# Patient Record
Sex: MALE | Race: WHITE | NOT HISPANIC OR LATINO | Employment: OTHER | URBAN - METROPOLITAN AREA
[De-identification: names, ages, dates, MRNs, and addresses within clinical notes are randomized per-mention and may not be internally consistent; named-entity substitution may affect disease eponyms.]

---

## 2017-01-17 ENCOUNTER — HOSPITAL ENCOUNTER (OUTPATIENT)
Dept: RADIOLOGY | Facility: HOSPITAL | Age: 67
Discharge: HOME | End: 2017-01-17
Attending: INTERNAL MEDICINE
Payer: COMMERCIAL

## 2017-01-17 ENCOUNTER — OFFICE VISIT (OUTPATIENT)
Dept: CARDIOLOGY | Facility: CLINIC | Age: 67
End: 2017-01-17
Payer: COMMERCIAL

## 2017-01-17 ENCOUNTER — LAB VISIT (OUTPATIENT)
Dept: LAB | Facility: HOSPITAL | Age: 67
End: 2017-01-17
Payer: COMMERCIAL

## 2017-01-17 VITALS
HEART RATE: 63 BPM | BODY MASS INDEX: 24.08 KG/M2 | DIASTOLIC BLOOD PRESSURE: 74 MMHG | WEIGHT: 187.63 LBS | HEIGHT: 74 IN | SYSTOLIC BLOOD PRESSURE: 159 MMHG

## 2017-01-17 DIAGNOSIS — I27.20 PULMONARY HYPERTENSION: Primary | ICD-10-CM

## 2017-01-17 DIAGNOSIS — Z76.82 ORGAN TRANSPLANT CANDIDATE: ICD-10-CM

## 2017-01-17 DIAGNOSIS — Z76.82 AWAITING ORGAN TRANSPLANT STATUS: ICD-10-CM

## 2017-01-17 PROCEDURE — 99214 OFFICE O/P EST MOD 30 MIN: CPT | Mod: S$GLB,,, | Performed by: INTERNAL MEDICINE

## 2017-01-17 PROCEDURE — 86829 HLA CLASS I/II ANTIBODY QUAL: CPT | Mod: PO

## 2017-01-17 PROCEDURE — 86829 HLA CLASS I/II ANTIBODY QUAL: CPT | Mod: 91,PO

## 2017-01-17 PROCEDURE — 99999 PR PBB SHADOW E&M-EST. PATIENT-LVL IV: CPT | Mod: PBBFAC,,, | Performed by: INTERNAL MEDICINE

## 2017-01-17 PROCEDURE — 71020 XR CHEST PA AND LATERAL: CPT | Mod: 26,,, | Performed by: RADIOLOGY

## 2017-01-17 PROCEDURE — 1159F MED LIST DOCD IN RCRD: CPT | Mod: S$GLB,,, | Performed by: INTERNAL MEDICINE

## 2017-01-17 RX ORDER — CHOLECALCIFEROL (VITAMIN D3) 25 MCG
5000 TABLET ORAL
COMMUNITY
End: 2017-05-09

## 2017-01-17 RX ORDER — LABETALOL 100 MG/1
100 TABLET, FILM COATED ORAL EVERY 12 HOURS
Qty: 60 TABLET | Refills: 11 | Status: SHIPPED | OUTPATIENT
Start: 2017-01-17 | End: 2018-02-05 | Stop reason: SDUPTHER

## 2017-01-17 NOTE — PROGRESS NOTES
Subjective:    Patient ID:  Tom Gage is a 66 y.o. male who presents for evaluation of PA Pressure of 65; Pre-op Exam (Kidney transplant); and Medication Management (amlodipine)      HPI Comments: Mr. Gage is a 66 y.o. year old white male with ESRD currently evaluated for a renal transplant. He has advanced kidney disease secondary to Hypertensive nephrosclerosis and diabetic nephropathy.Patient is currently on peritoneal dialysis from Jul 2016( was on hemodialysis for 2 months prior).    He has PMH of CAD when he had NSTEMI in Feb 2012. Left Heart Cath showed 2 lesions of 50% and 20%. Medical management was advised. In addition he had diastolic heart failure in 2012.    He was hospitalized for a pneumonia in April and had recurrence of similar symptoms around Thanksgiving 2016. He has persistent right sided pleural effusion that has never been tapped( thoracentesis).  He saw renal transplant team on 12/16 and also had a 2 D Echo done that showed elevated PA pressure - estimate 65. Hence referred for further cardiac evaluation. He denies any chest pain, SOB, palpitations. Denies orthopnea. Physically very active and no limitations.      Past Medical History   Diagnosis Date    Anemia of chronic renal failure, stage 5     CAD (coronary artery disease)     CHF (congestive heart failure)     CKD (chronic kidney disease) stage 5, GFR less than 15 ml/min     Diastolic dysfunction 12/16/2016    GERD (gastroesophageal reflux disease)     Hyperparathyroidism, secondary renal     Hypertension     Metabolic acidosis     MI (myocardial infarction) Feb 2012    Pulmonary hypertension 12/16/2016    TIA (transient ischemic attack)     Type 2 diabetes mellitus with diabetic nephropathy      Past Surgical History   Procedure Laterality Date    Cholecystectomy  July 2010    Eye surgery Bilateral      bilateral cataracts    Gastric bypass  May 2014    Gastric sleeve  June 2013     Malfunctioned requiring  bypass    Shoulder arthroscopy Left 11/18/14     RCR; glenoid labral repair; arch decompression    Elbow surgery Left 3/18/14     anterior submuscular transposition, ulnar nerve    Basal cell carcinoma excision Left Jan 2011     left forehead    Rotator cuff repair Left 2014    Hernia repair      Abdominal surgery       PD catheter     Current Outpatient Prescriptions on File Prior to Visit   Medication Sig Dispense Refill    amlodipine (NORVASC) 10 MG tablet Take 10 mg by mouth once daily. As needed      calcitRIOL (ROCALTROL) 0.25 MCG Cap once daily.       FOLIC ACID/VIT BCOMP,C (RENAL-CARLOS ORAL) Take 1 tablet by mouth once daily.      hyoscyamine (LEVSIN/SL) 0.125 mg Subl Place 0.125 mg under the tongue every 4 (four) hours as needed (for dry heaving).       nitroGLYCERIN (NITROSTAT) 0.4 MG SL tablet Place 0.4 mg under the tongue every 5 (five) minutes as needed for Chest pain.      ondansetron (ZOFRAN-ODT) 4 MG TbDL Take 2 tablets (8 mg total) by mouth every 8 (eight) hours as needed (Nausea). 21 tablet 0    pantoprazole (PROTONIX) 40 MG tablet Take 40 mg by mouth 2 (two) times daily.       sevelamer carbonate (RENVELA) 800 mg Tab Take 800 mg by mouth 3 (three) times daily.      sodium bicarbonate 325 MG tablet 3 (three) times daily.   10    tamsulosin (FLOMAX) 0.4 mg Cp24 TAKE ONE CAPSULE BY MOUTH EVERY EVENING 90 capsule 1    [DISCONTINUED] LABETALOL HCL (LABETALOL ORAL) Take 100 mg by mouth once daily.      [DISCONTINUED] VITAMIN D2 50,000 unit capsule TAKE 1 CAPSULE BY MOUTH EVERY 7 DAYS 12 capsule 1     No current facility-administered medications on file prior to visit.      Review of patient's allergies indicates:   Allergen Reactions    Latex, natural rubber Rash     Latex tape causes blisters    Adhesive Dermatitis    Morphine Itching and Hives     Body Rash/ Reddness  Phlebitis and itching     Ace inhibitors      Other reaction(s): COUGH       Review of Systems   Constitution:  Negative for diaphoresis and malaise/fatigue.   HENT: Negative.    Eyes: Negative for blurred vision, double vision and photophobia.   Cardiovascular: Negative for chest pain, cyanosis, dyspnea on exertion, leg swelling, near-syncope, orthopnea and palpitations.   Respiratory: Negative for cough and shortness of breath.    Endocrine: Negative.    Hematologic/Lymphatic: Negative.    Skin: Negative.    Musculoskeletal: Negative for joint pain and joint swelling.   Gastrointestinal: Negative for abdominal pain and anorexia.   Genitourinary: Negative for decreased libido.   Neurological: Negative for dizziness and focal weakness.   Psychiatric/Behavioral: Negative.         Objective:    Physical Exam   Constitutional: He is oriented to person, place, and time. He appears well-developed and well-nourished. No distress.   HENT:   Head: Normocephalic and atraumatic.   Eyes: Conjunctivae and EOM are normal. Pupils are equal, round, and reactive to light. Right eye exhibits no discharge. Left eye exhibits no discharge. No scleral icterus.   Neck: Normal range of motion. Neck supple. No JVD present. No tracheal deviation present. No thyromegaly present.   Cardiovascular: Normal rate, regular rhythm and intact distal pulses.  Exam reveals no gallop and no friction rub.    Murmur heard.  AV fistula in left arm   Pulmonary/Chest: Effort normal and breath sounds normal. No respiratory distress. He has no wheezes. He has no rales.   Abdominal: Soft. Bowel sounds are normal. He exhibits no distension. There is no tenderness. There is no rebound.   Musculoskeletal: Normal range of motion. He exhibits edema.   Neurological: He is alert and oriented to person, place, and time. No cranial nerve deficit. Coordination normal.   Skin: Skin is warm and dry. He is not diaphoretic. No erythema.   Psychiatric: He has a normal mood and affect. His behavior is normal.   Vitals reviewed.    Visit Vitals    BP (!) 159/74 (BP Location: Right arm,  "Patient Position: Sitting, BP Method: Automatic)    Pulse 63    Ht 6' 2" (1.88 m)    Wt 85.1 kg (187 lb 9.8 oz)    BMI 24.09 kg/m2   TTE 12/16/16  CONCLUSIONS     1 - Normal left ventricular systolic function (EF 55-60%).     2 - Eccentric hypertrophy.     3 - Left ventricular diastolic dysfunction.     4 - Severe left atrial enlargement.     5 - Normal right ventricular systolic function .     6 - Mild aortic stenosis, ARMIN = 1.79 cm2, peak velocity = 2.5 m/s, mean gradient = 13.0 mmHg.     7 - Trivial aortic regurgitation.     8 - Mild to moderate mitral regurgitation.     9 - Trivial to mild tricuspid regurgitation.     10 - Trivial pulmonic regurgitation.     11 - Trivial pericardial effusion with thickened pericardium .     12 - Pulmonary hypertension. The estimated PA systolic pressure is 65 mmHg.   TTE 12/21/15  CONCLUSIONS     1 - Concentric hypertrophy.     2 - Normal left ventricular systolic function (EF 60-65%).     3 - Left ventricular diastolic dysfunction.     4 - Mild left atrial enlargement.     5 - Normal right ventricular systolic function .     6 - The estimated PA systolic pressure is 37 mmHg.     7 - Mild tricuspid regurgitation           Assessment:       1. Pulmonary hypertension    2       ESRD  3       CAD- Previous NSTEMI  4.      HTN with nephropathy and retinopathy  5       DM2 with nephropathy and retinopathy  6       S/p gastric bypass surgery     Plan:       66 year old male patient with DM/HTN , ESRD on PD being evaluated for renal transplant candidacy. Found to have elevated PA pressures on trans thoracic echo- 65.  He was euvolemic at the time of echo with IVC was not enlarged suggesting RA pressure of around 3. Patient is clinically euvolemic at this time.   No signs of DULCE- he lost 100 pounds from gastric sleeve surgery and was told that he did not need to be on CPAP.   No prior history of PE/DVT. He worked as a  in [employer] for 40 years and recently retired. He did a " lot of scuba diving while in [place]. These are probably not related to his current PA pressures as it was normal ( 37 on TTE )a year ago on his initial cardiac evaluation for assessment of renal transplant candidacy.     Will get a Right heart cath to confirm elevated PA pressures- Will get a VQ scan, PFTs if elevated PA pressures confirmed and refer to PHTN clinic.  Advised patient to take his blood pressure medications and dialyse himself- Peritoneal dialysis a day before RHC so that he is not fluid overloaded on the RHC day.  He has a right sided pleural effusion- never tapped. With renal transplant candidacy on hold- will refer him to Pulmonary for diagnostic thoracentesis.    D/w Dr Mcmanus.    Henok Burns MD  [phone]

## 2017-01-17 NOTE — MR AVS SNAPSHOT
[facility] Cardiology  [address]  [place] [address]  Phone: [phone]                  Tom Gage   2017 3:00 PM   Office Visit    Description:  Male : 1950   Provider:  Henok Burns MD   Department:  [facility] - Cardiology           Reason for Visit     PA Pressure of 65     Pre-op Exam     Medication Management           Diagnoses this Visit        Comments    Pulmonary hypertension    -  Primary            To Do List           Future Appointments        Provider Department Dept Phone    2017 1:40 PM Henok Burns MD [facility] Cardiology [phone]      Your Future Surgeries/Procedures     2017   Surgery with Julita Wilder MD   [facility]    [address] [facility] [address] [phone]              Goals (5 Years of Data)     None       These Medications        Disp Refills Start End    labetalol (NORMODYNE) 100 MG tablet 60 tablet 11 2017    Take 1 tablet (100 mg total) by mouth every 12 (twelve) hours. - Oral    Pharmacy: [facility] [address] [place], [place] [address] Ph #: [phone]         [facility]     [facility] Nurse Care Line -  Assistance  Registered nurses in the [facility] Center provide clinical advisement, health education, appointment booking, and other advisory services.  Call for this free service at [phone].             Medications           Message regarding Medications     Verify the changes and/or additions to your medication regime listed below are the same as discussed with your clinician today.  If any of these changes or additions are incorrect, please notify your healthcare provider.        CHANGE how you are taking these medications     Start Taking Instead of    labetalol (NORMODYNE) 100 MG tablet LABETALOL HCL (LABETALOL ORAL)    Dosage:   "Take 1 tablet (100 mg total) by mouth every 12 (twelve) hours. Dosage:  Take 100 mg by mouth once daily.    Reason for Change:  Reorder       STOP taking these medications     VITAMIN D2 50,000 unit capsule TAKE 1 CAPSULE BY MOUTH EVERY 7 DAYS           Verify that the below list of medications is an accurate representation of the medications you are currently taking.  If none reported, the list may be blank. If incorrect, please contact your healthcare provider. Carry this list with you in case of emergency.           Current Medications     amlodipine (NORVASC) 10 MG tablet Take 10 mg by mouth once daily. As needed    calcitRIOL (ROCALTROL) 0.25 MCG Cap once daily.     FOLIC ACID/VIT BCOMP,C (RENAL-CARLOS ORAL) Take 1 tablet by mouth once daily.    hyoscyamine (LEVSIN/SL) 0.125 mg Subl Place 0.125 mg under the tongue every 4 (four) hours as needed (for dry heaving).     labetalol (NORMODYNE) 100 MG tablet Take 1 tablet (100 mg total) by mouth every 12 (twelve) hours.    nitroGLYCERIN (NITROSTAT) 0.4 MG SL tablet Place 0.4 mg under the tongue every 5 (five) minutes as needed for Chest pain.    ondansetron (ZOFRAN-ODT) 4 MG TbDL Take 2 tablets (8 mg total) by mouth every 8 (eight) hours as needed (Nausea).    pantoprazole (PROTONIX) 40 MG tablet Take 40 mg by mouth 2 (two) times daily.     sevelamer carbonate (RENVELA) 800 mg Tab Take 800 mg by mouth 3 (three) times daily.    sodium bicarbonate 325 MG tablet 3 (three) times daily.     tamsulosin (FLOMAX) 0.4 mg Cp24 TAKE ONE CAPSULE BY MOUTH EVERY EVENING    vitamin D 1000 units Tab Take 5,000 mg by mouth every 7 days.           Clinical Reference Information           Vital Signs - Last Recorded  Most recent update: 1/17/2017  2:40 PM by Leanna Dubose MA    BP Pulse Ht Wt BMI    (!) 159/74 (BP Location: Right arm, Patient Position: Sitting, BP Method: Automatic) 63 6' 2" (1.88 m) 85.1 kg (187 lb 9.8 oz) 24.09 kg/m2      Blood Pressure          Most Recent Value "    Right Arm BP - Sitting  159/74    Reason for not completing BP on both arms  central line    BP  (!)  159/74      Allergies as of 1/17/2017     Latex, Natural Rubber    Adhesive    Morphine    Ace Inhibitors      Immunizations Administered on Date of Encounter - 1/17/2017     None      Orders Placed During Today's Visit      Normal Orders This Visit    Ambulatory consult to Pulmonology     Case Request-RAD/Other Procedure Area: HEART CATH-RIGHT       Maintenance Dialysis History     Start End Type Comments Center    4/21/2016  [facility] # [id number] [facility]            Current Dialysis Center Information     [facility] [address] [place] Phone #:  [phone]    Contact:  [place]  [id number] Fax #:  [phone]            Transplant Information        Txp Date Organ Coordinator Care Team     Kidney Addy Gant Jr., RN Referring Physician:  Mauricio Frazier MD   Current Nephrologist:  Ekta Cope MD

## 2017-01-17 NOTE — PROGRESS NOTES
Would suggest to Dr Mercer or PCP to refer to Pulmonary Medicine due to pleural effusion and chronic changes in the Lung parenchyma

## 2017-01-18 DIAGNOSIS — I25.10 CORONARY ARTERY DISEASE INVOLVING NATIVE CORONARY ARTERY OF NATIVE HEART WITHOUT ANGINA PECTORIS: Primary | ICD-10-CM

## 2017-01-18 DIAGNOSIS — I27.20 PULMONARY HYPERTENSION: Primary | ICD-10-CM

## 2017-01-18 DIAGNOSIS — I13.0 HYPERTENSIVE HEART AND CHRONIC KIDNEY DISEASE WITH HEART FAILURE AND STAGE 1 THROUGH STAGE 4 CHRONIC KIDNEY DISEASE, OR UNSPECIFIED CHRONIC KIDNEY DISEASE (CODE): ICD-10-CM

## 2017-01-20 ENCOUNTER — SURGERY (OUTPATIENT)
Age: 67
End: 2017-01-20

## 2017-01-20 ENCOUNTER — HOSPITAL ENCOUNTER (OUTPATIENT)
Facility: HOSPITAL | Age: 67
Discharge: HOME | End: 2017-01-20
Attending: INTERNAL MEDICINE | Admitting: INTERNAL MEDICINE
Payer: COMMERCIAL

## 2017-01-20 PROBLEM — I13.0 HYPERTENSIVE HEART AND KIDNEY DISEASE WITH HEART FAILURE: Status: ACTIVE | Noted: 2017-01-20

## 2017-01-20 PROCEDURE — 25000003 PHARM REV CODE 250

## 2017-01-20 PROCEDURE — C1894 INTRO/SHEATH, NON-LASER: HCPCS

## 2017-01-20 PROCEDURE — 93451 RIGHT HEART CATH: CPT | Mod: 26,,, | Performed by: INTERNAL MEDICINE

## 2017-01-20 NOTE — DISCHARGE SUMMARY
[facility]  Discharge Note  Short Stay    Admit Date: 1/20/2017    Discharge Date and Time: 1/20/2017    Attending Physician: Ron Bernstein Jr    Discharge Provider: Ron Bernstein Jr    Diagnoses:  Active Hospital Problems    Diagnosis  POA    *Pulmonary hypertension group 2 [I27.2]  Yes    Hypertensive heart and kidney disease with heart failure [I13.0]  Yes    CKD (chronic kidney disease) stage V requiring chronic dialysis [N18.6, Z99.2]  Not Applicable    Pre-transplant evaluation for chronic kidney disease [Z01.818]  Not Applicable      Resolved Hospital Problems    Diagnosis Date Resolved POA   No resolved problems to display.       Discharged Condition: stable    Hospital Course: Patient was admitted for an outpatient procedure and tolerated the procedure well with no complications.    Final Diagnoses: Same as principal problem.    Disposition: Home or Self Care    Follow up/Patient Instructions:    Medications:  Reconciled Home Medications:   Current Discharge Medication List      CONTINUE these medications which have NOT CHANGED    Details   calcitRIOL (ROCALTROL) 0.25 MCG Cap once daily.       FOLIC ACID/VIT BCOMP,C (RENAL-CARLOS ORAL) Take 1 tablet by mouth once daily.      hyoscyamine (LEVSIN/SL) 0.125 mg Subl Place 0.125 mg under the tongue every 4 (four) hours as needed (for dry heaving).       labetalol (NORMODYNE) 100 MG tablet Take 1 tablet (100 mg total) by mouth every 12 (twelve) hours.  Qty: 60 tablet, Refills: 11      nitroGLYCERIN (NITROSTAT) 0.4 MG SL tablet Place 0.4 mg under the tongue every 5 (five) minutes as needed for Chest pain.      ondansetron (ZOFRAN-ODT) 4 MG TbDL Take 2 tablets (8 mg total) by mouth every 8 (eight) hours as needed (Nausea).  Qty: 21 tablet, Refills: 0      pantoprazole (PROTONIX) 40 MG tablet Take 40 mg by mouth 2 (two) times daily.       sevelamer carbonate (RENVELA) 800 mg Tab Take 800 mg by mouth 3 (three) times daily.      sodium  bicarbonate 325 MG tablet 3 (three) times daily.   Refills: 10      tamsulosin (FLOMAX) 0.4 mg Cp24 TAKE ONE CAPSULE BY MOUTH EVERY EVENING  Qty: 90 capsule, Refills: 1    Comments: **Patient requests 90 days supply**      vitamin D 1000 units Tab Take 5,000 mg by mouth every 7 days.           No discharge procedures on file.  Follow-up Information     Follow up with Prudence Mcmanus MD. Call in 3 days.    Specialty:  Cardiology    Contact information:    [facility] [address] [phone]          Resume previous diet and activity; continue f/u with your physicians as directed prior to this procedure

## 2017-01-20 NOTE — OP NOTE
RHC Lab Post Procedure Note    Patient tolerated the procedure well.   Group 2 PH with normal PVR and high CO due to AV fistula  There were no complications.   Please see full report in [facility] for details.

## 2017-01-20 NOTE — DISCHARGE INSTRUCTIONS
AFTER THE PROCEDURE:   -DO NOT DRINK OR EAT ANYTHING UNTIL NO LONGER HOARSE   -You may remove the bandage in 24 hours and wash with soap and water.   -You may shower, but do not soak in a tub for three days.   PRECAUTIONS FOR THE NEXT 24 HOURS:   -If you need to cough, sneeze, have a bowel movement, or bear down, hold pressure over your bandage.   -Do not  anything heavier than a gallon of milk(about 5 pounds)   -Avoid excessive bending over.   SYMPTOMS TO WATCH FOR AND REPORT TO YOUR DOCTOR:   -BLEEDING: hold pressure over the site until bleeding stops. Proceed to Emergency Room by ambulance (do not drive yourself) if unable to stop bleeding. Notify your doctor.   -HEMATOMA(hard bruise under the skin): Antoine around the bruise if one develops. Call your doctor if it increases in size or if you have difficulty talking, swallowing, breathing or anything unusual.   SIGNS OF INFECTION:Fever (temperature over 100.5 F), pus or redness   -RASH   -CHEST PAIN OR SHORTNESS OF BREATH   You may call you coordinator in the Heart Failure/Heart Transplant/Pulmonary Hypertension Clinic at [phone] during normal business hours(Monday through Friday from 8 A.M. to 5 P.M.) After hours, call the Heart Transplant Service doctor on call at [phone].

## 2017-01-20 NOTE — H&P
66 y.o. year old white male referred for RHC by Dr. VIN Mcmanus has hx of ESRD on PD and undergoing evaluation for renal transplant. Hx includes hypertensive nephrosclerosis, DM, diabetic nephropathy, CAD with NSTEMI in Feb 2012. Left Heart Cath with mild CAD.  He feels well today and ready for procedure.    Past History and Operation reviewed  Meds reviewed  Allergies reviewed  Vital signs per nurse's notes  JVP not elevated sitting  S1 and S2 normal.  No gallop. No murmur. No rub  Lungs: clear  Extr: Moderate edema    Lab Results   Component Value Date     01/20/2017     01/20/2017    K 4.9 01/20/2017    K 4.9 01/20/2017    MG 2.1 04/07/2016     01/20/2017     01/20/2017    CO2 18 (L) 01/20/2017    CO2 18 (L) 01/20/2017    BUN 77 (H) 01/20/2017    BUN 77 (H) 01/20/2017    CREATININE 9.3 (H) 01/20/2017    CREATININE 9.3 (H) 01/20/2017     01/20/2017     01/20/2017    AST 23 01/20/2017    AST 23 01/20/2017    ALT 40 01/20/2017    ALT 40 01/20/2017    ALBUMIN 3.0 (L) 01/20/2017    ALBUMIN 3.0 (L) 01/20/2017    PROT 5.7 (L) 01/20/2017    PROT 5.7 (L) 01/20/2017    BILITOT 0.6 01/20/2017    BILITOT 0.6 01/20/2017    WBC 5.25 01/20/2017    WBC 5.25 01/20/2017    HGB 11.5 (L) 01/20/2017    HGB 11.5 (L) 01/20/2017    HCT 34.3 (L) 01/20/2017    HCT 34.3 (L) 01/20/2017     (L) 01/20/2017     (L) 01/20/2017    INR 1.1 01/20/2017    INR 1.1 01/20/2017    TSH 4.653 (H) 09/20/2015    CHOL 131 12/15/2015    HDL 36 (L) 12/15/2015    LDLCALC 73.6 12/15/2015    TRIG 107 12/15/2015       12/16/16 ECHO CONCLUSIONS     1 - Normal left ventricular systolic function (EF 55-60%).     2 - Eccentric hypertrophy.     3 - Left ventricular diastolic dysfunction.     4 - Severe left atrial enlargement.     5 - Normal right ventricular systolic function .     6 - Mild aortic stenosis, ARMIN = 1.79 cm2, peak velocity = 2.5 m/s, mean gradient = 13.0 mmHg.     7 - Trivial aortic regurgitation.     8 -  Mild to moderate mitral regurgitation.     9 - Trivial to mild tricuspid regurgitation.     10 - Trivial pulmonic regurgitation.     11 - Trivial pericardial effusion with thickened pericardium .     12 - Pulmonary hypertension. The estimated PA systolic pressure is 65 mmHg.     13 - Anterior pericardial density as per text.     ASSES:  PH on ECHO  CKD on PD  CAD  Hx HFpEF  HYPERTENSIVE CARDIOVASCULAR-RENAL DISEASE    PLAN:  Proceed with RHC as planned  This procedure has been fully reviewed with the patient and written informed consent has been obtained.

## 2017-01-20 NOTE — IP AVS SNAPSHOT
[facility]  [address]  [facility] [address]  Phone: [phone]           Patient Discharge Instructions     Our goal is to set you up for success. This packet includes information on your condition, medications, and your home care. It will help you to care for yourself so you don't get sicker and need to go back to the hospital.     Please ask your nurse if you have any questions.        There are many details to remember when preparing to leave the hospital. Here is what you will need to do:    1. Take your medicine. If you are prescribed medications, review your Medication List in the following pages. You may have new medications to  at the pharmacy and others that you'll need to stop taking. Review the instructions for how and when to take your medications. Talk with your doctor or nurses if you are unsure of what to do.     2. Go to your follow-up appointments. Specific follow-up information is listed in the following pages. Your may be contacted by a transition nurse or clinical provider about future appointments. Be sure we have all of the phone numbers to reach you, if needed. Please contact your provider's office if you are unable to make an appointment.     3. Watch for warning signs. Your doctor or nurse will give you detailed warning signs to watch for and when to call for assistance. These instructions may also include educational information about your condition. If you experience any of warning signs to your health, call your doctor.               [facility]  Unless otherwise directed by your provider, please contact [facility], our nurse care line that is available for 24/7 assistance.     [phone] (toll-free)    Registered nurses in the [facility] provide clinical advisement, health education, appointment booking, and other advisory services.                    ** Verify the list of medication(s) below is accurate and up  to date. Carry this with you in case of emergency. If your medications have changed, please notify your healthcare provider.             Medication List      CONTINUE taking these medications        Additional Info                      calcitRIOL 0.25 MCG Cap   Commonly known as:  ROCALTROL   Refills:  0    Instructions:  once daily.     Begin Date    AM    Noon    PM    Bedtime       hyoscyamine 0.125 mg Subl   Commonly known as:  LEVSIN/SL   Refills:  0   Dose:  0.125 mg    Instructions:  Place 0.125 mg under the tongue every 4 (four) hours as needed (for dry heaving).     Begin Date    AM    Noon    PM    Bedtime       labetalol 100 MG tablet   Commonly known as:  NORMODYNE   Quantity:  60 tablet   Refills:  11   Dose:  100 mg    Instructions:  Take 1 tablet (100 mg total) by mouth every 12 (twelve) hours.     Begin Date    AM    Noon    PM    Bedtime       nitroGLYCERIN 0.4 MG SL tablet   Commonly known as:  NITROSTAT   Refills:  0   Dose:  0.4 mg    Instructions:  Place 0.4 mg under the tongue every 5 (five) minutes as needed for Chest pain.     Begin Date    AM    Noon    PM    Bedtime       ondansetron 4 MG Tbdl   Commonly known as:  ZOFRAN-ODT   Quantity:  21 tablet   Refills:  0   Dose:  8 mg    Instructions:  Take 2 tablets (8 mg total) by mouth every 8 (eight) hours as needed (Nausea).     Begin Date    AM    Noon    PM    Bedtime       pantoprazole 40 MG tablet   Commonly known as:  PROTONIX   Refills:  0   Dose:  40 mg    Instructions:  Take 40 mg by mouth 2 (two) times daily.     Begin Date    AM    Noon    PM    Bedtime       RENAL-CARLOS ORAL   Refills:  0   Dose:  1 tablet    Instructions:  Take 1 tablet by mouth once daily.     Begin Date    AM    Noon    PM    Bedtime       RENVELA 800 mg Tab   Refills:  0   Dose:  800 mg   Generic drug:  sevelamer carbonate    Instructions:  Take 800 mg by mouth 3 (three) times daily.     Begin Date    AM    Noon    PM    Bedtime       sodium bicarbonate 325 MG  tablet   Refills:  10    Instructions:  3 (three) times daily.     Begin Date    AM    Noon    PM    Bedtime       tamsulosin 0.4 mg Cp24   Commonly known as:  FLOMAX   Quantity:  90 capsule   Refills:  1   Comments:  **Patient requests 90 days supply**    Instructions:  TAKE ONE CAPSULE BY MOUTH EVERY EVENING     Begin Date    AM    Noon    PM    Bedtime       vitamin D 1000 units Tab   Refills:  0   Dose:  5000 mg    Instructions:  Take 5,000 mg by mouth every 7 days.     Begin Date    AM    Noon    PM    Bedtime                  Please bring to all follow up appointments:    1. A copy of your discharge instructions.  2. All medicines you are currently taking in their original bottles.  3. Identification and insurance card.    Please arrive 15 minutes ahead of scheduled appointment time.    Please call 24 hours in advance if you must reschedule your appointment and/or time.        Your Scheduled Appointments     Feb 08, 2017 10:00 AM CST   Consult with MD Jordan Galvin jori - Pulmonary Services ([facility] )    [address]  [place] [id number] [phone]            Feb 21, 2017  1:40 PM CST   Established Patient Visit with MD Jordan Urbina jori - Cardiology ([facility] )    [address]  [place] [address] [phone]              Follow-up Information     Follow up with Prudence Mcmanus MD. Call in 3 days.    Specialty:  Cardiology    Contact information:    [address] [facility] [id number] [phone]            Discharge Instructions       AFTER THE PROCEDURE:   -DO NOT DRINK OR EAT ANYTHING UNTIL NO LONGER HOARSE   -You may remove the bandage in 24 hours and wash with soap and water.   -You may shower, but do not soak in a tub for three days.   PRECAUTIONS FOR THE NEXT 24 HOURS:   -If you need to cough, sneeze, have a bowel movement, or bear down, hold pressure over your bandage.   -Do not  anything heavier than a gallon of  milk(about 5 pounds)   -Avoid excessive bending over.   SYMPTOMS TO WATCH FOR AND REPORT TO YOUR DOCTOR:   -BLEEDING: hold pressure over the site until bleeding stops. Proceed to Emergency Room by ambulance (do not drive yourself) if unable to stop bleeding. Notify your doctor.   -HEMATOMA(hard bruise under the skin): Antoine around the bruise if one develops. Call your doctor if it increases in size or if you have difficulty talking, swallowing, breathing or anything unusual.   SIGNS OF INFECTION:Fever (temperature over 100.5 F), pus or redness   -RASH   -CHEST PAIN OR SHORTNESS OF BREATH   You may call you coordinator in the Heart Failure/Heart Transplant/Pulmonary Hypertension Clinic at [phone] during normal business hours(Monday through Friday from 8 A.M. to 5 P.M.) After hours, call the Heart Transplant Service doctor on call at [phone].          Primary Diagnosis     Your primary diagnosis was:  Pulmonary Hypertension      Admission Information     Date & Time Provider Department [facility]    1/20/2017 10:10 AM Julita Wilder MD [facility] [id number]      Care Providers     Provider Role Specialty Primary office phone    Julita Wilder MD Attending Provider Cardiology [phone]      Recent Lab Values     No lab values to display.      Allergies as of 1/20/2017        Reactions    Latex, Natural Rubber Rash    Latex tape causes blisters    Adhesive Dermatitis    Morphine Itching, Hives    Body Rash/ Reddness  Phlebitis and itching     Ace Inhibitors     Other reaction(s): COUGH      Advance Directives     An advance directive is a document which, in the event you are no longer able to make decisions for yourself, tells your healthcare team what kind of treatment you do or do not want to receive, or who you would like to make those decisions for you.  If you do not currently have an advance directive, [facility] encourages you to create one.  For more information call:  [phone]  [phone]), [phone] ([phone]),  or log on to [email or web address].        Language Assistance Services     ATTENTION: Language assistance services are available, free of charge. Please call [phone].      ATENCIÓN: Si antonyla elodia, tiene a rosas disposición servicios gratuitos de asistencia lingüística. Llame al [phone].     CHÚ Ý: N?u b?n nói Ti?ng Vi?t, có các d?ch v? h? tr? ngôn ng? mi?n phí dành cho b?n. G?i s? [phone].        Heart Failure Education       Heart Failure: Being Active  You have a condition called heart failure. Being active doesnt mean that you have to wear yourself out. Even a little movement each day helps to strengthen your heart. If you cant get out to exercise, you can do simple stretching and strengthening exercises at home. These are good ways to keep you well-conditioned and prevent you and your heart from becoming excessively weak.    Ideas to get you started  · Add a little movement to things you do now. Walk to mail letters. Park your car at the far end of the parking lot and walk to the store. Walk up a flight of stairs instead of taking the elevator.  · Choose activities you enjoy. You might walk, swim, or ride an exercise bike. Things like gardening and washing the car count, too. Other possibilities include: washing dishes, walking the dog, walking around the mall, and doing aerobic activities with friends.  · Join a group exercise program at a [facility] or [facility], a senior center, or a community center. Or look into a hospital cardiac rehabilitation program. Ask your doctor if you qualify.  Tips to keep you going  · Get up and get dressed each day. Go to a coffee shop and read a newspaper or go somewhere that you'll be in the presence of other active people. Youll feel more like being active.  · Make a plan. Choose one or more activities that you enjoy and that you can easily do. Then plan to do at least one each day. You might write your plan on a  calendar.  · Go with a friend or a group if you like company. This can help you feel supported and stay motivated, too.  · Plan social events that you enjoy. This will keep you mentally engaged as well as physically motivated to do things you find pleasure in.  For your safety  · Talk with your healthcare provider before starting an exercise program.  · Exercise indoors when its too hot or too cold outside, or when the air quality is poor. Try walking at a shopping mall.  · Wear socks and sturdy shoes to maintain your balance and prevent falls.  · Start slowly. Do a few minutes several times a day at first. Increase your time and speed little by little.  · Stop and rest whenever you feel tired or get short of breath.  · Dont push yourself on days when you dont feel well.  © 5363-2880 [employer]. [address] [place] [address]. All rights reserved. This information is not intended as a substitute for professional medical care. Always follow your healthcare professional's instructions.              Heart Failure: Evaluating Your Heart  You have a condition called heart failure. To evaluate your condition, your doctor will examine you, ask questions, and do some tests. Along with looking for signs of heart failure, the doctor looks for any other health problems that may have led to heart failure. The results of your evaluation will help your doctor form a treatment plan.  Health history and physical exam  Your visit will start with a health history. Tell the doctor about any symptoms youve noticed and about all medicines you take. Then youll have a physical exam. This includes listening to your heartbeat and breathing. Youll also be checked for swelling (edema) in your legs and neck. When you have fluid buildup or fluid in the lungs, it may be called congestive heart failure.  Diagnosing heart failure     During an echocardiogram, sound waves bounce off the heart. These are converted  into a picture on the screen.   The following may be done to help your doctor form a diagnosis:  · X-rays show the size and shape of your heart. These pictures can also show fluid in your lungs.  · An electrocardiogram (ECG or EKG) shows the pattern of your heartbeat. Small pads (electrodes) are placed on your chest, arms, and legs. Wires connect the pads to the ECG machine, which records your hearts electrical signals. This can give the doctor information about heart function.  · An echocardiogram uses ultrasound waves to show the structure and movement of your heart muscle. This shows how well the heart pumps. It also shows the thickness of the heart walls, and if the heart is enlarged. It is one of the most useful, non-invasive tests as it provides information about the heart's general function. This helps your doctor make treatment decisions.  · Lab tests evaluate small amounts of blood or urine for signs of problems. A BNP lab test can help diagnose and evaluate heart failure. BNP stands for B-type natriuretic peptide. The ventricles secrete more BNP when heart failure worsens. Lab tests can also provide information about metabolic dysfunction or heart dysfunction.  Your treatment plan  Based on the results of your evaluation and tests, your doctor will develop a treatment plan. This plan is designed to relieve some of your heart failure symptoms and help make you more comfortable. Your treatment plan may include:  · Medicine to help your heart work better and improve your quality of life  · Changes in what you eat and drink to help prevent fluid from backing up in your body  · Daily monitoring of your weight and heart failure symptoms to see how well your treatment plan is working  · Exercise to help you stay healthy  · Help with quitting smoking  · Emotional and psychological support to help adjust to the changes  · Referrals to other specialists to make sure you are being treated comprehensively  ©  6875-2887 [employer]. [address] [place] [address], [place] [address]. All rights reserved. This information is not intended as a substitute for professional medical care. Always follow your healthcare professional's instructions.              Heart Failure: Making Changes to Your Diet  You have a condition called heart failure. When you have heart failure, excess fluid is more likely to build up in your body because your heart isn't working well. This makes the heart work harder to pump blood. Fluid buildup causes symptoms such as shortness of breath and swelling (edema). This is often referred to as congestive heart failure or CHF. Controlling the amount of salt (sodium) you eat may help stop fluid from building up. Your doctor may also tell you to reduce the amount of fluid you drink.  Reading food labels    Your healthcare provider will tell you how much sodium you can eat each day. Read food labels to keep track. Keep in mind that certain foods are high in salt. These include canned, frozen, and processed foods. Check the amount of sodium in each serving. Watch out for high-sodium ingredients. These include MSG (monosodium glutamate), baking soda, and sodium phosphate.   Eating less salt  Give yourself time to get used to eating less salt. It may take a little while. Here are some tips to help:  · Take the saltshaker off the table. Replace it with salt-free herb mixes and spices.  · Eat fresh or plain frozen vegetables. These have much less salt than canned vegetables.  · Choose low-sodium snacks like sodium-free pretzels, crackers, or air-popped popcorn.  · Dont add salt to your food when youre cooking. Instead, season your foods with pepper, lemon, garlic, or onion.  · When you eat out, ask that your food be cooked without added salt.  · Avoid eating fried foods as these often have a great deal of salt.  If youre told to limit fluids  You may need to limit how much fluid you have to help prevent  swelling. This includes anything that is liquid at room temperature, such as ice cream and soup. If your doctor tells you to limit fluid, try these tips:  · Measure drinks in a measuring cup before you drink them. This will help you meet daily goals.  · Chill drinks to make them more refreshing.  · Suck on frozen lemon wedges to quench thirst.  · Only drink when youre thirsty.  · Chew sugarless gum or suck on hard candy to keep your mouth moist.  · Weigh yourself daily to know if your body's fluid content is rising.  My sodium goal  Your healthcare provider may give you a sodium goal to meet each day. This includes sodium found in food as well as salt that you add. My goal is to eat no more than ___________ mg of sodium per day.     When to call your doctor  Call your doctor right away if you have any symptoms of worsening heart failure. These can include:  · Sudden weight gain  · Increased swelling of your legs or ankles  · Trouble breathing when youre resting or at night  · Increase in the number of pillows you have to sleep on  · Chest pain, pressure, discomfort, or pain in the jaw, neck, or back   © 2840-6233 [employer]. [address] [place] [address], [place] [address]. All rights reserved. This information is not intended as a substitute for professional medical care. Always follow your healthcare professional's instructions.              Heart Failure: Medicines to Help Your Heart    You have a condition called heart failure (also known as congestive heart failure, or CHF). Your doctor will likely prescribe medicines for heart failure and any underlying health problems you have. Most heart failure patients take one or more types of medicinen. Your healthcare provider will work to find the combination of medicines that works best for you.  Heart failure medicines  Here are the most common heart failure medicines:  · ACE inhibitors lower blood pressure and decrease strain on the heart. This makes it  easier for the heart to pump. Angiotensin receptor blockers have similar effects. These are prescribed for some patients instead of ACE inhibitors.  · Beta-blockers relieve stress on the heart. They also improve symptoms. They may also improve the heart's pumping action over time.  · Diuretics (also called water pills) help rid your body of excess water. This can help rid your body of swelling (edema). Having less fluid to pump means your heart doesnt have to work as hard. Some diuretics make your body lose a mineral called potassium. Your doctor will tell you if you need to take supplements or eat more foods high in potassium.  · Digoxin helps your heart pump with more strength. This helps your heart pump more blood with each beat. So, more oxygen-rich blood travels to the rest of the body.  · Aldosterone antagonists help alter hormones and decrease strain on the heart.  · Hydralazine and nitrates are two separate medicines used together to treat heart failure. They may come in one combination pill. They lower blood pressure and decrease how hard the heart has to pump.  Medicines for related conditions  Controlling other heart problems helps keep heart failure under control, too. Depending on other heart problems you have, medicines may be prescribed to:  · Lower blood pressure (antihypertensives).  · Lower cholesterol levels (statins).  · Prevent blood clots (anticoagulants or aspirin).  · Keep the heartbeat steady (antiarrhythmics).  © 0559-7774 [employer]. [address] [place] [address], [place] [address]. All rights reserved. This information is not intended as a substitute for professional medical care. Always follow your healthcare professional's instructions.              Heart Failure: Procedures That May Help    The heart is a muscle that pumps oxygen-rich blood to all parts of the body. When you have heart failure, the heart is not able to pump as well as it should. Blood and fluid may back up  into the lungs (congestive heart failure), and some parts of the body dont get enough oxygen-rich blood to work normally. These problems lead to the symptoms of heart failure.     Certain procedures may help the heart pump better in some cases of heart failure. Some procedures are done to treat health problems that may have caused the heart failure such as coronary artery disease or heart rhythm problems. For more serious heart failure, other options are available.  Treating artery and valve problems  If you have coronary artery disease or valve disease, procedures may be done to improve blood flow. This helps the heart pump better, which can improve heart failure symptoms. First, your doctor may do a cardiac catheterization to help detect clogged blood vessels or valve damage. During this procedure, a  thin tube (catheter) in inserted into a blood vessel and guided to the heart. There a dye is injected and a special type of X-ray (angiogram) is taken of the blood vessels. Procedures to open a blocked artery or fix damaged valves can also be done using catheterization.  · Angioplasty uses a balloon-tipped instrument at the end of the catheter. The balloon is inflated to widen the narrowed artery. In many cases, a stent is expanded to further support the narrowed artery. A stent is a metal mesh tube.  · Valve surgery repairs or replacement of faulty valves can also be done during catheterization so blood can flow properly through the chambers of the heart.  Bypass surgery is another option to help treat blocked arteries. It uses a healthy blood vessel from elsewhere in the body. The healthy blood vessel is attached above and below the blocked area so that blood can flow around the blocked artery.  Treating heart rhythm problems  A device may be placed in the chest to help a weak heart maintain a healthy, heartbeat so the heart can pump more effectively:  · Pacemaker. A pacemaker is an implanted device that regulates  your heartbeat electronically. It monitors your heart's rhythm and generates a painless electric impulse that helps the heart beat in a regular rhythm. A pacemaker is programmed to meet your specific heart rhythm needs.  · Biventricular pacing/cardiac resynchronization therapy. A type of pacemaker that paces both pumping chambers of the heart at the same time to coordinate contractions and to improve the heart's function. Some people with heart failure are candidates for this therapy.  · Implantable cardioverter defibrillator. A device similar to a pacemaker that senses when the heart is beating too fast and delivers an electrical shock to convert the fast rhythm to a normal rhythm. This can be a life saving device.  In severe cases  In more serious cases of heart failure when other treatments no longer work, other options may include:  · Ventricular assist devices (VADs). These are mechanical devices used to take over the pumping function for one or both of the heart's ventricles, or pumping chambers. A VAD may be necessary when heart failure progresses to the point that medicines and other treatments no longer help. In some cases, a VAD may be used as a bridge to transplant.  · Heart transplant. This is replacing the diseased heart with a healthy one from a donor. This is an option for a few people who are very sick. A heart transplant is very serious and not an option for all patients. Your doctor can tell you more.  © 8659-8592 [employer]. [address] [place] [address], [place] [address]. All rights reserved. This information is not intended as a substitute for professional medical care. Always follow your healthcare professional's instructions.              Heart Failure: Tracking Your Weight  You have a condition called heart failure. When you have heart failure, a sudden weight gain or a steady rise in weight is a warning sign that your body is retaining too much water and salt. This could mean your  heart failure is getting worse. If left untreated, it can cause problems for your lungs and result in shortness of breath. Weighing yourself each day is the best way to know if youre retaining water. If your weight goes up quickly, call your doctor. You will be given instructions on how to get rid of the excess water. You will likely need medicines and to avoid salt. This will help your heart work better.  Call your doctor if you gain more than 2 pounds in 1 day, more than 5 pounds in 1 week, or whatever weight gain you were told to report by your doctor. This is often a sign of worsening heart failure and needs to be evaluated and treated. Your doctor will tell you what to do next.   Tips for weighing yourself    · Weigh yourself at the same time each morning, wearing the same clothes. Weigh yourself after urinating and before eating.  · Use the same scale each day. Make sure the numbers are easy to read. Put the scale on a flat, hard surface -- not on a rug or carpet.  · Do not stop weighing yourself. If you forget one day, weigh again the next morning.  How to use your weight chart  · Keep your weight chart near the scale. Write your weight on the chart as soon as you get off the scale.  · Fill in the month and the start date on the chart. Then write down your weight each day. Your chart will look like this:    · If you miss a day, leave the space blank. Weigh yourself the next day and write your weight in the next space.  · Take your weight chart with you when you go to see your doctor.  © 9980-7215 [email or web address]. [address] [place] [address], [place] [address]. All rights reserved. This information is not intended as a substitute for professional medical care. Always follow your healthcare professional's instructions.              Heart Failure: Warning Signs of a Flare-Up  You have a condition called heart failure. Once you have heart failure, flare-ups can happen. Below are signs that can mean your  heart failure is getting worse. If you notice any of these warning signs, call your healthcare provider.  Swelling    · Your feet, ankles, or lower legs get puffier.  · You notice skin changes on your lower legs.  · Your shoes feel too tight.  · Your clothes are tighter in the waist.  · You have trouble getting rings on or off your fingers.  Shortness of breath  · You have to breathe harder even when youre doing your normal activities or when youre resting.  · You are short of breath walking up stairs or even short distances.  · You wake up at night short of breath or coughing.  · You need to use more pillows or sit up to sleep.  · You wake up tired or restless.  Other warning signs  · You feel weaker, dizzy, or more tired.  · You have chest pain or changes in your heartbeat.  · You have a cough that wont go away.  · You cant remember things or dont feel like eating.  Tracking your weight  Gaining weight is often the first warning sign that heart failure is getting worse. Gaining even a few pounds can be a sign that your body is retaining excess water and salt. Weighing yourself each day in the morning after you urinate and before you eat, is the best way to know if you're retaining water. Get a scale that is easy to read and make sure you wear the same clothes and use the same scale every time you weigh. Your healthcare provider will show you how to track your weight. Call your doctor if you gain more than 2 pounds in 1 day, 5 pounds in 1 week, or whatever weight gain you were told to report by your doctor. This is often a sign of worsening heart failure and needs to be evaluated and treated before it compromises your breathing. Your doctor will tell you what to do next.    © 0144-0060 [employer]. [address] [place] [address], [place] [address]. All rights reserved. This information is not intended as a substitute for professional medical care. Always follow your healthcare professional's  instructions.              Pneumonmia Discharge Instructions                Chronic Kindey Disease Education             Diabetes Discharge Instructions                                    [facility] complies with applicable Federal civil rights laws and does not discriminate on the basis of race, color, national origin, age, disability, or sex.

## 2017-01-25 ENCOUNTER — TELEPHONE (OUTPATIENT)
Dept: CARDIOLOGY | Facility: CLINIC | Age: 67
End: 2017-01-25

## 2017-01-25 NOTE — TELEPHONE ENCOUNTER
Called patient and updated RHC results and acceptable range for a renal transplant. Also notified renal transplant team about the results. Advised to increase volume of fluid removal in consultation with his primary nephrologist.

## 2017-01-31 LAB — HPRA INTERPRETATION: NORMAL

## 2017-02-08 ENCOUNTER — HOSPITAL ENCOUNTER (OUTPATIENT)
Dept: PULMONOLOGY | Facility: CLINIC | Age: 67
Discharge: HOME | End: 2017-02-08
Payer: COMMERCIAL

## 2017-02-08 ENCOUNTER — OFFICE VISIT (OUTPATIENT)
Dept: PULMONOLOGY | Facility: CLINIC | Age: 67
End: 2017-02-08
Payer: MEDICARE

## 2017-02-08 ENCOUNTER — HOSPITAL ENCOUNTER (OUTPATIENT)
Dept: PULMONOLOGY | Facility: CLINIC | Age: 67
Discharge: HOME | End: 2017-02-08
Payer: MEDICARE

## 2017-02-08 VITALS — WEIGHT: 198 LBS | HEIGHT: 72 IN | BODY MASS INDEX: 26.82 KG/M2

## 2017-02-08 VITALS
BODY MASS INDEX: 25.72 KG/M2 | SYSTOLIC BLOOD PRESSURE: 140 MMHG | WEIGHT: 200.38 LBS | HEART RATE: 60 BPM | HEIGHT: 74 IN | OXYGEN SATURATION: 98 % | DIASTOLIC BLOOD PRESSURE: 64 MMHG

## 2017-02-08 DIAGNOSIS — N18.6 ESRD ON PERITONEAL DIALYSIS: ICD-10-CM

## 2017-02-08 DIAGNOSIS — E87.70 HYPERVOLEMIA, UNSPECIFIED HYPERVOLEMIA TYPE: ICD-10-CM

## 2017-02-08 DIAGNOSIS — J90 PLEURAL EFFUSION: ICD-10-CM

## 2017-02-08 DIAGNOSIS — I50.32 CHRONIC DIASTOLIC HEART FAILURE: ICD-10-CM

## 2017-02-08 DIAGNOSIS — Z99.2 ESRD ON PERITONEAL DIALYSIS: ICD-10-CM

## 2017-02-08 DIAGNOSIS — J90 PLEURAL EFFUSION: Primary | ICD-10-CM

## 2017-02-08 LAB
POST FEV1 FVC: 0.86
POST FEV1: 2.06
POST FVC: 2.4
PRE FEV1 FVC: 85
PRE FEV1: 1.97
PRE FVC: 2.31
PREDICTED FEV1 FVC: 79
PREDICTED FEV1: 3.79
PREDICTED FVC: 4.73

## 2017-02-08 PROCEDURE — 99204 OFFICE O/P NEW MOD 45 MIN: CPT | Mod: 25,NTX,S$GLB, | Performed by: INTERNAL MEDICINE

## 2017-02-08 PROCEDURE — 94620 PR PULMONARY STRESS TESTING,SIMPLE: CPT | Mod: PBBFAC,NTX | Performed by: INTERNAL MEDICINE

## 2017-02-08 PROCEDURE — 99999 PR PBB SHADOW E&M-EST. PATIENT-LVL III: CPT | Mod: PBBFAC,TXP,, | Performed by: INTERNAL MEDICINE

## 2017-02-08 PROCEDURE — 94060 EVALUATION OF WHEEZING: CPT | Mod: NTX,S$GLB,, | Performed by: INTERNAL MEDICINE

## 2017-02-08 PROCEDURE — 94620 PR PULMONARY STRESS TESTING,SIMPLE: CPT | Mod: 26,S$PBB,NTX, | Performed by: INTERNAL MEDICINE

## 2017-02-08 PROCEDURE — 94729 DIFFUSING CAPACITY: CPT | Mod: NTX,S$GLB,, | Performed by: INTERNAL MEDICINE

## 2017-02-08 PROCEDURE — 99213 OFFICE O/P EST LOW 20 MIN: CPT | Mod: PBBFAC,TXP,25 | Performed by: INTERNAL MEDICINE

## 2017-02-08 NOTE — PROGRESS NOTES
"Subjective:       Patient ID: Tom Gage is a 66 y.o. male.  Consult from Dr. Campbell for pleural effusion  Chief Complaint: Pleural Effusion    HPI Comments: 66 year old lifetime nonsmoker with ESRD on initially peritoneal dialysis for which he became dehydrated and could not tolerate it.  Then temporarily went to HD for 6 weeks.  Patient still on PD three times a week.  During the holidays, had an acute viral infection.  Does not always tolerate HD due to cramping.  Feels like he is at a comfortable dry weight, however, he has tissue edema.  Unclear if adherent to fluid restriction.  Drinks gatorade.  Denies any current respiratory symptoms.  Recently started on Lasix.    Review of Systems   Constitutional: Negative for weight loss and weight gain.   HENT: Negative for trouble swallowing.    Eyes: Negative for itching.   Respiratory: Negative for cough, shortness of breath and wheezing.    Cardiovascular: Positive for leg swelling. Negative for palpitations.   Genitourinary: Negative for difficulty urinating and hematuria.   Endocrine: Negative for cold intolerance and heat intolerance.    Gastrointestinal: Negative for acid reflux.   Neurological: Negative for headaches.   Hematological: Negative for adenopathy.   Psychiatric/Behavioral: Negative for confusion.       Objective:       Vitals:    02/08/17 1023   BP: (!) 140/64   Pulse: 60   SpO2: 98%   Weight: 90.9 kg (200 lb 6.4 oz)   Height: 6' 2" (1.88 m)     Physical Exam   Constitutional: He is oriented to person, place, and time. He appears well-developed.   HENT:   Head: Normocephalic.   Right Ear: External ear normal.   Left Ear: External ear normal.   Mouth/Throat: Oropharynx is clear and moist. No oropharyngeal exudate.   Neck: Normal range of motion. Neck supple. No tracheal deviation present. No thyromegaly present.   Cardiovascular: Normal rate and regular rhythm.  Exam reveals no friction rub.    No murmur heard.  Pulmonary/Chest: Normal " expansion. He has no wheezes. He has rales (at right base).   Musculoskeletal: He exhibits edema (2 plus pitting edema).   Lymphadenopathy: No supraclavicular adenopathy is present.     He has no cervical adenopathy.   Neurological: He is alert and oriented to person, place, and time.   Skin: Skin is warm and dry.   Psychiatric: He has a normal mood and affect.   Vitals reviewed.    Personal Diagnostic Review:    1.  Right heart cath:  Summary/Post-Operative Diagnosis      1.   Borderline-mildly elevated right atrial pressure.   2.   Markedly elevated mean PCWP pressure.   3.   Mildly elevated mean PA pressure with normal PVR (< 1 Wood unit).   4.   Elevated CO/CI in renal failure patient with AV fistula.  This elevated CO contributes to elevated PA pressure with normal PVR.   5.   Systemic vascular resistance 1168.    Chest x-ray: Right effusion that was new since a year ago.  CT of abdomen with bilateral effusions, no obvious parenchymal lung disease.    No flowsheet data found.      Assessment:       1. Pleural effusion    2. ESRD on peritoneal dialysis    3. Chronic diastolic heart failure    4. Hypervolemia, unspecified hypervolemia type        Outpatient Encounter Prescriptions as of 2/8/2017   Medication Sig Dispense Refill    calcitRIOL (ROCALTROL) 0.25 MCG Cap once daily.       FOLIC ACID/VIT BCOMP,C (RENAL-CARLOS ORAL) Take 1 tablet by mouth once daily.      hyoscyamine (LEVSIN/SL) 0.125 mg Subl Place 0.125 mg under the tongue every 4 (four) hours as needed (for dry heaving).       labetalol (NORMODYNE) 100 MG tablet Take 1 tablet (100 mg total) by mouth every 12 (twelve) hours. 60 tablet 11    nitroGLYCERIN (NITROSTAT) 0.4 MG SL tablet Place 0.4 mg under the tongue every 5 (five) minutes as needed for Chest pain.      ondansetron (ZOFRAN-ODT) 4 MG TbDL Take 2 tablets (8 mg total) by mouth every 8 (eight) hours as needed (Nausea). 21 tablet 0    pantoprazole (PROTONIX) 40 MG tablet Take 40 mg by mouth  2 (two) times daily.       sevelamer carbonate (RENVELA) 800 mg Tab Take 800 mg by mouth 3 (three) times daily.      sodium bicarbonate 325 MG tablet 3 (three) times daily.   10    tamsulosin (FLOMAX) 0.4 mg Cp24 TAKE ONE CAPSULE BY MOUTH EVERY EVENING 90 capsule 1    vitamin D 1000 units Tab Take 5,000 mg by mouth every 7 days.      [DISCONTINUED] amlodipine (NORVASC) 10 MG tablet Take 10 mg by mouth once daily. As needed       No facility-administered encounter medications on file as of 2/8/2017.      Orders Placed This Encounter   Procedures    X-Ray Chest PA And Lateral     Standing Status:   Future     Standing Expiration Date:   2/8/2018     Order Specific Question:   May the Radiologist modify the order per protocol to meet the clinical needs of the patient?     Answer:   Yes    Spirometry with/without bronchodilator     Standing Status:   Future     Number of Occurrences:   1     Standing Expiration Date:   2/8/2018    DLCO-Carbon Monoxide Diffusing Capacity     Standing Status:   Future     Number of Occurrences:   1     Standing Expiration Date:   2/8/2018    Stress test, pulmonary     Standing Status:   Future     Number of Occurrences:   1     Standing Expiration Date:   2/8/2018     Plan:       Bilateral pleural effusions with pitting edema in a patient that has difficulty adhering to fluid restriction due to cramping.  Right heart cath c/w volume overload (no PH)  Counseled extensively of obtaining a slight neg negative/euvolemic fluid balance.  Will adhere to fluid restriction and diuretic use for now.  CXR in two weeks to see if improved after diuresis and fluid restriction.  Baseline PFTs today.  Call with CXR results to determine if follow up is necessary.

## 2017-02-08 NOTE — LETTER
February 8, 2017      Henok Burns MD  [address] [facility] [address]           [facility] - Pulmonary Services  [address]  [place] [address]  Phone: [phone]          Patient: Tom Gage   MR Number: [id number]   YOB: 1950   Date of Visit: 2/8/2017       Dear Dr. Henok Burns:    Thank you for referring Tom Gage to me for evaluation. Attached you will find relevant portions of my assessment and plan of care.    If you have questions, please do not hesitate to call me. I look forward to following Tom Gage along with you.    Sincerely,    Marcy Sarmiento MD    Enclosure  CC:  No Recipients    If you would like to receive this communication electronically, please contact [email or web address] or [phone] to request more information on [facility] access.    For providers and/or their staff who would like to refer a patient to [facility], please contact us through our one-stop-shop provider referral line, [facility], at [phone].    If you feel you have received this communication in error or would no longer like to receive these types of communications, please e-mail [email or web address]

## 2017-02-09 NOTE — PROCEDURES
Tom Gage is a 66 y.o.  male patient, who presents for a 6 minute walk test ordered by Marcy Sarmiento MD.  The diagnosis is Diastolic Heart Failure.  The patient's BMI is 26.9 kg/m2.  Predicted distance (lower limit of normal) is 391.93 meters.      Test Results:    The test was completed without stopping.  The total time walked was 360 seconds.  During walking, the patient reported:  No complaints. The patient used no assistive devices during testing.     02/08/2017---------Distance: 335.28 meters (1100 feet)     O2 Sat % Supplemental Oxygen Heart Rate Blood Pressure Evelina Scale   Pre-exercise  (Resting) 98 % Room Air 60 bpm 161/78 mmHg 0   During Exercise 93 % Room Air 70 bpm 180/77 mmHg 0   Post-exercise  (Recovery) 95 % Room Air  67 bpm       Recovery Time: 170 seconds    Performing nurse/tech: FAY Max      PREVIOUS STUDY:   The patient has not had a previous study.      CLINICAL INTERPRETATION:  Six minute walk distance is 335.28 meters (1100 feet) with no dyspnea.  During exercise, there was significant desaturation while breathing room air.  Both blood pressure and heart rate remained stable with walking.  Hypertension was present prior to exercise.  The patient did not report non-pulmonary symptoms during exercise.  No previous study performed.  Based upon age and body mass index, exercise capacity is less than predicted.

## 2017-02-13 ENCOUNTER — LAB VISIT (OUTPATIENT)
Dept: LAB | Facility: HOSPITAL | Age: 67
End: 2017-02-13
Payer: COMMERCIAL

## 2017-02-13 DIAGNOSIS — Z76.82 ORGAN TRANSPLANT CANDIDATE: ICD-10-CM

## 2017-02-13 PROCEDURE — 86829 HLA CLASS I/II ANTIBODY QUAL: CPT | Mod: 91,PO

## 2017-02-13 PROCEDURE — 86829 HLA CLASS I/II ANTIBODY QUAL: CPT | Mod: PO

## 2017-02-14 ENCOUNTER — TELEPHONE (OUTPATIENT)
Dept: PULMONOLOGY | Facility: CLINIC | Age: 67
End: 2017-02-14

## 2017-02-14 NOTE — TELEPHONE ENCOUNTER
----- Message from Elda Murphy sent at 2/14/2017 12:07 PM CST -----  Contact: self  [phone]  Torsten  -  Patient calling to get an follow up appt  To see the Dr after his xray appt  . - call back number [phone]  Thanks,

## 2017-02-15 ENCOUNTER — TELEPHONE (OUTPATIENT)
Dept: PULMONOLOGY | Facility: CLINIC | Age: 67
End: 2017-02-15

## 2017-02-15 NOTE — TELEPHONE ENCOUNTER
----- Message from Brea Nicolas sent at 2/15/2017  9:01 AM CST -----  Contact: patient wife Padmaja: [phone]  Patient wife is calling to see about getting an appt after 2/22/17.  Patient wife is not wanting a May appt.  Patient was told that he should increase the lasix.  Patient needs to be seen for transplant to stay in compliance with them.  She can be reached at [phone].  Please call.    Thanks

## 2017-02-23 LAB — HPRA INTERPRETATION: NORMAL

## 2017-03-01 ENCOUNTER — HOSPITAL ENCOUNTER (OUTPATIENT)
Dept: RADIOLOGY | Facility: HOSPITAL | Age: 67
Discharge: HOME | End: 2017-03-01
Attending: INTERNAL MEDICINE
Payer: MEDICARE

## 2017-03-01 DIAGNOSIS — J90 PLEURAL EFFUSION: ICD-10-CM

## 2017-03-01 DIAGNOSIS — Z99.2 ESRD ON PERITONEAL DIALYSIS: ICD-10-CM

## 2017-03-01 DIAGNOSIS — N18.6 ESRD ON PERITONEAL DIALYSIS: ICD-10-CM

## 2017-03-01 PROCEDURE — 71020 XR CHEST PA AND LATERAL: CPT | Mod: 26,,, | Performed by: RADIOLOGY

## 2017-03-01 PROCEDURE — 71020 XR CHEST PA AND LATERAL: CPT | Mod: TC,PO

## 2017-03-06 ENCOUNTER — TELEPHONE (OUTPATIENT)
Dept: PULMONOLOGY | Facility: CLINIC | Age: 67
End: 2017-03-06

## 2017-03-09 ENCOUNTER — OFFICE VISIT (OUTPATIENT)
Dept: PULMONOLOGY | Facility: CLINIC | Age: 67
End: 2017-03-09
Payer: MEDICARE

## 2017-03-09 VITALS
HEIGHT: 74 IN | HEART RATE: 64 BPM | BODY MASS INDEX: 24.93 KG/M2 | OXYGEN SATURATION: 98 % | WEIGHT: 194.25 LBS | DIASTOLIC BLOOD PRESSURE: 60 MMHG | SYSTOLIC BLOOD PRESSURE: 142 MMHG

## 2017-03-09 DIAGNOSIS — Z99.2 CKD (CHRONIC KIDNEY DISEASE) REQUIRING CHRONIC DIALYSIS: ICD-10-CM

## 2017-03-09 DIAGNOSIS — N18.6 CKD (CHRONIC KIDNEY DISEASE) REQUIRING CHRONIC DIALYSIS: ICD-10-CM

## 2017-03-09 DIAGNOSIS — J90 PLEURAL EFFUSION: ICD-10-CM

## 2017-03-09 DIAGNOSIS — J90 PLEURAL EFFUSION, RIGHT: Primary | ICD-10-CM

## 2017-03-09 LAB
ALBUMIN FLD-MCNC: 0.8 G/DL
APPEARANCE FLD: CLEAR
BODY FLD TYPE: NORMAL
BODY FLUID SOURCE, LDH: NORMAL
COLOR FLD: YELLOW
GLUCOSE FLD-MCNC: 94 MG/DL
GRAM STN SPEC: NORMAL
GRAM STN SPEC: NORMAL
LDH FLD L TO P-CCNC: 49 U/L
LYMPHOCYTES NFR FLD MANUAL: 25 %
MESOTHL CELL NFR FLD MANUAL: 1 %
MONOS+MACROS NFR FLD MANUAL: 22 %
NEUTROPHILS NFR FLD MANUAL: 52 %
PROT FLD-MCNC: 1.1 G/DL
SPECIMEN SOURCE: NORMAL
WBC # FLD: 46 /CU MM

## 2017-03-09 PROCEDURE — 99211 OFF/OP EST MAY X REQ PHY/QHP: CPT | Mod: 25,S$PBB,, | Performed by: INTERNAL MEDICINE

## 2017-03-09 PROCEDURE — 88305 TISSUE EXAM BY PATHOLOGIST: CPT | Mod: 26,,, | Performed by: PATHOLOGY

## 2017-03-09 PROCEDURE — 84157 ASSAY OF PROTEIN OTHER: CPT

## 2017-03-09 PROCEDURE — 32554 ASPIRATE PLEURA W/O IMAGING: CPT | Mod: PBBFAC | Performed by: INTERNAL MEDICINE

## 2017-03-09 PROCEDURE — 83615 LACTATE (LD) (LDH) ENZYME: CPT | Mod: 91

## 2017-03-09 PROCEDURE — 87116 MYCOBACTERIA CULTURE: CPT

## 2017-03-09 PROCEDURE — 99999 PR PBB SHADOW E&M-EST. PATIENT-LVL III: CPT | Mod: PBBFAC,,, | Performed by: INTERNAL MEDICINE

## 2017-03-09 PROCEDURE — 82945 GLUCOSE OTHER FLUID: CPT

## 2017-03-09 PROCEDURE — 88305 TISSUE EXAM BY PATHOLOGIST: CPT | Performed by: PATHOLOGY

## 2017-03-09 PROCEDURE — 87070 CULTURE OTHR SPECIMN AEROBIC: CPT

## 2017-03-09 PROCEDURE — 88112 CYTOPATH CELL ENHANCE TECH: CPT | Mod: 26,,, | Performed by: PATHOLOGY

## 2017-03-09 PROCEDURE — 87102 FUNGUS ISOLATION CULTURE: CPT

## 2017-03-09 PROCEDURE — 32554 ASPIRATE PLEURA W/O IMAGING: CPT | Mod: S$PBB,,, | Performed by: INTERNAL MEDICINE

## 2017-03-09 PROCEDURE — 89051 BODY FLUID CELL COUNT: CPT

## 2017-03-09 PROCEDURE — 87205 SMEAR GRAM STAIN: CPT

## 2017-03-09 PROCEDURE — 99213 OFFICE O/P EST LOW 20 MIN: CPT | Mod: PBBFAC | Performed by: INTERNAL MEDICINE

## 2017-03-09 PROCEDURE — 82042 OTHER SOURCE ALBUMIN QUAN EA: CPT

## 2017-03-09 RX ORDER — GENTAMICIN SULFATE 3 MG/ML
SOLUTION/ DROPS OPHTHALMIC
Refills: 3 | COMMUNITY
Start: 2017-01-03 | End: 2017-11-02

## 2017-03-09 RX ORDER — FUROSEMIDE 80 MG/1
TABLET ORAL
Refills: 11 | COMMUNITY
Start: 2017-02-08 | End: 2017-03-09 | Stop reason: SDUPTHER

## 2017-03-09 RX ORDER — VIT B COMP NO.3/FOLIC/C/BIOTIN 1 MG-60 MG
TABLET ORAL
Refills: 5 | COMMUNITY
Start: 2017-02-16 | End: 2017-05-09

## 2017-03-09 RX ORDER — CALCITRIOL 0.5 UG/1
CAPSULE ORAL
Refills: 3 | COMMUNITY
Start: 2017-02-10 | End: 2017-09-19 | Stop reason: ALTCHOICE

## 2017-03-09 NOTE — PROGRESS NOTES
"Subjective:       Patient ID: Tom Gage is a 66 y.o. male.    Chief Complaint: Pleural Effusion    HPI Comments: 66 year old on peritoneal HD for CKD.  Being evaluated for kidney transplant.  Since last visit has worsening right effusion and edema.  More fatigued and dyspneic    Pleural Effusion       Review of Systems    Objective:       Vitals:    03/09/17 0818   BP: (!) 142/60   Pulse: 64   SpO2: 98%   Weight: 88.1 kg (194 lb 3.6 oz)   Height: 6' 2" (1.88 m)     Physical Exam   Constitutional: He is oriented to person, place, and time. He appears well-developed and well-nourished.   Cardiovascular: Normal rate and regular rhythm.    Musculoskeletal: He exhibits edema (Sacral edema that is pitting half way up back.  Tense lower extremity edema to knees).   Neurological: He is alert and oriented to person, place, and time.   Vitals reviewed.    Personal Diagnostic Review  Chest x-ray: 3/1/2017 with increasing pleural fluid on the right  No flowsheet data found.      Assessment:       1. Pleural effusion, right    2. CKD (chronic kidney disease) requiring chronic dialysis        Outpatient Encounter Prescriptions as of 3/9/2017   Medication Sig Dispense Refill    calcitRIOL (ROCALTROL) 0.5 MCG Cap TK ONE C PO D  3    FOLIC ACID/VIT BCOMP,C (RENAL-CARLOS ORAL) Take 1 tablet by mouth once daily.      gentamicin (GARAMYCIN) 0.3 % ophthalmic solution APPLY 2 GTS TO EXIT SITE QD AS DIRECTED  3    labetalol (NORMODYNE) 100 MG tablet Take 1 tablet (100 mg total) by mouth every 12 (twelve) hours. 60 tablet 11    pantoprazole (PROTONIX) 40 MG tablet Take 40 mg by mouth 2 (two) times daily.       GEMMA-CARLOS RX 1- mg-mg-mcg Tab TK 1 T PO D  5    sevelamer carbonate (RENVELA) 800 mg Tab Take 800 mg by mouth 3 (three) times daily.      sodium bicarbonate 325 MG tablet 3 (three) times daily.   10    tamsulosin (FLOMAX) 0.4 mg Cp24 TAKE ONE CAPSULE BY MOUTH EVERY EVENING 90 capsule 1    vitamin D 1000 units Tab " Take 5,000 mg by mouth every 7 days.      [DISCONTINUED] furosemide (LASIX) 80 MG tablet   11    hyoscyamine (LEVSIN/SL) 0.125 mg Subl Place 0.125 mg under the tongue every 4 (four) hours as needed (for dry heaving).       nitroGLYCERIN (NITROSTAT) 0.4 MG SL tablet Place 0.4 mg under the tongue every 5 (five) minutes as needed for Chest pain.      ondansetron (ZOFRAN-ODT) 4 MG TbDL Take 2 tablets (8 mg total) by mouth every 8 (eight) hours as needed (Nausea). 21 tablet 0    [DISCONTINUED] calcitRIOL (ROCALTROL) 0.25 MCG Cap once daily.        No facility-administered encounter medications on file as of 3/9/2017.      Orders Placed This Encounter   Procedures    LACTATE DEHYDROGENASE     Standing Status:   Future     Standing Expiration Date:   5/8/2018    COMPREHENSIVE METABOLIC PANEL     Standing Status:   Future     Standing Expiration Date:   5/8/2018    CBC W/ AUTO DIFFERENTIAL     Standing Status:   Future     Standing Expiration Date:   5/8/2018     Plan:       Diagnostic thoracentesis today.  I suspect that this is volume overload related to ineffective UF removal with peritoneal HD      [facility]  Thoracentesis  Procedure Note    SUMMARY     Date of Procedure: 03/09/2017     Procedure: Thoracentesis    Indications: Diagnostic    Pre-Operative Diagnosis: Volume overload with right pleural effusion    Post-Operative Diagnosis: same    Anesthesia: local    Technical Procedures Used: Seldinger technique    Description of the Findings of the Procedure: 1500 cc of clear light yellow fluid aspirated    Consent: Informed consent was obtained. Risks of the procedure were discussed including: infection, bleeding, pain, pneumothorax.    Under sterile conditions the patient was positioned. Chlorhexadine solution and sterile drapes were utilized. 40 cc 1% plain lidocaine was used to anesthetize between the rib space after localized under ultrasound. Fluid was obtained after catheter inserted  without  difficulty and suction applied with minimal blood loss.  A dressing was applied to the wound and wound care instructions were provided.     1500 ml of clear pleural fluid was obtained. A sample was sent to Pathology for cytogenetics, and cell counts, as well as for infection analysis.    Plan:    A follow up chest x-ray was ordered. No  Tylenol 650 mg. for pain.    Significant Surgical Tasks Conducted by the Assistant(s), if Applicable:    Complications: None; patient tolerated the procedure well.    Estimated Blood Loss (EBL): None    Attestation: I performed the procedure.

## 2017-03-13 LAB — BACTERIA SPEC AEROBE CULT: NO GROWTH

## 2017-03-14 ENCOUNTER — OFFICE VISIT (OUTPATIENT)
Dept: CARDIOLOGY | Facility: CLINIC | Age: 67
End: 2017-03-14
Payer: MEDICARE

## 2017-03-14 VITALS
HEART RATE: 61 BPM | BODY MASS INDEX: 26.12 KG/M2 | WEIGHT: 192.88 LBS | DIASTOLIC BLOOD PRESSURE: 83 MMHG | SYSTOLIC BLOOD PRESSURE: 157 MMHG | HEIGHT: 72 IN

## 2017-03-14 DIAGNOSIS — I27.20 PULMONARY HTN: Primary | ICD-10-CM

## 2017-03-14 PROCEDURE — 99213 OFFICE O/P EST LOW 20 MIN: CPT | Mod: PBBFAC | Performed by: INTERNAL MEDICINE

## 2017-03-14 PROCEDURE — 99999 PR PBB SHADOW E&M-EST. PATIENT-LVL III: CPT | Mod: PBBFAC,GC,, | Performed by: INTERNAL MEDICINE

## 2017-03-14 PROCEDURE — 99213 OFFICE O/P EST LOW 20 MIN: CPT | Mod: S$PBB,GC,, | Performed by: INTERNAL MEDICINE

## 2017-03-14 RX ORDER — FUROSEMIDE 80 MG/1
80 TABLET ORAL 2 TIMES DAILY PRN
COMMUNITY
End: 2017-05-09

## 2017-03-14 NOTE — MR AVS SNAPSHOT
[facility] - Cardiology  [address]  [facility] [address]  Phone: [phone]                  Tom Gage   3/14/2017 1:00 PM   Office Visit    Description:  Male : 1950   Provider:  Henok Burns MD   Department:  [facility] - Cardiology           Reason for Visit     Pulmonary Hypertension                To Do List           Goals (5 Years of Data)     None      [facility]     [facility] Care [facility] -  Assistance  Registered nurses in the [facility] Call Center provide clinical advisement, health education, appointment booking, and other advisory services.  Call for this free service at [phone].             Medications           Message regarding Medications     Verify the changes and/or additions to your medication regime listed below are the same as discussed with your clinician today.  If any of these changes or additions are incorrect, please notify your healthcare provider.             Verify that the below list of medications is an accurate representation of the medications you are currently taking.  If none reported, the list may be blank. If incorrect, please contact your healthcare provider. Carry this list with you in case of emergency.           Current Medications     calcitRIOL (ROCALTROL) 0.5 MCG Cap TK ONE C PO D    FOLIC ACID/VIT BCOMP,C (RENAL-CARLOS ORAL) Take 1 tablet by mouth once daily.    furosemide (LASIX) 80 MG tablet Take 80 mg by mouth 2 (two) times daily.    gentamicin (GARAMYCIN) 0.3 % ophthalmic solution APPLY 2 GTS TO EXIT SITE QD AS DIRECTED    hyoscyamine (LEVSIN/SL) 0.125 mg Subl Place 0.125 mg under the tongue every 4 (four) hours as needed (for dry heaving).     labetalol (NORMODYNE) 100 MG tablet Take 1 tablet (100 mg total) by mouth every 12 (twelve) hours.    nitroGLYCERIN (NITROSTAT) 0.4 MG SL tablet Place 0.4 mg under the tongue every 5 (five) minutes as needed for Chest pain.    ondansetron (ZOFRAN-ODT) 4 MG  TbDL Take 2 tablets (8 mg total) by mouth every 8 (eight) hours as needed (Nausea).    pantoprazole (PROTONIX) 40 MG tablet Take 40 mg by mouth 2 (two) times daily.     GEMMA-CARLOS RX 1- mg-mg-mcg Tab TK 1 T PO D    sevelamer carbonate (RENVELA) 800 mg Tab Take 800 mg by mouth 3 (three) times daily.    sodium bicarbonate 325 MG tablet 3 (three) times daily.     tamsulosin (FLOMAX) 0.4 mg Cp24 TAKE ONE CAPSULE BY MOUTH EVERY EVENING    vitamin D 1000 units Tab Take 5,000 mg by mouth every 7 days.           Clinical Reference Information           Your Vitals Were     BP Pulse Height Weight BMI    157/83 (BP Location: Right arm, Patient Position: Sitting, BP Method: Automatic) 61 6' (1.829 m) 87.5 kg (192 lb 14.4 oz) 26.16 kg/m2      Blood Pressure          Most Recent Value    Right Arm BP - Sitting  157/83    Reason for not completing BP on both arms  AV shunt    BP  (!)  157/83 [pt refused to raise shirt sleeve]      Allergies as of 3/14/2017     Latex, Natural Rubber    Adhesive    Morphine    Ace Inhibitors      Immunizations Administered on Date of Encounter - 3/14/2017     None      Maintenance Dialysis History     Start End Type Comments Center    4/21/2016  [facility] # [id number] [facility]            Current D[facility] Center Information     [facility] [address] [place] Phone #:  [phone]    Contact:  [place]  [address] Fax #:  [phone]            Transplant Information        Txp Date Organ Coordinator Care Team     Kidney Addy Gant Jr. RN Referring Physician:  Mauricio Frazier MD   Current Nephrologist:  Ekta Cope MD         Language Assistance Services     ATTENTION: Language assistance services are available, free of charge. Please call [phone].      ATENCIÓN: Si antonyla elodia, tiene a rosas disposición servicios gratuitos de asistencia lingüística. Llame al [phone].     CHÚ Ý: N?u b?n nói Ti?ng Vi?t, có các d?ch v? h? tr? ngôn ng? mi?n phí dàn  jacqueline fung?n. G?i s? [phone].         [facility] complies with applicable Federal civil rights laws and does not discriminate on the basis of race, color, national origin, age, disability, or sex.

## 2017-03-14 NOTE — PROGRESS NOTES
Subjective:    Patient ID:  Tom Gage is a 66 y.o. male who presents for follow-up of Pulmonary Hypertension (4 weeks fu)      HPI Comments: Mr. Gage is a 66 y.o. year old white male with ESRD currently evaluated for a renal transplant. He has advanced kidney disease secondary to Hypertensive nephrosclerosis and diabetic nephropathy.Patient is currently on peritoneal dialysis from Jul 2016( was on hemodialysis for 2 months prior).    He has PMH of non obstructive CAD when he had NSTEMI in Feb 2012. In addition he had diastolic heart failure in 2012.   He had persistent right sided pleural effusion that has never been tapped( thoracentesis).  He saw renal transplant team on 12/16 and also had a 2 D Echo done that showed elevated PA pressure - estimate 65. Hence referred for further cardiac evaluation.     Interval history: Patient had a RHC and found to have mild elevated PA pressures at an acceptable rage for a transplant surgery candidate. In addition thoracentesis was done over a liter of pleural fluid removed.- transudative in analysis. Patient feels much better.      Past Medical History:   Diagnosis Date    Anemia of chronic renal failure, stage 5     CAD (coronary artery disease)     CHF (congestive heart failure)     CKD (chronic kidney disease) stage 5, GFR less than 15 ml/min     Diastolic dysfunction 12/16/2016    GERD (gastroesophageal reflux disease)     Hyperparathyroidism, secondary renal     Hypertension     Metabolic acidosis     MI (myocardial infarction) Feb 2012    Pulmonary hypertension 12/16/2016    TIA (transient ischemic attack)     Type 2 diabetes mellitus with diabetic nephropathy      Past Surgical History:   Procedure Laterality Date    ABDOMINAL SURGERY      PD catheter    BASAL CELL CARCINOMA EXCISION Left Jan 2011    left forehead    CHOLECYSTECTOMY  July 2010    ELBOW SURGERY Left 3/18/14    anterior submuscular transposition, ulnar nerve    EYE SURGERY  Bilateral     bilateral cataracts    GASTRIC BYPASS  May 2014    gastric sleeve  June 2013    Malfunctioned requiring bypass    HERNIA REPAIR      ROTATOR CUFF REPAIR Left 2014    SHOULDER ARTHROSCOPY Left 11/18/14    RCR; glenoid labral repair; arch decompression     Review of Systems   Constitution: Negative for diaphoresis and malaise/fatigue.   HENT: Negative.    Eyes: Negative for blurred vision, double vision and photophobia.   Cardiovascular: Negative for chest pain, cyanosis, dyspnea on exertion, leg swelling, near-syncope, orthopnea and palpitations.   Respiratory: Negative for cough and shortness of breath.    Endocrine: Negative.    Hematologic/Lymphatic: Negative.    Skin: Negative.    Musculoskeletal: Positive for stiffness. Negative for joint pain and joint swelling.   Gastrointestinal: Negative for abdominal pain and anorexia.   Genitourinary: Negative for decreased libido.   Neurological: Negative for dizziness and focal weakness.   Psychiatric/Behavioral: Negative.        Current Outpatient Prescriptions on File Prior to Visit   Medication Sig Dispense Refill    calcitRIOL (ROCALTROL) 0.5 MCG Cap TK ONE C PO D  3    FOLIC ACID/VIT BCOMP,C (RENAL-CARLOS ORAL) Take 1 tablet by mouth once daily.      gentamicin (GARAMYCIN) 0.3 % ophthalmic solution APPLY 2 GTS TO EXIT SITE QD AS DIRECTED  3    hyoscyamine (LEVSIN/SL) 0.125 mg Subl Place 0.125 mg under the tongue every 4 (four) hours as needed (for dry heaving).       labetalol (NORMODYNE) 100 MG tablet Take 1 tablet (100 mg total) by mouth every 12 (twelve) hours. 60 tablet 11    nitroGLYCERIN (NITROSTAT) 0.4 MG SL tablet Place 0.4 mg under the tongue every 5 (five) minutes as needed for Chest pain.      ondansetron (ZOFRAN-ODT) 4 MG TbDL Take 2 tablets (8 mg total) by mouth every 8 (eight) hours as needed (Nausea). 21 tablet 0    pantoprazole (PROTONIX) 40 MG tablet Take 40 mg by mouth 2 (two) times daily.       GEMMA-CARLOS RX 0-  mg-mg-mcg Tab TK 1 T PO D  5    sevelamer carbonate (RENVELA) 800 mg Tab Take 800 mg by mouth 3 (three) times daily.      sodium bicarbonate 325 MG tablet 3 (three) times daily.   10    tamsulosin (FLOMAX) 0.4 mg Cp24 TAKE ONE CAPSULE BY MOUTH EVERY EVENING 90 capsule 1    vitamin D 1000 units Tab Take 5,000 mg by mouth every 7 days.       No current facility-administered medications on file prior to visit.      Review of patient's allergies indicates:   Allergen Reactions    Latex, natural rubber Rash     Latex tape causes blisters    Adhesive Dermatitis    Morphine Itching and Hives     Body Rash/ Reddness  Phlebitis and itching     Ace inhibitors      Other reaction(s): COUGH          Objective:    Physical Exam   Constitutional: He is oriented to person, place, and time. He appears well-developed and well-nourished. No distress.   HENT:   Head: Normocephalic and atraumatic.   Eyes: Conjunctivae and EOM are normal. Pupils are equal, round, and reactive to light. Right eye exhibits no discharge. Left eye exhibits no discharge. No scleral icterus.   Neck: Normal range of motion. Neck supple. No JVD present. No tracheal deviation present. No thyromegaly present.   Cardiovascular: Normal rate, regular rhythm and intact distal pulses.  Exam reveals no gallop and no friction rub.    Murmur heard.  AV fistula in left arm   Pulmonary/Chest: Effort normal and breath sounds normal. No respiratory distress. He has no wheezes. He has no rales.   Abdominal: Soft. Bowel sounds are normal. He exhibits no distension. There is no tenderness. There is no rebound.   Musculoskeletal: Normal range of motion. He exhibits edema.   Neurological: He is alert and oriented to person, place, and time. No cranial nerve deficit. Coordination normal.   Skin: Skin is warm and dry. He is not diaphoretic. No erythema.   Psychiatric: He has a normal mood and affect. His behavior is normal.   Vitals reviewed.    BP (!) 157/83 (BP Location:  Right arm, Patient Position: Sitting, BP Method: Automatic) Comment: pt refused to raise shirt sleeve  Pulse 61  Ht 6' (1.829 m)  Wt 87.5 kg (192 lb 14.4 oz)  BMI 26.16 kg/m2     RHC 1/20/17   1.   Borderline-mildly elevated right atrial pressure.   2.   Markedly elevated mean PCWP pressure.   3.   Mildly elevated mean PA pressure with normal PVR (< 1 Wood unit).   4.   Elevated CO/CI in renal failure patient with AV fistula.  This elevated CO contributes to elevated PA pressure with normal PVR.   5.   Systemic vascular resistance 1168.  AOPRES: 158/82 (101)  AOSAT: 99  FICKCI: 3.04  FICKCO: 6.44  PAPRES: 59/20 (33)  PASAT: 71  PVR: 0.47  PWPRES: 26/51 (30)  Unable to confirm with saturation but confirmed under fluoroscopy and with reproducibile waveforms.  RAPRES: 12/9 (7)  RVPRES: 55/10, 10  SVR: 14.6        Assessment:     1. Pulmonary hypertension    2 ESRD  3 CAD- Previous NSTEMI  4. HTN with nephropathy and retinopathy  5 DM2 with nephropathy and retinopathy  6 S/p gastric bypass surgery.  Moderate to severe MR       Plan:       66 year old male patient with DM/HTN , ESRD on PD being evaluated for renal transplant candidacy. Found to have elevated PA pressures on trans thoracic echo- 65.  Has mildly elevated PA pressure with PVR < 1 EDWARDS elevated PCWP. This is acceptable for a transplant procedure. Discussed with Dr Bernstein who did the RHC. He has more volume that has to removed by dialysis.   He does have mod-sev MR. Will benefit from afterload reduction. Will start him on ARBs. He was on ACEi in the past but had cough and switched to ARBs. With decline in renal function it was taken off. In the current circumstances he has ESRD and on RRT with peritoneal dialysis. Hence the benefits of ARBs is higher than the risk of further renal decline.     Discussed with Dr Peters.    Henok Burns MD  [phone].

## 2017-04-11 LAB — FUNGUS SPEC CULT: NORMAL

## 2017-04-26 ENCOUNTER — TELEPHONE (OUTPATIENT)
Dept: SURGERY | Facility: CLINIC | Age: 67
End: 2017-04-26

## 2017-04-26 NOTE — TELEPHONE ENCOUNTER
----- Message from Jesse Singh sent at 4/26/2017 12:11 PM CDT -----  Patient states that he needs to speak with nurse in ref to some questions he has and to possibly get some bariatric vitamins//please call back at [phone]//thank you

## 2017-04-28 ENCOUNTER — TELEPHONE (OUTPATIENT)
Dept: SURGERY | Facility: CLINIC | Age: 67
End: 2017-04-28

## 2017-04-28 NOTE — TELEPHONE ENCOUNTER
----- Message from Nancy Galarza sent at 4/28/2017  9:17 AM CDT -----  Contact: wife/padmaja Giang States that she needs a call returned by a nurse in reference to her husbands bariatrics weight mgt appointment that was not scheduled.  Please call Padmaja @ [phone] . Thanks :)

## 2017-05-02 ENCOUNTER — LAB VISIT (OUTPATIENT)
Dept: LAB | Facility: HOSPITAL | Age: 67
End: 2017-05-02
Attending: SURGERY
Payer: MEDICARE

## 2017-05-02 ENCOUNTER — PATIENT MESSAGE (OUTPATIENT)
Dept: BARIATRICS | Facility: CLINIC | Age: 67
End: 2017-05-02

## 2017-05-02 ENCOUNTER — INITIAL CONSULT (OUTPATIENT)
Dept: BARIATRICS | Facility: CLINIC | Age: 67
End: 2017-05-02
Payer: MEDICARE

## 2017-05-02 VITALS
DIASTOLIC BLOOD PRESSURE: 78 MMHG | SYSTOLIC BLOOD PRESSURE: 170 MMHG | HEART RATE: 64 BPM | HEIGHT: 72 IN | WEIGHT: 173.75 LBS | BODY MASS INDEX: 23.53 KG/M2

## 2017-05-02 DIAGNOSIS — K21.9 GASTROESOPHAGEAL REFLUX DISEASE WITHOUT ESOPHAGITIS: ICD-10-CM

## 2017-05-02 DIAGNOSIS — Z98.84 S/P GASTRIC BYPASS: ICD-10-CM

## 2017-05-02 DIAGNOSIS — T40.2X5A CONSTIPATION DUE TO OPIOID THERAPY: ICD-10-CM

## 2017-05-02 DIAGNOSIS — D51.9 ANEMIA DUE TO VITAMIN B12 DEFICIENCY, UNSPECIFIED B12 DEFICIENCY TYPE: ICD-10-CM

## 2017-05-02 DIAGNOSIS — Z99.2 CKD (CHRONIC KIDNEY DISEASE) STAGE V REQUIRING CHRONIC DIALYSIS: ICD-10-CM

## 2017-05-02 DIAGNOSIS — I13.0 HYPERTENSIVE HEART AND KIDNEY DISEASE WITH HEART FAILURE: ICD-10-CM

## 2017-05-02 DIAGNOSIS — D52.0 DIETARY FOLATE DEFICIENCY ANEMIA: ICD-10-CM

## 2017-05-02 DIAGNOSIS — Z76.82 ORGAN TRANSPLANT CANDIDATE: ICD-10-CM

## 2017-05-02 DIAGNOSIS — I27.20 PULMONARY HYPERTENSION: ICD-10-CM

## 2017-05-02 DIAGNOSIS — E56.9 VITAMIN DEFICIENCY: ICD-10-CM

## 2017-05-02 DIAGNOSIS — K59.03 CONSTIPATION DUE TO OPIOID THERAPY: ICD-10-CM

## 2017-05-02 DIAGNOSIS — N18.6 CKD (CHRONIC KIDNEY DISEASE) STAGE V REQUIRING CHRONIC DIALYSIS: ICD-10-CM

## 2017-05-02 DIAGNOSIS — E56.9 VITAMIN DEFICIENCY: Primary | ICD-10-CM

## 2017-05-02 DIAGNOSIS — E55.9 VITAMIN D DEFICIENCY: ICD-10-CM

## 2017-05-02 LAB
25(OH)D3+25(OH)D2 SERPL-MCNC: 20 NG/ML
FOLATE SERPL-MCNC: 34.3 NG/ML
VIT B12 SERPL-MCNC: 560 PG/ML

## 2017-05-02 PROCEDURE — 82746 ASSAY OF FOLIC ACID SERUM: CPT | Mod: TXP

## 2017-05-02 PROCEDURE — 82607 VITAMIN B-12: CPT | Mod: TXP

## 2017-05-02 PROCEDURE — 99999 PR PBB SHADOW E&M-EST. PATIENT-LVL IV: CPT | Mod: PBBFAC,TXP,, | Performed by: PHYSICIAN ASSISTANT

## 2017-05-02 PROCEDURE — 84630 ASSAY OF ZINC: CPT | Mod: TXP

## 2017-05-02 PROCEDURE — 82306 VITAMIN D 25 HYDROXY: CPT | Mod: TXP

## 2017-05-02 PROCEDURE — 84597 ASSAY OF VITAMIN K: CPT | Mod: TXP

## 2017-05-02 PROCEDURE — 82525 ASSAY OF COPPER: CPT | Mod: TXP

## 2017-05-02 PROCEDURE — 99215 OFFICE O/P EST HI 40 MIN: CPT | Mod: S$PBB,NTX,, | Performed by: PHYSICIAN ASSISTANT

## 2017-05-02 PROCEDURE — 99214 OFFICE O/P EST MOD 30 MIN: CPT | Mod: PBBFAC,TXP | Performed by: PHYSICIAN ASSISTANT

## 2017-05-02 PROCEDURE — 84425 ASSAY OF VITAMIN B-1: CPT | Mod: TXP

## 2017-05-02 RX ORDER — LOSARTAN POTASSIUM 50 MG/1
1 TABLET ORAL DAILY
Refills: 6 | COMMUNITY
Start: 2017-03-16 | End: 2017-05-09 | Stop reason: SINTOL

## 2017-05-02 RX ORDER — ERGOCALCIFEROL 1.25 MG/1
50000 CAPSULE ORAL
Qty: 24 CAPSULE | Refills: 0 | Status: SHIPPED | OUTPATIENT
Start: 2017-05-04 | End: 2017-09-08 | Stop reason: SDUPTHER

## 2017-05-02 NOTE — LETTER
May 9, 2017      Owen Ríos MD  [address]  [facility] [address]           [facility]  [address]  [facility] [address]  Phone: [phone]  Fax: [phone]          Patient: Tom Gage   MR Number: [id number]   YOB: 1950   Date of Visit: 5/2/2017       Dear Dr. Owen Ríos:    Thank you for referring Tom Gage to me for evaluation. Attached you will find relevant portions of my assessment and plan of care.    If you have questions, please do not hesitate to call me. I look forward to following Tom Gage along with you.    Sincerely,    Benita Win PA-C    Enclosure  CC:  No Recipients    If you would like to receive this communication electronically, please contact [email or web address] or [phone] to request more information on [facility] Link access.    For providers and/or their staff who would like to refer a patient to [facility], please contact us through our one-stop-shop provider referral line, [facility], at [phone].    If you feel you have received this communication in error or would no longer like to receive these types of communications, please e-mail [email or web address]

## 2017-05-02 NOTE — MR AVS SNAPSHOT
[facility] - Bariatric Surgery  [address]  [facility] [address]  Phone: [phone]  Fax: [phone]                  Tom Gage   2017 9:40 AM   Initial consult    Description:  Male : 1950   Provider:  Benita Win PA-C   Department:  [facility] - Bariatric Surgery           Reason for Visit     Consult           Diagnoses this Visit        Comments    Vitamin deficiency    -  Primary     Anemia due to vitamin B12 deficiency, unspecified B12 deficiency type         Vitamin D deficiency         Dietary folate deficiency anemia                To Do List           Future Appointments        Provider Department Dept Phone    2017 11:45 AM LAB, SAME DAY [facility] [phone]    2017 9:00 AM Benita Win PA-C [facility] Bariatric Surgery [phone]      Goals (5 Years of Data)     None      [facility]     [facility] Nurse Care Line -  Assistance  Unless otherwise directed by your provider, please contact [facility], our nurse care line that is available for  assistance.     Registered nurses in the [facility] Center provide: appointment scheduling, clinical advisement, health education, and other advisory services.  Call: [phone] (toll free)               Medications           Message regarding Medications     Verify the changes and/or additions to your medication regime listed below are the same as discussed with your clinician today.  If any of these changes or additions are incorrect, please notify your healthcare provider.             Verify that the below list of medications is an accurate representation of the medications you are currently taking.  If none reported, the list may be blank. If incorrect, please contact your healthcare provider. Carry this list with you in case of emergency.           Current Medications     calcitRIOL (ROCALTROL) 0.5 MCG Cap TK ONE C PO D    furosemide (LASIX) 80 MG  tablet Take 80 mg by mouth 2 (two) times daily as needed.     gentamicin (GARAMYCIN) 0.3 % ophthalmic solution APPLY 2 GTS TO EXIT SITE QD AS DIRECTED    hyoscyamine (LEVSIN/SL) 0.125 mg Subl Place 0.125 mg under the tongue every 4 (four) hours as needed (for dry heaving).     labetalol (NORMODYNE) 100 MG tablet Take 1 tablet (100 mg total) by mouth every 12 (twelve) hours.    losartan (COZAAR) 50 MG tablet Take 1 tablet by mouth once daily at 6am.    nitroGLYCERIN (NITROSTAT) 0.4 MG SL tablet Place 0.4 mg under the tongue every 5 (five) minutes as needed for Chest pain.    ondansetron (ZOFRAN-ODT) 4 MG TbDL Take 2 tablets (8 mg total) by mouth every 8 (eight) hours as needed (Nausea).    pantoprazole (PROTONIX) 40 MG tablet Take 40 mg by mouth 2 (two) times daily.     GEMMA-CARLOS RX 1- mg-mg-mcg Tab TK 1 T PO D    sevelamer carbonate (RENVELA) 800 mg Tab Take 800 mg by mouth 3 (three) times daily.    sodium bicarbonate 325 MG tablet 3 (three) times daily.     tamsulosin (FLOMAX) 0.4 mg Cp24 TAKE ONE CAPSULE BY MOUTH EVERY EVENING    vitamin D 1000 units Tab Take 5,000 mg by mouth every 7 days.    FOLIC ACID/VIT BCOMP,C (RENAL-CARLOS ORAL) Take 1 tablet by mouth once daily.           Clinical Reference Information           Your Vitals Were     BP Pulse Height Weight BMI    170/78 64 6' (1.829 m) 78.8 kg (173 lb 11.6 oz) 23.56 kg/m2      Blood Pressure          Most Recent Value    BP  (!)  170/78      Allergies as of 5/2/2017     Latex, Natural Rubber    Adhesive    Morphine    Ace Inhibitors      Immunizations Administered on Date of Encounter - 5/2/2017     None      Orders Placed During Today's Visit     Future Labs/Procedures Expected by Expires    B12  5/2/2017 7/1/2018    B1  5/2/2017 7/1/2018    Copper, serum  5/2/2017 7/1/2018    Folate  5/2/2017 7/1/2018    Vitamin D 25 Hydroxy  5/2/2017 7/1/2018    Vitamin K  5/2/2017 7/1/2018    Zinc  5/2/2017 7/1/2018      Maintenance Dialysis History     Start End  Type Comments Center    4/21/2016  [facility] # [id number] [facility]            Current Dialysis Center Information     [facility] [address] Phone #:  [phone]    Contact:  [place]  [address] Fax #:  [phone]            Transplant Information        Txp Date Organ Coordinator Care Team     Kidney Addy Gant Jr., RN Referring Physician:  Mauricio Frazier MD   Current Nephrologist:  Ekta Cope MD         Instructions    - Test Vitamin levels  - Meet with nutrition for diet & vitamins  - return in 1 month       Language Assistance Services     ATTENTION: Language assistance services are available, free of charge. Please call [phone].      ATENCIÓN: Si antonyla elodia, tiene a rosas disposición servicios gratuitos de asistencia lingüística. Llame al [phone].     CHÚ Ý: N?u b?n nói Ti?ng Vi?t, có các d?ch v? h? tr? ngôn ng? mi?n phí dành cho b?n. G?i s? [phone].         [facility] complies with applicable Federal civil rights laws and does not discriminate on the basis of race, color, national origin, age, disability, or sex.

## 2017-05-02 NOTE — PROGRESS NOTES
BARIATRIC SURGERY VISIT TO ESTABLISH CARE:    Chief Complaint   Patient presents with    Consult       HISTORY OF PRESENT ILLNESS: Tom Gage is a 66 y.o. male with a Body mass index is 23.56 kg/(m^2). who presents in clinic today to establish care s/p LRNY with Dr. Forman on 5/2/2014.  His initial surgery was a Sleeve Gastrectomy in June 2013 with a pre-surgery weight of 290 lbs.  He has severe complications and needed to be revised to a LRNY.  He has done very well since surgery and maintains a weight loss of 117 lbs, approximately 98% of his excess weight.  He has seen Dr. Ríos for placement of PD Cath.  He will be on the Renal Transplant list.  He has no other complaints.     Denies: nausea, vomiting, abdominal pain, changes in bowel movement pattern, fever, chills, dysphagia, chest pain, and shortness of breath.    Review of Systems   Constitutional: Negative for chills, fever and malaise/fatigue.   Eyes: Negative for blurred vision and double vision.   Respiratory: Negative for cough, hemoptysis and shortness of breath.    Cardiovascular: Negative for chest pain, palpitations and leg swelling.   Gastrointestinal: Negative for abdominal pain, blood in stool, constipation, diarrhea, heartburn, melena, nausea and vomiting.   Genitourinary: Negative for dysuria and hematuria.   Musculoskeletal: Negative for back pain, falls, joint pain, myalgias and neck pain.   Skin: Negative for rash.   Neurological: Negative for dizziness, tingling, weakness and headaches.   Endo/Heme/Allergies: Negative for environmental allergies. Does not bruise/bleed easily.   Psychiatric/Behavioral: Negative for depression, hallucinations, memory loss, substance abuse and suicidal ideas. The patient is not nervous/anxious and does not have insomnia.        EXERCISE & VITAMINS:  See Bariatric Assessment    MEDICATIONS/ALLERGIES:  Have been reviewed.    DIET:  Regular Bariatric Diet.  Diet Recall.  Br:  Cereal-cheerios w/  strawberries (3 g), [place]:  Crystal burger x 2 (15 g), Di:  Meat & veggie (15 g), Sn: Pure Protein Bar & alexis food cake (20 g), ~50-60 grams daily protein.    Vitals:    05/02/17 1007   BP: (!) 170/78   Pulse: 64       Physical Exam   Constitutional: He is oriented to person, place, and time. He appears well-developed and well-nourished. No distress.   HENT:   Head: Normocephalic and atraumatic.   Cardiovascular: Normal rate, regular rhythm, normal heart sounds and intact distal pulses.    Pulmonary/Chest: Effort normal and breath sounds normal. No respiratory distress. He has no wheezes. He has no rales. He exhibits no tenderness.   Abdominal: Soft. Bowel sounds are normal. He exhibits no distension and no mass. There is no tenderness. There is no rebound and no guarding.   WHSS   Musculoskeletal: He exhibits no edema.   Neurological: He is alert and oriented to person, place, and time.   Skin: Skin is warm and dry. No rash noted. He is not diaphoretic. No erythema. No pallor.   Psychiatric: He has a normal mood and affect. His behavior is normal. Judgment and thought content normal.   Nursing note and vitals reviewed.      ASSESSMENT:  - Body mass index is 23.56 kg/(m^2).,  s/p laparoscopic Claire-en-Y on 5/2/2014.  - Estimated goal weight, 230 lbs, which is 50% EWL  - Co-morbidities: GERD (stable), DM2 (stable), CAD (stable)  - Great Weight loss, 117 lbs, 98% EWL  - No Exercise regimen  - Good Vitamin Regimen  - Fair Diet, low in daily protein.  - Not at risk for fall or abuse    PLAN:  - Emphasized the importance of regular exercise and adherence to bariatric diet to achieve maximum weight loss.  - Encouraged patient to start regular exercise.  - Follow-up with dietician to reinforce diet.  - Continue daily vitamins and medications.  - Anti-Acid medication, Omeprazole daily as needed.  - Miralax daily for constipation, no fiber.  - RTC in 1 year or sooner if needed.  - Call the office for any issues.  - Check labs  today.    40 minute visit, over 50% of time spent counseling patient face to face on diet, exercise, and weight loss.

## 2017-05-04 LAB
COPPER SERPL-MCNC: 763 UG/L (ref 665–1480)
VIT B1 SERPL-MCNC: >160 UG/L (ref 38–122)
ZINC SERPL-MCNC: 65 UG/DL (ref 60–130)

## 2017-05-08 DIAGNOSIS — I50.9 CONGESTIVE HEART FAILURE, UNSPECIFIED CONGESTIVE HEART FAILURE CHRONICITY, UNSPECIFIED CONGESTIVE HEART FAILURE TYPE: Primary | ICD-10-CM

## 2017-05-09 ENCOUNTER — OFFICE VISIT (OUTPATIENT)
Dept: CARDIOLOGY | Facility: CLINIC | Age: 67
End: 2017-05-09
Payer: MEDICARE

## 2017-05-09 ENCOUNTER — HOSPITAL ENCOUNTER (OUTPATIENT)
Dept: CARDIOLOGY | Facility: CLINIC | Age: 67
Discharge: HOME | End: 2017-05-09
Payer: MEDICARE

## 2017-05-09 VITALS
HEART RATE: 56 BPM | BODY MASS INDEX: 22.43 KG/M2 | WEIGHT: 174.81 LBS | DIASTOLIC BLOOD PRESSURE: 65 MMHG | HEIGHT: 74 IN | SYSTOLIC BLOOD PRESSURE: 146 MMHG

## 2017-05-09 DIAGNOSIS — I15.0 RENOVASCULAR HYPERTENSION: ICD-10-CM

## 2017-05-09 DIAGNOSIS — I25.10 CORONARY ARTERY DISEASE INVOLVING NATIVE CORONARY ARTERY OF NATIVE HEART WITHOUT ANGINA PECTORIS: Primary | Chronic | ICD-10-CM

## 2017-05-09 DIAGNOSIS — I50.9 CONGESTIVE HEART FAILURE, UNSPECIFIED CONGESTIVE HEART FAILURE CHRONICITY, UNSPECIFIED CONGESTIVE HEART FAILURE TYPE: ICD-10-CM

## 2017-05-09 DIAGNOSIS — Z99.2 CKD (CHRONIC KIDNEY DISEASE) STAGE V REQUIRING CHRONIC DIALYSIS: ICD-10-CM

## 2017-05-09 DIAGNOSIS — I27.20 PULMONARY HYPERTENSION: ICD-10-CM

## 2017-05-09 DIAGNOSIS — N18.6 CKD (CHRONIC KIDNEY DISEASE) STAGE V REQUIRING CHRONIC DIALYSIS: ICD-10-CM

## 2017-05-09 DIAGNOSIS — I51.89 DIASTOLIC DYSFUNCTION: ICD-10-CM

## 2017-05-09 PROBLEM — Z98.84 S/P GASTRIC BYPASS: Status: ACTIVE | Noted: 2017-05-09

## 2017-05-09 LAB — PHYTONADIONE SERPL-MCNC: 176 PG/ML (ref 80–1160)

## 2017-05-09 PROCEDURE — 93010 ELECTROCARDIOGRAM REPORT: CPT | Mod: S$PBB,NTX,, | Performed by: INTERNAL MEDICINE

## 2017-05-09 PROCEDURE — 99999 PR PBB SHADOW E&M-EST. PATIENT-LVL III: CPT | Mod: PBBFAC,TXP,, | Performed by: NURSE PRACTITIONER

## 2017-05-09 PROCEDURE — 99214 OFFICE O/P EST MOD 30 MIN: CPT | Mod: S$PBB,NTX,, | Performed by: NURSE PRACTITIONER

## 2017-05-09 PROCEDURE — 99213 OFFICE O/P EST LOW 20 MIN: CPT | Mod: PBBFAC,TXP | Performed by: NURSE PRACTITIONER

## 2017-05-09 PROCEDURE — 93005 ELECTROCARDIOGRAM TRACING: CPT | Mod: PBBFAC,NTX | Performed by: INTERNAL MEDICINE

## 2017-05-09 NOTE — PROGRESS NOTES
Mr. Gage is a patient of Dr. Burns and was last seen in [facility] 3/14/2017.      Subjective:   Patient ID:  Tom Gage is a 66 y.o. male who presents for follow-up of Pulmonary HTN; Edema; Medication Management (losartan,labetalol); Dizziness; Nausea; and Fatigue  .   HPI:    Mr. Gage is a 67yo male with a PMHx of CAD (NSTEMI in 2012), HFpEF (2012), MR, HTN, pleural effusion (thoracentesis 3/9/2017), TIA, gastric bypass (2014), DM, and ESRD (on peritoneal dialysis) here for follow up of CHF. He is here with complaints of fatigue, light-headedness, loss of balance, and nausea since initiating his losartan in March 2017. He also takes labetalol 100mg BID. Mr. Gage denies chest pain with exertion or at rest, palpitations, syncope,  claudication, PND, or orthopnea. He has mild leg edema which resolves after dialysis. He is normally active but reports that his activity is severely limited by lack of balance and fatigue since starting his losartan in March. He says that his SBP at home range in the 130s-140s and this has been steady both before and after the losartan was added to his regimen.    Recent Cardiac Tests:    2D Echo (12/16/2016):  CONCLUSIONS     1 - Normal left ventricular systolic function (EF 55-60%).     2 - Eccentric hypertrophy.     3 - Left ventricular diastolic dysfunction.     4 - Severe left atrial enlargement.     5 - Normal right ventricular systolic function .     6 - Mild aortic stenosis, ARMIN = 1.79 cm2, peak velocity = 2.5 m/s, mean gradient = 13.0 mmHg.     7 - Trivial aortic regurgitation.     8 - Mild to moderate mitral regurgitation.     9 - Trivial to mild tricuspid regurgitation.     10 - Trivial pulmonic regurgitation.     11 - Trivial pericardial effusion with thickened pericardium .     12 - Pulmonary hypertension. The estimated PA systolic pressure is 65 mmHg.     13 - Anterior pericardial density as per text.     Nuclear Stress Test  (1/20/2016):  Impression: NORMAL MYOCARDIAL PERFUSION  1. The perfusion scan is free of evidence for myocardial ischemia or injury.   2. Resting wall motion is physiologic.   3. Resting LV function is normal.  (normal is >= 51%)  4. The ventricular volumes are normal at rest and stress.   5. The extracardiac distribution of radioactivity is normal.   6. There was no previous study available to compare.      Current Outpatient Prescriptions   Medication Sig    calcitRIOL (ROCALTROL) 0.5 MCG Cap TK ONE C PO D    ergocalciferol (ERGOCALCIFEROL) 50,000 unit Cap Take 1 capsule (50,000 Units total) by mouth twice a week.    FOLIC ACID/VIT BCOMP,C (RENAL-CARLOS ORAL) Take 1 tablet by mouth once daily.    gentamicin (GARAMYCIN) 0.3 % ophthalmic solution APPLY 2 GTS TO EXIT SITE QD AS DIRECTED    hyoscyamine (LEVSIN/SL) 0.125 mg Subl Place 0.125 mg under the tongue every 4 (four) hours as needed (for dry heaving).     labetalol (NORMODYNE) 100 MG tablet Take 1 tablet (100 mg total) by mouth every 12 (twelve) hours.    nitroGLYCERIN (NITROSTAT) 0.4 MG SL tablet Place 0.4 mg under the tongue every 5 (five) minutes as needed for Chest pain.    ondansetron (ZOFRAN-ODT) 4 MG TbDL Take 2 tablets (8 mg total) by mouth every 8 (eight) hours as needed (Nausea).    pantoprazole (PROTONIX) 40 MG tablet Take 40 mg by mouth 2 (two) times daily.     sevelamer carbonate (RENVELA) 800 mg Tab Take 800 mg by mouth 3 (three) times daily.    sodium bicarbonate 325 MG tablet 3 (three) times daily.      No current facility-administered medications for this visit.        Review of Systems   Constitution: Positive for weakness. Negative for malaise/fatigue.   HENT: Negative for headaches.    Eyes: Negative for blurred vision.   Cardiovascular: Negative for chest pain, claudication, dyspnea on exertion, irregular heartbeat, leg swelling, orthopnea, palpitations, paroxysmal nocturnal dyspnea and syncope.   Respiratory: Negative for snoring.   "  Hematologic/Lymphatic: Negative for bleeding problem.   Skin: Negative for rash.   Musculoskeletal: Negative for back pain, muscle weakness and myalgias.   Gastrointestinal: Positive for nausea. Negative for abdominal pain, constipation and diarrhea.   Genitourinary: Negative for dysuria and hematuria.   Neurological: Positive for dizziness, light-headedness and loss of balance. Negative for numbness.   Psychiatric/Behavioral: Negative for altered mental status.   Allergic/Immunologic: Negative for persistent infections.         Objective:     Right Arm BP - Sittin/65 (17 1427)    BP (!) 146/65 (BP Location: Right arm, Patient Position: Sitting, BP Method: Automatic)  Pulse (!) 56  Ht 6' 2" (1.88 m)  Wt 79.3 kg (174 lb 13.2 oz)  BMI 22.45 kg/m2    Physical Exam   Constitutional: He is oriented to person, place, and time. He appears well-developed and well-nourished.   HENT:   Head: Normocephalic.   Nose: Nose normal.   Eyes: Pupils are equal, round, and reactive to light.   Neck: No JVD present. Carotid bruit is not present.   Cardiovascular: Normal rate, regular rhythm, S1 normal, S2 normal and intact distal pulses.   No extrasystoles are present. PMI is not displaced.  Exam reveals no gallop and no friction rub.    Murmur heard.  High-pitched blowing holosystolic murmur is present with a grade of 3/6  at the apex radiating to the axilla  Pulses:       Carotid pulses are 2+ on the right side, and 2+ on the left side.       Radial pulses are 2+ on the right side, and 2+ on the left side.        Dorsalis pedis pulses are 2+ on the right side, and 2+ on the left side.   Pulmonary/Chest: Breath sounds normal. No respiratory distress.   Abdominal: Soft. Bowel sounds are normal. He exhibits no distension. There is no tenderness.   PD cath noted to left abd   Musculoskeletal: Normal range of motion. He exhibits edema (1 pitting lower legs).   Neurological: He is alert and oriented to person, place, and " time. He is not disoriented.   Skin: Skin is warm and dry. No rash noted.   Psychiatric: He has a normal mood and affect. His speech is normal and behavior is normal.   Nursing note and vitals reviewed.        Lab Results   Component Value Date     03/09/2017    K 5.0 03/09/2017     03/09/2017    CO2 17 (L) 03/09/2017     (H) 03/09/2017    CREATININE 9.5 (H) 03/09/2017    GLU 82 03/09/2017    MG 2.1 04/07/2016    AST 30 03/09/2017    ALT 35 03/09/2017    ALBUMIN 2.8 (L) 03/09/2017    PROT 5.4 (L) 03/09/2017    BILITOT 0.7 03/09/2017    WBC 5.01 03/09/2017    HGB 11.1 (L) 03/09/2017    HCT 33.5 (L) 03/09/2017    MCV 99 (H) 03/09/2017     (L) 03/09/2017    TSH 4.653 (H) 09/20/2015    CHOL 131 12/15/2015    HDL 36 (L) 12/15/2015    LDLCALC 73.6 12/15/2015    TRIG 107 12/15/2015         Recent Labs  Lab 12/15/15  0715 04/07/16  0336 01/20/17  1009   INR 0.9 0.9 1.1  1.1        Test(s) Reviewed  I have reviewed the following in detail:  [] Stress test   [] Angiography   [] Echocardiogram   [x] Labs   [x] Other:  EKG       Assessment:         1. Coronary artery disease involving native coronary artery of native heart without angina pectoris. Intolerant of statins. LDL 73 in 2015. Nuclear stress 1/20/2016 negative for ischemia. EKG today demonstrates no significant changes.     2. Diastolic dysfunction. On BB. No significant overload at this time.     3. CKD (chronic kidney disease) stage V requiring chronic dialysis. Peritoneal dialysis.     4. Renovascular hypertension. Controlled per home reports. BP unchanged since initiation of losartan. Will stop losartan to see if symptoms improve.     5. Pulmonary hypertension group 2. Stable.     Plan:     Tom was seen today for pulmonary htn, edema, medication management, dizziness, nausea and fatigue.    Diagnoses and all orders for this visit:    Coronary artery disease involving native coronary artery of native heart without angina  pectoris    Diastolic dysfunction    CKD (chronic kidney disease) stage V requiring chronic dialysis    Renovascular hypertension    Pulmonary hypertension group 2      Patient may discontinue losartan. Continue other medications as prescribed.     Return in about 1 year (around 5/9/2018).

## 2017-05-09 NOTE — MR AVS SNAPSHOT
[facility] - Cardiology  [address]  [facility] [address]  Phone: [phone]                  Tom Gage   2017 3:20 PM   Office Visit    Description:  Male : 1950   Provider:  Renetta Luo NP   Department:  [facility]           Reason for Visit     Pulmonary HTN     Edema     Medication Management     Dizziness     Nausea     Fatigue           Diagnoses this Visit        Comments    Coronary artery disease involving native coronary artery of native heart without angina pectoris    -  Primary     Diastolic dysfunction         CKD (chronic kidney disease) stage V requiring chronic dialysis         Type 2 diabetes mellitus with diabetic nephropathy, without long-term current use of insulin         Renovascular hypertension         Pulmonary hypertension                To Do List           Future Appointments        Provider Department Dept Phone    2017 9:00 AM Benita Win PA-C [facility] - Bariatric Surgery [phone]    2017 9:30 AM Christiano Suazo RD [facility] Bariatric Surgery [phone]      Goals (5 Years of Data)     None      Follow-Up and Disposition     Return in about 1 year (around 2018).      [facility]     [facility] -  Assistance  Unless otherwise directed by your provider, please contact [facility], our nurse care line that is available for  assistance.     Registered nurses in the [facility] provide: appointment scheduling, clinical advisement, health education, and other advisory services.  Call: [phone] (toll free)               Medications           Message regarding Medications     Verify the changes and/or additions to your medication regime listed below are the same as discussed with your clinician today.  If any of these changes or additions are incorrect, please notify your healthcare provider.        STOP taking these medications     furosemide (LASIX) 80 MG  "tablet Take 80 mg by mouth 2 (two) times daily as needed.     GEMMA-CARLOS RX 1- mg-mg-mcg Tab TK 1 T PO D    vitamin D 1000 units Tab Take 5,000 mg by mouth every 7 days.    tamsulosin (FLOMAX) 0.4 mg Cp24 TAKE ONE CAPSULE BY MOUTH EVERY EVENING    losartan (COZAAR) 50 MG tablet Take 1 tablet by mouth once daily at 6am.           Verify that the below list of medications is an accurate representation of the medications you are currently taking.  If none reported, the list may be blank. If incorrect, please contact your healthcare provider. Carry this list with you in case of emergency.           Current Medications     calcitRIOL (ROCALTROL) 0.5 MCG Cap TK ONE C PO D    ergocalciferol (ERGOCALCIFEROL) 50,000 unit Cap Take 1 capsule (50,000 Units total) by mouth twice a week.    FOLIC ACID/VIT BCOMP,C (RENAL-CARLOS ORAL) Take 1 tablet by mouth once daily.    gentamicin (GARAMYCIN) 0.3 % ophthalmic solution APPLY 2 GTS TO EXIT SITE QD AS DIRECTED    hyoscyamine (LEVSIN/SL) 0.125 mg Subl Place 0.125 mg under the tongue every 4 (four) hours as needed (for dry heaving).     labetalol (NORMODYNE) 100 MG tablet Take 1 tablet (100 mg total) by mouth every 12 (twelve) hours.    nitroGLYCERIN (NITROSTAT) 0.4 MG SL tablet Place 0.4 mg under the tongue every 5 (five) minutes as needed for Chest pain.    ondansetron (ZOFRAN-ODT) 4 MG TbDL Take 2 tablets (8 mg total) by mouth every 8 (eight) hours as needed (Nausea).    pantoprazole (PROTONIX) 40 MG tablet Take 40 mg by mouth 2 (two) times daily.     sevelamer carbonate (RENVELA) 800 mg Tab Take 800 mg by mouth 3 (three) times daily.    sodium bicarbonate 325 MG tablet 3 (three) times daily.            Clinical Reference Information           Your Vitals Were     BP Pulse Height Weight BMI    146/65 (BP Location: Right arm, Patient Position: Sitting, BP Method: Automatic) 56 6' 2" (1.88 m) 79.3 kg (174 lb 13.2 oz) 22.45 kg/m2      Blood Pressure          Most Recent Value    " Right Arm BP - Sitting  146/65    Reason for not completing BP on both arms  AV shunt    BP  (!)  146/65      Allergies as of 5/9/2017     Latex, Natural Rubber    Adhesive    Morphine    Ace Inhibitors      Immunizations Administered on Date of Encounter - 5/9/2017     None      Maintenance Dialysis History     Start End Type Comments Center    4/21/2016  [facility] # [id number] [facility] [place]            Current Dialysis Center Information     [facility] [address] [place] Phone #:  [phone]    Contact:  [place]  [address] Fax #:  [phone]            Transplant Information        Txp Date Organ Coordinator Care Team     Kidney Addy Gant Jr., RN Referring Physician:  Mauricio Frazier MD   Current Nephrologist:  Ekta Cope MD         Language Assistance Services     ATTENTION: Language assistance services are available, free of charge. Please call [phone].      ATENCIÓN: Si habla español, tiene a rosas disposición servicios gratuitos de asistencia lingüística. Llame al [phone].     CHÚ Ý: N?u b?n nói Ti?ng Vi?t, có các d?ch v? h? tr? ngôn ng? mi?n phí dành cho b?n. G?i s? [phone].         [facility] complies with applicable Federal civil rights laws and does not discriminate on the basis of race, color, national origin, age, disability, or sex.

## 2017-05-11 LAB
ACID FAST MOD KINY STN SPEC: NORMAL
MYCOBACTERIUM SPEC QL CULT: NORMAL

## 2017-05-16 ENCOUNTER — LAB VISIT (OUTPATIENT)
Dept: LAB | Facility: HOSPITAL | Age: 67
End: 2017-05-16
Payer: MEDICARE

## 2017-05-16 DIAGNOSIS — Z76.82 AWAITING ORGAN TRANSPLANT STATUS: ICD-10-CM

## 2017-05-22 ENCOUNTER — TELEPHONE (OUTPATIENT)
Dept: TRANSPLANT | Facility: CLINIC | Age: 67
End: 2017-05-22

## 2017-05-22 NOTE — TELEPHONE ENCOUNTER
----- Message from Addy Gant Jr., RN sent at 5/10/2017  4:13 PM CDT -----  Dr. Sims,  The above pt had a CT of abd/pelvis performed on 5/16/16 for an abdominal mass.  That issue was address by Urology.  Can you, though, review this CT to determine if this gentleman has any vascular issues which may preclude transplant?  Thanks  Addy

## 2017-05-30 ENCOUNTER — CLINICAL SUPPORT (OUTPATIENT)
Dept: BARIATRICS | Facility: CLINIC | Age: 67
End: 2017-05-30
Payer: MEDICARE

## 2017-05-30 ENCOUNTER — OFFICE VISIT (OUTPATIENT)
Dept: BARIATRICS | Facility: CLINIC | Age: 67
End: 2017-05-30
Payer: MEDICARE

## 2017-05-30 VITALS
SYSTOLIC BLOOD PRESSURE: 154 MMHG | WEIGHT: 183.63 LBS | HEIGHT: 74 IN | BODY MASS INDEX: 23.57 KG/M2 | DIASTOLIC BLOOD PRESSURE: 70 MMHG | HEART RATE: 70 BPM

## 2017-05-30 DIAGNOSIS — I51.89 DIASTOLIC DYSFUNCTION: ICD-10-CM

## 2017-05-30 DIAGNOSIS — Z98.84 S/P GASTRIC BYPASS: ICD-10-CM

## 2017-05-30 DIAGNOSIS — I13.0 HYPERTENSIVE HEART AND KIDNEY DISEASE WITH HEART FAILURE: ICD-10-CM

## 2017-05-30 DIAGNOSIS — K21.9 GASTROESOPHAGEAL REFLUX DISEASE WITHOUT ESOPHAGITIS: Chronic | ICD-10-CM

## 2017-05-30 DIAGNOSIS — Z79.899 OTHER LONG TERM (CURRENT) DRUG THERAPY: ICD-10-CM

## 2017-05-30 DIAGNOSIS — E55.9 VITAMIN D DEFICIENCY: Primary | ICD-10-CM

## 2017-05-30 PROCEDURE — 99999 PR PBB SHADOW E&M-EST. PATIENT-LVL IV: CPT | Mod: PBBFAC,TXP,, | Performed by: PHYSICIAN ASSISTANT

## 2017-05-30 PROCEDURE — 99211 OFF/OP EST MAY X REQ PHY/QHP: CPT | Mod: PBBFAC,TXP | Performed by: DIETITIAN, REGISTERED

## 2017-05-30 PROCEDURE — 99214 OFFICE O/P EST MOD 30 MIN: CPT | Mod: S$PBB,NTX,, | Performed by: PHYSICIAN ASSISTANT

## 2017-05-30 PROCEDURE — 99499 UNLISTED E&M SERVICE: CPT | Mod: S$PBB,NTX,, | Performed by: SURGERY

## 2017-05-30 PROCEDURE — 99999 PR PBB SHADOW E&M-EST. PATIENT-LVL I: CPT | Mod: PBBFAC,TXP,, | Performed by: DIETITIAN, REGISTERED

## 2017-05-30 PROCEDURE — 99214 OFFICE O/P EST MOD 30 MIN: CPT | Mod: PBBFAC,27,TXP | Performed by: PHYSICIAN ASSISTANT

## 2017-05-30 RX ORDER — VIT B COMP NO.3/FOLIC/C/BIOTIN 1 MG-60 MG
1 TABLET ORAL DAILY
COMMUNITY
Start: 2017-05-10 | End: 2017-09-19 | Stop reason: SDUPTHER

## 2017-05-30 NOTE — PROGRESS NOTES
BARIATRIC SURGERY FOLLOW UP VISIT:    Chief Complaint   Patient presents with    Follow-up       HISTORY OF PRESENT ILLNESS: Tom Gage is a 66 y.o. male with a Body mass index is 23.58 kg/m². who presents in clinic today for follow up s/p LRNY with Dr. Forman on 5/2/2014.  His initial surgery was a Sleeve Gastrectomy in June 2013 with a pre-surgery weight of 290 lbs.  He has severe complications and needed to be revised to a LRNY.  He has done very well since surgery but is having a great deal of difficulty with his dialysis center.  He states that they are not managing him well and he is concerned.  He would like our help to change centers.  He tried increasing his protein, but his phosphorous became elevated.  We will help him with a good Renal/Bariatric Diet. He has lost 107 lbs, approximately 89% of his excess weight. He has no other complaints.     Denies: nausea, vomiting, abdominal pain, changes in bowel movement pattern, fever, chills, dysphagia, chest pain, and shortness of breath.    Review of Systems   Constitutional: Negative for chills, fever and malaise/fatigue.   Eyes: Negative for blurred vision and double vision.   Respiratory: Negative for cough, hemoptysis and shortness of breath.    Cardiovascular: Negative for chest pain, palpitations and leg swelling.   Gastrointestinal: Negative for abdominal pain, blood in stool, constipation, diarrhea, heartburn, melena, nausea and vomiting.   Genitourinary: Negative for dysuria and hematuria.   Musculoskeletal: Negative for back pain, falls, joint pain, myalgias and neck pain.   Skin: Negative for rash.   Neurological: Negative for dizziness, tingling, weakness and headaches.   Endo/Heme/Allergies: Negative for environmental allergies. Does not bruise/bleed easily.   Psychiatric/Behavioral: Negative for depression, hallucinations, memory loss, substance abuse and suicidal ideas. The patient is not nervous/anxious and does not have insomnia.         EXERCISE & VITAMINS:  See Bariatric Assessment    MEDICATIONS/ALLERGIES:  Have been reviewed.    DIET:  Regular Bariatric Diet.  Diet Recall.  Br:  Cereal-cheerios w/ strawberries (3 g), Madison:  Crystal burger x 2 (15 g), Di:  Meat & veggie (15 g), Sn: Pure Protein Bar & alexis food cake (20 g), ~50-60 grams daily protein.      Vitals:    05/30/17 0927   BP: (!) 154/70   Pulse: 70       Physical Exam   Constitutional: He is oriented to person, place, and time. He appears well-developed and well-nourished. No distress.   HENT:   Head: Normocephalic and atraumatic.   Cardiovascular: Normal rate, regular rhythm, normal heart sounds and intact distal pulses.    Pulmonary/Chest: Effort normal and breath sounds normal. No respiratory distress. He has no wheezes. He has no rales. He exhibits no tenderness.   Abdominal: Soft. Bowel sounds are normal. He exhibits no distension and no mass. There is no tenderness. There is no rebound and no guarding.   WHSS   Musculoskeletal: He exhibits no edema.   Neurological: He is alert and oriented to person, place, and time.   Skin: Skin is warm and dry. No rash noted. He is not diaphoretic. No erythema. No pallor.   Psychiatric: He has a normal mood and affect. His behavior is normal. Judgment and thought content normal.   Nursing note and vitals reviewed.      ASSESSMENT:  - Body mass index is 23.58 kg/m².,  s/p laparoscopic Claire-en-Y on 5/2/2014.  - Estimated goal weight, 230 lbs, which is 50% EWL  - Co-morbidities: GERD (stable), DM2 (stable), CAD (stable)  - Great Weight loss, 107 lbs, 89% EWL  - No Exercise regimen  - Good Vitamin Regimen  - Fair Diet, low in daily protein.  - Not at risk for fall or abuse    PLAN:  - Emphasized the importance of regular exercise and adherence to bariatric diet to achieve maximum weight loss.  - Encouraged patient to start regular exercise.  - Follow-up with dietician to reinforce diet and give recommendations on a Bariatric/Renal Diet.   Recommend Unjury Protein shakes.  - Continue daily vitamins and medications.  - Anti-Acid medication, Omeprazole daily as needed.  - Miralax daily for constipation, no fiber.  - RTC in 1 year or sooner if needed.  - Call the office for any issues.  - Check labs 4/2018.    25 minute visit, over 50% of time spent counseling patient face to face on diet, exercise, and weight loss.

## 2017-05-30 NOTE — PATIENT INSTRUCTIONS
- Repeat Vitamin D in 4 weeks.  - Work with Christiano on Diet.    - To lose weight you want to cut 100% starchy carbohydrates out of your diet (bread, rice, pasta, potatoes, granola, flour, corn, peas, oatmeal, grits, tortillas, crackers, chips) and get  grams of protein.  Aim for 100 grams of protein daily.    - Premier Protein (Chocolate, Bananas & Cream, Strawberries & Cream, Vanilla) Eugen[employer] or [employer]    - Syntrax Ivey from [employer], www.bariatricadvantage.com, www.bariatricchoice.com. (LACTOSE FREE)    - Atkins Lift - [employer] & [employer] (LACTOSE FREE)    - Veggetti Pro from [employer]ORSt, [employer], [employer] Beyond    - www.pinterest.com (cauliflower, cloud bread, quest bar cookies, eggplant, zucchini, zucchini noodles, crustless quiche, no carb meals, taco lettuce boats)    - [email or web address]

## 2017-06-01 NOTE — PROGRESS NOTES
NUTRITION NOTE    Referring Physician: Owen Ríos M.D.  Reason for MNT Referral: Follow-up s/p Gastric Bypass    PAST MEDICAL HISTORY:    Denies nausea, vomiting, constipation and diarrhea.  Reports doing well.    Past Medical History:   Diagnosis Date    Anemia of chronic renal failure, stage 5     CAD (coronary artery disease)     CHF (congestive heart failure)     CKD (chronic kidney disease) stage 5, GFR less than 15 ml/min     Diastolic dysfunction 12/16/2016    GERD (gastroesophageal reflux disease)     Hyperparathyroidism, secondary renal     Hypertension     Metabolic acidosis     MI (myocardial infarction) Feb 2012    Pulmonary hypertension 12/16/2016    TIA (transient ischemic attack)     Type 2 diabetes mellitus with diabetic nephropathy     history/ before wt loss       CLINICAL DATA:  66 y.o. male.    There were no vitals filed for this visit.    Current Weight: 183 lbs  BMI: 23.58  Total Weight Loss: 107 lbs  Excess Weight Loss: 89%    LABS:  Reviewed.    CURRENT DIET:  Regular Bariatric Diet.  Diet Recall.  Br:  Cereal-cheerios w/ strawberries (3 g), Madison:  Crystal burger x 2 (15 g), Di:  Meat & veggie (15 g), Sn: Pure Protein Bar & alexis food cake (20 g), ~50-60 grams daily protein.  Inadequate protein supplement intake.  Inadequate dairy intake.  Adequate vegetable intake. Tolerates raw vegetables and lettuce.  Adequate fruit intake.  Starchy CHO: excess  Other: high phosphorus foods    EXERCISE:  None.  Restrictions to Exercise: None.    VITAMINS / MINERALS:  Multivitamins: Renal-Vit  B-Complex: B-Complex Renal Ricky  Calcitriol  Vitamin B12: Sublingual.    ASSESSMENT:  Doing well overall.  Weight loss.  Adequate calorie intake.  Inadequate protein intake.  Adequate fluid intake.  Following diet appropriately.  Not exercising.  Adequate vitamins & minerals.    BARIATRIC DIET DISCUSSION:  Instructed and provided written materials on bariatric diet plan.  Reinforced post-op  nutrition guidelines.    PLAN / RECOMMENDATIONS:  May begin to incorporate raw vegetables, lettuce, unsalted nuts, and light popcorn as tolerated.  May begin to swallow whole pills as tolerated.  Continue excellent diet plan.  Adjust diet by eliminating carbohydrate sources of phosphorus and use Unjury protein shakes.  Increase protein intake.  Maintain fluid intake.  Continue exercise.  Continue appropriate vitamins & minerals.    Return to clinic in 1 months.    SESSION TIME: 15 minutes

## 2017-06-14 ENCOUNTER — LAB VISIT (OUTPATIENT)
Dept: LAB | Facility: HOSPITAL | Age: 67
End: 2017-06-14
Payer: MEDICARE

## 2017-06-14 DIAGNOSIS — Z76.82 AWAITING ORGAN TRANSPLANT STATUS: ICD-10-CM

## 2017-06-14 PROCEDURE — 86829 HLA CLASS I/II ANTIBODY QUAL: CPT | Mod: PO,TXP

## 2017-06-14 PROCEDURE — 86829 HLA CLASS I/II ANTIBODY QUAL: CPT | Mod: 91,PO,TXP

## 2017-06-28 NOTE — Clinical Note
June 28, 2017        [facility]  [address] [place] [address]          The patient below is is currently being dialized at your dialysis center/unit and who is on the kidney waiting list at [facility].     Tom Gage   [facility] Number: [id number]    To monitor the antibody levels that are essential in transplantation and which fluctuate   from month to month, it is imperative that we keep a record of monthly antibody titers. Therefore, we would like to have you draw one 10 ml RED top tube BEFORE dialysis   begins on the above specified patient.                                                    IMPORTANT NOTICE  SAMPLES THAT DO NOT HAVE CORRECT LABELLING INFORMATION WILL BE          REFUSED DUE TO LABORATORY REGULATIONS AND GUIDELINES    All tubes MUST BE labeled with the following information: PATIENT NAME, either DATE OF BIRTH or [facility] NUMBER and DATE DRAWN. Sample must be mailed within two to three days of the draw so that it is still a usable sample once received. Tubes do not have to be iced nor serum must be  from clot. It is imperative that the blood samples for the month be received in the [facility] by the end of the month, (preferably by the 15th).        Please send samples to :       [facility]     [address]      [place] [address]        Please contact the [facility] staff at [phone] if additional collection or shipping supplies are needed. Thank you for your cooperation.      Sincerely,  [facility] Team

## 2017-06-28 NOTE — LETTER
IMPORTANT    July 3, 2017    Tom Gage  [address] [facility] [place] [address]     Re: [id number]    Dear Mr/Mrs Gage,    Thank you for choosing [facility] as your health care provider.  We are committed to assisting you with timely insurance filing and payment of your account.  To protect your liability, updated insurance information must be given to us at the time of service and we should be notified immediately if      · Your insurance benefits/plan changes.   You become eligible for any other benefits   Your current plan/coverage terms.    Also, please bring in a copy of your insurance premium payment if you have one of the following types of insurance:    · Coverage from a care home plan.  · Coverage from the Affordable Healthcare Act Plan.  · Coverage from a COBRA plan  · Premium paid by the [employer].    To ensure we have the correct insurance (Medical/Pharmacy) on file and to answer any questions regarding your benefits, please call us at [phone] or [phone] and ask to speak to the kidney  indicated below:      Transplant Dept  [facility]   Heart   Maria Dolores Barrera   Kidney (A-K) and Lung  Jayajohn Raoul    Kidney (L-Z)  Veronica Li   Liver    We look forward to hearing from you soon.    Sincerely,      Transplant Financial Services  [facility]

## 2017-07-06 PROCEDURE — 86829 HLA CLASS I/II ANTIBODY QUAL: CPT | Mod: PO,TXP

## 2017-07-06 PROCEDURE — 86829 HLA CLASS I/II ANTIBODY QUAL: CPT | Mod: 91,PO,TXP

## 2017-07-07 LAB — HPRA INTERPRETATION: NORMAL

## 2017-07-14 LAB — HPRA INTERPRETATION: NORMAL

## 2017-07-21 ENCOUNTER — LAB VISIT (OUTPATIENT)
Dept: LAB | Facility: HOSPITAL | Age: 67
End: 2017-07-21
Payer: MEDICARE

## 2017-07-21 DIAGNOSIS — Z76.82 AWAITING ORGAN TRANSPLANT STATUS: ICD-10-CM

## 2017-07-21 PROCEDURE — 86829 HLA CLASS I/II ANTIBODY QUAL: CPT | Mod: 91,PO,TXP

## 2017-07-21 PROCEDURE — 86829 HLA CLASS I/II ANTIBODY QUAL: CPT | Mod: PO,TXP

## 2017-07-24 ENCOUNTER — TELEPHONE (OUTPATIENT)
Dept: BARIATRICS | Facility: CLINIC | Age: 67
End: 2017-07-24

## 2017-07-31 DIAGNOSIS — Z76.82 ORGAN TRANSPLANT CANDIDATE: ICD-10-CM

## 2017-08-16 ENCOUNTER — TELEPHONE (OUTPATIENT)
Dept: BARIATRICS | Facility: CLINIC | Age: 67
End: 2017-08-16

## 2017-08-16 NOTE — TELEPHONE ENCOUNTER
----- Message from Jesse Singh sent at 8/16/2017  9:49 AM CDT -----  Contact: Padmaja//Wife  Caller states that (s)he needs to speak with nurse in ref to some questions she has for the pt about the next step//please call back at [phone]//thank you

## 2017-08-16 NOTE — TELEPHONE ENCOUNTER
Returned patient's wife called. Patient ended up in [place] for 9 days due to fluid overload with 5 liters positive. Per wife, patient will be converting to HD from PD and has appointment with Dr. Ríos for PD catheter removal. Patient curious if he needs further work up for Vitamin D levels. Patient has lost another 20 lbs in the last 3 months.     Scheduled patient with me following Dr. Ríos's appointment and will discuss with PAs regarding workup.

## 2017-08-18 LAB — HPRA INTERPRETATION: NORMAL

## 2017-08-22 ENCOUNTER — TELEPHONE (OUTPATIENT)
Dept: BARIATRICS | Facility: CLINIC | Age: 67
End: 2017-08-22

## 2017-08-22 NOTE — TELEPHONE ENCOUNTER
Scheduled patient for labs and discussed upcoming appointments. Discussed strategies for increasing calorie and protein intake.

## 2017-08-28 ENCOUNTER — TELEPHONE (OUTPATIENT)
Dept: BARIATRICS | Facility: CLINIC | Age: 67
End: 2017-08-28

## 2017-08-28 NOTE — TELEPHONE ENCOUNTER
Returned patients call and rescheduled appointments. Patient was admitted to the hospital over the weekend with 2L + volume overload. Patient's wife did not feel comfortable traveling with the weather.

## 2017-08-28 NOTE — TELEPHONE ENCOUNTER
----- Message from Nancy Galarza sent at 8/28/2017 12:58 PM CDT -----  Contact: wife/padmaja Giang Called and wanted to reschedule his labs and appointment with you  And also had general questions. Please call Padmaja [phone]  . Thanks :)

## 2017-09-01 ENCOUNTER — TELEPHONE (OUTPATIENT)
Dept: TRANSPLANT | Facility: CLINIC | Age: 67
End: 2017-09-01

## 2017-09-07 ENCOUNTER — CLINICAL SUPPORT (OUTPATIENT)
Dept: BARIATRICS | Facility: CLINIC | Age: 67
End: 2017-09-07
Payer: MEDICARE

## 2017-09-07 ENCOUNTER — OFFICE VISIT (OUTPATIENT)
Dept: SURGERY | Facility: CLINIC | Age: 67
End: 2017-09-07
Payer: MEDICARE

## 2017-09-07 ENCOUNTER — LAB VISIT (OUTPATIENT)
Dept: LAB | Facility: HOSPITAL | Age: 67
End: 2017-09-07
Attending: SURGERY
Payer: MEDICARE

## 2017-09-07 VITALS
HEIGHT: 75 IN | TEMPERATURE: 99 F | BODY MASS INDEX: 21.01 KG/M2 | WEIGHT: 169 LBS | HEART RATE: 64 BPM | DIASTOLIC BLOOD PRESSURE: 77 MMHG | SYSTOLIC BLOOD PRESSURE: 171 MMHG

## 2017-09-07 VITALS — WEIGHT: 169.06 LBS | BODY MASS INDEX: 21.14 KG/M2

## 2017-09-07 DIAGNOSIS — E11.21 TYPE 2 DIABETES MELLITUS WITH DIABETIC NEPHROPATHY, WITHOUT LONG-TERM CURRENT USE OF INSULIN: Chronic | ICD-10-CM

## 2017-09-07 DIAGNOSIS — Z98.84 S/P GASTRIC BYPASS: ICD-10-CM

## 2017-09-07 DIAGNOSIS — E55.9 VITAMIN D DEFICIENCY: ICD-10-CM

## 2017-09-07 DIAGNOSIS — N18.5 CKD (CHRONIC KIDNEY DISEASE) STAGE 5, GFR LESS THAN 15 ML/MIN: Primary | Chronic | ICD-10-CM

## 2017-09-07 LAB — 25(OH)D3+25(OH)D2 SERPL-MCNC: 25 NG/ML

## 2017-09-07 PROCEDURE — 99999 PR PBB SHADOW E&M-EST. PATIENT-LVL II: CPT | Mod: PBBFAC,TXP,, | Performed by: DIETITIAN, REGISTERED

## 2017-09-07 PROCEDURE — 99212 OFFICE O/P EST SF 10 MIN: CPT | Mod: PBBFAC,TXP | Performed by: DIETITIAN, REGISTERED

## 2017-09-07 PROCEDURE — 99999 PR PBB SHADOW E&M-EST. PATIENT-LVL III: CPT | Mod: PBBFAC,TXP,, | Performed by: SURGERY

## 2017-09-07 PROCEDURE — 36415 COLL VENOUS BLD VENIPUNCTURE: CPT | Mod: TXP

## 2017-09-07 PROCEDURE — 97803 MED NUTRITION INDIV SUBSEQ: CPT | Mod: PBBFAC,NTX | Performed by: DIETITIAN, REGISTERED

## 2017-09-07 PROCEDURE — 99214 OFFICE O/P EST MOD 30 MIN: CPT | Mod: S$PBB,NTX,, | Performed by: SURGERY

## 2017-09-07 PROCEDURE — 82306 VITAMIN D 25 HYDROXY: CPT | Mod: TXP

## 2017-09-07 PROCEDURE — 99499 UNLISTED E&M SERVICE: CPT | Mod: S$PBB,NTX,, | Performed by: DIETITIAN, REGISTERED

## 2017-09-07 PROCEDURE — 99213 OFFICE O/P EST LOW 20 MIN: CPT | Mod: PBBFAC,27,TXP | Performed by: SURGERY

## 2017-09-07 NOTE — PROGRESS NOTES
Nutrition Note    Patient has been in and out of the hospital for the last week and half. During that time, he lost 12 lbs. He was unable to have meals during scheduled meal hours due to HD needs inpatient. Patient has been struggling to maintain weight and has lost 14 lbs since his last visit in May. He has lost about 7.6% of his body weight in the last 3 months. Patient is at risk for malnutrition to ESRD and poor ability to maintain nutrition. Discussed strategies to increase calorie and protein intake and appropriate protein supplements 2/2 hx of gastric bypass. Patient would like to gain the 12 lbs he lost inpatient. Patient to f/u with RD as needed.

## 2017-09-07 NOTE — PROGRESS NOTES
History & Physical    SUBJECTIVE:     History of Present Illness:  Patient is a 67 y.o. male presents with esrd with pd cath.  He is on hemo now due to recurring lung fluid from the pd.    Chief Complaint   Patient presents with    Follow-up     pd cath removal       Review of patient's allergies indicates:   Allergen Reactions    Latex, natural rubber Rash     Latex tape causes blisters    Adhesive Dermatitis    Morphine Itching and Hives     Body Rash/ Reddness  Phlebitis and itching     Ace inhibitors      Other reaction(s): COUGH       Current Outpatient Prescriptions   Medication Sig Dispense Refill    calcitRIOL (ROCALTROL) 0.5 MCG Cap TK ONE C PO D  3    ergocalciferol (ERGOCALCIFEROL) 50,000 unit Cap Take 1 capsule (50,000 Units total) by mouth twice a week. 24 capsule 0    FOLIC ACID/VIT BCOMP,C (RENAL-CARLOS ORAL) Take 1 tablet by mouth once daily.      gentamicin (GARAMYCIN) 0.3 % ophthalmic solution APPLY 2 GTS TO EXIT SITE QD AS DIRECTED  3    hyoscyamine (LEVSIN/SL) 0.125 mg Subl Place 0.125 mg under the tongue every 4 (four) hours as needed (for dry heaving).       labetalol (NORMODYNE) 100 MG tablet Take 1 tablet (100 mg total) by mouth every 12 (twelve) hours. 60 tablet 11    nitroGLYCERIN (NITROSTAT) 0.4 MG SL tablet Place 0.4 mg under the tongue every 5 (five) minutes as needed for Chest pain.      ondansetron (ZOFRAN-ODT) 4 MG TbDL Take 2 tablets (8 mg total) by mouth every 8 (eight) hours as needed (Nausea). 21 tablet 0    pantoprazole (PROTONIX) 40 MG tablet Take 40 mg by mouth 2 (two) times daily.       GEMMA-CARLOS RX 1- mg-mg-mcg Tab Take 1 tablet by mouth once daily at 6am.      sevelamer carbonate (RENVELA) 800 mg Tab Take 800 mg by mouth 3 (three) times daily.      sodium bicarbonate 325 MG tablet 3 (three) times daily.   10     No current facility-administered medications for this visit.        Past Medical History:   Diagnosis Date    Anemia of chronic renal failure,  stage 5     CAD (coronary artery disease)     CHF (congestive heart failure)     CKD (chronic kidney disease) stage 5, GFR less than 15 ml/min     Diastolic dysfunction 12/16/2016    GERD (gastroesophageal reflux disease)     Hyperparathyroidism, secondary renal     Hypertension     Metabolic acidosis     MI (myocardial infarction) Feb 2012    Pulmonary hypertension 12/16/2016    TIA (transient ischemic attack)     Type 2 diabetes mellitus with diabetic nephropathy     history/ before wt loss     Past Surgical History:   Procedure Laterality Date    ABDOMINAL SURGERY      PD catheter    BASAL CELL CARCINOMA EXCISION Left Jan 2011    left forehead    CHOLECYSTECTOMY  July 2010    ELBOW SURGERY Left 3/18/14    anterior submuscular transposition, ulnar nerve    EYE SURGERY Bilateral     bilateral cataracts    GASTRECTOMY      GASTRIC BYPASS  May 2014    gastric sleeve  June 2013    Malfunctioned requiring bypass    HERNIA REPAIR      ROTATOR CUFF REPAIR Left 2014    SHOULDER ARTHROSCOPY Left 11/18/14    RCR; glenoid labral repair; arch decompression     Family History   Problem Relation Age of Onset    Lung cancer Mother      smoker    Diabetes Mother     Diabetes Father     Kidney disease Neg Hx     Hypertension Neg Hx     Coronary artery disease Neg Hx      Social History   Substance Use Topics    Smoking status: Never Smoker    Smokeless tobacco: Never Used    Alcohol use No        Review of Systems:  Review of Systems   Constitutional: Negative for fever.   Respiratory: Negative for chest tightness and shortness of breath.    Cardiovascular: Negative for chest pain.   Gastrointestinal: Negative for abdominal pain.   Genitourinary: Negative for difficulty urinating and dysuria.   Hematological:        Has easy bruising but no easy bleeding       OBJECTIVE:     Vital Signs (Most Recent)  Temp: 98.6 °F (37 °C) (09/07/17 0828)  Pulse: 64 (09/07/17 0828)  BP: (!) 171/77 (09/07/17  "0828)  6' 3" (1.905 m)  76.7 kg (169 lb)     Physical Exam:  Physical Exam   Constitutional: He appears well-developed and well-nourished.   Cardiovascular:   Murmur heard.   Systolic murmur is present with a grade of 5/6   Pulmonary/Chest: Effort normal and breath sounds normal.   Abdominal: Soft. Normal appearance and bowel sounds are normal. There is no tenderness.       Vitals reviewed.      Laboratory  CBC: Reviewed  CMP: Reviewed    Diagnostic Results:  X-Ray: Reviewed  Echo: Reviewed    ASSESSMENT/PLAN:     ESRD on hemo    PLAN:Plan     Removal pd.       "

## 2017-09-07 NOTE — PATIENT INSTRUCTIONS
Switch to whole milk and full-fat cheese.   FitSceneShot bars. [email or web address] or at [facility].   Power Crunch bars at [employer].     Unjury Unflavored protein powder. [email or web address]

## 2017-09-08 RX ORDER — ERGOCALCIFEROL 1.25 MG/1
50000 CAPSULE ORAL
Qty: 24 CAPSULE | Refills: 0 | Status: SHIPPED | OUTPATIENT
Start: 2017-09-11 | End: 2017-11-30

## 2017-09-19 ENCOUNTER — TELEPHONE (OUTPATIENT)
Dept: SURGERY | Facility: CLINIC | Age: 67
End: 2017-09-19

## 2017-09-19 NOTE — PRE-PROCEDURE INSTRUCTIONS
Spoke with Patient and Wife.  NPO, medication, and pre-op instructions reviewed.  Denies previous problems with Anesthesia.  Goes to Dialysis on Mondays, Wednesdays, and Fridays.  Stated that he spoke to his Nephrologist and he was told that he could skip Dialysis tomorrow, have Dialysis at [facility] post-op, or go to his Dialysis at 1600 post-op.  Both verbalized understanding of instructions.

## 2017-09-20 ENCOUNTER — SURGERY (OUTPATIENT)
Age: 67
End: 2017-09-20

## 2017-09-20 ENCOUNTER — ANESTHESIA EVENT (OUTPATIENT)
Dept: SURGERY | Facility: HOSPITAL | Age: 67
End: 2017-09-20
Payer: MEDICARE

## 2017-09-20 ENCOUNTER — HOSPITAL ENCOUNTER (OUTPATIENT)
Facility: HOSPITAL | Age: 67
Discharge: HOME | End: 2017-09-20
Attending: SURGERY | Admitting: SURGERY
Payer: MEDICARE

## 2017-09-20 ENCOUNTER — ANESTHESIA (OUTPATIENT)
Dept: SURGERY | Facility: HOSPITAL | Age: 67
End: 2017-09-20
Payer: MEDICARE

## 2017-09-20 VITALS
OXYGEN SATURATION: 100 % | RESPIRATION RATE: 16 BRPM | DIASTOLIC BLOOD PRESSURE: 68 MMHG | TEMPERATURE: 98 F | HEART RATE: 60 BPM | HEIGHT: 74 IN | SYSTOLIC BLOOD PRESSURE: 173 MMHG | WEIGHT: 172.31 LBS | BODY MASS INDEX: 22.11 KG/M2

## 2017-09-20 DIAGNOSIS — N18.6 ESRD (END STAGE RENAL DISEASE): ICD-10-CM

## 2017-09-20 DIAGNOSIS — I15.0 RENOVASCULAR HYPERTENSION: Primary | ICD-10-CM

## 2017-09-20 LAB — POCT GLUCOSE: 100 MG/DL (ref 70–110)

## 2017-09-20 PROCEDURE — 37000009 HC ANESTHESIA EA ADD 15 MINS: Mod: TXP | Performed by: SURGERY

## 2017-09-20 PROCEDURE — D9220A PRA ANESTHESIA: Mod: ANES,NTX,, | Performed by: ANESTHESIOLOGY

## 2017-09-20 PROCEDURE — 25000003 PHARM REV CODE 250: Mod: NTX | Performed by: NURSE ANESTHETIST, CERTIFIED REGISTERED

## 2017-09-20 PROCEDURE — S0028 INJECTION, FAMOTIDINE, 20 MG: HCPCS | Mod: NTX | Performed by: NURSE ANESTHETIST, CERTIFIED REGISTERED

## 2017-09-20 PROCEDURE — 63600175 PHARM REV CODE 636 W HCPCS: Mod: TXP | Performed by: NURSE ANESTHETIST, CERTIFIED REGISTERED

## 2017-09-20 PROCEDURE — D9220A PRA ANESTHESIA: Mod: CRNA,NTX,, | Performed by: NURSE ANESTHETIST, CERTIFIED REGISTERED

## 2017-09-20 PROCEDURE — 36000704 HC OR TIME LEV I 1ST 15 MIN: Mod: TXP | Performed by: SURGERY

## 2017-09-20 PROCEDURE — 82962 GLUCOSE BLOOD TEST: CPT | Mod: NTX | Performed by: SURGERY

## 2017-09-20 PROCEDURE — 25000003 PHARM REV CODE 250: Mod: TXP | Performed by: SURGERY

## 2017-09-20 PROCEDURE — 94760 N-INVAS EAR/PLS OXIMETRY 1: CPT | Mod: TXP

## 2017-09-20 PROCEDURE — 25000003 PHARM REV CODE 250: Mod: NTX | Performed by: SURGERY

## 2017-09-20 PROCEDURE — 88300 SURGICAL PATH GROSS: CPT | Mod: TXP | Performed by: PATHOLOGY

## 2017-09-20 PROCEDURE — 49422 REMOVE TUNNELED IP CATH: CPT | Mod: NTX,,, | Performed by: SURGERY

## 2017-09-20 PROCEDURE — 71000015 HC POSTOP RECOV 1ST HR: Mod: TXP | Performed by: SURGERY

## 2017-09-20 PROCEDURE — 71000039 HC RECOVERY, EACH ADD'L HOUR: Mod: TXP | Performed by: SURGERY

## 2017-09-20 PROCEDURE — 37000008 HC ANESTHESIA 1ST 15 MINUTES: Mod: TXP | Performed by: SURGERY

## 2017-09-20 PROCEDURE — 27000221 HC OXYGEN, UP TO 24 HOURS: Mod: TXP

## 2017-09-20 PROCEDURE — 36000705 HC OR TIME LEV I EA ADD 15 MIN: Mod: TXP | Performed by: SURGERY

## 2017-09-20 PROCEDURE — 71000033 HC RECOVERY, INTIAL HOUR: Mod: NTX | Performed by: SURGERY

## 2017-09-20 RX ORDER — BUPIVACAINE HYDROCHLORIDE 2.5 MG/ML
INJECTION, SOLUTION EPIDURAL; INFILTRATION; INTRACAUDAL
Status: DISCONTINUED | OUTPATIENT
Start: 2017-09-20 | End: 2017-09-20 | Stop reason: HOSPADM

## 2017-09-20 RX ORDER — FENTANYL CITRATE 50 UG/ML
INJECTION, SOLUTION INTRAMUSCULAR; INTRAVENOUS
Status: DISCONTINUED | OUTPATIENT
Start: 2017-09-20 | End: 2017-09-20

## 2017-09-20 RX ORDER — MIDAZOLAM HYDROCHLORIDE 1 MG/ML
INJECTION, SOLUTION INTRAMUSCULAR; INTRAVENOUS
Status: DISCONTINUED | OUTPATIENT
Start: 2017-09-20 | End: 2017-09-20

## 2017-09-20 RX ORDER — SODIUM CHLORIDE 9 MG/ML
INJECTION, SOLUTION INTRAVENOUS CONTINUOUS
Status: DISCONTINUED | OUTPATIENT
Start: 2017-09-20 | End: 2017-09-20 | Stop reason: HOSPADM

## 2017-09-20 RX ORDER — DIPHENHYDRAMINE HYDROCHLORIDE 50 MG/ML
INJECTION INTRAMUSCULAR; INTRAVENOUS
Status: DISCONTINUED | OUTPATIENT
Start: 2017-09-20 | End: 2017-09-20

## 2017-09-20 RX ORDER — ONDANSETRON 2 MG/ML
INJECTION INTRAMUSCULAR; INTRAVENOUS
Status: DISCONTINUED | OUTPATIENT
Start: 2017-09-20 | End: 2017-09-20

## 2017-09-20 RX ORDER — FAMOTIDINE 10 MG/ML
INJECTION INTRAVENOUS
Status: DISCONTINUED | OUTPATIENT
Start: 2017-09-20 | End: 2017-09-20

## 2017-09-20 RX ORDER — PROPOFOL 10 MG/ML
VIAL (ML) INTRAVENOUS
Status: DISCONTINUED | OUTPATIENT
Start: 2017-09-20 | End: 2017-09-20

## 2017-09-20 RX ORDER — CISATRACURIUM BESYLATE 10 MG/ML
INJECTION, SOLUTION INTRAVENOUS
Status: DISCONTINUED | OUTPATIENT
Start: 2017-09-20 | End: 2017-09-20

## 2017-09-20 RX ORDER — AMOXICILLIN 250 MG
1 CAPSULE ORAL 2 TIMES DAILY
COMMUNITY
Start: 2017-09-20 | End: 2017-11-02

## 2017-09-20 RX ORDER — GLYCOPYRROLATE 0.2 MG/ML
INJECTION INTRAMUSCULAR; INTRAVENOUS
Status: DISCONTINUED | OUTPATIENT
Start: 2017-09-20 | End: 2017-09-20

## 2017-09-20 RX ORDER — LIDOCAINE HCL/PF 100 MG/5ML
SYRINGE (ML) INTRAVENOUS
Status: DISCONTINUED | OUTPATIENT
Start: 2017-09-20 | End: 2017-09-20

## 2017-09-20 RX ORDER — NEOSTIGMINE METHYLSULFATE 1 MG/ML
INJECTION, SOLUTION INTRAVENOUS
Status: DISCONTINUED | OUTPATIENT
Start: 2017-09-20 | End: 2017-09-20

## 2017-09-20 RX ORDER — OXYCODONE AND ACETAMINOPHEN 5; 325 MG/1; MG/1
1 TABLET ORAL EVERY 4 HOURS PRN
Status: DISCONTINUED | OUTPATIENT
Start: 2017-09-20 | End: 2017-09-20 | Stop reason: HOSPADM

## 2017-09-20 RX ORDER — OXYCODONE AND ACETAMINOPHEN 5; 325 MG/1; MG/1
1 TABLET ORAL EVERY 4 HOURS PRN
Qty: 21 TABLET | Refills: 0 | Status: SHIPPED | OUTPATIENT
Start: 2017-09-20 | End: 2017-11-02

## 2017-09-20 RX ADMIN — CISATRACURIUM BESYLATE 2 MG: 10 INJECTION INTRAVENOUS at 08:09

## 2017-09-20 RX ADMIN — DIPHENHYDRAMINE HYDROCHLORIDE 12.5 MG: 50 INJECTION, SOLUTION INTRAMUSCULAR; INTRAVENOUS at 08:09

## 2017-09-20 RX ADMIN — GLYCOPYRROLATE 0.8 MG: 0.2 INJECTION, SOLUTION INTRAMUSCULAR; INTRAVENOUS at 08:09

## 2017-09-20 RX ADMIN — CISATRACURIUM BESYLATE 10 MG: 10 INJECTION INTRAVENOUS at 08:09

## 2017-09-20 RX ADMIN — MIDAZOLAM HYDROCHLORIDE 2 MG: 1 INJECTION, SOLUTION INTRAMUSCULAR; INTRAVENOUS at 07:09

## 2017-09-20 RX ADMIN — BUPIVACAINE HYDROCHLORIDE 6 ML: 2.5 INJECTION, SOLUTION EPIDURAL; INFILTRATION; INTRACAUDAL; PERINEURAL at 08:09

## 2017-09-20 RX ADMIN — ONDANSETRON 4 MG: 2 INJECTION INTRAMUSCULAR; INTRAVENOUS at 08:09

## 2017-09-20 RX ADMIN — FAMOTIDINE 20 MG: 10 INJECTION, SOLUTION INTRAVENOUS at 08:09

## 2017-09-20 RX ADMIN — SODIUM CHLORIDE: 0.9 INJECTION, SOLUTION INTRAVENOUS at 07:09

## 2017-09-20 RX ADMIN — LIDOCAINE HYDROCHLORIDE 75 MG: 20 INJECTION, SOLUTION INTRAVENOUS at 08:09

## 2017-09-20 RX ADMIN — VANCOMYCIN HYDROCHLORIDE: 1 INJECTION, POWDER, LYOPHILIZED, FOR SOLUTION INTRAVENOUS at 08:09

## 2017-09-20 RX ADMIN — FENTANYL CITRATE 100 MCG: 50 INJECTION, SOLUTION INTRAMUSCULAR; INTRAVENOUS at 08:09

## 2017-09-20 RX ADMIN — PROPOFOL 100 MG: 10 INJECTION, EMULSION INTRAVENOUS at 08:09

## 2017-09-20 RX ADMIN — NEOSTIGMINE METHYLSULFATE 5 MG: 1 INJECTION INTRAVENOUS at 08:09

## 2017-09-20 NOTE — ANESTHESIA RELEASE NOTE
"Anesthesia Release from PACU Note    Patient: Tom Gage    Procedure(s) Performed: Procedure(s) (LRB):  REMOVAL-CATHETER-DIALYSIS-PERITONEAL (N/A)    Anesthesia type: general    Post pain: Adequate analgesia    Post assessment: no apparent anesthetic complications, tolerated procedure well and no evidence of recall    Last Vitals:   Visit Vitals  BP (!) 149/79   Pulse (!) 56   Temp 36.6 °C (97.9 °F) (Temporal)   Resp 11   Ht 6' 2" (1.88 m)   Wt 78.2 kg (172 lb 5 oz)   SpO2 100%   BMI 22.12 kg/m²       Post vital signs: stable    Level of consciousness: awake, alert  and oriented    Nausea/Vomiting: no nausea/no vomiting    Complications: none    Airway Patency: patent    Respiratory: unassisted, spontaneous ventilation, room air    Cardiovascular: stable and blood pressure at baseline    Hydration: euvolemic  "

## 2017-09-20 NOTE — BRIEF OP NOTE
[facility]  Brief Operative Note     SUMMARY     Surgery Date: 9/20/2017     Surgeon(s) and Role:     * Owen Ríos MD - Primary     * Jonathan Schoen, MD - Resident - Assisting        Pre-op Diagnosis:  CKD (chronic kidney disease) stage 5, GFR less than 15 ml/min [N18.5]    Post-op Diagnosis:  Post-Op Diagnosis Codes:     * CKD (chronic kidney disease) stage 5, GFR less than 15 ml/min [N18.5]    Procedure(s) (LRB):  REMOVAL-CATHETER-DIALYSIS-PERITONEAL (N/A)    Anesthesia: General    Description of the findings of the procedure: as expected -- PD catheter removed without difficulty and provided for gross specimen    Findings/Key Components: as above    Estimated Blood Loss: 2 mL         Specimens:   Specimen (12h ago through future)    Start     Ordered    09/20/17 0827  Specimen to Pathology - Surgery  Once     Comments:  1. Removed PD catheter, gross path only.      09/20/17 0829          Discharge Note    SUMMARY     Admit Date: 9/20/2017    Discharge Date and Time:  09/20/2017 8:43 AM    Hospital Course (synopsis of major diagnoses, care, treatment, and services provided during the course of the hospital stay): The patient came into the hospital for an outpatient procedure, tolerated it well, and was discharged in good condition from the recovery area with the below follow-up, instructions, and medications.       Final Diagnosis: Post-Op Diagnosis Codes:     * CKD (chronic kidney disease) stage 5, GFR less than 15 ml/min [N18.5]    Disposition: Home or Self Care    Follow Up/Patient Instructions:     Medications:  Reconciled Home Medications:   Current Discharge Medication List      START taking these medications    Details   oxycodone-acetaminophen (PERCOCET) 5-325 mg per tablet Take 1 tablet by mouth every 4 (four) hours as needed for Pain.  Qty: 21 tablet, Refills: 0      senna-docusate 8.6-50 mg (PERICOLACE) 8.6-50 mg per tablet Take 1 tablet by mouth 2 (two) times daily.  Take twice daily on any days that you take your prescription pain medications.         CONTINUE these medications which have NOT CHANGED    Details   gentamicin (GARAMYCIN) 0.3 % ophthalmic solution APPLY 2 GTS TO EXIT SITE QD AS DIRECTED, prn PD Catheter site  Refills: 3      labetalol (NORMODYNE) 100 MG tablet Take 1 tablet (100 mg total) by mouth every 12 (twelve) hours.  Qty: 60 tablet, Refills: 11      pantoprazole (PROTONIX) 40 MG tablet Take 40 mg by mouth 2 (two) times daily as needed.       pediatric multivit-iron-min (FLINTSTONES COMPLETE, IRON,) Chew Take 1 tablet by mouth 2 (two) times daily.      sevelamer carbonate (RENVELA) 800 mg Tab Take 1,600 mg by mouth 3 (three) times daily. Takes two 800 mg tablets (1600 mg) with meals      ergocalciferol (ERGOCALCIFEROL) 50,000 unit Cap Take 1 capsule (50,000 Units total) by mouth twice a week.  Qty: 24 capsule, Refills: 0      FOLIC ACID/VIT BCOMP,C (RENAL-CARLOS ORAL) Take 1 tablet by mouth every morning.       hyoscyamine (LEVSIN/SL) 0.125 mg Subl Place 0.125 mg under the tongue every 4 (four) hours as needed (for dry heaving).       nitroGLYCERIN (NITROSTAT) 0.4 MG SL tablet Place 0.4 mg under the tongue every 5 (five) minutes as needed for Chest pain.      ondansetron (ZOFRAN-ODT) 4 MG TbDL Take 2 tablets (8 mg total) by mouth every 8 (eight) hours as needed (Nausea).  Qty: 21 tablet, Refills: 0             Discharge Procedure Orders  Diet general     Activity as tolerated     Lifting restrictions   Order Comments: No lifting greater than 10 lbs prior to follow-up.  No swimming, bathing, or otherwise submerging wound until follow-up.     Other restrictions (specify):   Order Comments: Remove surgical dressings on POD 2. Leave steri-strips (small white bandages directly on the skin) in place--if they fall off in the shower, that is OK.  If not, we will remove then in clinic at follow-up.     Call MD for:  temperature >100.4     Call MD for:  persistent nausea  and vomiting     Call MD for:  severe uncontrolled pain     Call MD for:  difficulty breathing, headache or visual disturbances     Call MD for:  redness, tenderness, or signs of infection (pain, swelling, redness, odor or green/yellow discharge around incision site)     Call MD for:  hives     Call MD for:  persistent dizziness or light-headedness     Call MD for:  extreme fatigue     Remove dressing in 48 hours       Follow-up Information     Owen Ríos MD.    Specialties:  General Surgery, Bariatrics  Why:  As needed  Contact information:  John ZENDEJAS  [facility] [id number] [phone]

## 2017-09-20 NOTE — DISCHARGE INSTRUCTIONS
Discharge Instructions: Caring for Your Abdominal Incision  You are going home with stitches (sutures), surgical staples, special strips of tape, or surgical skin glue. One of these items was used to close your incision, help stop bleeding, and speed healing. Follow the tips on this sheet to help your incision heal.   Home care  · Clean your work area:  ¨ Put pets in another room.  ¨ Use soap and water to clean the surface youll be working on.  ¨ Spread a clean cloth or paper towel over the surface.  ¨ Move away from the clean surface if you need to cough or sneeze.  · Gather your supplies:  ¨ Packaged dressing for your wound  ¨ Irrigation solutions (if using these)  ¨ Pair of scissors (cleaned with soap and water)  ¨ Medical tape  ¨ Disposable gloves (2 pairs)  ¨ Clean plastic trash bag (open it before you wash your hands)  · Wash your hands:  ¨ Use liquid soap.  ¨ Work up a good lather and scrub for 1 to 2 minutes.  ¨ Be sure to scrub between your fingers and under your nails.  ¨ Rinse with warm water, keeping your fingers pointed down.  ¨ Use a clean paper towel to dry your hands and turn off the faucet.  · Prepare your dressing supplies:  ¨ Peel back the edges of the dressing packages. Pour any irrigation solutions into solution cups.  ¨ Cut each piece of tape 4 inches longer than the dressing.  · Remove the old dressing:  ¨ Put on disposable gloves.  ¨ Loosen the tape on the dressing by pulling gently toward the incision. Remove the dressing one layer at a time. Put it in the plastic bag immediately.  ¨ Remove your gloves and put them in the plastic bag. Wash your hands.  ¨ Put on a new pair of gloves.  · Clean and dress the incision:  ¨ Clean the incision and apply a new dressing as directed.  ¨ Do not remove the special strips of tape even if they are starting to loosen.   ¨ Put all used supplies in the plastic bag. Remove your gloves last and put them in the plastic bag. Seal the bag and put it in the  trash.  ¨ Be sure to wash your hands again.  Care for specific closures  Follow these guidelines unless your healthcare provider tells you otherwise:  · Sutures or staples. Once you no longer need to keep these dry, clean the wound daily, using the instructions listed above. First remove the bandage using clean hands. Then wash the area gently with soap and warm water. Use a wet cotton swab to loosen and remove any blood or crust that forms. After cleaning, put a thin layer of antibiotic ointment on. Then put on a new bandage.  · Skin glue. Dont put liquid, ointment, or cream on your wound while the glue is in place. Avoid activities that cause heavy sweating. Protect the wound from sunlight. Do not scratch, rub, or pick at the glue. Do not put tape directly over the glue. The glue should peel off within 5 to 10 days.  · Surgical tape. Keep the area dry. If it gets wet, blot the area dry with a clean towel. Surgical tape usually falls off within 7 to 10 days. If it has not fallen off after 10 days, contact your healthcare provider before taking it off yourself. If you are told to remove the tape, put mineral oil or petroleum jelly on a cotton ball. Gently rub the tape until it is removed.  Follow-up care  Follow up with your healthcare provider to ask how long sutures or staples should be left in place. Be sure to return for suture or staple removal as directed.  If tape closures were used, remove them yourself when your provider recommends if they have not fallen off on their own. If skin glue was used, the glue will wear off by itself.     When to call your healthcare provider  Call your healthcare provider right away if you have any of the following:  · More pain, bleeding, redness, swelling, or foul-smelling discharge around the incision area  · Fever of 100.4°F (38°C) or higher, or as directed by your healthcare provider  · Shaking chills  · Vomiting or nausea that doesnt go away  · Numbness, coldness, or  tingling around the incision area, or changes in skin color  · Opening of sutures or wound  · Stitches or staples come apart or fall out or surgical tape falls off before 7 days, or as directed by your provider   Date Last Reviewed: 8/1/2016  © 5350-3101 [employer]. [address] [place] [address]. All rights reserved. This information is not intended as a substitute for professional medical care. Always follow your healthcare professional's instructions.

## 2017-09-20 NOTE — OP NOTE
[facility]  Surgery Department  Operative Note    SUMMARY     Date of Procedure: 9/20/2017     Procedure: Procedure(s) (LRB):  REMOVAL-CATHETER-DIALYSIS-PERITONEAL (N/A)     Surgeon(s) and Role:     * Owen Ríos MD - Primary     * Jonathan Schoen, MD - Resident - Assisting        Pre-Operative Diagnosis: CKD (chronic kidney disease) stage 5, GFR less than 15 ml/min [N18.5]    Post-Operative Diagnosis: Post-Op Diagnosis Codes:     * CKD (chronic kidney disease) stage 5, GFR less than 15 ml/min [N18.5]    Anesthesia: General    Technical Procedures Used: none    Procedure In-Detail:  After informed consent was assured-preoperatively and H&P updated, the patient was brought to the operating room and general endotracheal anesthesia was induced.  His abdomen was prepped with chlorhexidine with focus on the prior incision site where the catheter enters the fascia. The prior incision was opened approx 2 cm in vertical orientation using the #15 blade scalpel and the Bovie on deeper tissues and blunt dissection with a hemostat was used to get beneath the catheter and elevate it.  It was clamped x 2 with hemostats and cut using Copeland scissors between the clamps.  The curved mayos were then used to the cut the catheter at its non-sterile skin exit site and then both the catheter and curved copeland scissors were removed from the field.  The subcutaneous cuff was dissected using the bovie and the intra-tunnel portion of the catheter was removed.  The facial cuff was then dissected using the Bovie and once it was free the catheter was carefully removed from the abdomen and noted to be complete.  The fascial defect was closed using a figure of 8 0 vicryl suture and then the skin was closed using 4-0 monocryl in subcuticular fashion.  The skin was dressed with dermaflex and the catheter exit site was covered with telfa gauze and paper tape.    Significant Surgical Tasks Conducted by the Assistant(s), if  Applicable: the case    Complications: No    Estimated Blood Loss (EBL): 2 mL           Implants: * No implants in log *    Specimens:   Specimen (12h ago through future)    Start     Ordered    09/20/17 0827  Specimen to Pathology - Surgery  Once     Comments:  1. Removed PD catheter, gross path only.      09/20/17 0829                  Condition: Good    Disposition: PACU - hemodynamically stable.    Attestation: I was present and scrubbed for the entire procedure.

## 2017-09-20 NOTE — PLAN OF CARE
Pt to pod __28 AAOx4. Pt oriented to immediate surroundings and situation and verbalized understanding, acceptance and satisfaction with clinical encounter.Patient arrived to unit via stretcher from__pacu_____. Denies pain. Denies shortness of breath. Monitoring equipment placed on pt with noted VSS HRR, .IVF patent per gravity and without any s/s of infiltration noted and saline locked upon arrival.. Bed in lowest position. Brakes locked Call light within reach. Side rails up x2. Abdominal wound noted cdi to llq with dermabond on umbilical puncture site. No s/s of bleeding or hematoma formation noted

## 2017-09-20 NOTE — ANESTHESIA POSTPROCEDURE EVALUATION
"Anesthesia Post Evaluation    Patient: Tom Gage    Procedure(s) Performed: Procedure(s) (LRB):  REMOVAL-CATHETER-DIALYSIS-PERITONEAL (N/A)    Final Anesthesia Type: general  Patient location during evaluation: PACU  Patient participation: Yes- Able to Participate  Level of consciousness: awake and alert and oriented  Post-procedure vital signs: reviewed and stable  Pain management: adequate  Airway patency: patent  PONV status at discharge: No PONV  Anesthetic complications: no      Cardiovascular status: stable  Respiratory status: unassisted  Hydration status: euvolemic  Follow-up not needed.        Visit Vitals  BP (!) 173/68   Pulse 60   Temp 36.5 °C (97.7 °F) (Temporal)   Resp 16   Ht 6' 2" (1.88 m)   Wt 78.2 kg (172 lb 5 oz)   SpO2 100%   BMI 22.12 kg/m²       Pain/Lary Score: Pain Assessment Performed: Yes (9/20/2017 10:12 AM)  Presence of Pain: denies (9/20/2017 10:55 AM)  Lary Score: 10 (9/20/2017 10:40 AM)      "

## 2017-09-28 ENCOUNTER — OFFICE VISIT (OUTPATIENT)
Dept: SURGERY | Facility: CLINIC | Age: 67
End: 2017-09-28
Payer: MEDICARE

## 2017-09-28 VITALS
BODY MASS INDEX: 22.07 KG/M2 | HEART RATE: 65 BPM | TEMPERATURE: 98 F | SYSTOLIC BLOOD PRESSURE: 163 MMHG | WEIGHT: 172 LBS | DIASTOLIC BLOOD PRESSURE: 72 MMHG | HEIGHT: 74 IN

## 2017-09-28 DIAGNOSIS — Z09 POSTOP CHECK: ICD-10-CM

## 2017-09-28 DIAGNOSIS — E55.9 VITAMIN D DEFICIENCY: Primary | ICD-10-CM

## 2017-09-28 PROCEDURE — 99024 POSTOP FOLLOW-UP VISIT: CPT | Mod: NTX,,, | Performed by: SURGERY

## 2017-09-28 PROCEDURE — 99999 PR PBB SHADOW E&M-EST. PATIENT-LVL III: CPT | Mod: PBBFAC,TXP,, | Performed by: SURGERY

## 2017-09-28 PROCEDURE — 99213 OFFICE O/P EST LOW 20 MIN: CPT | Mod: PBBFAC,TXP | Performed by: SURGERY

## 2017-09-28 RX ORDER — POLYETHYLENE GLYCOL 3350 17 G/17G
POWDER, FOR SOLUTION ORAL
Refills: 0 | COMMUNITY
Start: 2017-08-01 | End: 2017-11-02

## 2017-09-28 NOTE — PROGRESS NOTES
"Tom Gage is a 67 y.o. male patient.   No diagnosis found.  Past Medical History:   Diagnosis Date    Anemia of chronic renal failure, stage 5     BPH (benign prostatic hyperplasia)     CAD (coronary artery disease)     CHF (congestive heart failure)     CKD (chronic kidney disease) stage 5, GFR less than 15 ml/min     Diastolic dysfunction 12/16/2016    GERD (gastroesophageal reflux disease)     Hyperparathyroidism, secondary renal     Hypertension     Metabolic acidosis     MI (myocardial infarction) Feb 2012    Pulmonary hypertension 12/16/2016    TIA (transient ischemic attack)     Type 2 diabetes mellitus with diabetic nephropathy     history/ before wt loss     No past surgical history pertinent negatives on file.  Scheduled Meds:  Continuous Infusions:  PRN Meds:    Review of patient's allergies indicates:   Allergen Reactions    Latex, natural rubber Rash and Blisters     Latex tape causes blisters    Ace inhibitors Other (See Comments)     Other reaction(s): Cough    Adhesive Dermatitis and Blisters     Please use Paper Tape    Morphine Hives, Itching, Dermatitis and Rash     Body Rash, Phlebitis, "My veins showed through the skin."    Iodine and iodide containing products      IVP dye     There are no hospital problems to display for this patient.    Blood pressure (!) 163/72, pulse 65, temperature 97.9 °F (36.6 °C), height 6' 2" (1.88 m), weight 78 kg (172 lb).    Subjective S/p removal pd 9/20/17.  He has been having pleural effusions and drainage of these continues despite stopping pd.  He has no complaints.  Objective Abdomen benign, wounds clear.   Assessment & Plan Doing well.  Regular duty and rtc prn.       Owen Ríos MD  9/28/2017  "

## 2017-09-28 NOTE — LETTER
[facility] - General Surgery  [address]  [facility] [address]  Phone: [phone] October 13, 2017      Ruel Calabrese MD  [address] [place] [address] [address]  [place] [address]    Patient: Tom Gage   MR Number: [id number]   YOB: 1950   Date of Visit: 9/28/2017     Dear Dr. Calabrese:    Thank you for referring Tom Gage to me for evaluation. Below are the relevant portions of my assessment and plan of care.    Patient is status post removal PD 9/20/17.  He has been having pleural effusions and drainage of these continues despite stopping PD.  He has no complaints.    PLAN: Doing well.  Regular duty and RTC PRN.    If you have questions, please do not hesitate to call me. I look forward to following Tom along with you.    Sincerely,      Owen Ríos MD   Section Head - General, Laparoscopic, Bariatric  Acute Care and Oncologic Surgery   - Surgical Weight Loss Program  [facility]    WSR/hayden    CC  MD Ekta King MD Catherine Staffeld Coit, MD

## 2017-10-12 ENCOUNTER — TELEPHONE (OUTPATIENT)
Dept: PULMONOLOGY | Facility: CLINIC | Age: 67
End: 2017-10-12

## 2017-10-12 NOTE — TELEPHONE ENCOUNTER
"Patient was last seen here inFeb of this year. He states that in the last 3 months IR at [facility] has performed 8 thoracentesis. Patient was told he needs to see pulmonary. [facility] cannot get him in before 11/1. Patient does not feel he can wait that long as he is "smothering in his own fluids". Patient is asking if you can see him sooner, please.  "

## 2017-10-12 NOTE — TELEPHONE ENCOUNTER
----- Message from Elda Murphy sent at 10/12/2017 11:39 AM CDT -----  Contact: Pt   [phone]    Torsten   -   Pt has had 8  pleural effusions done with   other health issues with his lungs , please call the pt back to discuss  Call back number [phone]  Thanks,

## 2017-10-13 ENCOUNTER — TELEPHONE (OUTPATIENT)
Dept: PULMONOLOGY | Facility: CLINIC | Age: 67
End: 2017-10-13

## 2017-10-13 DIAGNOSIS — J90 PLEURAL EFFUSION: Primary | ICD-10-CM

## 2017-10-13 NOTE — TELEPHONE ENCOUNTER
----- Message from Gemini Richardson sent at 10/13/2017 10:20 AM CDT -----  Regarding: STAT Appointment Request from Dr. Paredes  Good Morning,     I received a call from Dr. Tari Paredes requesting an update on the STAT appointment she requested for this patient yesterday. I'd be happy to give her an update regarding the status of this patient's appointment, please advise.     Thank you,  Gemini Richardson   [facility]   [phone]

## 2017-10-13 NOTE — TELEPHONE ENCOUNTER
Received a call and message from [facility] requesting a STAT appointment for patient with Dr. Sarmiento. I advised the staff that I have spoken with the patient for details of his condition and I am in communication with the provider. I will call patient to arrange as soon as I receive a reply. Further advised caller at no time did we receive a request that a provider was asking for stat appointment. Patient only asked to be seen sooner than 11/1.

## 2017-10-16 ENCOUNTER — LAB VISIT (OUTPATIENT)
Dept: LAB | Facility: HOSPITAL | Age: 67
End: 2017-10-16
Payer: MEDICARE

## 2017-10-16 DIAGNOSIS — Z76.82 ORGAN TRANSPLANT CANDIDATE: ICD-10-CM

## 2017-10-16 PROCEDURE — 86833 HLA CLASS II HIGH DEFIN QUAL: CPT | Mod: PO,TXP

## 2017-10-16 PROCEDURE — 86832 HLA CLASS I HIGH DEFIN QUAL: CPT | Mod: PO,TXP

## 2017-10-19 ENCOUNTER — HOSPITAL ENCOUNTER (OUTPATIENT)
Dept: RADIOLOGY | Facility: HOSPITAL | Age: 67
Discharge: HOME | End: 2017-10-19
Attending: INTERNAL MEDICINE
Payer: MEDICARE

## 2017-10-19 ENCOUNTER — OFFICE VISIT (OUTPATIENT)
Dept: PULMONOLOGY | Facility: CLINIC | Age: 67
End: 2017-10-19
Payer: MEDICARE

## 2017-10-19 VITALS
HEART RATE: 58 BPM | OXYGEN SATURATION: 100 % | WEIGHT: 180.75 LBS | BODY MASS INDEX: 23.2 KG/M2 | SYSTOLIC BLOOD PRESSURE: 160 MMHG | DIASTOLIC BLOOD PRESSURE: 77 MMHG | HEIGHT: 74 IN

## 2017-10-19 DIAGNOSIS — J90 PLEURAL EFFUSION: Primary | ICD-10-CM

## 2017-10-19 DIAGNOSIS — E46 PROTEIN MALNUTRITION: ICD-10-CM

## 2017-10-19 DIAGNOSIS — N18.6 ESRD (END STAGE RENAL DISEASE): ICD-10-CM

## 2017-10-19 DIAGNOSIS — N18.5 CKD (CHRONIC KIDNEY DISEASE) STAGE 5, GFR LESS THAN 15 ML/MIN: Chronic | ICD-10-CM

## 2017-10-19 DIAGNOSIS — J90 PLEURAL EFFUSION: ICD-10-CM

## 2017-10-19 PROCEDURE — 99214 OFFICE O/P EST MOD 30 MIN: CPT | Mod: S$PBB,NTX,, | Performed by: INTERNAL MEDICINE

## 2017-10-19 PROCEDURE — 99213 OFFICE O/P EST LOW 20 MIN: CPT | Mod: PBBFAC,25,TXP | Performed by: INTERNAL MEDICINE

## 2017-10-19 PROCEDURE — 99999 PR PBB SHADOW E&M-EST. PATIENT-LVL III: CPT | Mod: PBBFAC,TXP,, | Performed by: INTERNAL MEDICINE

## 2017-10-19 PROCEDURE — 71020 XR CHEST PA AND LATERAL: CPT | Mod: TC,TXP

## 2017-10-19 PROCEDURE — 71020 XR CHEST PA AND LATERAL: CPT | Mod: 26,NTX,, | Performed by: RADIOLOGY

## 2017-10-19 RX ORDER — FUROSEMIDE 80 MG/1
80 TABLET ORAL EVERY 12 HOURS
COMMUNITY
Start: 2017-10-16

## 2017-10-19 RX ORDER — VIT B COMP NO.3/FOLIC/C/BIOTIN 1 MG-60 MG
TABLET ORAL
Refills: 3 | COMMUNITY
Start: 2017-10-10 | End: 2018-05-31

## 2017-10-19 NOTE — LETTER
October 19, 2017        Asael Poole MD  [address] [place] [address]             [facility] - Pulmonary Services  [address]  [place] [address]  Phone: [phone]   Patient: Tom Gage   MR Number: [id number]   YOB: 1950   Date of Visit: 10/19/2017       Dear Dr. Poole:    Thank you for referring Tom Gage to me for evaluation. Below are the relevant portions of my assessment and plan of care.        1. Pleural effusion    2. Protein malnutrition    3. ESRD (end stage renal disease)    4. CKD (chronic kidney disease) stage 5, GFR less than 15 ml/min          Explained to patient that with ESRD and protein malnutrition, patient is third spacing into his right pleural space.  The weekly thoracentesis is likely leading to further protein loss.  Agree with nephrologist, Dr. Cope, that these must be discontinued and a strategy of better nutrition with maintaining euvolemia through HD is a better option.  If nutrition improves and effusion continues to reaccumulate, consider thoracic surgery for VATS pleuredesis but at this point would be concerned that healing is an issue.    I attempted to call PCP, Dr. Asael Poole, and after a lengthy hold I was able to leave a message requesting to stop the weekly thoracentesis.    This was a 30 minute visit that counseling was the majority of the visit.        If you have questions, please do not hesitate to call me. I look forward to following Tom along with you.    Sincerely,      Marcy Sarmiento MD           CC  Ekta Cope MD

## 2017-10-19 NOTE — PROGRESS NOTES
"Subjective:       Patient ID: Tom Gage is a 67 y.o. male.    Chief Complaint: Pleural Effusion     67 year old with protein malnutrition secondary to gastric bypass and ESRD.  I last saw him in March of 2017 for a transudative effusion, pulmonary edema and ineffective ultrafiltrate removal secondary to peritoneal dialysis.   Patient had worsening volume overload from ineffective HD which resulted in prolonged hospitalization in August and he was transitioned to hemodialysis.  Patient has a chronic right pleural effusion for which he has a standing order for weekly thoracentesis.  His pleural space was last drained yesterday.  He is here today because he was told the his lung was collapsed and need to see a pulmonologist.  Patient denies worsening LE edema and orthopnea at this time.  He described rhinorrhea prior to his thoracentesis.      Review of Systems   Cardiovascular: Positive for leg swelling (but improved).       Objective:       Vitals:    10/19/17 0814   BP: (!) 160/77   BP Location: Right arm   Patient Position: Sitting   Pulse: (!) 58   SpO2: 100%   Weight: 82 kg (180 lb 12.4 oz)   Height: 6' 2" (1.88 m)     Physical Exam   Constitutional: He is oriented to person, place, and time.   pallor   Neurological: He is alert and oriented to person, place, and time.   Psychiatric: He has a normal mood and affect.     Personal Diagnostic Review  Chest x-ray: reviewed from today with atelectasis and possible loculated effusion.  Chest CT from 10/4/2017 with effusion with associated atelectasis on right.  Left lung without parenchymal abnormalities.  No flowsheet data found.      Assessment:       1. Pleural effusion    2. Protein malnutrition    3. ESRD (end stage renal disease)    4. CKD (chronic kidney disease) stage 5, GFR less than 15 ml/min        Outpatient Encounter Prescriptions as of 10/19/2017   Medication Sig Dispense Refill    ergocalciferol (ERGOCALCIFEROL) 50,000 unit Cap Take 1 capsule " (50,000 Units total) by mouth twice a week. (Patient taking differently: Take 50,000 Units by mouth twice a week. Takes on Tuesdays and Thursdays) 24 capsule 0    FOLIC ACID/VIT BCOMP,C (RENAL-CARLOS ORAL) Take 1 tablet by mouth every morning.       furosemide (LASIX) 80 MG tablet       hyoscyamine (LEVSIN/SL) 0.125 mg Subl Place 0.125 mg under the tongue every 4 (four) hours as needed (for dry heaving).       labetalol (NORMODYNE) 100 MG tablet Take 1 tablet (100 mg total) by mouth every 12 (twelve) hours. 60 tablet 11    nitroGLYCERIN (NITROSTAT) 0.4 MG SL tablet Place 0.4 mg under the tongue every 5 (five) minutes as needed for Chest pain.      ondansetron (ZOFRAN-ODT) 4 MG TbDL Take 2 tablets (8 mg total) by mouth every 8 (eight) hours as needed (Nausea). 21 tablet 0    pantoprazole (PROTONIX) 40 MG tablet Take 40 mg by mouth 2 (two) times daily as needed.       pediatric multivit-iron-min (FLINTSTONES COMPLETE, IRON,) Chew Take 1 tablet by mouth 2 (two) times daily.      polyethylene glycol (GLYCOLAX) 17 gram/dose powder TK UTD ON PREP SHEET.  0    GEMMA-CARLOS RX 1- mg-mg-mcg Tab TK 1 T PO QD  3    sevelamer carbonate (RENVELA) 800 mg Tab Take 1,600 mg by mouth 3 (three) times daily. Takes two 800 mg tablets (1600 mg) with meals      gentamicin (GARAMYCIN) 0.3 % ophthalmic solution APPLY 2 GTS TO EXIT SITE QD AS DIRECTED, prn PD Catheter site  3    oxycodone-acetaminophen (PERCOCET) 5-325 mg per tablet Take 1 tablet by mouth every 4 (four) hours as needed for Pain. 21 tablet 0    senna-docusate 8.6-50 mg (PERICOLACE) 8.6-50 mg per tablet Take 1 tablet by mouth 2 (two) times daily. Take twice daily on any days that you take your prescription pain medications.       No facility-administered encounter medications on file as of 10/19/2017.      No orders of the defined types were placed in this encounter.    Plan:       Explained to patient that with ESRD and protein malnutrition, patient is third  spacing into his right pleural space.  The weekly thoracentesis is likely leading to further protein loss.  Agree with nephrologist, Dr. Cope, that these must be discontinued and a strategy of better nutrition with maintaining euvolemia through HD is a better option.  If nutrition improves and effusion continues to reaccumulate, consider thoracic surgery for VATS pleuredesis but at this point would be concerned that healing is an issue.    I attempted to call PCP, Dr. Asael Poole, and after a lengthy hold I was able to leave a message requesting to stop the weekly thoracentesis.    This was a 30 minute visit that counseling was the majority of the visit.

## 2017-10-30 ENCOUNTER — TELEPHONE (OUTPATIENT)
Dept: DERMATOLOGY | Facility: CLINIC | Age: 67
End: 2017-10-30

## 2017-10-30 NOTE — TELEPHONE ENCOUNTER
10-30-17 Called patient with path report and he is scheduled for a consult with Dr. Flores on 11-2-17

## 2017-10-30 NOTE — TELEPHONE ENCOUNTER
----- Message from Josiah Blue sent at 10/30/2017 10:30 AM CDT -----  Contact: PT  Please follow up with pt in regard to scheduling a consult for squamous cell carcinoma, please contact pt at [phone]

## 2017-11-02 ENCOUNTER — INITIAL CONSULT (OUTPATIENT)
Dept: DERMATOLOGY | Facility: CLINIC | Age: 67
End: 2017-11-02
Payer: MEDICARE

## 2017-11-02 VITALS
WEIGHT: 180 LBS | HEART RATE: 70 BPM | SYSTOLIC BLOOD PRESSURE: 190 MMHG | BODY MASS INDEX: 23.1 KG/M2 | HEIGHT: 74 IN | DIASTOLIC BLOOD PRESSURE: 87 MMHG

## 2017-11-02 DIAGNOSIS — C44.42 SQUAMOUS CELL CARCINOMA OF SCALP: Primary | ICD-10-CM

## 2017-11-02 PROCEDURE — 99999 PR PBB SHADOW E&M-EST. PATIENT-LVL III: CPT | Mod: PBBFAC,,, | Performed by: DERMATOLOGY

## 2017-11-02 PROCEDURE — 99214 OFFICE O/P EST MOD 30 MIN: CPT | Mod: S$PBB,,, | Performed by: DERMATOLOGY

## 2017-11-02 PROCEDURE — 99213 OFFICE O/P EST LOW 20 MIN: CPT | Mod: PBBFAC | Performed by: DERMATOLOGY

## 2017-11-02 NOTE — LETTER
November 4, 2017      Jessica Coller [facility], MD  [address] [place] [address]  [place] [address]           [facility] - Dermatology Surgery  [address]  [place] [address]  Phone: [phone]  Fax: [phone]          Patient: Tom Gage   MR Number: [id number]   YOB: 1950   Date of Visit: 11/2/2017       Dear Dr. Jessica [facility]ler [facility]:    Thank you for referring Tom Gage to me for evaluation. Attached you will find relevant portions of my assessment and plan of care.    If you have questions, please do not hesitate to call me. I look forward to following Tom Gage along with you.    Sincerely,    Dustin Flores MD    Enclosure  CC:  No Recipients    If you would like to receive this communication electronically, please contact [email or web address] or [phone] to request more information on [employer] Link access.    For providers and/or their staff who would like to refer a patient to [facility], please contact us through our one-stop-shop provider referral line, [facility], at [phone].    If you feel you have received this communication in error or would no longer like to receive these types of communications, please e-mail [email or web address]

## 2017-11-02 NOTE — PROGRESS NOTES
ALLERGIES:  Latex, natural rubber; Ace inhibitors; Adhesive; Morphine; and Iodine and iodide containing products    CHIEF COMPLAINT:  This 67 y.o. 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 Consultation requested by J. Col[facility],M.D.    The patient is accompanied to this visit by his wife.    HISTORY OF PRESENT ILLNESS:   Location: scalp  Duration: several weeks  Quality: not really symptomatic at present  Context: status post biopsy by [facility].Coll[facility],M.D.; path = keratoacanthoma-type squamous cell carcinoma; pathology accession #[id number],  [facility]    Prior Treatment: none    See also the handwritten notes/diagrams scanned to chart for additional details.    Defibrillator: No  Pacemaker: No  Artificial heart valves: No  Artificial joints: No    REVIEW OF SYSTEMS:   General: general health fair  Skin: has previous history of skin cancer(s); prior Mohs surgery  CV: has hypertension, no artificial valves, has no chest pain; has CHF, CAD  Resp: has no shortness of breath  Endo: has diabetes  Hem/Lymph: not taking prescribed anticoagulants, has easy bruising/bleeding  Allergy/Immuno: has allergies as noted above  GI: has no history of hepatitis  MS: as noted above   : on dialysis MWF due to chronic renal disease    PAST MEDICAL HISTORY:  Past Medical History:   Diagnosis Date    Anemia of chronic renal failure, stage 5     BPH (benign prostatic hyperplasia)     CAD (coronary artery disease)     CHF (congestive heart failure)     CKD (chronic kidney disease) stage 5, GFR less than 15 ml/min     Diastolic dysfunction 12/16/2016    GERD (gastroesophageal reflux disease)     Hyperparathyroidism, secondary renal     Hypertension     Metabolic acidosis     MI (myocardial infarction) Feb 2012    Pulmonary hypertension 12/16/2016    TIA (transient ischemic  attack)     Type 2 diabetes mellitus with diabetic nephropathy     history/ before wt loss       PAST SURGICAL HISTORY:  Past Surgical History:   Procedure Laterality Date    ABDOMINAL SURGERY      PD catheter    BASAL CELL CARCINOMA EXCISION Left Jan 2011    left forehead    CHOLECYSTECTOMY  July 2010    ELBOW SURGERY Left 3/18/14    anterior submuscular transposition, ulnar nerve    EYE SURGERY Bilateral     bilateral cataracts    GASTRECTOMY      GASTRIC BYPASS  May 2014    gastric sleeve  June 2013    Malfunctioned requiring bypass    HERNIA REPAIR      ROTATOR CUFF REPAIR Left 2014    SHOULDER ARTHROSCOPY Left 11/18/14    RCR; glenoid labral repair; arch decompression        SOCIAL HISTORY:  Dependencies: smoking status as noted below  Social History   Substance Use Topics    Smoking status: Never Smoker    Smokeless tobacco: Never Used    Alcohol use No       PERTINENT MEDICATIONS:  See medications list.    Current Outpatient Prescriptions:     ergocalciferol (ERGOCALCIFEROL) 50,000 unit Cap, Take 1 capsule (50,000 Units total) by mouth twice a week. (Patient taking differently: Take 50,000 Units by mouth twice a week. Takes on Tuesdays and Thursdays), Disp: 24 capsule, Rfl: 0    FOLIC ACID/VIT BCOMP,C (RENAL-CARLOS ORAL), Take 1 tablet by mouth every morning. , Disp: , Rfl:     furosemide (LASIX) 80 MG tablet, , Disp: , Rfl:     hyoscyamine (LEVSIN/SL) 0.125 mg Subl, Place 0.125 mg under the tongue every 4 (four) hours as needed (for dry heaving). , Disp: , Rfl:     labetalol (NORMODYNE) 100 MG tablet, Take 1 tablet (100 mg total) by mouth every 12 (twelve) hours., Disp: 60 tablet, Rfl: 11    nitroGLYCERIN (NITROSTAT) 0.4 MG SL tablet, Place 0.4 mg under the tongue every 5 (five) minutes as needed for Chest pain., Disp: , Rfl:     ondansetron (ZOFRAN-ODT) 4 MG TbDL, Take 2 tablets (8 mg total) by mouth every 8 (eight) hours as needed (Nausea)., Disp: 21 tablet, Rfl: 0    pantoprazole  (PROTONIX) 40 MG tablet, Take 40 mg by mouth 2 (two) times daily as needed. , Disp: , Rfl:     pediatric multivit-iron-min (FLINTSTONES COMPLETE, IRON,) Chew, Take 1 tablet by mouth 2 (two) times daily., Disp: , Rfl:     GEMMA-CARLOS RX 1- mg-mg-mcg Tab, TK 1 T PO QD, Disp: , Rfl: 3    sevelamer carbonate (RENVELA) 800 mg Tab, Take 1,600 mg by mouth 3 (three) times daily. Takes two 800 mg tablets (1600 mg) with meals, Disp: , Rfl:     ALLERGIES:  Latex, natural rubber; Ace inhibitors; Adhesive; Morphine; and Iodine and iodide containing products    EXAM:  See also the handwritten notes/diagrams scanned to chart for additional details.  Constitutional  General appearance: well-developed, well-nourished, well-kempt older white male    Eyes  Inspection of conjunctivae and lids reveals no abnormalities; sclerae anicteric  Neurologic/Psychiatric  Alert,  normal orientation to time, place, person  Normal mood and affect with no evidence of depression, anxiety, agitation  Skin: see photo(s)  Head: background marked solar damage to exposed areas of skin; in addition, inspection/palpation reveals an approximately 1.2 cm eschar on the posterior scalp vertex which feels freely movable over the underlying tissues on palpation;  he confirmed this as the site of the prior biopsy  Neck: examination reveals marked chronic solar damage  Right upper extremity: examination reveals marked chronic solar damage  Left upper extremity: examination reveals marked chronic solar damage    ASSESSMENT: 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 In particular, the discussion included, but was not limited to, the following:    One alternative at this point would be to defer further treatment and observe the lesion. With small skin cancers of this kind, it is possible that a biopsy can be sufficient to definitively treat a small skin cancer of this kind. Alternatively, some skin cancers are slow growing and do not require immediate treatment. The potential advantage of this choice would be to avoid the need for possibly unnecessary additional surgery. Among the potential disadvantages of this would be the possibility of enlargement of the lesion, more extensive spread of the lesion or recurrence at a later date, which might necessitate a larger and more complex surgery.    Radiation treatment can be an effective treatment for this type of skin cancer. The usual course of treatment is every weekday for several weeks. Local irritation will result from treatment, although no systemic side effects are expected. The potential advantage of radiation treatment is that it avoids the need for surgery. Among the disadvantages of radiation treatment are the length of treatment, the local inflammatory response, the absence of pathologic confirmation of the removal of the skin cancer, a possible increased risk of additional skin cancer in the treated area in later years, and a somewhat increased risk of recurrence at a later date.     Excisional surgery can be an effective treatment for this type of skin cancer. This would involve excision of the lesion with margin evaluation by submitting the specimen to a pathologist for either immediate marginal assessment via frozen section processing, or delayed marginal assessment by fixed-tissue processing. The potential advantage of this technique is that it offers a way of treating the lesion with some degree of histologic confirmation of tumor removal. Among the  disadvantages of this treatment are the possible need for re-excision if marginal involvement is identified, a somewhat greater likelihood of recurrence as compared to Mohs' surgery because of the less comprehensive margin evaluation inherent in the technique, and the general potential risks of surgery, including allergic reactions to the anesthetic and other materials used, infection, injury to nerves in the area with consequent loss of sensation or muscle function, and scarring or distortion of surrounding structures.    Mohs' surgery is a very effective treatment for this type of skin cancer. The potential advantage of Mohs' surgery is that this technique offers the greatest possible certainty of knowing that the skin cancer has been completely removed, with the removal of the least amount of normal tissue. The potential disadvantages of Mohs' surgery include the duration of the surgery, the possible need for a separate surgery for reconstruction following tumor removal, and scarring as a result. In addition, general potential risks of surgery as noted above also apply to treatment via Mohs' surgery.    In light of the nature of this tumor and the location on the scalp in an area of increased risk of recurrence,  Mohs' micrographic surgery was thought to be the most appropriate management choice, and this diagnosis is appropriate for treatment by Mohs' micrographic surgery.     Sufficient time was available for questions, and all questions were answered to his satisfaction. He fully understands the aims, risks, alternatives, and possible complications, and has elected to proceed with the surgery, and verbally consented to do so. The procedure will be scheduled in the near future.    Routine pre-op instructions were given to him.    A consultation report will be sent to Dr. [facility].  --------------------------------------  Note: Some or all of this note may have been generated using voice recognition software.  There may be voice recognition errors including grammatical and/or spelling errors found in the text. Attempts were made to correct these errors prior to signature.

## 2017-11-08 LAB — HPRA INTERPRETATION: NORMAL

## 2017-11-09 ENCOUNTER — PROCEDURE VISIT (OUTPATIENT)
Dept: DERMATOLOGY | Facility: CLINIC | Age: 67
End: 2017-11-09
Payer: MEDICARE

## 2017-11-09 VITALS
WEIGHT: 180 LBS | HEART RATE: 68 BPM | BODY MASS INDEX: 23.1 KG/M2 | DIASTOLIC BLOOD PRESSURE: 87 MMHG | SYSTOLIC BLOOD PRESSURE: 187 MMHG | HEIGHT: 74 IN

## 2017-11-09 DIAGNOSIS — C44.42 SQUAMOUS CELL CARCINOMA OF SCALP: Primary | ICD-10-CM

## 2017-11-09 PROCEDURE — 13121 CMPLX RPR S/A/L 2.6-7.5 CM: CPT | Mod: 51,S$PBB,, | Performed by: DERMATOLOGY

## 2017-11-09 PROCEDURE — 12032 INTMD RPR S/A/T/EXT 2.6-7.5: CPT | Mod: PBBFAC | Performed by: DERMATOLOGY

## 2017-11-09 PROCEDURE — 17311 MOHS 1 STAGE H/N/HF/G: CPT | Mod: PBBFAC | Performed by: DERMATOLOGY

## 2017-11-09 PROCEDURE — 99499 UNLISTED E&M SERVICE: CPT | Mod: S$PBB,,, | Performed by: DERMATOLOGY

## 2017-11-09 PROCEDURE — 17311 MOHS 1 STAGE H/N/HF/G: CPT | Mod: S$PBB,,, | Performed by: DERMATOLOGY

## 2017-11-09 RX ORDER — DOXYCYCLINE 100 MG/1
CAPSULE ORAL
Qty: 20 CAPSULE | Refills: 0 | Status: SHIPPED | OUTPATIENT
Start: 2017-11-09 | End: 2017-11-30

## 2017-11-09 NOTE — PROGRESS NOTES
ALLERGIES:   Latex, natural rubber; Ace inhibitors; Adhesive; Morphine; and Iodine and iodide containing products      Current Outpatient Prescriptions:     ergocalciferol (ERGOCALCIFEROL) 50,000 unit Cap, Take 1 capsule (50,000 Units total) by mouth twice a week. (Patient taking differently: Take 50,000 Units by mouth twice a week. Takes on Tuesdays and Thursdays), Disp: 24 capsule, Rfl: 0    FOLIC ACID/VIT BCOMP,C (RENAL-CARLOS ORAL), Take 1 tablet by mouth every morning. , Disp: , Rfl:     furosemide (LASIX) 80 MG tablet, , Disp: , Rfl:     hyoscyamine (LEVSIN/SL) 0.125 mg Subl, Place 0.125 mg under the tongue every 4 (four) hours as needed (for dry heaving). , Disp: , Rfl:     labetalol (NORMODYNE) 100 MG tablet, Take 1 tablet (100 mg total) by mouth every 12 (twelve) hours., Disp: 60 tablet, Rfl: 11    nitroGLYCERIN (NITROSTAT) 0.4 MG SL tablet, Place 0.4 mg under the tongue every 5 (five) minutes as needed for Chest pain., Disp: , Rfl:     ondansetron (ZOFRAN-ODT) 4 MG TbDL, Take 2 tablets (8 mg total) by mouth every 8 (eight) hours as needed (Nausea)., Disp: 21 tablet, Rfl: 0    pantoprazole (PROTONIX) 40 MG tablet, Take 40 mg by mouth 2 (two) times daily as needed. , Disp: , Rfl:     pediatric multivit-iron-min (FLINTSTONES COMPLETE, IRON,) Chew, Take 1 tablet by mouth 2 (two) times daily., Disp: , Rfl:     GEMMA-CARLOS RX 1- mg-mg-mcg Tab, TK 1 T PO QD, Disp: , Rfl: 3    sevelamer carbonate (RENVELA) 800 mg Tab, Take 1,600 mg by mouth 3 (three) times daily. 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    CASE NUMBER: [id number]      ANESTHETIC: 3 mL 1% Lidocaine with Epinephrine 1:100,000    SURGICAL PREP: Ethanol and Hibiclens    SURGEON: Dustin Floers,  MD    ASSISTANTS: Pippa Tarango CST     PREOPERATIVE DIAGNOSIS: squamous cell carcinoma     POSTOPERATIVE DIAGNOSIS: squamous cell carcinoma     PATHOLOGIC DIAGNOSIS: keratoacanthoma-type squamous cell carcinoma     STAGES OF MOHS' SURGERY PERFORMED: one    TUMOR-FREE PLANE ACHIEVED: yes    HEMOSTASIS: Hyfrecation     SPECIMENS: one (one in stage A)    INITIAL LESION SIZE: 1.7 x 1.8 cm    FINAL DEFECT SIZE: 1.7 x 1.8 cm    WOUND REPAIR/DISPOSITION: see below    NARRATIVE:    The patient is a 67 y.o.male referred by [facility], MD with a history of cancer on the posterior scalp which was biopsied - pathology accession #[id number],  [facility]. 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 Examination revealed an approximately 1 cm eschar with an ill-defined surrounding halo of erythema and slight scale on the posterior scalp at the site of prior biopsy, which was confirmed by reference to the photograph taken at the previous patient visit. In light of the nature of this tumor and the location on the scalp, Mohs' micrographic surgery was thought to be the most appropriate management choice, and this diagnosis is appropriate for treatment by Mohs' micrographic surgery.  I discussed it with the patient and he fully understands the aims, risks, alternatives, and possible complications, and elects to proceed.  There are no medical or surgical contraindications to the procedure.     A signed informed consent was obtained.    PROCEDURE:  The patient was placed in the right lateral decubitus position on the operating table in the Mohs' Surgery Suite. The area in question was thoroughly prepped with ethanol and Hibiclens. A sterile surgical marker was used to outline the clinically apparent margins of the involved area, and a narrow margin of normal-appearing skin. Reference marks were made at the periphery of the outlined area with the surgical marker. The proposed area of excision  "was measured and photographed. Local anesthesia of 1% Lidocaine with 1:100,000 epinephrine was administered.  The total volume of anesthetic used throughout this portion of the procedure was as documented above. The area was prepared and draped in the standard manner. All of the grossly identifiable area of clinically abnormal tissue was removed by curettage, and an underlying/peripheral layer was taken and processed by the Mohs' technique.  Hemostasis was obtained with the hyfrecator. Tissue was taken from any areas of residual marginal involvement (if present) and processed by the Mohs' technique in as many stages as needed until a tumor-free plane was achieved.    Colors of inks used in the reference nicks at epidermal margins (if present) and/or inking of non-epithelial edges, if applicable, is represented on the Mohs map as follows: solid lines represent red ink, dots represent blue ink, jagged lines represent black ink, curlicues represent green ink, "xxx" represents yellow ink.    The first Mohs' layer consisted of one section(s) with 3 slide(s) evaluated. No residual tumor was noted at the margins of the first Mohs' layer. Histology of the specimen(s) showed changes consistent with chronic solar damage.    A total of one section(s) and 3 slide(s) were examined under the microscope via the Mohs technique.  A cancer free plane was reached after layer number one. Defect final size was as noted above.      The wound was covered with a nonadherent dressing between stages, and the patient allowed to wait in the waiting area during these periods. The final defect was photographed at the completion of the Mohs' procedure.    See the separate procedure note which follows regarding repair of the defect following Mohs' surgery.        -----------------------------------------------    REPAIR FOLLOWING MOHS' MICROGRAPHIC SURGERY    PREOPERATIVE DIAGNOSIS: defect following Mohs' surgery for a keratoacanthoma-type " squamous cell carcinoma     POSTOPERATIVE DIAGNOSIS: same    PROCEDURE PERFORMED: intermediate (layered) closure     ANESTHETIC: 5.5 mL 1% Lidocaine with Epinephrine 1:100,000     SURGICAL PREP: Hibiclens    SURGEON: Dustin Flores MD     ASSISTANTS: as above    LOCATION: posterior scalp      INDICATIONS:  Earlier in the day, the patient underwent Mohs' micrographic surgical excision of a keratoacanthoma-type squamous cell carcinoma on the posterior scalp. Tumor free margins were achieved after layer number one.  Later in the day, the management of the resulting wound was addressed with the patient. I discussed the various wound management options with the patient and he fully understands the aims, risks, alternatives, and possible complications of the alternatives, and he elects to proceed with closure of the defect in the manner noted below.  There are no medical or surgical contraindications to the procedure.    A signed informed consent was previously obtained.    PROCEDURE:  Repair via intermediate closure:  The patient was returned to the procedure room following completion of the Mohs' procedure and final slide review. Because of the size, shape and location of the defect, simple closure could not be achieved without excessive tension on the wound margins and an unacceptable risk of wound dehiscence and standing cone deformities. After surgical prepping, additional anesthetic was infiltrated into the tissues surrounding the defect and the anticipated area of repair, to maintain anesthesia during the procedure. Preparation of the site was carried out by extending the defect through excision of small triangles of superfluous tissue on either side of the wound to square the shoulders of the defect and to allow closure without distortion by standing cone deformities, creating a fusiform defect measuring 1.7 x 5.0 cm in size. Wound margins were minimally undermined to allow closure with minimal tension. After  hemostasis was achieved with the hyfrecator, closure was accomplished in layered fashion with:      multiple #3-0 buried interrupted Vicryl suture(s) and    multiple #3-0 simple interrupted, vertical mattress, and figure-8 Prolene suture(s) for final approximation of the wound margins.    Total length of the final closure was 5.0 cm.     There was an approximately 2 x 8 mm area of incomplete coaptation of the wound margins in the center of the closure, and the closure was under relatively high tension. Under the circumstances, I have sent a prescription for doxycycline 100 mg po bid x 10 days to lessen the risk of postoperative wound infection associated with the high tension closure    The site was photographed following completion of the repair. Final dressing consisted of petrolatum, Telfa and tape.    Estimated blood loss for the total procedure was less than 10 mL.    Total operative time including tissue processing in the Mohs' laboratory and microscopic Mohs' frozen section slide review was 2 hour(s). Verbal and written wound care instructions were given to the patient, and he expressed understanding of these instructions. The patient tolerated the procedure well and left the operating room in good condition; he is to return in 18 days for suture removal.     Dr. Flores's cell phone number was given to the patient with instructions to call prn with any problems.

## 2017-11-15 ENCOUNTER — LAB VISIT (OUTPATIENT)
Dept: LAB | Facility: HOSPITAL | Age: 67
End: 2017-11-15
Payer: MEDICARE

## 2017-11-15 DIAGNOSIS — Z76.82 ORGAN TRANSPLANT CANDIDATE: ICD-10-CM

## 2017-11-15 PROCEDURE — 86829 HLA CLASS I/II ANTIBODY QUAL: CPT | Mod: PO,TXP

## 2017-11-15 PROCEDURE — 86829 HLA CLASS I/II ANTIBODY QUAL: CPT | Mod: 91,PO,TXP

## 2017-11-17 ENCOUNTER — PATIENT MESSAGE (OUTPATIENT)
Dept: DERMATOLOGY | Facility: CLINIC | Age: 67
End: 2017-11-17

## 2017-11-27 ENCOUNTER — TELEPHONE (OUTPATIENT)
Dept: CARDIOTHORACIC SURGERY | Facility: CLINIC | Age: 67
End: 2017-11-27

## 2017-11-27 ENCOUNTER — OFFICE VISIT (OUTPATIENT)
Dept: DERMATOLOGY | Facility: CLINIC | Age: 67
End: 2017-11-27
Payer: MEDICARE

## 2017-11-27 DIAGNOSIS — Z48.02 VISIT FOR SUTURE REMOVAL: Primary | ICD-10-CM

## 2017-11-27 PROCEDURE — 99024 POSTOP FOLLOW-UP VISIT: CPT | Mod: ,,, | Performed by: DERMATOLOGY

## 2017-11-27 PROCEDURE — 99212 OFFICE O/P EST SF 10 MIN: CPT | Mod: PBBFAC | Performed by: DERMATOLOGY

## 2017-11-27 PROCEDURE — 99999 PR PBB SHADOW E&M-EST. PATIENT-LVL II: CPT | Mod: PBBFAC,,, | Performed by: DERMATOLOGY

## 2017-11-27 NOTE — TELEPHONE ENCOUNTER
Returned the pt's wife's phone call. She is requesting a visit with Dr. Farrell re:recurrent right pleural effusion, would like an evaluation for a pleurx catheter. Pt's wife spoke with their transplant coordinator and he recommended Dr. Farrell for evaluation.   Outside CT chest on 10/4/17 [place] along with multiple thoracentesis. Will fax a request to  to acquire CT chest report along with pathology reports.   Pt's wife is agreeable to an appt on 11/30 at 845a. Provided her with location information for the appt.     ----- Message from Jesse Singh sent at 11/27/2017  8:43 AM CST -----  Contact: Padmaja//Wife  Caller states that (s)he needs to speak with nurse in ref to an appt that was supposed to be scheduled for the pt;callerstates that they spoke with Addy Gant (transplant coordinator) @ [phone] on Wednesday and was told that someone would call then for an appt for chronic fluid build up of right lung;caller states that the ppt has already seen pulmonary and there isn't much more they can do due to a collapsed lung//please call back at [phone]//thank you

## 2017-11-27 NOTE — PROGRESS NOTES
CC: 67 y.o.male patient is here for suture removal.     HPI: Patient is two week(s) s/p Mohs' micrographic surgery, fresh tissue technique of a squamous cell carcinoma on the posterior scalp, with subsequent repair   Patient reports no problems.    EXAM:  Sutures intact.  Wound healing well.  Good approximation of skin edges.  No undue erythema to surrounding skin or signs or symptoms of infection.    IMPRESSION:  Healing well post Mohs' micrographic surgery and repair    PLAN:  Site cleaned with peroxide, sutures removed  Dressed with petrolatum, Telfa and tape  Reviewed further care and expected course  Followup to Dr. [facility] in 1-2 months; PRN to me

## 2017-11-30 ENCOUNTER — HOSPITAL ENCOUNTER (OUTPATIENT)
Dept: RADIOLOGY | Facility: HOSPITAL | Age: 67
Discharge: HOME | End: 2017-11-30
Attending: THORACIC SURGERY (CARDIOTHORACIC VASCULAR SURGERY)
Payer: MEDICARE

## 2017-11-30 ENCOUNTER — OFFICE VISIT (OUTPATIENT)
Dept: CARDIOTHORACIC SURGERY | Facility: CLINIC | Age: 67
End: 2017-11-30
Payer: MEDICARE

## 2017-11-30 VITALS
DIASTOLIC BLOOD PRESSURE: 78 MMHG | HEART RATE: 60 BPM | SYSTOLIC BLOOD PRESSURE: 153 MMHG | OXYGEN SATURATION: 96 % | HEIGHT: 74 IN | BODY MASS INDEX: 23.4 KG/M2 | WEIGHT: 182.31 LBS

## 2017-11-30 DIAGNOSIS — J90 PLEURAL EFFUSION: Primary | ICD-10-CM

## 2017-11-30 DIAGNOSIS — J90 PLEURAL EFFUSION: ICD-10-CM

## 2017-11-30 LAB — HPRA INTERPRETATION: NORMAL

## 2017-11-30 PROCEDURE — 71020 XR CHEST PA AND LATERAL: CPT | Mod: 26,NTX,, | Performed by: RADIOLOGY

## 2017-11-30 PROCEDURE — 99999 PR PBB SHADOW E&M-EST. PATIENT-LVL IV: CPT | Mod: PBBFAC,TXP,, | Performed by: THORACIC SURGERY (CARDIOTHORACIC VASCULAR SURGERY)

## 2017-11-30 PROCEDURE — 99214 OFFICE O/P EST MOD 30 MIN: CPT | Mod: PBBFAC,25,TXP | Performed by: THORACIC SURGERY (CARDIOTHORACIC VASCULAR SURGERY)

## 2017-11-30 PROCEDURE — 99205 OFFICE O/P NEW HI 60 MIN: CPT | Mod: S$PBB,NTX,, | Performed by: THORACIC SURGERY (CARDIOTHORACIC VASCULAR SURGERY)

## 2017-11-30 PROCEDURE — 71020 XR CHEST PA AND LATERAL: CPT | Mod: TC,TXP

## 2017-11-30 NOTE — PROGRESS NOTES
"History & Physical    SUBJECTIVE:     History of Present Illness:    Patient is a 67 y.o. male with CAD s/p NSTEMI in 2012, PAH, ESRD on HD (MWF) and recurrent right pleural effusion presenting for evaluation. Previously on PD but transitioned to HD following ineffective clearance. 9 previous thoracenteses between July and most recently on Oct 18 with apx 2 liters removed with each draining. Referred to Dr. Sarmiento for a "collapsed lung" who recommended he no longer repeating thoracentesis as this was likely contributing to further protein loss. He was on the kidney transplant list for approximately 2 years but per patient's wife recently taken of list secondary to recurrent effusion. Today he reports DUFFY with minimal exertion and endorses LE edema which is stable. Of note, had prior gastric sleeve and subsequent gastric bypass. Follows with nutritionist at [facility]. Otherwise, denies fever, chills, CP, cough, nausea, vomiting, dysphagia, appetite or weight changes.     Never smoker.   PSH: cholecystectomy, gastric sleeve, corrective gastric bypass, multiple skin cancer excisions, PD catheter placement/removal, left UE AVF      Chief Complaint   Patient presents with    Consult       Review of patient's allergies indicates:   Allergen Reactions    Latex, natural rubber Rash and Blisters     Latex tape causes blisters    Ace inhibitors Other (See Comments)     Other reaction(s): Cough    Adhesive Dermatitis and Blisters     Please use Paper Tape    Morphine Hives, Itching, Dermatitis and Rash     Body Rash, Phlebitis, "My veins showed through the skin."    Iodine and iodide containing products      IVP dye       Current Outpatient Prescriptions   Medication Sig Dispense Refill    FOLIC ACID/VIT BCOMP,C (RENAL-CARLOS ORAL) Take 1 tablet by mouth every morning.       furosemide (LASIX) 80 MG tablet       hyoscyamine (LEVSIN/SL) 0.125 mg Subl Place 0.125 mg under the tongue every 4 (four) hours as needed (for dry " heaving).       labetalol (NORMODYNE) 100 MG tablet Take 1 tablet (100 mg total) by mouth every 12 (twelve) hours. 60 tablet 11    nitroGLYCERIN (NITROSTAT) 0.4 MG SL tablet Place 0.4 mg under the tongue every 5 (five) minutes as needed for Chest pain.      ondansetron (ZOFRAN-ODT) 4 MG TbDL Take 2 tablets (8 mg total) by mouth every 8 (eight) hours as needed (Nausea). 21 tablet 0    pantoprazole (PROTONIX) 40 MG tablet Take 40 mg by mouth 2 (two) times daily as needed.       pediatric multivit-iron-min (FLINTSTONES COMPLETE, IRON,) Chew Take 1 tablet by mouth 2 (two) times daily.      GEMMA-CARLOS RX 1- mg-mg-mcg Tab TK 1 T PO QD  3    sevelamer carbonate (RENVELA) 800 mg Tab Take 1,600 mg by mouth 3 (three) times daily. Takes two 800 mg tablets (1600 mg) with meals       No current facility-administered medications for this visit.        Past Medical History:   Diagnosis Date    Anemia of chronic renal failure, stage 5     BPH (benign prostatic hyperplasia)     CAD (coronary artery disease)     CHF (congestive heart failure)     CKD (chronic kidney disease) stage 5, GFR less than 15 ml/min     Diastolic dysfunction 12/16/2016    GERD (gastroesophageal reflux disease)     Hyperparathyroidism, secondary renal     Hypertension     Metabolic acidosis     MI (myocardial infarction) Feb 2012    Pulmonary hypertension 12/16/2016    TIA (transient ischemic attack)     Type 2 diabetes mellitus with diabetic nephropathy     history/ before wt loss     Past Surgical History:   Procedure Laterality Date    ABDOMINAL SURGERY      PD catheter    BASAL CELL CARCINOMA EXCISION Left Jan 2011    left forehead    CHOLECYSTECTOMY  July 2010    ELBOW SURGERY Left 3/18/14    anterior submuscular transposition, ulnar nerve    EYE SURGERY Bilateral     bilateral cataracts    GASTRECTOMY      GASTRIC BYPASS  May 2014    gastric sleeve  June 2013    Malfunctioned requiring bypass    HERNIA REPAIR       "ROTATOR CUFF REPAIR Left 2014    SHOULDER ARTHROSCOPY Left 11/18/14    RCR; glenoid labral repair; arch decompression     Family History   Problem Relation Age of Onset    Lung cancer Mother      smoker    Diabetes Mother     Diabetes Father     Kidney disease Neg Hx     Hypertension Neg Hx     Coronary artery disease Neg Hx      Social History   Substance Use Topics    Smoking status: Never Smoker    Smokeless tobacco: Never Used    Alcohol use No        Review of Systems:  Review of Systems   Constitutional: Positive for fatigue. Negative for appetite change, chills, fever and unexpected weight change.   HENT: Negative for congestion.    Eyes: Negative for pain.   Respiratory: Positive for shortness of breath. Negative for cough and chest tightness.    Cardiovascular: Positive for leg swelling. Negative for chest pain and palpitations.   Gastrointestinal: Negative for abdominal pain, nausea and vomiting.   Musculoskeletal: Negative for arthralgias.   Skin: Negative for color change and rash.   Neurological: Negative for dizziness.   Psychiatric/Behavioral: Negative for agitation. The patient is not nervous/anxious.        OBJECTIVE:     Vital Signs (Most Recent)  Pulse: 60 (11/30/17 0841)  BP: (!) 153/78 (11/30/17 0841)  SpO2: 96 % (11/30/17 0841)  6' 2" (1.88 m)  82.7 kg (182 lb 5.1 oz)     Physical Exam:  Physical Exam   Constitutional: He is oriented to person, place, and time. He appears well-developed and well-nourished.   HENT:   Head: Normocephalic and atraumatic.   Eyes: EOM are normal.   Neck: Normal range of motion. Neck supple. No tracheal deviation present.   Cardiovascular: Normal rate and regular rhythm.    Murmur heard.  2+ BLE edema   Pulmonary/Chest: Effort normal. No respiratory distress. He has decreased breath sounds in the right middle field and the right lower field.   Abdominal: Soft. Bowel sounds are normal. There is no tenderness.   Musculoskeletal: Normal range of motion. "   Neurological: He is alert and oriented to person, place, and time.   Skin: Skin is warm and dry.   Psychiatric: He has a normal mood and affect. Thought content normal.   Vitals reviewed.    Cardiac cath 1/20/17:   1.   Borderline-mildly elevated right atrial pressure.   2.   Markedly elevated mean PCWP pressure.   3.   Mildly elevated mean PA pressure with normal PVR (< 1 Wood unit).   4.   Elevated CO/CI in renal failure patient with AV fistula.  This elevated CO contributes to elevated PA pressure with normal PVR.   5.   Systemic vascular resistance 1168.    PAPRES: 59/20 (33)    Chest CT 10/30/17:  Outside images reviewed.   1. Right pleural effusion of moderate size. Nodular and bandlike opacification in the RLL is in part atelectasis but additional pneumonia and underlying neoplasm is possible.   2. Minimal left pleural effusion with minor atelectasis media left lung base.   3. No enlarged mediastinal or hilar lymphadenopathy      ASSESSMENT/PLAN:     Patient is a 67 y.o. male with CAD s/p NSTEMI in 2012, PAH, ESRD on HD (MWF) and recurrent right pleural effusion presenting for evaluation.    PLAN:Plan     Will get PA/lateral today.   Effusion is likely secondary to ESRD and a component of protein malnutrition. Discussed surgical options with patient and wife but they are not interested at this time.   Recommend keeping f/u with Dr. Sarmiento   [facility] prn       ATTENDING ATTESTATION:    I evaluated the patient and I agree with the assessment and plan.  Moderate right pleural effusion on CXR today, recurrent after multiple serial thoracenteses.  ESRD and hypoalbuminemia most likely causes.  Will follow to see how improved nutrition and HD will affect.  No need for PleurX or Pleurodesis at this time.

## 2017-11-30 NOTE — LETTER
December 1, 2017      Marcy Sarmiento MD  [address]  [facility] [address]           [facility]  [address]  [facility] [address]  Phone: [phone]  Fax: [phone]          Patient: Tom Gage   MR Number: [id number]   YOB: 1950   Date of Visit: 11/30/2017       Dear Dr. Marcy Sarmiento:    Thank you for referring Tom Gage to me for evaluation. Attached you will find relevant portions of my assessment and plan of care.    If you have questions, please do not hesitate to call me. I look forward to following Tom Gage along with you.    Sincerely,    Sj Farrell MD    Enclosure  CC:  No Recipients    If you would like to receive this communication electronically, please contact [email or web address] or [phone] to request more information on [employer] access.    For providers and/or their staff who would like to refer a patient to [facility], please contact us through our one-stop-shop provider referral line, [facility], at [phone].    If you feel you have received this communication in error or would no longer like to receive these types of communications, please e-mail [email or web address]

## 2017-12-01 ENCOUNTER — PATIENT MESSAGE (OUTPATIENT)
Dept: CARDIOTHORACIC SURGERY | Facility: CLINIC | Age: 67
End: 2017-12-01

## 2017-12-04 LAB
CLASS I ANTIBODIES - LUMINEX: NEGATIVE
CLASS II ANTIBODIES - LUMINEX: NEGATIVE
CPRA %: 0
SERUM COLLECTION DT - LUMINEX CLASS I: NORMAL
SERUM COLLECTION DT - LUMINEX CLASS II: NORMAL
SPCL1 TESTING DATE: NORMAL
SPCL2 TESTING DATE: NORMAL
SPCLU TESTING DATE: NORMAL

## 2017-12-11 ENCOUNTER — TELEPHONE (OUTPATIENT)
Dept: DERMATOLOGY | Facility: CLINIC | Age: 67
End: 2017-12-11

## 2017-12-11 NOTE — TELEPHONE ENCOUNTER
12-11-17 Taled to Mrs. Gage , I did a phone consult with her on the phone for her  and I scheduled his mohs surgery for 1-18-18.

## 2017-12-15 ENCOUNTER — TELEPHONE (OUTPATIENT)
Dept: BARIATRICS | Facility: CLINIC | Age: 67
End: 2017-12-15

## 2017-12-15 DIAGNOSIS — Z76.82 AWAITING ORGAN TRANSPLANT STATUS: Primary | ICD-10-CM

## 2017-12-15 NOTE — TELEPHONE ENCOUNTER
----- Message from Familia Minor sent at 12/15/2017 11:43 AM CST -----  Contact: Sabrina Pride  Caller said they were told by you to do f/u on vitamin d level. Caller wants to know if it can be done on the [facility]. Please call regarding this at [phone]

## 2017-12-16 ENCOUNTER — LAB VISIT (OUTPATIENT)
Dept: LAB | Facility: HOSPITAL | Age: 67
End: 2017-12-16
Attending: SURGERY
Payer: MEDICARE

## 2017-12-16 DIAGNOSIS — E55.9 VITAMIN D DEFICIENCY: ICD-10-CM

## 2017-12-16 LAB — 25(OH)D3+25(OH)D2 SERPL-MCNC: 21 NG/ML

## 2017-12-16 PROCEDURE — 36415 COLL VENOUS BLD VENIPUNCTURE: CPT | Mod: PO,TXP

## 2017-12-16 PROCEDURE — 82306 VITAMIN D 25 HYDROXY: CPT | Mod: TXP

## 2017-12-20 NOTE — PROGRESS NOTES
Low Vit D, pt notified via [facility] and rx sent to pharmacy.  Please cont once week vit d 50,000

## 2017-12-21 ENCOUNTER — LAB VISIT (OUTPATIENT)
Dept: LAB | Facility: HOSPITAL | Age: 67
End: 2017-12-21
Payer: MEDICARE

## 2017-12-21 DIAGNOSIS — Z76.82 AWAITING ORGAN TRANSPLANT STATUS: ICD-10-CM

## 2017-12-21 PROCEDURE — 86829 HLA CLASS I/II ANTIBODY QUAL: CPT | Mod: 91,PO,TXP

## 2017-12-21 PROCEDURE — 86829 HLA CLASS I/II ANTIBODY QUAL: CPT | Mod: PO,TXP

## 2017-12-22 ENCOUNTER — TELEPHONE (OUTPATIENT)
Dept: BARIATRICS | Facility: CLINIC | Age: 67
End: 2017-12-22

## 2017-12-22 NOTE — TELEPHONE ENCOUNTER
----- Message from Nancy Galarza sent at 12/22/2017  8:15 AM CST -----  Contact: Wife/Padmaja Giang States that she needs a call returned by a nurse in reference to Tom vitamin D levels and not wanting to speak with the M.A Because she is upset she hasn't got a call in 2 days, only wants to speak to kurt when call is returned , Please call Padmaja @ [phone]. Thanks :)

## 2017-12-22 NOTE — TELEPHONE ENCOUNTER
Returned call to patient's wife- Padmaja. She stated that prescription was sent for ergocaliferol; however, the nephrologist informed him not to take the ergocalciferol and that they were managing Vitamin D.  Lab sent to neprhologist, Dr. Cope, for review and treatment.

## 2018-01-03 LAB — HPRA INTERPRETATION: NORMAL

## 2018-01-25 ENCOUNTER — LAB VISIT (OUTPATIENT)
Dept: LAB | Facility: HOSPITAL | Age: 68
End: 2018-01-25
Payer: MEDICARE

## 2018-01-25 ENCOUNTER — PROCEDURE VISIT (OUTPATIENT)
Dept: DERMATOLOGY | Facility: CLINIC | Age: 68
End: 2018-01-25
Payer: MEDICARE

## 2018-01-25 VITALS
HEART RATE: 64 BPM | DIASTOLIC BLOOD PRESSURE: 82 MMHG | WEIGHT: 182 LBS | HEIGHT: 74 IN | BODY MASS INDEX: 23.36 KG/M2 | SYSTOLIC BLOOD PRESSURE: 164 MMHG

## 2018-01-25 DIAGNOSIS — D48.5 NEOPLASM OF UNCERTAIN BEHAVIOR OF SKIN: ICD-10-CM

## 2018-01-25 DIAGNOSIS — Z76.82 AWAITING ORGAN TRANSPLANT STATUS: ICD-10-CM

## 2018-01-25 DIAGNOSIS — C44.42 SQUAMOUS CELL CARCINOMA OF SCALP: ICD-10-CM

## 2018-01-25 DIAGNOSIS — D04.4 SQUAMOUS CELL CARCINOMA IN SITU OF SCALP: Primary | ICD-10-CM

## 2018-01-25 PROCEDURE — 88305 TISSUE EXAM BY PATHOLOGIST: CPT | Mod: NTX | Performed by: PATHOLOGY

## 2018-01-25 PROCEDURE — 17271 DSTR MAL LES S/N/H/F/G 0.6-1: CPT | Mod: PBBFAC | Performed by: DERMATOLOGY

## 2018-01-25 PROCEDURE — 17271 DSTR MAL LES S/N/H/F/G 0.6-1: CPT | Mod: S$PBB,,, | Performed by: DERMATOLOGY

## 2018-01-25 PROCEDURE — 86829 HLA CLASS I/II ANTIBODY QUAL: CPT | Mod: 91,PO,TXP

## 2018-01-25 PROCEDURE — 99212 OFFICE O/P EST SF 10 MIN: CPT | Mod: 25,S$PBB,, | Performed by: DERMATOLOGY

## 2018-01-25 PROCEDURE — 86829 HLA CLASS I/II ANTIBODY QUAL: CPT | Mod: PO,TXP

## 2018-01-25 NOTE — PROGRESS NOTES
Addendum:   FINAL PATHOLOGIC DIAGNOSIS  1. Skin, superior scalp, shave biopsy:  - INVASIVE SQUAMOUS CELL CARCINOMA, WELL-DIFFERENTIATED, CRATERIFORM  (KERATOACANTHOMATOUS TYPE).  - TUMOR EXTENDS DEEP BIOPSY MARGIN.  MICROSCOPIC DESCRIPTION: Sections show a crateriform squamous proliferation exhibiting minimal atypia.  Diagnosed by: Sheridan Giraldo M.D.  (Electronically Signed: 2018-01-29 12:48:30)  -----------------------------------    ALLERGIES:  Latex, natural rubber; Ace inhibitors; Adhesive; Morphine; and Iodine and iodide containing products    CHIEF COMPLAINT:  This 67 y.o. male comes for evaluation for Mohs' Micrographic Surgery, Fresh Tissue Technique, for treatment of a biopsy-proven squamous cell carcinoma in situ/Bowen's disease on the scalp. Consultation requested by J. [facility], M.D..    The patient is accompanied to this visit by his wife.    HISTORY OF PRESENT ILLNESS:   Location: scalp  Duration: 5 weeks  or more  Quality: persistent  Context: status post biopsy by [facility]. [facility], M.D.; path = squamous cell carcinoma in situ/Bowen's disease; pathology accession #[id number],  [facility]    Prior Treatment: none    See also the handwritten notes/diagrams scanned to chart for additional details.    Defibrillator: No  Pacemaker: No  Artificial heart valves: No  Artificial joints: No    REVIEW OF SYSTEMS:   General: general health fair  Skin: has previous history of skin cancer(s); prior Mohs' surgery on the posterior scalp on 11/09/2017; he notes a persistent bump in this vicinity for the last month or more  CV: has hypertension, no artificial valves  Resp: has pulmonary hypertension  Endo: has diabetes; type II  Hem/Lymph: not taking prescribed anticoagulants  Allergy/Immuno: has allergies as noted above  GI: has no history of hepatitis  MS: as noted above   : On dialysis Monday/Wednesday/Friday    PAST MEDICAL HISTORY:  Past Medical History:   Diagnosis Date     Anemia of chronic renal failure, stage 5     BPH (benign prostatic hyperplasia)     CAD (coronary artery disease)     CHF (congestive heart failure)     CKD (chronic kidney disease) stage 5, GFR less than 15 ml/min     Diastolic dysfunction 12/16/2016    GERD (gastroesophageal reflux disease)     Hyperparathyroidism, secondary renal     Hypertension     Metabolic acidosis     MI (myocardial infarction) Feb 2012    Pulmonary hypertension 12/16/2016    TIA (transient ischemic attack)     Type 2 diabetes mellitus with diabetic nephropathy     history/ before wt loss       PAST SURGICAL HISTORY:  Past Surgical History:   Procedure Laterality Date    ABDOMINAL SURGERY      PD catheter    BASAL CELL CARCINOMA EXCISION Left Jan 2011    left forehead    CHOLECYSTECTOMY  July 2010    ELBOW SURGERY Left 3/18/14    anterior submuscular transposition, ulnar nerve    EYE SURGERY Bilateral     bilateral cataracts    GASTRECTOMY      GASTRIC BYPASS  May 2014    gastric sleeve  June 2013    Malfunctioned requiring bypass    HERNIA REPAIR      ROTATOR CUFF REPAIR Left 2014    SHOULDER ARTHROSCOPY Left 11/18/14    RCR; glenoid labral repair; arch decompression        SOCIAL HISTORY:  Dependencies: smoking status as noted below  Social History   Substance Use Topics    Smoking status: Never Smoker    Smokeless tobacco: Never Used    Alcohol use No       PERTINENT MEDICATIONS:  See medications list.    Current Outpatient Prescriptions:     furosemide (LASIX) 80 MG tablet, , Disp: , Rfl:     hyoscyamine (LEVSIN/SL) 0.125 mg Subl, Place 0.125 mg under the tongue every 4 (four) hours as needed (for dry heaving). , Disp: , Rfl:     nitroGLYCERIN (NITROSTAT) 0.4 MG SL tablet, Place 0.4 mg under the tongue every 5 (five) minutes as needed for Chest pain., Disp: , Rfl:     ondansetron (ZOFRAN-ODT) 4 MG TbDL, Take 2 tablets (8 mg total) by mouth every 8 (eight) hours as needed (Nausea)., Disp: 21 tablet, Rfl:  0    pantoprazole (PROTONIX) 40 MG tablet, Take 40 mg by mouth 2 (two) times daily as needed. , Disp: , Rfl:     GEMMA-CARLOS RX 1- mg-mg-mcg Tab, TK 1 T PO QD, Disp: , Rfl: 3    sevelamer carbonate (RENVELA) 800 mg Tab, Take 1,600 mg by mouth 3 (three) times daily. Takes two 800 mg tablets (1600 mg) with meals, Disp: , Rfl:     labetalol (NORMODYNE) 100 MG tablet, Take 1 tablet (100 mg total) by mouth every 12 (twelve) hours., Disp: 60 tablet, Rfl: 11    ALLERGIES:  Latex, natural rubber; Ace inhibitors; Adhesive; Morphine; and Iodine and iodide containing products    EXAM:  See also the handwritten notes/diagrams scanned to chart for additional details.  Constitutional  General appearance: well-developed, well-nourished, well-kempt older white male    Eyes  Inspection of conjunctivae and lids reveals no abnormalities; sclerae anicteric  Neurologic/Psychiatric  Alert,  normal orientation to time, place, person  Normal mood and affect with no evidence of depression, anxiety, agitation  Skin: see photo(s)  Head: background marked solar damage to exposed areas of skin; on his mid scalp vertex, as noted in the photo taken at this visit, there is an approximate 7 mm pink scar without hyperkeratosis or induration which is consistent with a site of recent biopsy, and which he identifies as the site of recent biopsy; on the posterior scalp, as noted in the photo, there is an approximately 6-7 mm somewhat keratotic nodule approximately 1 cm to the left of the linear scar from his recent Mohs' surgery, which is clinically suggestive of either an underlying suture granuloma versus an new keratoacanthoma  Neck: examination reveals marked chronic solar damage  Right upper extremity: examination reveals marked chronic solar damage;   Left upper extremity: examination reveals marked chronic solar damage     ASSESSMENT: biopsy-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 In particular, the discussion included, but was not limited to, the following:    One alternative at this point would be to defer further treatment and observe the lesion. With small skin cancers of this kind, it is possible that a biopsy can be sufficient to definitively treat a small skin cancer of this kind. Alternatively, some skin cancers are slow growing and do not require immediate treatment. The potential advantage of this choice would be to avoid the need for possibly unnecessary additional surgery. Among the potential disadvantages of this would be the possibility of enlargement of the lesion, more extensive spread of the lesion or recurrence at a later date, which might necessitate a larger and more complex surgery.    Radiation treatment can be an effective treatment for this type of skin cancer. The usual course of treatment is every weekday for several weeks. Local irritation will result from treatment, although no systemic side effects are expected. The potential advantage of radiation treatment is that it avoids the need for surgery. Among the disadvantages of radiation treatment are the length of treatment, the local inflammatory response, the absence of pathologic confirmation of the removal of the skin cancer, a possible increased risk of additional skin cancer in the treated area in later years, and a somewhat increased risk of recurrence at a later date.     Excisional surgery can  be an effective treatment for this type of skin cancer. This would involve excision of the lesion with margin evaluation by submitting the specimen to a pathologist for either immediate marginal assessment via frozen section processing, or delayed marginal assessment by fixed-tissue processing. The potential advantage of this technique is that it offers a way of treating the lesion with some degree of histologic confirmation of tumor removal. Among the disadvantages of this treatment are the possible need for re-excision if marginal involvement is identified, a somewhat greater likelihood of recurrence as compared to Mohs' surgery because of the less comprehensive margin evaluation inherent in the technique, and the general potential risks of surgery, including allergic reactions to the anesthetic and other materials used, infection, injury to nerves in the area with consequent loss of sensation or muscle function, and scarring or distortion of surrounding structures.    Mohs' surgery is a very effective treatment for this type of skin cancer. The potential advantage of Mohs' surgery is that this technique offers the greatest possible certainty of knowing that the skin cancer has been completely removed, with the removal of the least amount of normal tissue. The potential disadvantages of Mohs' surgery include the duration of the surgery, the possible need for a separate surgery for reconstruction following tumor removal, and scarring as a result. In addition, general potential risks of surgery as noted above also apply to treatment via Mohs' surgery.    In light of the absence of any evidence of residual tumor to the area at present, and after discussing options with the patient, we will defer further treatment to the site and observe it for the present.    We also discussed the nature of the other lesion in the possibility that it represents a suture granuloma versus another new keratoacanthoma.  Under the  circumstances, a biopsy is indicated to establish a definitive diagnosis and guide further treatment.  This will be followed by electrodesiccation and curettage X 3 for definitive treatment in the event that it is a small keratoacanthoma.  See the procedure note below.    An appointment will be made for him to follow-up for re-evaluation in 2 months. He is to call to be seen sooner if any changes or signs of recurrence, which were reviewed, should arise in the meantime.    Sufficient time was available for questions, and all questions were answered to the satisfaction of the patient and his wife.    Greater than 50% of a 25 minute face-to-face encounter was spent counseling the patient regarding the diagnoses and options, and answering questions.    PROCEDURE NOTE: SHAVE BIOPSY AND DESTRUCTION    The diagnosis and management options and risk, benefits and alternatives were discussed with the patient. All questions were answered. Verbal consent was obtained.    Site: posterior scalp  Indication: clinically suspicious lesion; suggestive of malignancy  Size: 0.6 cm  Prep: Alcohol  Anesthesia: 1% lidocaine plain, less than 2 mL  Shave biopsy performed  Electrodesiccation and curettage carried out for destruction, 3 repetitions  Hemostasis with electrodesiccation  Dressed with petrolatum and bandaid  Specimen placed in formalin to be submitted to pathology    Routine care instructions given  Followup: two months   --------------------------------------  Note: Some or all of this note may have been generated using voice recognition software. There may be voice recognition errors including grammatical and/or spelling errors found in the text. Attempts were made to correct these errors prior to signature.

## 2018-01-30 ENCOUNTER — TELEPHONE (OUTPATIENT)
Dept: DERMATOLOGY | Facility: CLINIC | Age: 68
End: 2018-01-30

## 2018-01-30 NOTE — TELEPHONE ENCOUNTER
----- Message from Dustin Flores MD sent at 1/30/2018  2:35 PM CST -----  This did show a small keratoacanthoma-type squamous cell carcinoma, as suspected; it was treated at the time of biopsy. Please followup as scheduled, or call if needed sooner.  FINAL PATHOLOGIC DIAGNOSIS  1. Skin, superior scalp, shave biopsy:  - INVASIVE SQUAMOUS CELL CARCINOMA, WELL-DIFFERENTIATED, CRATERIFORM  (KERATOACANTHOMATOUS TYPE).  - TUMOR EXTENDS DEEP BIOPSY MARGIN.  MICROSCOPIC DESCRIPTION: Sections show a crateriform squamous proliferation exhibiting minimal atypia.  Diagnosed by: Sheridan Giraldo M.D.  (Electronically Signed: 2018-01-29 12:48:30)

## 2018-02-03 LAB — HPRA INTERPRETATION: NORMAL

## 2018-02-05 RX ORDER — LABETALOL 100 MG/1
100 TABLET, FILM COATED ORAL EVERY 12 HOURS
Qty: 60 TABLET | Refills: 6 | Status: SHIPPED | OUTPATIENT
Start: 2018-02-05 | End: 2018-05-07

## 2018-03-07 ENCOUNTER — TELEPHONE (OUTPATIENT)
Dept: BARIATRICS | Facility: CLINIC | Age: 68
End: 2018-03-07

## 2018-03-07 NOTE — TELEPHONE ENCOUNTER
Called patient- spoke with his wife. Completed Vitamin D prescription.  Notified her that we will retest along with all post bariatric labs - appt scheduled for 3/10/18.    Notified patient's wife that ABN was needed to be signed at the time of lab for VItamin d and iron.  She verbalized understanding.  ABN scanned and linked to appt pending signature.  Patient stated that nephrology stopped the oral vitamin d in September because it was trending up and he was getting it through dialysis - retested in December and restarted on oral Vitamin d.  Dr. Cope and Dr. Raman vu with him being on oral Vitamin.  If need supplements can call to verify with nephrology for plan of care regarding vitamin d supplementation.  Annual follow up scheduled for 3/27/18 with Dr. Ríos

## 2018-03-07 NOTE — TELEPHONE ENCOUNTER
----- Message from Christiano Suazo RD sent at 3/7/2018  8:58 AM CST -----  Contact: wife/ teddy       ----- Message -----  From: Nancy Galarza  Sent: 3/7/2018   8:49 AM  To: CR Lovelace States that she needs a call returned by a nurse in reference to Tom's Vitamin D levels, Please call Teddy @ [phone]  . Thanks :)

## 2018-03-10 ENCOUNTER — LAB VISIT (OUTPATIENT)
Dept: LAB | Facility: HOSPITAL | Age: 68
End: 2018-03-10
Attending: FAMILY MEDICINE
Payer: MEDICARE

## 2018-03-10 DIAGNOSIS — E55.9 VITAMIN D DEFICIENCY: ICD-10-CM

## 2018-03-10 DIAGNOSIS — I13.0 HYPERTENSIVE HEART AND KIDNEY DISEASE WITH HEART FAILURE: ICD-10-CM

## 2018-03-10 DIAGNOSIS — I51.89 DIASTOLIC DYSFUNCTION: ICD-10-CM

## 2018-03-10 DIAGNOSIS — Z79.899 OTHER LONG TERM (CURRENT) DRUG THERAPY: ICD-10-CM

## 2018-03-10 DIAGNOSIS — Z98.84 S/P GASTRIC BYPASS: ICD-10-CM

## 2018-03-10 DIAGNOSIS — K21.9 GASTROESOPHAGEAL REFLUX DISEASE WITHOUT ESOPHAGITIS: Chronic | ICD-10-CM

## 2018-03-10 LAB
25(OH)D3+25(OH)D2 SERPL-MCNC: 30 NG/ML
ALBUMIN SERPL BCP-MCNC: 3.4 G/DL
ALP SERPL-CCNC: 111 U/L
ALT SERPL W/O P-5'-P-CCNC: 13 U/L
ANION GAP SERPL CALC-SCNC: 10 MMOL/L
AST SERPL-CCNC: 23 U/L
BASOPHILS # BLD AUTO: 0.05 K/UL
BASOPHILS NFR BLD: 0.8 %
BILIRUB SERPL-MCNC: 0.9 MG/DL
BUN SERPL-MCNC: 23 MG/DL
CALCIUM SERPL-MCNC: 8.4 MG/DL
CHLORIDE SERPL-SCNC: 99 MMOL/L
CHOLEST SERPL-MCNC: 109 MG/DL
CHOLEST/HDLC SERPL: 2.5 {RATIO}
CO2 SERPL-SCNC: 31 MMOL/L
CREAT SERPL-MCNC: 3.2 MG/DL
DIFFERENTIAL METHOD: ABNORMAL
EOSINOPHIL # BLD AUTO: 0.2 K/UL
EOSINOPHIL NFR BLD: 2.4 %
ERYTHROCYTE [DISTWIDTH] IN BLOOD BY AUTOMATED COUNT: 15.1 %
EST. GFR  (AFRICAN AMERICAN): 22.3 ML/MIN/1.73 M^2
EST. GFR  (NON AFRICAN AMERICAN): 19.3 ML/MIN/1.73 M^2
GLUCOSE SERPL-MCNC: 94 MG/DL
HCT VFR BLD AUTO: 31.5 %
HDLC SERPL-MCNC: 43 MG/DL
HDLC SERPL: 39.4 %
HGB BLD-MCNC: 10 G/DL
IMM GRANULOCYTES # BLD AUTO: 0.02 K/UL
IMM GRANULOCYTES NFR BLD AUTO: 0.3 %
IRON SERPL-MCNC: 52 UG/DL
LDLC SERPL CALC-MCNC: 54.4 MG/DL
LYMPHOCYTES # BLD AUTO: 0.5 K/UL
LYMPHOCYTES NFR BLD: 7.4 %
MCH RBC QN AUTO: 34.7 PG
MCHC RBC AUTO-ENTMCNC: 31.7 G/DL
MCV RBC AUTO: 109 FL
MONOCYTES # BLD AUTO: 0.5 K/UL
MONOCYTES NFR BLD: 7.6 %
NEUTROPHILS # BLD AUTO: 5.1 K/UL
NEUTROPHILS NFR BLD: 81.5 %
NONHDLC SERPL-MCNC: 66 MG/DL
NRBC BLD-RTO: 0 /100 WBC
PLATELET # BLD AUTO: 182 K/UL
PMV BLD AUTO: 9.8 FL
POTASSIUM SERPL-SCNC: 4.2 MMOL/L
PROT SERPL-MCNC: 6.6 G/DL
RBC # BLD AUTO: 2.88 M/UL
SATURATED IRON: 17 %
SODIUM SERPL-SCNC: 140 MMOL/L
TOTAL IRON BINDING CAPACITY: 314 UG/DL
TRANSFERRIN SERPL-MCNC: 212 MG/DL
TRIGL SERPL-MCNC: 58 MG/DL
VIT B12 SERPL-MCNC: 890 PG/ML
WBC # BLD AUTO: 6.21 K/UL

## 2018-03-10 PROCEDURE — 80053 COMPREHEN METABOLIC PANEL: CPT | Mod: TXP

## 2018-03-10 PROCEDURE — 83540 ASSAY OF IRON: CPT | Mod: TXP

## 2018-03-10 PROCEDURE — 82607 VITAMIN B-12: CPT | Mod: NTX

## 2018-03-10 PROCEDURE — 36415 COLL VENOUS BLD VENIPUNCTURE: CPT | Mod: PO,TXP

## 2018-03-10 PROCEDURE — 85025 COMPLETE CBC W/AUTO DIFF WBC: CPT | Mod: TXP

## 2018-03-10 PROCEDURE — 82306 VITAMIN D 25 HYDROXY: CPT | Mod: NTX

## 2018-03-10 PROCEDURE — 84425 ASSAY OF VITAMIN B-1: CPT | Mod: TXP

## 2018-03-10 PROCEDURE — 80061 LIPID PANEL: CPT | Mod: NTX

## 2018-03-12 ENCOUNTER — TELEPHONE (OUTPATIENT)
Dept: BARIATRICS | Facility: CLINIC | Age: 68
End: 2018-03-12

## 2018-03-12 ENCOUNTER — TELEPHONE (OUTPATIENT)
Dept: SURGERY | Facility: CLINIC | Age: 68
End: 2018-03-12

## 2018-03-12 NOTE — TELEPHONE ENCOUNTER
----- Message from Lidia Paniagua RN sent at 3/12/2018  9:58 AM CDT -----  Contact: wife/teddy  Ms. Giang wants to talk to you.  Concerning the Vit D. I sent message to EDYTA Murillo  ----- Message -----  From: Nancy Galarza  Sent: 3/12/2018   8:24 AM  To: [facility] Staff    Teddy States that she needs a call returned by a nurse in reference to kramer lab results.                      Please call Teddy @ [phone]. Thanks :)

## 2018-03-12 NOTE — TELEPHONE ENCOUNTER
Spoke with pt wife in reference to Vitamin D and just needing a refill on medication.    Pt wife and pt do not feel It is necessary to have a bariatric f/u with Dr. Ríos.   Will cancel appt after discussing with Christiano Suazo RD.

## 2018-03-12 NOTE — TELEPHONE ENCOUNTER
----- Message from Nancy Galarza sent at 3/12/2018  8:24 AM CDT -----  Contact: wife/padmaja  Padmaja States that she needs a call returned by a nurse in reference to kramer lab results.                      Please call Padmaja @ [phone]. Thanks :)

## 2018-03-12 NOTE — TELEPHONE ENCOUNTER
Returned call.  Concerned about Vit D refill.  Message sent to EDYTA Alfaro.  Request Ananya Morillo RN call her.  Message delivered.

## 2018-03-13 ENCOUNTER — PATIENT MESSAGE (OUTPATIENT)
Dept: BARIATRICS | Facility: CLINIC | Age: 68
End: 2018-03-13

## 2018-03-13 ENCOUNTER — TELEPHONE (OUTPATIENT)
Dept: BARIATRICS | Facility: CLINIC | Age: 68
End: 2018-03-13

## 2018-03-13 NOTE — TELEPHONE ENCOUNTER
----- Message from Familia Minor sent at 3/13/2018 11:04 AM CDT -----  Christiano    Pt said he is out of medication and would like for you to call him. Please call him at [phone].    Pt said he has left a message on the portal and also spoke with you last week. Pt said he is out of Vitamin D.

## 2018-03-15 LAB — VIT B1 SERPL-MCNC: 53 UG/L (ref 38–122)

## 2018-03-23 ENCOUNTER — PATIENT MESSAGE (OUTPATIENT)
Dept: CARDIOLOGY | Facility: CLINIC | Age: 68
End: 2018-03-23

## 2018-03-28 ENCOUNTER — TELEPHONE (OUTPATIENT)
Dept: CARDIOLOGY | Facility: CLINIC | Age: 68
End: 2018-03-28

## 2018-03-28 NOTE — TELEPHONE ENCOUNTER
----- Message from Donna Lopez sent at 3/28/2018  9:23 AM CDT -----  Contact: Patient's wife  The Pt's wife is calling to see what she needs to do to get the Pt's records from [facility]? Thanks, Donna

## 2018-03-29 ENCOUNTER — OFFICE VISIT (OUTPATIENT)
Dept: DERMATOLOGY | Facility: CLINIC | Age: 68
End: 2018-03-29
Payer: MEDICARE

## 2018-03-29 DIAGNOSIS — L90.5 SCAR: Primary | ICD-10-CM

## 2018-03-29 DIAGNOSIS — Z85.828 HISTORY OF NONMELANOMA SKIN CANCER: ICD-10-CM

## 2018-03-29 PROCEDURE — 99212 OFFICE O/P EST SF 10 MIN: CPT | Mod: PBBFAC | Performed by: DERMATOLOGY

## 2018-03-29 PROCEDURE — 99999 PR PBB SHADOW E&M-EST. PATIENT-LVL II: CPT | Mod: PBBFAC,,, | Performed by: DERMATOLOGY

## 2018-03-29 PROCEDURE — 99212 OFFICE O/P EST SF 10 MIN: CPT | Mod: S$PBB,,, | Performed by: DERMATOLOGY

## 2018-03-29 RX ORDER — CHOLECALCIFEROL (VITAMIN D3) 25 MCG
1000 TABLET ORAL
COMMUNITY
End: 2019-04-09

## 2018-03-29 RX ORDER — CALCITRIOL 0.5 UG/1
0.5 CAPSULE ORAL DAILY
COMMUNITY

## 2018-03-29 NOTE — PROGRESS NOTES
ALLERGIES:  Latex, natural rubber; Ace inhibitors; Adhesive; Morphine; and Iodine and iodide containing products    CHIEF COMPLAINT: followup post biopsy/destruction    HISTORY OF PRESENT ILLNESS:  Location: scalp  Timing: biopsy/destruction performed 2 months ago   Context: pathology as noted below  Quality: no changes notes    PATH:   FINAL PATHOLOGIC DIAGNOSIS  1. Skin, superior scalp, shave biopsy:  - INVASIVE SQUAMOUS CELL CARCINOMA, WELL-DIFFERENTIATED, CRATERIFORM  (KERATOACANTHOMATOUS TYPE).  - TUMOR EXTENDS DEEP BIOPSY MARGIN.  MICROSCOPIC DESCRIPTION: Sections show a crateriform squamous proliferation exhibiting minimal atypia.  Diagnosed by: Sheridan Giraldo M.D.  (Electronically Signed: 2018-01-29 12:48:30)    REVIEW OF SYSTEMS:   General: general health good  Skin: This lesion was adjacent to a site of previous Mohs' surgery and repair for a keratoacanthoma-type squamous cell carcinoma on the posterior scalp  At his previous visit on January 25, he was scheduled for treatment of a squamous cell carcinoma in situ to the vertex of the scalp, anterior to the previous surgery, which had been biopsied by Dr. [facility]; however, there was no evidence of residual squamous cell carcinoma in situ at that time and so further treatment was deferred    PAST MEDICAL HISTORY:  Past Medical History:   Diagnosis Date    Anemia of chronic renal failure, stage 5     BPH (benign prostatic hyperplasia)     CAD (coronary artery disease)     CHF (congestive heart failure)     CKD (chronic kidney disease) stage 5, GFR less than 15 ml/min     Diastolic dysfunction 12/16/2016    GERD (gastroesophageal reflux disease)     Hyperparathyroidism, secondary renal     Hypertension     Metabolic acidosis     MI (myocardial infarction) Feb 2012    Pulmonary hypertension 12/16/2016    TIA (transient ischemic attack)     Type 2 diabetes mellitus with diabetic nephropathy     history/ before wt loss       PAST SURGICAL  HISTORY:  Past Surgical History:   Procedure Laterality Date    ABDOMINAL SURGERY      PD catheter    BASAL CELL CARCINOMA EXCISION Left Jan 2011    left forehead    CHOLECYSTECTOMY  July 2010    ELBOW SURGERY Left 3/18/14    anterior submuscular transposition, ulnar nerve    EYE SURGERY Bilateral     bilateral cataracts    GASTRECTOMY      GASTRIC BYPASS  May 2014    gastric sleeve  June 2013    Malfunctioned requiring bypass    HERNIA REPAIR      ROTATOR CUFF REPAIR Left 2014    SHOULDER ARTHROSCOPY Left 11/18/14    RCR; glenoid labral repair; arch decompression       SOCIAL HISTORY:  Social History   Substance Use Topics    Smoking status: Never Smoker    Smokeless tobacco: Never Used    Alcohol use No        PERTINENT MEDICATIONS:  See medications list.  Current Outpatient Prescriptions on File Prior to Visit   Medication Sig Dispense Refill    furosemide (LASIX) 80 MG tablet       hyoscyamine (LEVSIN/SL) 0.125 mg Subl Place 0.125 mg under the tongue every 4 (four) hours as needed (for dry heaving).       labetalol (NORMODYNE) 100 MG tablet Take 1 tablet (100 mg total) by mouth every 12 (twelve) hours. 60 tablet 6    nitroGLYCERIN (NITROSTAT) 0.4 MG SL tablet Place 0.4 mg under the tongue every 5 (five) minutes as needed for Chest pain.      ondansetron (ZOFRAN-ODT) 4 MG TbDL Take 2 tablets (8 mg total) by mouth every 8 (eight) hours as needed (Nausea). 21 tablet 0    pantoprazole (PROTONIX) 40 MG tablet Take 40 mg by mouth 2 (two) times daily as needed.       GEMMA-CARLOS RX 1- mg-mg-mcg Tab TK 1 T PO QD  3    sevelamer carbonate (RENVELA) 800 mg Tab Take 1,600 mg by mouth 3 (three) times daily. Takes two 800 mg tablets (1600 mg) with meals       No current facility-administered medications on file prior to visit.        EXAM:  Constitutional  General appearance: well-developed, well-nourished, well-kempt older white male    Eyes  Inspection of conjunctivae and lids reveals no  abnormalities; sclerae anicteric  Neurologic/Psychiatric  Alert,  normal orientation to time, place, person  Normal mood and affect with no evidence of depression, anxiety, agitation  Skin: see photo(s) below  Head: background solar damage to exposed areas of skin  At the site of recent biopsy/destruction on his posterior scalp, to the left of the linear scar from his previous Mohs' surgery and repair, there remains an approximately 12 mm pink scar with no papular or nodular changes or other changes to suggest recurrent keratoacanthoma  At the site of previous biopsy on his scalp vertex, there is an approximately 6-7 mm pink, smooth scar with no evidence of residual/recurrent squamous cell carcinoma in situ  ---------------------------------------------  PHOTOS FROM 01/25/2018        =================================================  PHOTOS FROM THIS VISIT                    ASSESSMENT:   2 months Status post biopsy/destruction, keratoacanthoma--type squamous cell carcinoma, posterior scalp, with no evidence of recurrent keratoacanthoma  Status post biopsy, squamous cell carcinoma in situ, scalp vertex; now clinically clear  chronic solar damage to areas as noted above  personal history of non-melanoma skin cancer    PLAN:    The diagnoses and management options, and risks and benefits of the alternatives were discussed with the patient and his wife.  Given the absence of any evidence of clinically residual/recurrent squamous cell carcinoma in situ at the scalp vertex site, or of keratoacanthoma-type squamous cell carcinoma to the posterior scalp site, and after discussing the alternatives with Mr. Gage, we will defer further treatment.  I recommended that he follow-up with Dr. [facility] for ongoing dermatologic management and observation of the sites.  I will send a letter to Dr. [facility].  He is to follow-up with me on an as-needed basis.    ------------------------  Note: Some or all of this note may have been  generated using voice recognition software. There may be voice recognition errors including grammatical and/or spelling errors found in the text. Attempts were made to correct these errors prior to signature.

## 2018-04-11 ENCOUNTER — LAB VISIT (OUTPATIENT)
Dept: LAB | Facility: HOSPITAL | Age: 68
End: 2018-04-11
Payer: MEDICARE

## 2018-04-11 DIAGNOSIS — Z76.82 AWAITING ORGAN TRANSPLANT STATUS: ICD-10-CM

## 2018-04-11 PROCEDURE — 86829 HLA CLASS I/II ANTIBODY QUAL: CPT | Mod: PO,TXP

## 2018-04-11 PROCEDURE — 86829 HLA CLASS I/II ANTIBODY QUAL: CPT | Mod: 91,PO,TXP

## 2018-04-13 ENCOUNTER — TELEPHONE (OUTPATIENT)
Dept: CARDIOLOGY | Facility: CLINIC | Age: 68
End: 2018-04-13

## 2018-04-13 NOTE — TELEPHONE ENCOUNTER
Spoke with wife Padmaja. Pt records were requested from [facility] at [phone]. Wife stated patient was in [place] and had chest x rays done. Also spoke with  [facility] who said  They have out sourced released of records. Records requested for a second time. Will keep wife informed when records come in for scheduled appointment on 5/8/18.

## 2018-04-13 NOTE — TELEPHONE ENCOUNTER
----- Message from Ginger Federico sent at 4/13/2018  8:52 AM CDT -----  Contact: Padmaja [phone] pt wife  Pt wife would like a call from the supervisor in ref to messages/emails that were left, and no replies. She want to know the turn around for messages to be replied to, and he's a pt of Dr. Ames.    Thanks

## 2018-04-24 ENCOUNTER — OFFICE VISIT (OUTPATIENT)
Dept: SURGERY | Facility: CLINIC | Age: 68
End: 2018-04-24
Payer: MEDICARE

## 2018-04-24 VITALS
SYSTOLIC BLOOD PRESSURE: 168 MMHG | WEIGHT: 180 LBS | TEMPERATURE: 98 F | BODY MASS INDEX: 23.1 KG/M2 | HEART RATE: 62 BPM | HEIGHT: 74 IN | DIASTOLIC BLOOD PRESSURE: 79 MMHG

## 2018-04-24 DIAGNOSIS — R10.32 LEFT GROIN PAIN: Primary | ICD-10-CM

## 2018-04-24 PROCEDURE — 99999 PR PBB SHADOW E&M-EST. PATIENT-LVL III: CPT | Mod: PBBFAC,TXP,, | Performed by: SURGERY

## 2018-04-24 PROCEDURE — 99213 OFFICE O/P EST LOW 20 MIN: CPT | Mod: PBBFAC,NTX | Performed by: SURGERY

## 2018-04-24 PROCEDURE — 99213 OFFICE O/P EST LOW 20 MIN: CPT | Mod: S$PBB,NTX,, | Performed by: SURGERY

## 2018-04-24 RX ORDER — HYDROCODONE BITARTRATE AND ACETAMINOPHEN 5; 325 MG/1; MG/1
TABLET ORAL
Refills: 0 | COMMUNITY
Start: 2018-04-10 | End: 2018-05-31

## 2018-04-24 RX ORDER — METHOCARBAMOL 750 MG/1
TABLET, FILM COATED ORAL
Refills: 0 | COMMUNITY
Start: 2018-04-10 | End: 2018-05-31

## 2018-04-24 NOTE — LETTER
[facility] - General Surgery  [address]  [facility] [address]  Phone: [phone] April 24, 2018      Asael Poole MD  [address] [facility] [address]    Patient: Tom Gage   MR Number: [id number]   YOB: 1950   Date of Visit: 4/24/2018     Dear Dr. Poole:    Thank you for referring Tom Gage to me for evaluation. Below are the relevant portions of my assessment and plan of care.    Assessment:       1. Left groin pain           Likely strain, no hernia  Plan:      Obtain outside ct result.  Patient reassured.     If you have questions, please do not hesitate to call me. I look forward to following Tom along with you.    Sincerely,      Owen Ríos MD   Section Head - General, Laparoscopic, Bariatric  Acute Care and Oncologic Surgery   - Surgical Weight Loss Program  [facility]    WSR/hayden    CC  MD Ruel Suresh MD Kuntal P. Mohare, MD

## 2018-04-24 NOTE — PROGRESS NOTES
Subjective:       Patient ID: Tom Gage is a 67 y.o. male.    Chief Complaint: Hernia (LIH)    HPI He is my past PD patient and now on hemo.  In January he had a fall and again 2 weeks ago.  He now has pain in the left groin area.  He is s/p left inguinal hernia repair.  Review of Systems   Constitutional: Negative for fever.   Respiratory: Negative for cough.    Gastrointestinal: Positive for diarrhea. Negative for constipation, nausea and vomiting.   Genitourinary: Negative for difficulty urinating.   Skin: Negative for rash and wound.        Has easy bruising and has several bruises from falling       Objective:      Physical Exam   Constitutional: He appears well-developed and well-nourished.   Abdominal: Hernia confirmed negative in the left inguinal area.   Skin: Skin is warm and dry.   Vitals reviewed.      Assessment:       1. Left groin pain      Likely strain, no hernia  Plan:      Obtain outside ct result.  Pt reassured.

## 2018-05-01 ENCOUNTER — OFFICE VISIT (OUTPATIENT)
Dept: CARDIOLOGY | Facility: CLINIC | Age: 68
End: 2018-05-01
Payer: MEDICARE

## 2018-05-01 ENCOUNTER — HOSPITAL ENCOUNTER (OUTPATIENT)
Dept: CARDIOLOGY | Facility: CLINIC | Age: 68
Discharge: HOME | End: 2018-05-01
Attending: INTERNAL MEDICINE
Payer: MEDICARE

## 2018-05-01 VITALS
SYSTOLIC BLOOD PRESSURE: 148 MMHG | BODY MASS INDEX: 23.57 KG/M2 | HEART RATE: 64 BPM | WEIGHT: 183.63 LBS | HEIGHT: 74 IN | DIASTOLIC BLOOD PRESSURE: 78 MMHG

## 2018-05-01 DIAGNOSIS — I35.0 AORTIC VALVE STENOSIS, ETIOLOGY OF CARDIAC VALVE DISEASE UNSPECIFIED: Primary | ICD-10-CM

## 2018-05-01 DIAGNOSIS — I35.0 AORTIC VALVE STENOSIS, ETIOLOGY OF CARDIAC VALVE DISEASE UNSPECIFIED: ICD-10-CM

## 2018-05-01 LAB
AORTIC VALVE STENOSIS: ABNORMAL
ESTIMATED PA SYSTOLIC PRESSURE: 75.9
MITRAL VALVE MOBILITY: ABNORMAL
MITRAL VALVE REGURGITATION: ABNORMAL
MITRAL VALVE STENOSIS: ABNORMAL
RETIRED EF AND QEF - SEE NOTES: 33 (ref 55–65)
TRICUSPID VALVE REGURGITATION: ABNORMAL

## 2018-05-01 PROCEDURE — 99214 OFFICE O/P EST MOD 30 MIN: CPT | Mod: PBBFAC,TXP | Performed by: INTERNAL MEDICINE

## 2018-05-01 PROCEDURE — 93306 TTE W/DOPPLER COMPLETE: CPT | Mod: PBBFAC,TXP | Performed by: INTERNAL MEDICINE

## 2018-05-01 PROCEDURE — 99214 OFFICE O/P EST MOD 30 MIN: CPT | Mod: S$PBB,GC,TXP, | Performed by: INTERNAL MEDICINE

## 2018-05-01 PROCEDURE — 99999 PR PBB SHADOW E&M-EST. PATIENT-LVL IV: CPT | Mod: PBBFAC,GC,TXP, | Performed by: INTERNAL MEDICINE

## 2018-05-01 NOTE — PROGRESS NOTES
Subjective:    Patient ID:  Tom Gage is a 67 y.o. male who presents for  evaluation of renal transplant work up    Mr. Gage is a 67yo male with a PMHx of CAD (NSTEMI in ), HFpEF (), MR, HTN, pleural effusion (thoracentesis 3/9/2017), TIA, gastric bypass (), DM, and ESRD (on peritoneal dialysis) here for follow up after his recent hospitalization at  for SOB- diagnosed with right sided severe pleural effusion and thoracentesis x 2 done during the hospital stay.  He reports that he was switched to HD from PD later last year as he was getting almost weekly thoracentesis for 6-8 weeks. Since the switch he remained stable until February this year when he got a pleural tap x 2. He has recurrent transudative pleural effusion attributed to ESRD and hypoalbuminemia. He has modified his diet and consumes a lot of protein rich substitutes and HD also modified- 6 hr longer session on , shorter 4 hr ( increased speed of removal) on /). Patient reports that he feels a lot better with this new regime of diet and Hemodialysis. An echo done in feb at  shows decline in LV EF to 40-45 and increase in AS to moderate ARMIN 1.2( previously in [facility] in Dec 2016 ARMIN was 1.78) Patient denies any CP, new SOB/DUFFY in the past 3 months. He had a recent fall in the Dialysis center and has bad hip pain. Imaging studies done did not show any fracture or dislocation.    [place] recent records review:   2017 CXR : moderate right pleural effusion increased since 10/2017  EK18 NSR, First deg AV block( 215) prolonged QTc 487  TTE :2018   Normal LV size, LVH, EF 40-45%. Grade II diastolic dysfunction  MICHOACANO 38.3 Calcified AV severe. AS 1.2 cm2, MG 15.2  Normal RV function. PA 57+( IVC diameter not reported)         Past Medical History:   Diagnosis Date    Anemia of chronic renal failure, stage 5     BPH (benign prostatic hyperplasia)     CAD (coronary artery disease)     CHF  (congestive heart failure)     CKD (chronic kidney disease) stage 5, GFR less than 15 ml/min     Diastolic dysfunction 12/16/2016    GERD (gastroesophageal reflux disease)     Hyperparathyroidism, secondary renal     Hypertension     Metabolic acidosis     MI (myocardial infarction) Feb 2012    Pulmonary hypertension 12/16/2016    TIA (transient ischemic attack)     Type 2 diabetes mellitus with diabetic nephropathy     history/ before wt loss     Past Surgical History:   Procedure Laterality Date    ABDOMINAL SURGERY      PD catheter    BASAL CELL CARCINOMA EXCISION Left Jan 2011    left forehead    CHOLECYSTECTOMY  July 2010    ELBOW SURGERY Left 3/18/14    anterior submuscular transposition, ulnar nerve    EYE SURGERY Bilateral     bilateral cataracts    GASTRECTOMY      GASTRIC BYPASS  May 2014    gastric sleeve  June 2013    Malfunctioned requiring bypass    HERNIA REPAIR      ROTATOR CUFF REPAIR Left 2014    SHOULDER ARTHROSCOPY Left 11/18/14    RCR; glenoid labral repair; arch decompression     Current Outpatient Prescriptions on File Prior to Visit   Medication Sig Dispense Refill    calcitRIOL (ROCALTROL) 0.5 MCG Cap Take 0.5 mcg by mouth once daily.      furosemide (LASIX) 80 MG tablet       hydrocodone-acetaminophen 5-325mg (NORCO) 5-325 mg per tablet TK 1 T PO Q 4 TO 6 H PRN P CONTROL  0    hyoscyamine (LEVSIN/SL) 0.125 mg Subl Place 0.125 mg under the tongue every 4 (four) hours as needed (for dry heaving).       labetalol (NORMODYNE) 100 MG tablet Take 1 tablet (100 mg total) by mouth every 12 (twelve) hours. 60 tablet 6    methocarbamol (ROBAXIN) 750 MG Tab TK 2 TS PO TID  0    nitroGLYCERIN (NITROSTAT) 0.4 MG SL tablet Place 0.4 mg under the tongue every 5 (five) minutes as needed for Chest pain.      ondansetron (ZOFRAN-ODT) 4 MG TbDL Take 2 tablets (8 mg total) by mouth every 8 (eight) hours as needed (Nausea). 21 tablet 0    pantoprazole (PROTONIX) 40 MG tablet Take  "40 mg by mouth 2 (two) times daily as needed.       GEMMA-CARLOS RX 1- mg-mg-mcg Tab TK 1 T PO QD  3    sevelamer carbonate (RENVELA) 800 mg Tab Take 1,600 mg by mouth 3 (three) times daily. Takes two 800 mg tablets (1600 mg) with meals      vitamin D 1000 units Tab Take 1,000 Units by mouth once daily.       No current facility-administered medications on file prior to visit.      Review of patient's allergies indicates:   Allergen Reactions    Latex, natural rubber Rash and Blisters     Latex tape causes blisters    Ace inhibitors Other (See Comments)     Other reaction(s): Cough    Adhesive Dermatitis and Blisters     Please use Paper Tape    Morphine Hives, Itching, Dermatitis and Rash     Body Rash, Phlebitis, "My veins showed through the skin."    Iodine and iodide containing products      IVP dye       Social History     Social History    Marital status:      Spouse name: N/A    Number of children: N/A    Years of education: N/A     Occupational History    Not on file.     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     Flew in he [employer] for 20 years    Lives with wife      Family History   Problem Relation Age of Onset    Lung cancer Mother      smoker    Diabetes Mother     Diabetes Father     Kidney disease Neg Hx     Hypertension Neg Hx     Coronary artery disease Neg Hx        Review of Systems   Constitution: Positive for malaise/fatigue. Negative for weakness and weight gain.   HENT: Negative for congestion.    Eyes: Negative for blurred vision.   Cardiovascular: Negative for chest pain, dyspnea on exertion, leg swelling, orthopnea, paroxysmal nocturnal dyspnea and syncope.   Respiratory: Negative for snoring.  "   Endocrine: Negative.    Hematologic/Lymphatic: Positive for bleeding problem.   Skin: Positive for color change and rash.   Musculoskeletal: Positive for back pain and joint pain. Negative for muscle weakness and myalgias.   Gastrointestinal: Negative for abdominal pain, constipation and diarrhea.   Genitourinary: Negative for dysuria and hematuria.   Neurological: Negative for dizziness, headaches, light-headedness, loss of balance and numbness.   Psychiatric/Behavioral: Negative for altered mental status.   Allergic/Immunologic: Negative for persistent infections.        Objective:    Physical Exam   Constitutional: He is oriented to person, place, and time. He appears well-developed and well-nourished.   HENT:   Head: Normocephalic.   Nose: Nose normal.   Eyes: Conjunctivae are normal. Pupils are equal, round, and reactive to light.   Neck: Normal range of motion. Neck supple. JVD present. Carotid bruit is not present.   Cardiovascular: Normal rate, regular rhythm, S1 normal, S2 normal and intact distal pulses.   No extrasystoles are present. PMI is not displaced.  Exam reveals no gallop and no friction rub.    Murmur heard.  High-pitched blowing holosystolic murmur is present with a grade of 3/6  at the apex radiating to the axilla  Pulses:       Carotid pulses are 2+ on the right side, and 2+ on the left side.       Radial pulses are 1+ on the right side.        Dorsalis pedis pulses are 2+ on the right side, and 2+ on the left side.        Posterior tibial pulses are 2+ on the right side, and 2+ on the left side.   Both systolic and a diastolic murmur heard in the parasternal border   Pulmonary/Chest: Effort normal. No respiratory distress. He has no wheezes.   Diminished lower right sided breath sounds   Abdominal: Soft. Bowel sounds are normal. He exhibits no distension. There is no tenderness.   Musculoskeletal: Normal range of motion. He exhibits edema (1 pitting lower legs).   Neurological: He is alert  "and oriented to person, place, and time. He is not disoriented.   Skin: Skin is warm and dry. No rash noted.   Psychiatric: He has a normal mood and affect. His speech is normal and behavior is normal.   Nursing note and vitals reviewed.    BP (!) 148/78   Pulse 64   Ht 6' 2" (1.88 m)   Wt 83.3 kg (183 lb 10.3 oz)   BMI 23.58 kg/m²         TTE 12/16/16  CONCLUSIONS     1 - Normal left ventricular systolic function (EF 55-60%).     2 - Eccentric hypertrophy.     3 - Left ventricular diastolic dysfunction.     4 - Severe left atrial enlargement.     5 - Normal right ventricular systolic function .     6 - Mild aortic stenosis, ARMIN = 1.79 cm2, peak velocity = 2.5 m/s, mean gradient = 13.0 mmHg.     7 - Trivial aortic regurgitation.     8 - Mild to moderate mitral regurgitation.     9 - Trivial to mild tricuspid regurgitation.     10 - Trivial pulmonic regurgitation.     11 - Trivial pericardial effusion with thickened pericardium .     12 - Pulmonary hypertension. The estimated PA systolic pressure is 65 mmHg.     13 - Anterior pericardial density as per text     Pericardium: There is evidence of a trivial pericardial effusion. There is evidence of a thickened pericardium.   There is 1.5 by 2 cm echo density in the anterior pericardial space that may represent intra-pericardial fat ball, but another tumor cannot totally be excluded.     DSE 12/21/2015   CONCLUSIONS     1 - Concentric hypertrophy.     2 - Normal left ventricular systolic function (EF 60-65%).     3 - Left ventricular diastolic dysfunction.     4 - Mild left atrial enlargement.     5 - Normal right ventricular systolic function .     6 - The estimated PA systolic pressure is 37 mmHg.     7 - Mild tricuspid regurgitation.     No evidence of stress induced myocardial ischemia.       Assessment:     1. Coronary artery disease involving native coronary artery of native heart without angina pectoris. Intolerant of statins in the past. LDL 73 in 2015. " Nuclear stress 1/20/2016 negative for ischemia..      2 Calcific AS- moderate   3. Diastolic dysfunction. On BB. No significant overload at this time.      4. CKD (chronic kidney disease) stage V requiring chronic dialysis. hemo dialysis.      5. Renovascular hypertension. Controlled per home reports. BP unchanged since initiation of losartan..      6. Pulmonary hypertension group 2. Stable.           Plan:       66 year old male patient with CAD( non obstructive disease in 2012 ) DM/HTN , ESRD on PD was being evaluated for renal transplant candidacy. But taken off the list temporarily due to recurrent Right sided pleural effusions/ thoracentesis. During recent admission to  for a thoracentesis procedure an ECHo showed mild decrease in LV function and greater decrease in AV valve area to 1.2 ( 1.8 in 2016 in [facility] echo). This study was done when he was in fluid overload 3 months ago. He is almost Euvolemic clinically at this time. Will repeat a trans thoracic echo and evaluate LV function and AV.  If still the same as  records will get a ischemic evaluation with a nucelar stress test.     AS: calcific. Tri  Leaflet valve. Low gradient 15.  Repeat 2D echo in [facility]..    HTN: stable on labetalol.     Patient has his primary care/Cardiology and Nephrology at  and wish to continue to follow there. He presents to [facility] for pre renal transplant evaluation purposes only    Discussed with Dr MINE Mcmanus.     Henok Burns MD  [phone].

## 2018-05-02 DIAGNOSIS — I51.9 LV DYSFUNCTION: Primary | ICD-10-CM

## 2018-05-04 ENCOUNTER — TELEPHONE (OUTPATIENT)
Dept: CARDIOLOGY | Facility: CLINIC | Age: 68
End: 2018-05-04

## 2018-05-07 ENCOUNTER — TELEPHONE (OUTPATIENT)
Dept: CARDIOLOGY | Facility: CLINIC | Age: 68
End: 2018-05-07

## 2018-05-07 ENCOUNTER — PATIENT MESSAGE (OUTPATIENT)
Dept: CARDIOLOGY | Facility: CLINIC | Age: 68
End: 2018-05-07

## 2018-05-07 DIAGNOSIS — I42.0 DILATED CARDIOMYOPATHY: ICD-10-CM

## 2018-05-07 LAB — HPRA INTERPRETATION: NORMAL

## 2018-05-07 RX ORDER — CARVEDILOL 12.5 MG/1
12.5 TABLET ORAL 2 TIMES DAILY WITH MEALS
Qty: 60 TABLET | Refills: 11 | Status: SHIPPED | OUTPATIENT
Start: 2018-05-07 | End: 2018-05-31 | Stop reason: SDUPTHER

## 2018-05-07 RX ORDER — LOSARTAN POTASSIUM 25 MG/1
25 TABLET ORAL DAILY
Qty: 30 TABLET | Refills: 11 | Status: SHIPPED | OUTPATIENT
Start: 2018-05-07 | End: 2018-05-31 | Stop reason: SDUPTHER

## 2018-05-07 NOTE — TELEPHONE ENCOUNTER
Echo results reviewed. Echo here confirms drop in LVEF to 30-35% along with RV dysfunction and moderate to severe AS. Pharm SPECT ordered to assess for obstructive CAD. Losartan 25 mg daily ordered and labetalol changed to carvedilol 12.5 mg bid.

## 2018-05-10 ENCOUNTER — CLINICAL SUPPORT (OUTPATIENT)
Dept: CARDIOLOGY | Facility: CLINIC | Age: 68
End: 2018-05-10
Attending: INTERNAL MEDICINE
Payer: MEDICARE

## 2018-05-10 DIAGNOSIS — I51.9 LV DYSFUNCTION: ICD-10-CM

## 2018-05-10 LAB — DIASTOLIC DYSFUNCTION: NO

## 2018-05-10 PROCEDURE — 93018 CV STRESS TEST I&R ONLY: CPT | Mod: S$PBB,TXP,, | Performed by: INTERNAL MEDICINE

## 2018-05-10 PROCEDURE — 78452 HT MUSCLE IMAGE SPECT MULT: CPT | Mod: PBBFAC,TXP | Performed by: INTERNAL MEDICINE

## 2018-05-10 PROCEDURE — 93016 CV STRESS TEST SUPVJ ONLY: CPT | Mod: S$PBB,TXP,, | Performed by: INTERNAL MEDICINE

## 2018-05-16 ENCOUNTER — TELEPHONE (OUTPATIENT)
Dept: CARDIOLOGY | Facility: CLINIC | Age: 68
End: 2018-05-16

## 2018-05-21 DIAGNOSIS — I13.0 HYPERTENSIVE HEART AND RENAL DISEASE WITH CONGESTIVE HEART FAILURE: ICD-10-CM

## 2018-05-21 DIAGNOSIS — R94.30 EJECTION FRACTION < 50%: Primary | ICD-10-CM

## 2018-05-31 ENCOUNTER — OFFICE VISIT (OUTPATIENT)
Dept: CARDIOLOGY | Facility: CLINIC | Age: 68
End: 2018-05-31
Payer: MEDICARE

## 2018-05-31 VITALS
HEART RATE: 69 BPM | HEIGHT: 74 IN | WEIGHT: 180 LBS | SYSTOLIC BLOOD PRESSURE: 172 MMHG | DIASTOLIC BLOOD PRESSURE: 83 MMHG | BODY MASS INDEX: 23.1 KG/M2

## 2018-05-31 DIAGNOSIS — I13.0 HYPERTENSIVE HEART AND KIDNEY DISEASE WITH HEART FAILURE: ICD-10-CM

## 2018-05-31 DIAGNOSIS — E11.21 TYPE 2 DIABETES MELLITUS WITH DIABETIC NEPHROPATHY, WITHOUT LONG-TERM CURRENT USE OF INSULIN: Chronic | ICD-10-CM

## 2018-05-31 DIAGNOSIS — I34.0 NON-RHEUMATIC MITRAL REGURGITATION: ICD-10-CM

## 2018-05-31 DIAGNOSIS — Z99.2 CKD (CHRONIC KIDNEY DISEASE) STAGE V REQUIRING CHRONIC DIALYSIS: ICD-10-CM

## 2018-05-31 DIAGNOSIS — I35.0 NONRHEUMATIC AORTIC VALVE STENOSIS: ICD-10-CM

## 2018-05-31 DIAGNOSIS — I50.22 CHRONIC SYSTOLIC HEART FAILURE: ICD-10-CM

## 2018-05-31 DIAGNOSIS — I15.0 RENOVASCULAR HYPERTENSION: ICD-10-CM

## 2018-05-31 DIAGNOSIS — I42.0 DILATED CARDIOMYOPATHY: ICD-10-CM

## 2018-05-31 DIAGNOSIS — Z98.84 S/P GASTRIC BYPASS: ICD-10-CM

## 2018-05-31 DIAGNOSIS — I25.10 CORONARY ARTERY DISEASE INVOLVING NATIVE CORONARY ARTERY OF NATIVE HEART WITHOUT ANGINA PECTORIS: Primary | Chronic | ICD-10-CM

## 2018-05-31 DIAGNOSIS — N18.6 CKD (CHRONIC KIDNEY DISEASE) STAGE V REQUIRING CHRONIC DIALYSIS: ICD-10-CM

## 2018-05-31 PROCEDURE — 99999 PR PBB SHADOW E&M-EST. PATIENT-LVL IV: CPT | Mod: PBBFAC,TXP,, | Performed by: INTERNAL MEDICINE

## 2018-05-31 PROCEDURE — 99214 OFFICE O/P EST MOD 30 MIN: CPT | Mod: PBBFAC,TXP | Performed by: INTERNAL MEDICINE

## 2018-05-31 PROCEDURE — 99214 OFFICE O/P EST MOD 30 MIN: CPT | Mod: S$PBB,NTX,, | Performed by: INTERNAL MEDICINE

## 2018-05-31 RX ORDER — CARVEDILOL 25 MG/1
25 TABLET ORAL 2 TIMES DAILY WITH MEALS
Qty: 180 TABLET | Refills: 3 | Status: SHIPPED | OUTPATIENT
Start: 2018-05-31 | End: 2019-07-29 | Stop reason: SDUPTHER

## 2018-05-31 RX ORDER — LOSARTAN POTASSIUM 100 MG/1
100 TABLET ORAL DAILY
Qty: 90 TABLET | Refills: 3 | Status: ON HOLD | OUTPATIENT
Start: 2018-05-31 | End: 2018-09-21 | Stop reason: SDUPTHER

## 2018-05-31 NOTE — PATIENT INSTRUCTIONS
Increase losartan to 100 mg daily. If tolerating the increased dose after one week, increase carvedilol to 25 mg twice daily.    Follow up echo July 17 to reassess heart function.

## 2018-05-31 NOTE — PROGRESS NOTES
Subjective:   Patient ID:  Tom Gage is a 67 y.o. male who presents for follow-up of Aortic valve stenosis ( 1 month f/u ) and Congestive Heart Failure      HPI: His follow up echo confirmed moderate to severe LV dysfunction with an LVEF of 30-35%. His RV function was mildly to moderately decreased. He had moderate to severe AS with a mean gradient of 21 and an ARMIN of 1.12. He had significant MAC with a mean gradient of 2 across the mitral valve and mild to moderate MR. His SPECT was negative for ischemia. He has been feeling really well. His blood pressure has improved with the increased dose of losartan. The lowest systolic readings after HD are in the 150's. Tom Gage denies any chest pain, shortness of breath, PND, orthopnea, palpitations, leg edema, or syncope. He has not had any recurrence of his pleural effusion since February.     ECG: (5/10/18): NSR with 1st degree AV block.       Past Medical History:   Diagnosis Date    Anemia of chronic renal failure, stage 5     BPH (benign prostatic hyperplasia)     CAD (coronary artery disease)     CHF (congestive heart failure)     Chronic systolic heart failure 5/31/2018    CKD (chronic kidney disease) stage 5, GFR less than 15 ml/min     Diastolic dysfunction 12/16/2016    Dilated cardiomyopathy 5/7/2018    GERD (gastroesophageal reflux disease)     Hyperparathyroidism, secondary renal     Hypertension     Metabolic acidosis     MI (myocardial infarction) Feb 2012    Pulmonary hypertension 12/16/2016    TIA (transient ischemic attack)     Type 2 diabetes mellitus with diabetic nephropathy     history/ before wt loss       Past Surgical History:   Procedure Laterality Date    ABDOMINAL SURGERY      PD catheter    BASAL CELL CARCINOMA EXCISION Left Jan 2011    left forehead    CHOLECYSTECTOMY  July 2010    ELBOW SURGERY Left 3/18/14    anterior submuscular transposition, ulnar nerve    EYE SURGERY Bilateral     bilateral cataracts     GASTRECTOMY      GASTRIC BYPASS  May 2014    gastric sleeve  June 2013    Malfunctioned requiring bypass    HERNIA REPAIR      ROTATOR CUFF REPAIR Left 2014    SHOULDER ARTHROSCOPY Left 11/18/14    RCR; glenoid labral repair; arch decompression       Social History     Social History    Marital status:      Spouse name: N/A    Number of children: N/A    Years of education: N/A     Social History Main Topics    Smoking status: Never Smoker    Smokeless tobacco: Never Used    Alcohol use No    Drug use: No    Sexual activity: Yes     Partners: Female     Other Topics Concern    None     Social History Narrative    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     Flew in he [employer] for 20 years    Lives with wife        Family History   Problem Relation Age of Onset    Lung cancer Mother         smoker    Diabetes Mother     Diabetes Father     Kidney disease Neg Hx     Hypertension Neg Hx     Coronary artery disease Neg Hx        Patient's Medications   New Prescriptions    No medications on file   Previous Medications    CALCITRIOL (ROCALTROL) 0.5 MCG CAP    Take 0.5 mcg by mouth once daily.    FUROSEMIDE (LASIX) 80 MG TABLET    80 mg every 12 (twelve) hours.     NITROGLYCERIN (NITROSTAT) 0.4 MG SL TABLET    Place 0.4 mg under the tongue every 5 (five) minutes as needed for Chest pain.    PANTOPRAZOLE (PROTONIX) 40 MG TABLET    Take 40 mg by mouth 2 (two) times daily as needed.     SEVELAMER CARBONATE (RENVELA) 800 MG TAB    Take 1,600 mg by mouth 3 (three) times daily. Takes two 800 mg tablets (1600 mg) with meals    VITAMIN D 1000 UNITS TAB    Take 1,000 Units by mouth once daily.   Modified Medications    Modified Medication Previous Medication    CARVEDILOL (COREG) 25 MG TABLET carvedilol (COREG) 12.5 MG tablet       Take 1 tablet (25 mg total) by mouth 2 (two) times daily with meals.    Take 1 tablet (12.5 mg total) by mouth 2 (two) times daily with meals.    LOSARTAN  (COZAAR) 100 MG TABLET losartan (COZAAR) 25 MG tablet       Take 1 tablet (100 mg total) by mouth once daily.    Take 1 tablet (25 mg total) by mouth once daily.   Discontinued Medications    HYDROCODONE-ACETAMINOPHEN 5-325MG (NORCO) 5-325 MG PER TABLET    TK 1 T PO Q 4 TO 6 H PRN P CONTROL    HYOSCYAMINE (LEVSIN/SL) 0.125 MG SUBL    Place 0.125 mg under the tongue every 4 (four) hours as needed (for dry heaving).     METHOCARBAMOL (ROBAXIN) 750 MG TAB    TK 2 TS PO TID    ONDANSETRON (ZOFRAN-ODT) 4 MG TBDL    Take 2 tablets (8 mg total) by mouth every 8 (eight) hours as needed (Nausea).    GEMMA-CARLOS RX 1- MG-MG-MCG TAB    TK 1 T PO QD       Review of Systems   Constitution: Negative for weakness, malaise/fatigue and weight gain.   HENT: Negative for hearing loss.    Eyes: Negative for visual disturbance.   Cardiovascular: Negative for chest pain, claudication, dyspnea on exertion, leg swelling, near-syncope, orthopnea, palpitations, paroxysmal nocturnal dyspnea and syncope.   Respiratory: Negative for cough, shortness of breath, sleep disturbances due to breathing, snoring and wheezing.    Endocrine: Negative for cold intolerance, heat intolerance, polydipsia, polyphagia and polyuria.   Hematologic/Lymphatic: Negative for bleeding problem. Does not bruise/bleed easily.   Skin: Negative for rash and suspicious lesions.   Musculoskeletal: Negative for arthritis, falls, joint pain, muscle weakness and myalgias.   Gastrointestinal: Negative for abdominal pain, change in bowel habit, constipation, diarrhea, heartburn, hematochezia, melena and nausea.   Genitourinary: Negative for hematuria and nocturia.   Neurological: Negative for excessive daytime sleepiness, dizziness, headaches, light-headedness and loss of balance.   Psychiatric/Behavioral: Negative for depression. The patient is not nervous/anxious.    Allergic/Immunologic: Negative for environmental allergies.       BP (!) 172/83 (BP Location: Right arm,  "Patient Position: Sitting, BP Method: Medium (Automatic))   Pulse 69   Ht 6' 2" (1.88 m)   Wt 81.6 kg (180 lb)   BMI 23.11 kg/m²     Objective:   Physical Exam   Constitutional: He is oriented to person, place, and time. He appears well-developed and well-nourished.        HENT:   Head: Normocephalic and atraumatic.   Mouth/Throat: Oropharynx is clear and moist.   Eyes: Conjunctivae and EOM are normal. Pupils are equal, round, and reactive to light. No scleral icterus.   Neck: Normal range of motion. Neck supple. JVD present. No hepatojugular reflux present. No tracheal deviation present. No thyromegaly present.   JVD half up his neck sitting up   Cardiovascular: Normal rate, regular rhythm and intact distal pulses.  PMI is not displaced.    Murmur heard.   Harsh midsystolic murmur is present with a grade of 2/6  at the upper right sternal border  High-pitched blowing holosystolic murmur of grade 3/6 is also present at the apex.  Pulses:       Carotid pulses are 2+ on the right side, and 2+ on the left side.       Radial pulses are 1+ on the right side, and 0 on the left side.        Dorsalis pedis pulses are 2+ on the right side.   AV fistula left arm with palpable thrill   Pulmonary/Chest: Effort normal. He has rales in the right lower field and the left lower field.   Abdominal: Soft. Bowel sounds are normal. He exhibits no distension and no mass. There is no hepatosplenomegaly. There is no tenderness.   Musculoskeletal: He exhibits edema. He exhibits no tenderness.   Trace pre-tibial edema   Lymphadenopathy:     He has no cervical adenopathy.   Neurological: He is alert and oriented to person, place, and time.   Skin: Skin is warm and dry. Ecchymosis noted. No rash noted. No cyanosis or erythema. Nails show no clubbing.   Hands and arms   Psychiatric: He has a normal mood and affect. His speech is normal and behavior is normal.       Lab Results   Component Value Date     03/10/2018    K 4.2 03/10/2018 "    CL 99 03/10/2018    CO2 31 (H) 03/10/2018    BUN 23 03/10/2018    CREATININE 3.2 (H) 03/10/2018    GLU 94 03/10/2018    MG 2.1 04/07/2016    AST 23 03/10/2018    ALT 13 03/10/2018    ALBUMIN 3.4 (L) 03/10/2018    PROT 6.6 03/10/2018    BILITOT 0.9 03/10/2018    WBC 6.21 03/10/2018    HGB 10.0 (L) 03/10/2018    HCT 31.5 (L) 03/10/2018     (H) 03/10/2018     03/10/2018    INR 1.1 01/20/2017    INR 1.1 01/20/2017    TSH 4.653 (H) 09/20/2015    CHOL 109 (L) 03/10/2018    HDL 43 03/10/2018    LDLCALC 54.4 (L) 03/10/2018    TRIG 58 03/10/2018       Assessment:     1. Coronary artery disease involving native coronary artery of native heart without angina pectoris : Non-obstructive on previous cath. Recent SPECT negative for ischemia. He cannot take asa due to previous retinal bleeding. He is not on a statin given LDL of 54.   2. Dilated cardiomyopathy : He appears mildly volume overloaded, although he is at his dry weight and is asymptomatic. Increase losartan to 100 mg daily. Increase carvedilol to 25 mg bid one week later. Repeat echo in July to reassess LV function. If his LVEF < 35% we will discuss ICD for primary prevention of SCD.   3. Aortic stenosis: His AS was moderate to severe on his recent echo.   4. Renovascular hypertension : Increase losartan and carvedilol as above. Goal BP < 130/80.   5. CKD (chronic kidney disease) stage V requiring chronic dialysis    6. S/P gastric bypass    7. Type 2 diabetes mellitus with diabetic nephropathy, without long-term current use of insulin : Off of all medications since his gastric bypass.   8. Chronic systolic heart failure        Plan:     Tom was seen today for aortic valve stenosis and congestive heart failure.    Diagnoses and all orders for this visit:    Coronary artery disease involving native coronary artery of native heart without angina pectoris    Dilated cardiomyopathy  -     carvedilol (COREG) 25 MG tablet; Take 1 tablet (25 mg total) by  mouth 2 (two) times daily with meals.  -     losartan (COZAAR) 100 MG tablet; Take 1 tablet (100 mg total) by mouth once daily.    Hypertensive heart and kidney disease with heart failure    Renovascular hypertension    CKD (chronic kidney disease) stage V requiring chronic dialysis    S/P gastric bypass    Type 2 diabetes mellitus with diabetic nephropathy, without long-term current use of insulin    Chronic systolic heart failure  -     carvedilol (COREG) 25 MG tablet; Take 1 tablet (25 mg total) by mouth 2 (two) times daily with meals.  -     losartan (COZAAR) 100 MG tablet; Take 1 tablet (100 mg total) by mouth once daily.        Thank you for allowing me to participate in this patient's care. Please do not hesitate to contact me with any questions or concerns.

## 2018-06-01 ENCOUNTER — PATIENT MESSAGE (OUTPATIENT)
Dept: CARDIOLOGY | Facility: CLINIC | Age: 68
End: 2018-06-01

## 2018-06-14 ENCOUNTER — PATIENT MESSAGE (OUTPATIENT)
Dept: CARDIOLOGY | Facility: CLINIC | Age: 68
End: 2018-06-14

## 2018-06-18 ENCOUNTER — PATIENT MESSAGE (OUTPATIENT)
Dept: CARDIOLOGY | Facility: CLINIC | Age: 68
End: 2018-06-18

## 2018-06-18 ENCOUNTER — TELEPHONE (OUTPATIENT)
Dept: CARDIOLOGY | Facility: CLINIC | Age: 68
End: 2018-06-18

## 2018-06-18 NOTE — TELEPHONE ENCOUNTER
Mr Esparza reports feeling swimmy and unable to function on the losartan 25 mg tid, as he's been taking it for the past three weeks. His blood pressure readings have been 130/70's which he states is low for him. I asked him to cut the losartan down to 50 mg daily and see if he feels better. He has no other symptoms. I advised him to notify me if he doesn't feel better on the reduced dose.

## 2018-07-09 PROBLEM — I35.0 AORTIC STENOSIS: Status: ACTIVE | Noted: 2018-07-09

## 2018-07-09 NOTE — PLAN OF CARE
Outside Transfer Acceptance Note    Transferring Physician or Mid Level Provider/Speciality: Dr. Brian Calles; Cardiology    Accepting Physician: Maninder LIND Banner in consultation with Dr. Prudence Mcmanus    Date of Acceptance: 07/09/2018     Code Status: Full    Transferring Facility/Hospital: [facility]    Reason for Transfer [place] [facility]: [place] by interventional cardiology (structural)    Report from Transferring Physician or Mid-Level provider/Hospital course:   Patient of concern is a 67M with ESRD on HD, CAD s/p NSTEMi in 2012, recurrent pleural effusions (attributed to ESRD and hypoalbuminemia) and ascites, hx gastric bypass with suspected moderate-protein calorie malnutrition currently being evaluated at [facility] by cardiology for acute on chronic combined HF. Patient had prior HFpEF with WHO G2 pulmonary HTN but follow-up ECHO recently confirmed moderate to severe LV dysfunction with LVEF 30-35%. He had moderate to severe AS with mean gradient of 21 and ARMIN of 1.12. He had a SPECT study completed which was negative for ischemia. The patient had been treated for combined HF with diuretics and HD for volume management. The referring cardiologist discussed transfer for TAVR evaluation. He suspects the patient will need a dobutamine stress ECHO to further evaluate.  When queried whether this evaluation needed to occur as inpatient, the referring MD spoke with Dr. Mcmanus and this was decided to be necessary for inpatient evaluation.    ECHO 5/1/2018  CONCLUSIONS     1 - Upper limit of normal left ventricular enlargement.     2 - Moderately depressed left ventricular systolic function (EF 30-35%).     3 - Concentric hypertrophy.     4 - Right ventricular enlargement with mildly to moderately depressed systolic function.     5 - Biatrial enlargement.     6 - Moderate to severe aortic stenosis, ARMIN = 1.12 cm2, AVAi = 0.61 cm2/m2, peak velocity = 3.05 m/s, mean gradient = 21 mmHg.     7 - The mitral  "valve is moderately sclerotic with mildly restricted leaflet mobility.     8 - Trivial mitral stenosis, MVA = 3.14 cm2.     9 - Mild to moderate mitral regurgitation.     10 - Severe tricuspid regurgitation.     11 - Mild pulmonic regurgitation.     12 - Intermediate central venous pressure.     13 - Pulmonary hypertension. The estimated PA systolic pressure is 76 mmHg.     Nuclear stress 5/10/2018  Impression: NORMAL MYOCARDIAL PERFUSION  1. The perfusion scan is free of evidence for myocardial ischemia or injury.   2. Resting wall motion is physiologic.   3. Resting LV function is normal.  (normal is >= 51%)  4. The ventricular volumes are normal at rest and stress.   5. The extracardiac distribution of radioactivity is normal.   6. When compared to the previous study from 01/20/2016, there are no significant interval changes in the perfusion pattern.    VS: Referring physician did not have available, reports "HDS"    Transportation goal: Level 2    To do list upon patient arrival:   - Please call extension [phone] upon patient arrival to floor for Hospital Medicine admit team assignment and for additional admit orders.  - Admit to CSU telemetry under IMC/J; if capped okay to overflow to another service  - Consult to interventional cardiology (structural)  - Consult to nephrology for HD    KIMMY MANN MD  Attending Staff Physician   [facility] Medicine  Cell: [phone]  "

## 2018-07-10 ENCOUNTER — HOSPITAL ENCOUNTER (INPATIENT)
Facility: HOSPITAL | Age: 68
LOS: 4 days | Discharge: HOME HEALTH | DRG: 273 | End: 2018-07-14
Attending: HOSPITALIST | Admitting: HOSPITALIST
Payer: MEDICARE

## 2018-07-10 DIAGNOSIS — J90 PLEURAL EFFUSION: ICD-10-CM

## 2018-07-10 DIAGNOSIS — I51.89 DIASTOLIC DYSFUNCTION: ICD-10-CM

## 2018-07-10 DIAGNOSIS — R19.7 DIARRHEA: ICD-10-CM

## 2018-07-10 DIAGNOSIS — I35.0 NONRHEUMATIC AORTIC VALVE STENOSIS: ICD-10-CM

## 2018-07-10 DIAGNOSIS — I35.0 AORTIC STENOSIS: ICD-10-CM

## 2018-07-10 DIAGNOSIS — I25.10 CORONARY ARTERY DISEASE INVOLVING NATIVE CORONARY ARTERY OF NATIVE HEART WITHOUT ANGINA PECTORIS: ICD-10-CM

## 2018-07-10 DIAGNOSIS — I15.0 RENOVASCULAR HYPERTENSION: Primary | ICD-10-CM

## 2018-07-10 PROBLEM — Z99.2 ESRD ON DIALYSIS: Status: RESOLVED | Noted: 2018-07-10 | Resolved: 2018-07-10

## 2018-07-10 PROBLEM — E44.0 MODERATE PROTEIN MALNUTRITION: Status: ACTIVE | Noted: 2018-07-10

## 2018-07-10 PROBLEM — Z99.2 ESRD ON DIALYSIS: Status: ACTIVE | Noted: 2018-07-10

## 2018-07-10 PROBLEM — I50.20 HEART FAILURE WITH REDUCED EJECTION FRACTION, NYHA CLASS III: Status: ACTIVE | Noted: 2018-07-10

## 2018-07-10 PROBLEM — N18.6 ESRD ON DIALYSIS: Status: ACTIVE | Noted: 2018-07-10

## 2018-07-10 PROBLEM — N18.6 ESRD ON DIALYSIS: Status: RESOLVED | Noted: 2018-07-10 | Resolved: 2018-07-10

## 2018-07-10 PROBLEM — I50.20 HEART FAILURE WITH REDUCED EJECTION FRACTION, NYHA CLASS III: Chronic | Status: ACTIVE | Noted: 2018-07-10

## 2018-07-10 LAB
ALBUMIN SERPL BCP-MCNC: 2.3 G/DL
ALP SERPL-CCNC: 117 U/L
ALT SERPL W/O P-5'-P-CCNC: 7 U/L
ANION GAP SERPL CALC-SCNC: 8 MMOL/L
AST SERPL-CCNC: 16 U/L
BASOPHILS # BLD AUTO: 0.04 K/UL
BASOPHILS NFR BLD: 0.4 %
BILIRUB SERPL-MCNC: 0.9 MG/DL
BUN SERPL-MCNC: 33 MG/DL
CALCIUM SERPL-MCNC: 8.4 MG/DL
CHLORIDE SERPL-SCNC: 103 MMOL/L
CO2 SERPL-SCNC: 24 MMOL/L
CREAT SERPL-MCNC: 4.3 MG/DL
DIFFERENTIAL METHOD: ABNORMAL
EOSINOPHIL # BLD AUTO: 0.1 K/UL
EOSINOPHIL NFR BLD: 1.2 %
ERYTHROCYTE [DISTWIDTH] IN BLOOD BY AUTOMATED COUNT: 14.8 %
EST. GFR  (AFRICAN AMERICAN): 15.6 ML/MIN/1.73 M^2
EST. GFR  (NON AFRICAN AMERICAN): 13.5 ML/MIN/1.73 M^2
GLUCOSE SERPL-MCNC: 141 MG/DL
HCT VFR BLD AUTO: 32.6 %
HGB BLD-MCNC: 10.2 G/DL
IMM GRANULOCYTES # BLD AUTO: 0.23 K/UL
IMM GRANULOCYTES NFR BLD AUTO: 2 %
LYMPHOCYTES # BLD AUTO: 0.3 K/UL
LYMPHOCYTES NFR BLD: 2.6 %
MCH RBC QN AUTO: 32.7 PG
MCHC RBC AUTO-ENTMCNC: 31.3 G/DL
MCV RBC AUTO: 105 FL
MONOCYTES # BLD AUTO: 0.6 K/UL
MONOCYTES NFR BLD: 5.6 %
NEUTROPHILS # BLD AUTO: 10 K/UL
NEUTROPHILS NFR BLD: 88.2 %
NRBC BLD-RTO: 0 /100 WBC
PLATELET # BLD AUTO: 208 K/UL
PMV BLD AUTO: 9.2 FL
POTASSIUM SERPL-SCNC: 3.9 MMOL/L
PROT SERPL-MCNC: 5.9 G/DL
RBC # BLD AUTO: 3.12 M/UL
SODIUM SERPL-SCNC: 135 MMOL/L
WBC # BLD AUTO: 11.29 K/UL

## 2018-07-10 PROCEDURE — 93010 ELECTROCARDIOGRAM REPORT: CPT | Mod: NTX,,, | Performed by: INTERNAL MEDICINE

## 2018-07-10 PROCEDURE — 99223 1ST HOSP IP/OBS HIGH 75: CPT | Mod: 25,NTX,, | Performed by: NURSE PRACTITIONER

## 2018-07-10 PROCEDURE — 25000003 PHARM REV CODE 250: Mod: NTX | Performed by: HOSPITALIST

## 2018-07-10 PROCEDURE — 99223 1ST HOSP IP/OBS HIGH 75: CPT | Mod: AI,NTX,, | Performed by: HOSPITALIST

## 2018-07-10 PROCEDURE — 93005 ELECTROCARDIOGRAM TRACING: CPT | Mod: NTX

## 2018-07-10 PROCEDURE — 85025 COMPLETE CBC W/AUTO DIFF WBC: CPT | Mod: NTX

## 2018-07-10 PROCEDURE — A4216 STERILE WATER/SALINE, 10 ML: HCPCS | Mod: NTX | Performed by: HOSPITALIST

## 2018-07-10 PROCEDURE — 20600001 HC STEP DOWN PRIVATE ROOM: Mod: NTX

## 2018-07-10 PROCEDURE — 80053 COMPREHEN METABOLIC PANEL: CPT | Mod: NTX

## 2018-07-10 PROCEDURE — 36415 COLL VENOUS BLD VENIPUNCTURE: CPT | Mod: NTX

## 2018-07-10 RX ORDER — SODIUM CHLORIDE 0.9 % (FLUSH) 0.9 %
3 SYRINGE (ML) INJECTION EVERY 8 HOURS
Status: DISCONTINUED | OUTPATIENT
Start: 2018-07-10 | End: 2018-07-14 | Stop reason: HOSPADM

## 2018-07-10 RX ORDER — NITROGLYCERIN 0.4 MG/1
0.4 TABLET SUBLINGUAL EVERY 5 MIN PRN
Status: DISCONTINUED | OUTPATIENT
Start: 2018-07-10 | End: 2018-07-14 | Stop reason: HOSPADM

## 2018-07-10 RX ORDER — ONDANSETRON 2 MG/ML
4 INJECTION INTRAMUSCULAR; INTRAVENOUS EVERY 8 HOURS PRN
Status: DISCONTINUED | OUTPATIENT
Start: 2018-07-10 | End: 2018-07-14 | Stop reason: HOSPADM

## 2018-07-10 RX ORDER — SEVELAMER CARBONATE 800 MG/1
1600 TABLET, FILM COATED ORAL
Status: DISCONTINUED | OUTPATIENT
Start: 2018-07-11 | End: 2018-07-14 | Stop reason: HOSPADM

## 2018-07-10 RX ORDER — SEVELAMER CARBONATE 800 MG/1
1600 TABLET, FILM COATED ORAL 3 TIMES DAILY
Status: DISCONTINUED | OUTPATIENT
Start: 2018-07-10 | End: 2018-07-10

## 2018-07-10 RX ORDER — ASPIRIN 81 MG/1
81 TABLET ORAL DAILY
Status: DISCONTINUED | OUTPATIENT
Start: 2018-07-11 | End: 2018-07-14 | Stop reason: HOSPADM

## 2018-07-10 RX ORDER — ACETAMINOPHEN 325 MG/1
650 TABLET ORAL EVERY 6 HOURS PRN
Status: DISCONTINUED | OUTPATIENT
Start: 2018-07-10 | End: 2018-07-14 | Stop reason: HOSPADM

## 2018-07-10 RX ORDER — CALCITRIOL 0.25 UG/1
0.5 CAPSULE ORAL DAILY
Status: DISCONTINUED | OUTPATIENT
Start: 2018-07-11 | End: 2018-07-14 | Stop reason: HOSPADM

## 2018-07-10 RX ORDER — FUROSEMIDE 80 MG/1
80 TABLET ORAL 2 TIMES DAILY
Status: DISCONTINUED | OUTPATIENT
Start: 2018-07-10 | End: 2018-07-14 | Stop reason: HOSPADM

## 2018-07-10 RX ORDER — PANTOPRAZOLE SODIUM 40 MG/1
40 TABLET, DELAYED RELEASE ORAL DAILY
Status: DISCONTINUED | OUTPATIENT
Start: 2018-07-11 | End: 2018-07-14 | Stop reason: HOSPADM

## 2018-07-10 RX ORDER — CHOLECALCIFEROL (VITAMIN D3) 25 MCG
1000 TABLET ORAL DAILY
Status: DISCONTINUED | OUTPATIENT
Start: 2018-07-11 | End: 2018-07-14 | Stop reason: HOSPADM

## 2018-07-10 RX ORDER — CARVEDILOL 25 MG/1
25 TABLET ORAL 2 TIMES DAILY WITH MEALS
Status: DISCONTINUED | OUTPATIENT
Start: 2018-07-10 | End: 2018-07-10

## 2018-07-10 RX ORDER — HYDRALAZINE HYDROCHLORIDE 25 MG/1
25 TABLET, FILM COATED ORAL EVERY 8 HOURS PRN
Status: DISCONTINUED | OUTPATIENT
Start: 2018-07-10 | End: 2018-07-14 | Stop reason: HOSPADM

## 2018-07-10 RX ORDER — LOSARTAN POTASSIUM 50 MG/1
100 TABLET ORAL DAILY
Status: DISCONTINUED | OUTPATIENT
Start: 2018-07-11 | End: 2018-07-14 | Stop reason: HOSPADM

## 2018-07-10 RX ADMIN — SEVELAMER CARBONATE 1600 MG: 800 TABLET, FILM COATED ORAL at 04:07

## 2018-07-10 RX ADMIN — FUROSEMIDE 80 MG: 80 TABLET ORAL at 04:07

## 2018-07-10 RX ADMIN — SODIUM CHLORIDE, PRESERVATIVE FREE 3 ML: 5 INJECTION INTRAVENOUS at 09:07

## 2018-07-10 RX ADMIN — ACETAMINOPHEN 650 MG: 325 TABLET, FILM COATED ORAL at 09:07

## 2018-07-10 RX ADMIN — CARVEDILOL 25 MG: 25 TABLET, FILM COATED ORAL at 04:07

## 2018-07-10 NOTE — ASSESSMENT & PLAN NOTE
Symptomatic heart failure with decrease in EF to 30-35% without signs of ischemic disease on recent SPECT. Complicated by ESRD on dialysis. Low-flow, low-gradient moderate/severe AS. He is cachectic.  STS:  Risk of Mortality: 3.177%  Morbidity or Mortality: 24.411%  DSW Infection: 0.817%  Long Length of Stay: 9.91%  Permanent Stroke: 1.665%  Prolonged Ventilation: 16.206%  Renal Failure: N/A  Reoperation: 10.557%  Short Length of Stay: 23.895%    - Dobutamine stress echo  - Nephrology clearance  - depending on results, will evaluate for LHC pre-op  - recommend aggressive PT to improve activity level  - nutrition eval and supplementation

## 2018-07-10 NOTE — NURSING
Patient admitted to CSU Patient alert and oriented to room.Patient connected to telemetry, assessed, denies any pain, oriented to room, and instructed to call for assistance or any needs. Pt supine, flat on bed. Pt AAOx4. Call bell in reach. Reviewed goals for today. Will continue to monitor

## 2018-07-10 NOTE — HPI
Patient of concern is a 68yo gentleman with history of HFpEF, WHO G2 Pulm HTN, ESRD on HD (previously denied for renal transplant), CAD with NSTEMI (2012), recurrent nephrotic pleural effusions and ascites who presented to [facility] for symptoms of heart failure. His heart failure was managed with a combination of diuretics and dialysis.    He had an echo 12/2016 as part of renal transplant workup with mild AS and EF 55-60%, then a repeat echo 05/2018 showed change in LVEF to 30-35% with associated moderate AS (gradient of 21 and ARMIN 1.12). Stress test showed no ischemia. He has been referred here for AVR evaluation and possible TAVR.    As per patient and wife he has been getting progressively more confused and short of breath in the last week. Prior to this he had progressive anorexia and failure to thrive. He ended up at [facility] due to his confusion and lethargy, at [facility] he had thoracentesis x2, and paracentesis x1 prior to transferring here for AV stenosis workup. Currently he is somnolent, but appetite improved slightly.

## 2018-07-10 NOTE — SUBJECTIVE & OBJECTIVE
"Past Medical History:   Diagnosis Date    Anemia of chronic renal failure, stage 5     BPH (benign prostatic hyperplasia)     CAD (coronary artery disease)     CHF (congestive heart failure)     Chronic systolic heart failure 5/31/2018    CKD (chronic kidney disease) stage 5, GFR less than 15 ml/min     Diastolic dysfunction 12/16/2016    Dilated cardiomyopathy 5/7/2018    GERD (gastroesophageal reflux disease)     Hyperparathyroidism, secondary renal     Hypertension     Metabolic acidosis     MI (myocardial infarction) Feb 2012    Non-rheumatic mitral regurgitation 5/31/2018    Nonrheumatic aortic valve stenosis 5/31/2018    Pulmonary hypertension 12/16/2016    Stenosis of aortic and mitral valves     Aortic stenosis    TIA (transient ischemic attack)     Type 2 diabetes mellitus with diabetic nephropathy     history/ before wt loss    Valvular regurgitation     mitral regurgitation       Past Surgical History:   Procedure Laterality Date    ABDOMINAL SURGERY      PD catheter    BASAL CELL CARCINOMA EXCISION Left Jan 2011    left forehead    CHOLECYSTECTOMY  July 2010    ELBOW SURGERY Left 3/18/14    anterior submuscular transposition, ulnar nerve    EYE SURGERY Bilateral     bilateral cataracts    GASTRECTOMY      GASTRIC BYPASS  May 2014    gastric sleeve  June 2013    Malfunctioned requiring bypass    HERNIA REPAIR      ROTATOR CUFF REPAIR Left 2014    SHOULDER ARTHROSCOPY Left 11/18/14    RCR; glenoid labral repair; arch decompression       Review of patient's allergies indicates:   Allergen Reactions    Latex, natural rubber Rash and Blisters     Latex tape causes blisters    Ace inhibitors Other (See Comments)     Other reaction(s): Cough    Adhesive Dermatitis and Blisters     Please use Paper Tape    Morphine Hives, Itching, Dermatitis and Rash     Body Rash, Phlebitis, "My veins showed through the skin."    Iodine and iodide containing products      IVP dye       No " current facility-administered medications on file prior to encounter.      Current Outpatient Prescriptions on File Prior to Encounter   Medication Sig    calcitRIOL (ROCALTROL) 0.5 MCG Cap Take 0.5 mcg by mouth once daily.    carvedilol (COREG) 25 MG tablet Take 1 tablet (25 mg total) by mouth 2 (two) times daily with meals.    furosemide (LASIX) 80 MG tablet 80 mg every 12 (twelve) hours.     losartan (COZAAR) 100 MG tablet Take 1 tablet (100 mg total) by mouth once daily.    pantoprazole (PROTONIX) 40 MG tablet Take 40 mg by mouth 2 (two) times daily as needed.     sevelamer carbonate (RENVELA) 800 mg Tab Take 1,600 mg by mouth 3 (three) times daily. Takes two 800 mg tablets (1600 mg) with meals    vitamin D 1000 units Tab Take 1,000 Units by mouth once daily.    nitroGLYCERIN (NITROSTAT) 0.4 MG SL tablet Place 0.4 mg under the tongue every 5 (five) minutes as needed for Chest pain.     Family History     Problem Relation (Age of Onset)    Diabetes Mother, Father    Lung cancer Mother        Social History Main Topics    Smoking status: Never Smoker    Smokeless tobacco: Never Used    Alcohol use No    Drug use: No    Sexual activity: Yes     Partners: Female     Review of Systems   Constitution: Negative for chills, fever, weakness and weight gain.   HENT: Negative for congestion.    Eyes: Negative for visual disturbance.   Cardiovascular: Negative for chest pain, claudication, dyspnea on exertion, leg swelling, orthopnea, palpitations and syncope.   Respiratory: Positive for shortness of breath. Negative for cough and snoring.    Hematologic/Lymphatic: Does not bruise/bleed easily.   Skin: Negative for rash.   Musculoskeletal: Negative for muscle cramps and myalgias.   Gastrointestinal: Negative for bloating, abdominal pain, constipation, diarrhea and melena.   Genitourinary: Negative for bladder incontinence.   Neurological: Negative for excessive daytime sleepiness and focal weakness.    Psychiatric/Behavioral: Negative for depression and suicidal ideas.     Objective:     Vital Signs (Most Recent):  Temp: 97.6 °F (36.4 °C) (07/10/18 1422)  Pulse: 60 (07/10/18 1509)  Resp: 19 (07/10/18 1422)  BP: (!) 148/64 (07/10/18 1422)  SpO2: (!) 92 % (07/10/18 1422) Vital Signs (24h Range):  Temp:  [97.4 °F (36.3 °C)-97.9 °F (36.6 °C)] 97.6 °F (36.4 °C)  Pulse:  [58-65] 60  Resp:  [18-20] 19  SpO2:  [90 %-96 %] 92 %  BP: (131-149)/(56-70) 148/64        Body mass index is 21 kg/m².    SpO2: (!) 92 %  O2 Device (Oxygen Therapy): room air    Physical Exam   Constitutional: He is oriented to person, place, and time. He appears well-developed and well-nourished. No distress.   HENT:   Head: Normocephalic and atraumatic.   Mouth/Throat: Oropharynx is clear and moist.   Eyes: Conjunctivae and EOM are normal. Pupils are equal, round, and reactive to light. No scleral icterus.   Neck: Normal range of motion. Neck supple. No JVD present.   Cardiovascular: Normal rate, regular rhythm, normal heart sounds and intact distal pulses.    No murmur heard.  Pulses:       Radial pulses are 2+ on the right side, and 2+ on the left side.        Femoral pulses are 2+ on the right side, and 2+ on the left side.       Dorsalis pedis pulses are 2+ on the right side, and 2+ on the left side.        Posterior tibial pulses are 2+ on the right side, and 2+ on the left side.   Pulmonary/Chest: Effort normal and breath sounds normal. No respiratory distress.   Symmetrical expansion   Abdominal: Soft. Bowel sounds are normal. There is no hepatosplenomegaly. There is no tenderness.   Musculoskeletal: Normal range of motion. He exhibits no edema.   Neurological: He is alert and oriented to person, place, and time. No cranial nerve deficit.   Skin: Skin is warm and dry. No rash noted. He is not diaphoretic.   Psychiatric: He has a normal mood and affect. Judgment and thought content normal.       Significant Labs:   CMP:   Recent Labs  Lab  07/10/18  1446   *   K 3.9      CO2 24   *   BUN 33*   CREATININE 4.3*   CALCIUM 8.4*   PROT 5.9*   ALBUMIN 2.3*   BILITOT 0.9   ALKPHOS 117   AST 16   ALT 7*   ANIONGAP 8   ESTGFRAFRICA 15.6*   EGFRNONAA 13.5*   , CBC:   Recent Labs  Lab 07/10/18  1446   WBC 11.29   HGB 10.2*   HCT 32.6*          Significant Imaging:   ECHO 5/1/2018  CONCLUSIONS     1 - Upper limit of normal left ventricular enlargement.     2 - Moderately depressed left ventricular systolic function (EF 30-35%).     3 - Concentric hypertrophy.     4 - Right ventricular enlargement with mildly to moderately depressed systolic function.     5 - Biatrial enlargement.     6 - Moderate to severe aortic stenosis, ARMIN = 1.12 cm2, AVAi = 0.61 cm2/m2, peak velocity = 3.05 m/s, mean gradient = 21 mmHg.     7 - The mitral valve is moderately sclerotic with mildly restricted leaflet mobility.     8 - Trivial mitral stenosis, MVA = 3.14 cm2.     9 - Mild to moderate mitral regurgitation.     10 - Severe tricuspid regurgitation.     11 - Mild pulmonic regurgitation.     12 - Intermediate central venous pressure.     13 - Pulmonary hypertension. The estimated PA systolic pressure is 76 mmHg.      Nuclear stress 5/10/2018  Impression: NORMAL MYOCARDIAL PERFUSION  1. The perfusion scan is free of evidence for myocardial ischemia or injury.   2. Resting wall motion is physiologic.   3. Resting LV function is normal.  (normal is >= 51%)  4. The ventricular volumes are normal at rest and stress.   5. The extracardiac distribution of radioactivity is normal.   6. When compared to the previous study from 01/20/2016, there are no significant interval changes in the perfusion pattern.

## 2018-07-10 NOTE — NURSING
Notified Dr. Del Rio that pt had absent breath sound to right lung, and right lung is kiera out on CXR. Will continue to monitor.     STAFF ADDENDUM:  Advised patient that will call pulmonology consultation and likely will require repeat thoracentesis.

## 2018-07-10 NOTE — H&P
"HISTORY & PHYSICAL  Hospital Medicine    Team: [facility] HOSP MED C    PRESENTING HISTORY     Chief Complaint/Reason for Admission:  Evaluation of aortic stenosis    History of Present Illness:  68M with ESRD on HD, CAD s/p NSTEMI in 2012, recurrent pleural effusions (attributed to ESRD and hypoalbuminemia) and ascites - per patient was f/b Dr. Sarmiento prior, hx gastric bypass with suspected moderate-protein calorie malnutrition transferred from [place] by cardiology for acute on chronic combined HF thought possibly worsened by AoV disease (stenosis). Patient had prior HFpEF with WHO G2 pulmonary HTN but follow-up ECHO recently confirmed moderate to severe LV dysfunction with LVEF 30-35%. He had moderate to severe AS with mean gradient of 21 and ARMIN of 1.12. He had a SPECT study completed which was negative for ischemia. The patient had been treated for combined HF with diuretics and HD for volume management. The referring cardiologist (Dr. Calles) discussed transfer for TAVR evaluation. He suspects the patient will need a dobutamine stress ECHO to further evaluate.  When queried whether this evaluation needed to occur as inpatient, the referring MD spoke with Dr. Mcmanus and this was decided to be necessary for inpatient evaluation.    Further, per review of Dr. Calles's note: "Patient had CXR done on the 4th (July 4th) showing a large right pleural effusion with cardiomegaly without evidence of heart failure.  He had an ultrasound done of the pleural effusion and a thoracentesis. CT scan of the chest was done on the 5th, showing a large right pleural effusion with suggestion of some loculation, aeration of the lung on the right base as well as the apex, difficult to exclude neoplasm, cardiomegaly, third spacing of fluids, diffuse soft tissue edema, diffuse atherosclerotic vascular calcifications, mild nodular fullness of the left adrenal, which was unchanged, and cholecystectomy and postoperative changes at the GE " "junction and the stomach. An ECHO was done on July 5th, read by Dr. Ch to show mild LVH. EF reduced about 30% with elevated left heart filling pressures, pulmonary hypertension, severely increased right and left atrial sizes.  Aortic stenosis was felt to be moderate.  He had a gradient that was felt to possibly underestimate the severity of the stenosis. The mean gradient was 25 mmHg, dimensionless index 0.25."     ECHO 5/1/2018  CONCLUSIONS     1 - Upper limit of normal left ventricular enlargement.     2 - Moderately depressed left ventricular systolic function (EF 30-35%).     3 - Concentric hypertrophy.     4 - Right ventricular enlargement with mildly to moderately depressed systolic function.     5 - Biatrial enlargement.     6 - Moderate to severe aortic stenosis, ARMIN = 1.12 cm2, AVAi = 0.61 cm2/m2, peak velocity = 3.05 m/s, mean gradient = 21 mmHg.     7 - The mitral valve is moderately sclerotic with mildly restricted leaflet mobility.     8 - Trivial mitral stenosis, MVA = 3.14 cm2.     9 - Mild to moderate mitral regurgitation.     10 - Severe tricuspid regurgitation.     11 - Mild pulmonic regurgitation.     12 - Intermediate central venous pressure.     13 - Pulmonary hypertension. The estimated PA systolic pressure is 76 mmHg.      Nuclear stress 5/10/2018  Impression: NORMAL MYOCARDIAL PERFUSION  1. The perfusion scan is free of evidence for myocardial ischemia or injury.   2. Resting wall motion is physiologic.   3. Resting LV function is normal.  (normal is >= 51%)  4. The ventricular volumes are normal at rest and stress.   5. The extracardiac distribution of radioactivity is normal.   6. When compared to the previous study from 01/20/2016, there are no significant interval changes in the perfusion pattern.    The patient is doing well today, except for fatigue and weakness (generalized) due to transfer and lack of sleep.  It [birth year] today so he is in good spirits about that.  He reports " that due to gastric procedures he has limited appetite and has to have several smaller meals.  He reports no chest pain or SOB.  He is breathing comfortably on RA.  He reports that he had a paracentesis yesterday after consultation with GI.  He had 1 L removed.  He is sore at the paracentesis site.  He reports that the gastroenterologist explained that the function of his liver is good despite his hypoalbuminemia, and that the fluid in his abdomen is due to another cause other than liver disease. All questions answered to patient/family satisfaction.    Review of Systems:    Review of Systems   Constitutional: Negative for chills and fever.   HENT: Negative for congestion and sore throat.    Eyes: Negative for photophobia, pain and discharge.   Respiratory: Negative for cough, hemoptysis, sputum production and shortness of breath.    Cardiovascular: Negative for chest pain, palpitations and leg swelling.   Gastrointestinal: Positive for abdominal pain. Negative for diarrhea, nausea and vomiting.   Genitourinary: Negative for dysuria and urgency.   Musculoskeletal: Negative for myalgias and neck pain.   Skin: Negative for itching and rash.   Neurological: Negative for sensory change, focal weakness and headaches.   Endo/Heme/Allergies: Negative for polydipsia. Does not bruise/bleed easily.   Psychiatric/Behavioral: Negative for depression and suicidal ideas.       PAST HISTORY:     Past Medical History:   Diagnosis Date    Anemia of chronic renal failure, stage 5     BPH (benign prostatic hyperplasia)     CAD (coronary artery disease)     CHF (congestive heart failure)     Chronic systolic heart failure 5/31/2018    CKD (chronic kidney disease) stage 5, GFR less than 15 ml/min     Diastolic dysfunction 12/16/2016    Dilated cardiomyopathy 5/7/2018    GERD (gastroesophageal reflux disease)     Hyperparathyroidism, secondary renal     Hypertension     Metabolic acidosis     MI (myocardial infarction) Feb  2012    Non-rheumatic mitral regurgitation 5/31/2018    Nonrheumatic aortic valve stenosis 5/31/2018    Pulmonary hypertension 12/16/2016    Stenosis of aortic and mitral valves     Aortic stenosis    TIA (transient ischemic attack)     Type 2 diabetes mellitus with diabetic nephropathy     history/ before wt loss    Valvular regurgitation     mitral regurgitation       Past Surgical History:   Procedure Laterality Date    ABDOMINAL SURGERY      PD catheter    BASAL CELL CARCINOMA EXCISION Left Jan 2011    left forehead    CHOLECYSTECTOMY  July 2010    ELBOW SURGERY Left 3/18/14    anterior submuscular transposition, ulnar nerve    EYE SURGERY Bilateral     bilateral cataracts    GASTRECTOMY      GASTRIC BYPASS  May 2014    gastric sleeve  June 2013    Malfunctioned requiring bypass    HERNIA REPAIR      ROTATOR CUFF REPAIR Left 2014    SHOULDER ARTHROSCOPY Left 11/18/14    RCR; glenoid labral repair; arch decompression       Family History   Problem Relation Age of Onset    Lung cancer Mother         smoker    Diabetes Mother     Diabetes Father     Kidney disease Neg Hx     Hypertension Neg Hx     Coronary artery disease Neg Hx        Social History     Social History    Marital status:      Spouse name: N/A    Number of children: N/A    Years of education: N/A     Social History Main Topics    Smoking status: Never Smoker    Smokeless tobacco: Never Used    Alcohol use No    Drug use: No    Sexual activity: Yes     Partners: Female     Other Topics Concern    None     Social History Narrative    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     Flew in  [employer] for 20 years    Lives with wife        MEDICATIONS & ALLERGIES:     No current facility-administered medications on file prior to encounter.      Current Outpatient Prescriptions on File Prior to Encounter   Medication Sig Dispense Refill    calcitRIOL (ROCALTROL) 0.5 MCG Cap Take 0.5 mcg by mouth  "once daily.      carvedilol (COREG) 25 MG tablet Take 1 tablet (25 mg total) by mouth 2 (two) times daily with meals. 180 tablet 3    furosemide (LASIX) 80 MG tablet 80 mg every 12 (twelve) hours.       losartan (COZAAR) 100 MG tablet Take 1 tablet (100 mg total) by mouth once daily. 90 tablet 3    pantoprazole (PROTONIX) 40 MG tablet Take 40 mg by mouth 2 (two) times daily as needed.       sevelamer carbonate (RENVELA) 800 mg Tab Take 1,600 mg by mouth 3 (three) times daily. Takes two 800 mg tablets (1600 mg) with meals      vitamin D 1000 units Tab Take 1,000 Units by mouth once daily.      nitroGLYCERIN (NITROSTAT) 0.4 MG SL tablet Place 0.4 mg under the tongue every 5 (five) minutes as needed for Chest pain.          Review of patient's allergies indicates:   Allergen Reactions    Latex, natural rubber Rash and Blisters     Latex tape causes blisters    Ace inhibitors Other (See Comments)     Other reaction(s): Cough    Adhesive Dermatitis and Blisters     Please use Paper Tape    Morphine Hives, Itching, Dermatitis and Rash     Body Rash, Phlebitis, "My veins showed through the skin."    Iodine and iodide containing products      IVP dye       OBJECTIVE:     Vital Signs:  Temp:  [97.4 °F (36.3 °C)-97.9 °F (36.6 °C)] 97.6 °F (36.4 °C)  Pulse:  [58-65] 60  Resp:  [18-20] 19  SpO2:  [90 %-96 %] 92 %  BP: (131-149)/(56-70) 148/64  Body mass index is 18.6 kg/m².     Physical Exam:    Objective:  General Appearance:  Comfortable, well-appearing, in no acute distress and not in pain.    Vital signs: (most recent): Blood pressure (!) 148/64, pulse 65, temperature 97.6 °F (36.4 °C), temperature source Oral, resp. rate 19, height 6' 2" (1.88 m), SpO2 (!) 92 %.  No fever.    Output: Producing urine and producing stool.    HEENT: Normal HEENT exam.    Lungs:  Normal effort.  He is not in respiratory distress.  There are decreased breath sounds.  No rales or wheezes.  (R side completely dull to percussion with " decreased BS.)  Heart: Normal rate.  Regular rhythm.  S1 normal and S2 normal.  No murmur.   Chest: Symmetric chest wall expansion.   Abdomen: Abdomen is soft.  Bowel sounds are normal.   There is no abdominal tenderness.     Extremities: Normal range of motion.  There is no deformity, effusion, dependent edema or local swelling.    Pulses: Distal pulses are intact.    Neurological: Patient is alert and oriented to person, place and time.  Normal strength.    Pupils:  Pupils are equal, round, and reactive to light.    Skin:  Warm and dry.      Diagnostic Results:  Labs: Reviewed  ECG: Reviewed  X-Ray: Reviewed  Echo: Reviewed  PET Scan: Reviewed    ASSESSMENT & PLAN:     Current Problems List:  Active Hospital Problems    Diagnosis  POA    Moderate protein malnutrition [E44.0]  Yes    Heart failure with reduced ejection fraction, NYHA class III [I50.20]  Yes     Chronic    Aortic stenosis [I35.0]  Yes    Pleural effusion [J90]  Yes    Hypertensive heart and kidney disease with heart failure [I13.0]  Yes    Pulmonary hypertension group 2 [I27.20]  Yes    CKD (chronic kidney disease) stage V requiring chronic dialysis [N18.6, Z99.2]  Not Applicable    BPH with urinary obstruction [N40.1, N13.8]  Yes    Organ transplant candidate [Z76.82]  Not Applicable    Type 2 diabetes mellitus with diabetic nephropathy [E11.21]  Yes     Chronic    CKD (chronic kidney disease) stage 5, GFR less than 15 ml/min [N18.5]  Yes     Chronic    Coronary artery disease involving native coronary artery of native heart without angina pectoris [I25.10]  Yes    GERD (gastroesophageal reflux disease) [K21.9]  Yes    Renovascular hypertension [I15.0]  Yes      Resolved Hospital Problems    Diagnosis Date Resolved POA   No resolved problems to display.     Problem Assessment & Treatment Plan:    AoV stenosis, non-rheumatic  - Interventional cardiology consulted  - Will do dobutamine stress ECHO  - Preliminary discussions with   Enrique: pleural effusion is not contraindication and recommended to hold carvedilol if BP will allow  - NPOpMN for DSE  - Will f/u further formal interventional cardiology recommendations    Pleural effusion, R  - Thought 2/2 combined HF, hypoalbuminemia and ESRD  - Pulmonology consulted for thoracentesis  - No emergent need for procedure given patient not in respiratory distress and saturating well on RA  - Likely that pleural effusion was present even prior to transfer  - Volume management with HD    Acute on chronic combined heart failure, NYHA Class III  Hypertensive heart disease with heart failure  Pulmonary HTN WHO G2  - ECHO as above  - C/w GDMT: ARB and BB (once dobutamine ECHO completed)  - Okay to continue with maintenance diuretics  - Volume management with HD  - Strict I/O  - Daily weights    ESRD on HD  - Nephrology consulted for HD  - C/w sevelamer, vitamin D  - Avoid nephrotoxic agents    CAD native heart/artery without angina  - Unclear why not on ASA  - Will initiate ASA  - Must also consider initiation (inpt v outpt) of statin    Gastric bypass, history of   Moderate protein calorie malnutrition  - Patient may need further evaluation of nutritional deficiencies  - Nutrition consult  - Frequent small meals requested by patient    T2DM  - Recheck a1c  - Per patient is diet controlled  - No present indication for SSI    GI/DVT PPx in place.  NPOpMN for procedures.  Full code.    Disposition pending further evaluation by nephrology, pulmonology, and interventional cardiology.    Maninder Del Rio MD  [facility]

## 2018-07-11 LAB
ANION GAP SERPL CALC-SCNC: 8 MMOL/L
AORTIC VALVE STENOSIS: ABNORMAL
APPEARANCE FLD: CLEAR
BASOPHILS # BLD AUTO: 0.05 K/UL
BASOPHILS NFR BLD: 0.4 %
BODY FLD TYPE: NORMAL
BODY FLUID SOURCE, LDH: NORMAL
BUN SERPL-MCNC: 42 MG/DL
CALCIUM SERPL-MCNC: 8.3 MG/DL
CHLORIDE SERPL-SCNC: 104 MMOL/L
CO2 SERPL-SCNC: 23 MMOL/L
COLOR FLD: YELLOW
CREAT SERPL-MCNC: 4.8 MG/DL
DIASTOLIC DYSFUNCTION: YES
DIFFERENTIAL METHOD: ABNORMAL
EOSINOPHIL # BLD AUTO: 0.2 K/UL
EOSINOPHIL NFR BLD: 1.6 %
ERYTHROCYTE [DISTWIDTH] IN BLOOD BY AUTOMATED COUNT: 14.4 %
EST. GFR  (AFRICAN AMERICAN): 13.5 ML/MIN/1.73 M^2
EST. GFR  (NON AFRICAN AMERICAN): 11.7 ML/MIN/1.73 M^2
ESTIMATED AVG GLUCOSE: 103 MG/DL
ESTIMATED PA SYSTOLIC PRESSURE: 61.58
GLOBAL PERICARDIAL EFFUSION: ABNORMAL
GLUCOSE SERPL-MCNC: 73 MG/DL
HBA1C MFR BLD HPLC: 5.2 %
HCT VFR BLD AUTO: 29.4 %
HGB BLD-MCNC: 9.5 G/DL
IMM GRANULOCYTES # BLD AUTO: 0.27 K/UL
IMM GRANULOCYTES NFR BLD AUTO: 2.2 %
INR PPP: 1.1
LDH FLD L TO P-CCNC: 74 U/L
LDH SERPL L TO P-CCNC: 201 U/L
LDH SERPL L TO P-CCNC: 207 U/L
LYMPHOCYTES # BLD AUTO: 0.4 K/UL
LYMPHOCYTES NFR BLD: 3.1 %
LYMPHOCYTES NFR FLD MANUAL: 53 %
MAGNESIUM SERPL-MCNC: 1.9 MG/DL
MCH RBC QN AUTO: 32.6 PG
MCHC RBC AUTO-ENTMCNC: 32.3 G/DL
MCV RBC AUTO: 101 FL
MONOCYTES # BLD AUTO: 0.8 K/UL
MONOCYTES NFR BLD: 6.3 %
MONOS+MACROS NFR FLD MANUAL: 10 %
NEUTROPHILS # BLD AUTO: 10.4 K/UL
NEUTROPHILS NFR BLD: 86.4 %
NEUTROPHILS NFR FLD MANUAL: 37 %
NRBC BLD-RTO: 0 /100 WBC
PHOSPHATE SERPL-MCNC: 3.5 MG/DL
PLATELET # BLD AUTO: 222 K/UL
PMV BLD AUTO: 9.4 FL
POTASSIUM SERPL-SCNC: 3.9 MMOL/L
PROT FLD-MCNC: 2.3 G/DL
PROT SERPL-MCNC: 6.5 G/DL
PROTHROMBIN TIME: 11.1 SEC
RBC # BLD AUTO: 2.91 M/UL
RETIRED EF AND QEF - SEE NOTES: 40 (ref 55–65)
SODIUM SERPL-SCNC: 135 MMOL/L
SPECIMEN SOURCE: NORMAL
TRICUSPID VALVE REGURGITATION: ABNORMAL
WBC # BLD AUTO: 12.07 K/UL
WBC # FLD: 102 /CU MM

## 2018-07-11 PROCEDURE — 90935 HEMODIALYSIS ONE EVALUATION: CPT | Mod: NTX

## 2018-07-11 PROCEDURE — 99223 1ST HOSP IP/OBS HIGH 75: CPT | Mod: 25,GC,NTX, | Performed by: INTERNAL MEDICINE

## 2018-07-11 PROCEDURE — 25000003 PHARM REV CODE 250: Mod: NTX | Performed by: STUDENT IN AN ORGANIZED HEALTH CARE EDUCATION/TRAINING PROGRAM

## 2018-07-11 PROCEDURE — 99233 SBSQ HOSP IP/OBS HIGH 50: CPT | Mod: NTX,,, | Performed by: HOSPITALIST

## 2018-07-11 PROCEDURE — A4216 STERILE WATER/SALINE, 10 ML: HCPCS | Mod: NTX | Performed by: HOSPITALIST

## 2018-07-11 PROCEDURE — 83735 ASSAY OF MAGNESIUM: CPT | Mod: NTX

## 2018-07-11 PROCEDURE — 93320 DOPPLER ECHO COMPLETE: CPT | Mod: 26,NTX,, | Performed by: INTERNAL MEDICINE

## 2018-07-11 PROCEDURE — 85610 PROTHROMBIN TIME: CPT | Mod: NTX

## 2018-07-11 PROCEDURE — 83615 LACTATE (LD) (LDH) ENZYME: CPT | Mod: NTX

## 2018-07-11 PROCEDURE — 84157 ASSAY OF PROTEIN OTHER: CPT | Mod: NTX

## 2018-07-11 PROCEDURE — 25000003 PHARM REV CODE 250: Mod: NTX | Performed by: HOSPITALIST

## 2018-07-11 PROCEDURE — 20600001 HC STEP DOWN PRIVATE ROOM: Mod: NTX

## 2018-07-11 PROCEDURE — 25000003 PHARM REV CODE 250: Mod: NTX | Performed by: INTERNAL MEDICINE

## 2018-07-11 PROCEDURE — 88305 TISSUE EXAM BY PATHOLOGIST: CPT | Mod: 26,NTX,, | Performed by: PATHOLOGY

## 2018-07-11 PROCEDURE — 89051 BODY FLUID CELL COUNT: CPT | Mod: NTX

## 2018-07-11 PROCEDURE — 88112 CYTOPATH CELL ENHANCE TECH: CPT | Mod: NTX | Performed by: PATHOLOGY

## 2018-07-11 PROCEDURE — 93325 DOPPLER ECHO COLOR FLOW MAPG: CPT | Mod: 26,NTX,, | Performed by: INTERNAL MEDICINE

## 2018-07-11 PROCEDURE — 84155 ASSAY OF PROTEIN SERUM: CPT | Mod: NTX

## 2018-07-11 PROCEDURE — 90935 HEMODIALYSIS ONE EVALUATION: CPT | Mod: NTX,,, | Performed by: INTERNAL MEDICINE

## 2018-07-11 PROCEDURE — 83615 LACTATE (LD) (LDH) ENZYME: CPT | Mod: 91,NTX

## 2018-07-11 PROCEDURE — 87186 SC STD MICRODIL/AGAR DIL: CPT | Mod: NTX

## 2018-07-11 PROCEDURE — 85025 COMPLETE CBC W/AUTO DIFF WBC: CPT | Mod: NTX

## 2018-07-11 PROCEDURE — 32554 ASPIRATE PLEURA W/O IMAGING: CPT | Mod: RT,GC,NTX, | Performed by: INTERNAL MEDICINE

## 2018-07-11 PROCEDURE — 83036 HEMOGLOBIN GLYCOSYLATED A1C: CPT | Mod: NTX

## 2018-07-11 PROCEDURE — 87205 SMEAR GRAM STAIN: CPT | Mod: NTX

## 2018-07-11 PROCEDURE — 84100 ASSAY OF PHOSPHORUS: CPT | Mod: NTX

## 2018-07-11 PROCEDURE — 93351 STRESS TTE COMPLETE: CPT | Mod: 26,NTX,, | Performed by: INTERNAL MEDICINE

## 2018-07-11 PROCEDURE — 80048 BASIC METABOLIC PNL TOTAL CA: CPT | Mod: NTX

## 2018-07-11 PROCEDURE — 25000003 PHARM REV CODE 250: Mod: NTX | Performed by: NURSE PRACTITIONER

## 2018-07-11 PROCEDURE — 36415 COLL VENOUS BLD VENIPUNCTURE: CPT | Mod: NTX

## 2018-07-11 PROCEDURE — 87077 CULTURE AEROBIC IDENTIFY: CPT | Mod: NTX

## 2018-07-11 PROCEDURE — 87206 SMEAR FLUORESCENT/ACID STAI: CPT | Mod: NTX

## 2018-07-11 PROCEDURE — 93320 DOPPLER ECHO COMPLETE: CPT | Mod: NTX

## 2018-07-11 PROCEDURE — 0W993ZZ DRAINAGE OF RIGHT PLEURAL CAVITY, PERCUTANEOUS APPROACH: ICD-10-PCS | Performed by: INTERNAL MEDICINE

## 2018-07-11 PROCEDURE — 87102 FUNGUS ISOLATION CULTURE: CPT | Mod: NTX

## 2018-07-11 PROCEDURE — 87070 CULTURE OTHR SPECIMN AEROBIC: CPT | Mod: NTX

## 2018-07-11 PROCEDURE — 87116 MYCOBACTERIA CULTURE: CPT | Mod: NTX

## 2018-07-11 PROCEDURE — 25000003 PHARM REV CODE 250: Mod: NTX

## 2018-07-11 RX ORDER — LIDOCAINE HYDROCHLORIDE 10 MG/ML
10 INJECTION INFILTRATION; PERINEURAL ONCE
Status: COMPLETED | OUTPATIENT
Start: 2018-07-11 | End: 2018-07-11

## 2018-07-11 RX ORDER — SODIUM CHLORIDE 9 MG/ML
INJECTION, SOLUTION INTRAVENOUS ONCE
Status: COMPLETED | OUTPATIENT
Start: 2018-07-11 | End: 2018-07-11

## 2018-07-11 RX ORDER — SODIUM CHLORIDE 9 MG/ML
INJECTION, SOLUTION INTRAVENOUS
Status: DISCONTINUED | OUTPATIENT
Start: 2018-07-11 | End: 2018-07-13

## 2018-07-11 RX ORDER — DIPHENHYDRAMINE HCL 25 MG
25 CAPSULE ORAL ONCE
Status: COMPLETED | OUTPATIENT
Start: 2018-07-11 | End: 2018-07-11

## 2018-07-11 RX ADMIN — PANTOPRAZOLE SODIUM 40 MG: 40 TABLET, DELAYED RELEASE ORAL at 03:07

## 2018-07-11 RX ADMIN — DIPHENHYDRAMINE HYDROCHLORIDE 25 MG: 25 CAPSULE ORAL at 10:07

## 2018-07-11 RX ADMIN — SODIUM CHLORIDE, PRESERVATIVE FREE 3 ML: 5 INJECTION INTRAVENOUS at 04:07

## 2018-07-11 RX ADMIN — SODIUM CHLORIDE: 0.9 INJECTION, SOLUTION INTRAVENOUS at 08:07

## 2018-07-11 RX ADMIN — SODIUM CHLORIDE, PRESERVATIVE FREE 3 ML: 5 INJECTION INTRAVENOUS at 03:07

## 2018-07-11 RX ADMIN — FUROSEMIDE 80 MG: 80 TABLET ORAL at 03:07

## 2018-07-11 RX ADMIN — VITAMIN D, TAB 1000IU (100/BT) 1000 UNITS: 25 TAB at 03:07

## 2018-07-11 RX ADMIN — LIDOCAINE HYDROCHLORIDE 10 ML: 10 INJECTION, SOLUTION INFILTRATION; PERINEURAL at 03:07

## 2018-07-11 RX ADMIN — SEVELAMER CARBONATE 1600 MG: 800 TABLET, FILM COATED ORAL at 05:07

## 2018-07-11 RX ADMIN — CALCITRIOL 0.5 MCG: 0.25 CAPSULE, LIQUID FILLED ORAL at 03:07

## 2018-07-11 RX ADMIN — LOSARTAN POTASSIUM 100 MG: 50 TABLET ORAL at 03:07

## 2018-07-11 NOTE — PLAN OF CARE
Problem: Patient Care Overview  Goal: Plan of Care Review    Recommendations     Recommendation/Intervention:   1. Once medically appropriate, recommend regular diet to encourage intake if able with pt's fluid status. If with fluid issues, recommend low sodium.   2. Order Beneprotein 2 packets/meal to aid in pro intake. Will send [employer] ONS for pt to try to assess tolerance.   3. Encourage frequent, small meals as tolerated.   4. Consider checking B vitamin labs (thiamine, vit B12, folate) as these are common deficiencies in malnourished pts s/p gastric bypass.  5. RD following.     Goals: Intake >/=85% EEN/EPN with no further weight loss  Nutrition Goal Status: new

## 2018-07-11 NOTE — CONSULTS
[facility]  Interventional Cardiology  Consult Note    Patient Name: Tom Gage  MRN: [id number]  Admission Date: 7/10/2018  Hospital Length of Stay: 0 days  Code Status: Full Code   Attending Provider: Kimmy Mann MD  Consulting Provider: Henrry Curry MD  Primary Care Physician: Asael Poole MD  Principal Problem:<principal problem not specified>    Patient information was obtained from patient, spouse/SO, OSH records and ER records.     Inpatient consult to Interventional Cardiology  Consult performed by: HENRRY CURRY  Consult ordered by: KIMMY MANN        Subjective:     Chief Complaint:  Failure to thrive/Severe Aortic Stenosis    HPI:  Patient of concern is a 66yo gentleman with history of HFpEF, WHO G2 Pulm HTN, ESRD on HD (previously denied for renal transplant), CAD with NSTEMI (2012), recurrent nephrotic pleural effusions and ascites who presented to [place] for symptoms of heart failure. His heart failure was managed with a combination of diuretics and dialysis.     He had an echo 12/2016 as part of renal transplant workup with mild AS and EF 55-60%, then a repeat echo 05/2018 showed change in LVEF to 30-35% with associated moderate AS (gradient of 21 and ARMIN 1.12). Stress test showed no ischemia. He has been referred here for AVR evaluation and possible TAVR.     As per patient and wife he has been getting progressively more confused and short of breath in the last week. Prior to this he had progressive anorexia and failure to thrive. He ended up at [place] due to his confusion and lethargy, at [place] he had thoracentesis x2, and paracentesis x1 prior to transferring here for AV stenosis workup. Currently he is somnolent, but appetite improved slightly.    Past Medical History:   Diagnosis Date    Anemia of chronic renal failure, stage 5     BPH (benign prostatic hyperplasia)     CAD (coronary artery disease)     CHF  "(congestive heart failure)     Chronic systolic heart failure 5/31/2018    CKD (chronic kidney disease) stage 5, GFR less than 15 ml/min     Diastolic dysfunction 12/16/2016    Dilated cardiomyopathy 5/7/2018    GERD (gastroesophageal reflux disease)     Hyperparathyroidism, secondary renal     Hypertension     Metabolic acidosis     MI (myocardial infarction) Feb 2012    Non-rheumatic mitral regurgitation 5/31/2018    Nonrheumatic aortic valve stenosis 5/31/2018    Pulmonary hypertension 12/16/2016    Stenosis of aortic and mitral valves     Aortic stenosis    TIA (transient ischemic attack)     Type 2 diabetes mellitus with diabetic nephropathy     history/ before wt loss    Valvular regurgitation     mitral regurgitation       Past Surgical History:   Procedure Laterality Date    ABDOMINAL SURGERY      PD catheter    BASAL CELL CARCINOMA EXCISION Left Jan 2011    left forehead    CHOLECYSTECTOMY  July 2010    ELBOW SURGERY Left 3/18/14    anterior submuscular transposition, ulnar nerve    EYE SURGERY Bilateral     bilateral cataracts    GASTRECTOMY      GASTRIC BYPASS  May 2014    gastric sleeve  June 2013    Malfunctioned requiring bypass    HERNIA REPAIR      ROTATOR CUFF REPAIR Left 2014    SHOULDER ARTHROSCOPY Left 11/18/14    RCR; glenoid labral repair; arch decompression       Review of patient's allergies indicates:   Allergen Reactions    Latex, natural rubber Rash and Blisters     Latex tape causes blisters    Ace inhibitors Other (See Comments)     Other reaction(s): Cough    Adhesive Dermatitis and Blisters     Please use Paper Tape    Morphine Hives, Itching, Dermatitis and Rash     Body Rash, Phlebitis, "My veins showed through the skin."    Iodine and iodide containing products      IVP dye       PTA Medications   Medication Sig    calcitRIOL (ROCALTROL) 0.5 MCG Cap Take 0.5 mcg by mouth once daily.    carvedilol (COREG) 25 MG tablet Take 1 tablet (25 mg total) by " mouth 2 (two) times daily with meals.    furosemide (LASIX) 80 MG tablet 80 mg every 12 (twelve) hours.     losartan (COZAAR) 100 MG tablet Take 1 tablet (100 mg total) by mouth once daily.    pantoprazole (PROTONIX) 40 MG tablet Take 40 mg by mouth 2 (two) times daily as needed.     sevelamer carbonate (RENVELA) 800 mg Tab Take 1,600 mg by mouth 3 (three) times daily. Takes two 800 mg tablets (1600 mg) with meals    vitamin D 1000 units Tab Take 1,000 Units by mouth once daily.    nitroGLYCERIN (NITROSTAT) 0.4 MG SL tablet Place 0.4 mg under the tongue every 5 (five) minutes as needed for Chest pain.     Family History     Problem Relation (Age of Onset)    Diabetes Mother, Father    Lung cancer Mother        Social History Main Topics    Smoking status: Never Smoker    Smokeless tobacco: Never Used    Alcohol use No    Drug use: No    Sexual activity: Yes     Partners: Female     Review of Systems   Constitution: Positive for decreased appetite, malaise/fatigue and weight loss. Negative for chills, fever, weakness and weight gain.   HENT: Negative for congestion.    Eyes: Negative for visual disturbance.   Cardiovascular: Negative for chest pain, claudication, dyspnea on exertion, leg swelling, orthopnea, palpitations and syncope.   Respiratory: Positive for cough and shortness of breath. Negative for snoring.    Hematologic/Lymphatic: Does not bruise/bleed easily.   Skin: Negative for rash.   Musculoskeletal: Negative for muscle cramps and myalgias.   Gastrointestinal: Positive for bloating and anorexia. Negative for abdominal pain, constipation, diarrhea and melena.   Genitourinary: Negative for bladder incontinence.   Neurological: Negative for excessive daytime sleepiness and focal weakness.   Psychiatric/Behavioral: Negative for depression.     Objective:     Vital Signs (Most Recent):  Temp: 97.6 °F (36.4 °C) (07/10/18 1422)  Pulse: (!) 55 (07/10/18 1900)  Resp: 19 (07/10/18 1422)  BP: (!) 148/64  (07/10/18 1422)  SpO2: (!) 92 % (07/10/18 1422) Vital Signs (24h Range):  Temp:  [97.4 °F (36.3 °C)-97.9 °F (36.6 °C)] 97.6 °F (36.4 °C)  Pulse:  [55-65] 55  Resp:  [18-20] 19  SpO2:  [90 %-96 %] 92 %  BP: (131-149)/(56-70) 148/64     Weight: 65.7 kg (144 lb 13.5 oz)  Body mass index is 18.6 kg/m².    SpO2: (!) 92 %  O2 Device (Oxygen Therapy): room air    No intake or output data in the 24 hours ending 07/10/18 2012    Lines/Drains/Airways     Drain                 Hemodialysis AV Fistula Left upper arm -- days                Physical Exam   Constitutional: He is oriented to person, place, and time. He appears well-developed. He appears cachectic. He is easily aroused. He has a sickly appearance. No distress.   HENT:   Head: Normocephalic and atraumatic.   Mouth/Throat: Oropharynx is clear and moist.   Eyes: Conjunctivae and EOM are normal. Pupils are equal, round, and reactive to light. No scleral icterus.   Neck: Normal range of motion. JVD present.   Cardiovascular: Normal rate, regular rhythm and intact distal pulses.    Murmur heard.  Pulses:       Radial pulses are 1+ on the right side, and 1+ on the left side.        Femoral pulses are 2+ on the right side, and 2+ on the left side.       Dorsalis pedis pulses are 1+ on the right side, and 1+ on the left side.        Posterior tibial pulses are 1+ on the right side, and 1+ on the left side.   Mid-to-late peaking systolic murmur at the LUSB with radiation throughout the precordium, also audible in the carotid arteries   Pulmonary/Chest: Effort normal. No respiratory distress. He has decreased breath sounds in the right lower field. He has no wheezes. He has no rhonchi.   Symmetrical expansion   Abdominal: Soft. Bowel sounds are normal. He exhibits shifting dullness. There is no hepatosplenomegaly. There is no tenderness.   Musculoskeletal: Normal range of motion. He exhibits no edema.   Neurological: He is alert, oriented to person, place, and time and easily  aroused. No cranial nerve deficit.   Skin: Skin is warm and dry. No rash noted. He is not diaphoretic.   Psychiatric: He has a normal mood and affect. Judgment and thought content normal.       Significant Labs:   CMP   Recent Labs  Lab 07/10/18  1446   *   K 3.9      CO2 24   *   BUN 33*   CREATININE 4.3*   CALCIUM 8.4*   PROT 5.9*   ALBUMIN 2.3*   BILITOT 0.9   ALKPHOS 117   AST 16   ALT 7*   ANIONGAP 8   ESTGFRAFRICA 15.6*   EGFRNONAA 13.5*   , CBC   Recent Labs  Lab 07/10/18  1446   WBC 11.29   HGB 10.2*   HCT 32.6*      , INR No results for input(s): INR, PROTIME in the last 48 hours., Lipid Panel No results for input(s): CHOL, HDL, LDLCALC, TRIG, CHOLHDL in the last 48 hours. and Troponin No results for input(s): TROPONINI in the last 48 hours.    Significant Imaging:   ECHO 5/1/2018  CONCLUSIONS     1 - Upper limit of normal left ventricular enlargement.     2 - Moderately depressed left ventricular systolic function (EF 30-35%).     3 - Concentric hypertrophy.     4 - Right ventricular enlargement with mildly to moderately depressed systolic function.     5 - Biatrial enlargement.     6 - Moderate to severe aortic stenosis, ARMIN = 1.12 cm2, AVAi = 0.61 cm2/m2, peak velocity = 3.05 m/s, mean gradient = 21 mmHg.     7 - The mitral valve is moderately sclerotic with mildly restricted leaflet mobility.     8 - Trivial mitral stenosis, MVA = 3.14 cm2.     9 - Mild to moderate mitral regurgitation.     10 - Severe tricuspid regurgitation.     11 - Mild pulmonic regurgitation.     12 - Intermediate central venous pressure.     13 - Pulmonary hypertension. The estimated PA systolic pressure is 76 mmHg.      Nuclear stress 5/10/2018  Impression: NORMAL MYOCARDIAL PERFUSION  1. The perfusion scan is free of evidence for myocardial ischemia or injury.   2. Resting wall motion is physiologic.   3. Resting LV function is normal.  (normal is >= 51%)  4. The ventricular volumes are normal at rest  and stress.   5. The extracardiac distribution of radioactivity is normal.   6. When compared to the previous study from 01/20/2016, there are no significant interval changes in the perfusion pattern.      Assessment and Plan:     Aortic stenosis    Symptomatic heart failure with decrease in EF to 30-35% without signs of ischemic disease on recent SPECT. Complicated by ESRD on dialysis. Low-flow, low-gradient moderate/severe AS. He is cachectic.  STS:  Risk of Mortality: 3.177%  Morbidity or Mortality: 24.411%  DSW Infection: 0.817%  Long Length of Stay: 9.91%  Permanent Stroke: 1.665%  Prolonged Ventilation: 16.206%  Renal Failure: N/A  Reoperation: 10.557%  Short Length of Stay: 23.895%    - Dobutamine stress echo  - Nephrology clearance  - depending on results, will evaluate for LHC pre-op  - recommend aggressive PT to improve activity level  - nutrition eval and supplementation         Heart failure with reduced ejection fraction, NYHA class III    Currently stable, without exacerbation.    - Continue cozaar and coreg  - Careful monitoring of fluid removal given preload dependence due to AS  - target SBP <130, careful monitoring of respiratory status for effusion recurrence        CKD (chronic kidney disease) stage V requiring chronic dialysis    Dialysis as per nephrology service, careful monitoring of volume status        Moderate protein malnutrition    -recommend dietary/nutrition consult  -recommend dietary supplements            VTE Risk Mitigation         Ordered     Place sequential compression device  Until discontinued      07/10/18 1452     IP VTE LOW RISK PATIENT  Once      07/10/18 1452          Thank you for your consult. I will follow-up with patient. Please contact us if you have any additional questions.    Henrry Curry MD  Interventional Cardiology   [facility]

## 2018-07-11 NOTE — SUBJECTIVE & OBJECTIVE
"Past Medical History:   Diagnosis Date    Anemia of chronic renal failure, stage 5     BPH (benign prostatic hyperplasia)     CAD (coronary artery disease)     CHF (congestive heart failure)     Chronic systolic heart failure 5/31/2018    CKD (chronic kidney disease) stage 5, GFR less than 15 ml/min     Diastolic dysfunction 12/16/2016    Dilated cardiomyopathy 5/7/2018    GERD (gastroesophageal reflux disease)     Hyperparathyroidism, secondary renal     Hypertension     Metabolic acidosis     MI (myocardial infarction) Feb 2012    Non-rheumatic mitral regurgitation 5/31/2018    Nonrheumatic aortic valve stenosis 5/31/2018    Pulmonary hypertension 12/16/2016    Stenosis of aortic and mitral valves     Aortic stenosis    TIA (transient ischemic attack)     Type 2 diabetes mellitus with diabetic nephropathy     history/ before wt loss    Valvular regurgitation     mitral regurgitation       Past Surgical History:   Procedure Laterality Date    ABDOMINAL SURGERY      PD catheter    BASAL CELL CARCINOMA EXCISION Left Jan 2011    left forehead    CHOLECYSTECTOMY  July 2010    ELBOW SURGERY Left 3/18/14    anterior submuscular transposition, ulnar nerve    EYE SURGERY Bilateral     bilateral cataracts    GASTRECTOMY      GASTRIC BYPASS  May 2014    gastric sleeve  June 2013    Malfunctioned requiring bypass    HERNIA REPAIR      ROTATOR CUFF REPAIR Left 2014    SHOULDER ARTHROSCOPY Left 11/18/14    RCR; glenoid labral repair; arch decompression       Review of patient's allergies indicates:   Allergen Reactions    Latex, natural rubber Rash and Blisters     Latex tape causes blisters    Ace inhibitors Other (See Comments)     Other reaction(s): Cough    Adhesive Dermatitis and Blisters     Please use Paper Tape    Morphine Hives, Itching, Dermatitis and Rash     Body Rash, Phlebitis, "My veins showed through the skin."    Iodine and iodide containing products      IVP dye       PTA " Medications   Medication Sig    calcitRIOL (ROCALTROL) 0.5 MCG Cap Take 0.5 mcg by mouth once daily.    carvedilol (COREG) 25 MG tablet Take 1 tablet (25 mg total) by mouth 2 (two) times daily with meals.    furosemide (LASIX) 80 MG tablet 80 mg every 12 (twelve) hours.     losartan (COZAAR) 100 MG tablet Take 1 tablet (100 mg total) by mouth once daily.    pantoprazole (PROTONIX) 40 MG tablet Take 40 mg by mouth 2 (two) times daily as needed.     sevelamer carbonate (RENVELA) 800 mg Tab Take 1,600 mg by mouth 3 (three) times daily. Takes two 800 mg tablets (1600 mg) with meals    vitamin D 1000 units Tab Take 1,000 Units by mouth once daily.    nitroGLYCERIN (NITROSTAT) 0.4 MG SL tablet Place 0.4 mg under the tongue every 5 (five) minutes as needed for Chest pain.     Family History     Problem Relation (Age of Onset)    Diabetes Mother, Father    Lung cancer Mother        Social History Main Topics    Smoking status: Never Smoker    Smokeless tobacco: Never Used    Alcohol use No    Drug use: No    Sexual activity: Yes     Partners: Female     Review of Systems   Constitution: Positive for decreased appetite, malaise/fatigue and weight loss. Negative for chills, fever, weakness and weight gain.   HENT: Negative for congestion.    Eyes: Negative for visual disturbance.   Cardiovascular: Negative for chest pain, claudication, dyspnea on exertion, leg swelling, orthopnea, palpitations and syncope.   Respiratory: Positive for cough and shortness of breath. Negative for snoring.    Hematologic/Lymphatic: Does not bruise/bleed easily.   Skin: Negative for rash.   Musculoskeletal: Negative for muscle cramps and myalgias.   Gastrointestinal: Positive for bloating and anorexia. Negative for abdominal pain, constipation, diarrhea and melena.   Genitourinary: Negative for bladder incontinence.   Neurological: Negative for excessive daytime sleepiness and focal weakness.   Psychiatric/Behavioral: Negative for  depression.     Objective:     Vital Signs (Most Recent):  Temp: 97.6 °F (36.4 °C) (07/10/18 1422)  Pulse: (!) 55 (07/10/18 1900)  Resp: 19 (07/10/18 1422)  BP: (!) 148/64 (07/10/18 1422)  SpO2: (!) 92 % (07/10/18 1422) Vital Signs (24h Range):  Temp:  [97.4 °F (36.3 °C)-97.9 °F (36.6 °C)] 97.6 °F (36.4 °C)  Pulse:  [55-65] 55  Resp:  [18-20] 19  SpO2:  [90 %-96 %] 92 %  BP: (131-149)/(56-70) 148/64     Weight: 65.7 kg (144 lb 13.5 oz)  Body mass index is 18.6 kg/m².    SpO2: (!) 92 %  O2 Device (Oxygen Therapy): room air    No intake or output data in the 24 hours ending 07/10/18 2012    Lines/Drains/Airways     Drain                 Hemodialysis AV Fistula Left upper arm -- days                Physical Exam   Constitutional: He is oriented to person, place, and time. He appears well-developed. He appears cachectic. He is easily aroused. He has a sickly appearance. No distress.   HENT:   Head: Normocephalic and atraumatic.   Mouth/Throat: Oropharynx is clear and moist.   Eyes: Conjunctivae and EOM are normal. Pupils are equal, round, and reactive to light. No scleral icterus.   Neck: Normal range of motion. JVD present.   Cardiovascular: Normal rate, regular rhythm and intact distal pulses.    Murmur heard.  Pulses:       Radial pulses are 1+ on the right side, and 1+ on the left side.        Femoral pulses are 2+ on the right side, and 2+ on the left side.       Dorsalis pedis pulses are 1+ on the right side, and 1+ on the left side.        Posterior tibial pulses are 1+ on the right side, and 1+ on the left side.   Mid-to-late peaking systolic murmur at the LUSB with radiation throughout the precordium, also audible in the carotid arteries   Pulmonary/Chest: Effort normal. No respiratory distress. He has decreased breath sounds in the right lower field. He has no wheezes. He has no rhonchi.   Symmetrical expansion   Abdominal: Soft. Bowel sounds are normal. He exhibits shifting dullness. There is no  hepatosplenomegaly. There is no tenderness.   Musculoskeletal: Normal range of motion. He exhibits no edema.   Neurological: He is alert, oriented to person, place, and time and easily aroused. No cranial nerve deficit.   Skin: Skin is warm and dry. No rash noted. He is not diaphoretic.   Psychiatric: He has a normal mood and affect. Judgment and thought content normal.       Significant Labs:   CMP   Recent Labs  Lab 07/10/18  1446   *   K 3.9      CO2 24   *   BUN 33*   CREATININE 4.3*   CALCIUM 8.4*   PROT 5.9*   ALBUMIN 2.3*   BILITOT 0.9   ALKPHOS 117   AST 16   ALT 7*   ANIONGAP 8   ESTGFRAFRICA 15.6*   EGFRNONAA 13.5*   , CBC   Recent Labs  Lab 07/10/18  1446   WBC 11.29   HGB 10.2*   HCT 32.6*      , INR No results for input(s): INR, PROTIME in the last 48 hours., Lipid Panel No results for input(s): CHOL, HDL, LDLCALC, TRIG, CHOLHDL in the last 48 hours. and Troponin No results for input(s): TROPONINI in the last 48 hours.    Significant Imaging:   ECHO 5/1/2018  CONCLUSIONS     1 - Upper limit of normal left ventricular enlargement.     2 - Moderately depressed left ventricular systolic function (EF 30-35%).     3 - Concentric hypertrophy.     4 - Right ventricular enlargement with mildly to moderately depressed systolic function.     5 - Biatrial enlargement.     6 - Moderate to severe aortic stenosis, ARMIN = 1.12 cm2, AVAi = 0.61 cm2/m2, peak velocity = 3.05 m/s, mean gradient = 21 mmHg.     7 - The mitral valve is moderately sclerotic with mildly restricted leaflet mobility.     8 - Trivial mitral stenosis, MVA = 3.14 cm2.     9 - Mild to moderate mitral regurgitation.     10 - Severe tricuspid regurgitation.     11 - Mild pulmonic regurgitation.     12 - Intermediate central venous pressure.     13 - Pulmonary hypertension. The estimated PA systolic pressure is 76 mmHg.      Nuclear stress 5/10/2018  Impression: NORMAL MYOCARDIAL PERFUSION  1. The perfusion scan is free of  evidence for myocardial ischemia or injury.   2. Resting wall motion is physiologic.   3. Resting LV function is normal.  (normal is >= 51%)  4. The ventricular volumes are normal at rest and stress.   5. The extracardiac distribution of radioactivity is normal.   6. When compared to the previous study from 01/20/2016, there are no significant interval changes in the perfusion pattern.

## 2018-07-11 NOTE — HOSPITAL COURSE
HD and cardiac stress yesterday.  Also 1250cc of serous fluid drained from right emma-thorax via thoracentesis yesterday afternoon.  Lights criteria consistent with transudate.  This morning patient is doing well and feels improved.  Chest x-ray shows minimal improvement in massive right sided effusion.  Patient is comfortable on room air.

## 2018-07-11 NOTE — ASSESSMENT & PLAN NOTE
Currently stable, without exacerbation.    - Continue cozaar and coreg  - Careful monitoring of diuresis given preload dependence due to AS  - target SBP <130

## 2018-07-11 NOTE — ASSESSMENT & PLAN NOTE
Currently stable, without exacerbation.    - Continue cozaar and coreg  - Careful monitoring of fluid removal given preload dependence due to AS  - target SBP <130, careful monitoring of respiratory status for effusion recurrence

## 2018-07-11 NOTE — HPI
Mr. Gage is a 68 year old white male with past medical history of CAD, CHF (EF 30-35%), ESRD on HD via left AVF, anemia of chronic kidney disease, severe AS, MR, TIA and DMII, who presents from [place] for acute on chronic combined HF thought possibly worsened by AoV disease (stenosis).  He has recurrent right sided pleural effusions that have been occurring since at [facility] 1/2016 per our records.  His first echo in record, 12/2016, showed mild AS at that time.  He presents again with CHF decompensation and recurrent right- sided pleural effusion.  Pulmonary medicine consulted for evaluation and thoracentesis of effusion.

## 2018-07-11 NOTE — CONSULTS
[facility]  Nephrology  Consult Note    Patient Name: Tom Gage  MRN: [id number]  Admission Date: 7/10/2018  Hospital Length of Stay: 0 days  Attending Provider: Kimmy Mann MD   Primary Care Physician: Asael Poole MD  Principal Problem:<principal problem not specified>    Inpatient consult to Nephrology  Consult performed by: VIKASH MCGRAW  Consult ordered by: KIMMY MANN  Reason for consult: esrd        Subjective:     HPI: 69 yo male with significant history HTN, CAD sp MI 2012, recent 2 D ecoh 5/1/18 combined systolic diastolic HF EF 30%/mod to severe AS/severe TVR, recurrent pleural effusions/ascites, hx of gastric bypass, TIA, and ESRD secondary to HTN x 2 yrs who was transferred from [facility] to [facility] for further evaluation of aortic stenosis/TAVR evaluation. Interventional Cardiology consulted. NPO for dobutamine stress echo. Pulmonary consult large right pleural effusion thoracentesis.     Nephrology consult for hemodialysis. ESRD due HTN on IHD x 2 years via NA AVF 4 hrs duration at [facility] under direction of Dr. Cope. EDW 64 kg? Minimal residual renal function. Last HD was yesterday 7/9 at [facility].     Past Medical History:   Diagnosis Date    Anemia of chronic renal failure, stage 5     BPH (benign prostatic hyperplasia)     CAD (coronary artery disease)     CHF (congestive heart failure)     Chronic systolic heart failure 5/31/2018    CKD (chronic kidney disease) stage 5, GFR less than 15 ml/min     Diastolic dysfunction 12/16/2016    Dilated cardiomyopathy 5/7/2018    GERD (gastroesophageal reflux disease)     Hyperparathyroidism, secondary renal     Hypertension     Metabolic acidosis     MI (myocardial infarction) Feb 2012    Non-rheumatic mitral regurgitation 5/31/2018    Nonrheumatic aortic valve stenosis 5/31/2018    Pulmonary hypertension 12/16/2016    Stenosis of aortic and mitral valves     Aortic stenosis    TIA (transient ischemic  "attack)     Type 2 diabetes mellitus with diabetic nephropathy     history/ before wt loss    Valvular regurgitation     mitral regurgitation       Past Surgical History:   Procedure Laterality Date    ABDOMINAL SURGERY      PD catheter    BASAL CELL CARCINOMA EXCISION Left Jan 2011    left forehead    CHOLECYSTECTOMY  July 2010    ELBOW SURGERY Left 3/18/14    anterior submuscular transposition, ulnar nerve    EYE SURGERY Bilateral     bilateral cataracts    GASTRECTOMY      GASTRIC BYPASS  May 2014    gastric sleeve  June 2013    Malfunctioned requiring bypass    HERNIA REPAIR      ROTATOR CUFF REPAIR Left 2014    SHOULDER ARTHROSCOPY Left 11/18/14    RCR; glenoid labral repair; arch decompression       Review of patient's allergies indicates:   Allergen Reactions    Latex, natural rubber Rash and Blisters     Latex tape causes blisters    Ace inhibitors Other (See Comments)     Other reaction(s): Cough    Adhesive Dermatitis and Blisters     Please use Paper Tape    Morphine Hives, Itching, Dermatitis and Rash     Body Rash, Phlebitis, "My veins showed through the skin."    Iodine and iodide containing products      IVP dye     Current Facility-Administered Medications   Medication Frequency    acetaminophen tablet 650 mg Q6H PRN    [START ON 7/11/2018] aspirin EC tablet 81 mg Daily    [START ON 7/11/2018] calcitRIOL capsule 0.5 mcg Daily    furosemide tablet 80 mg BID    hydrALAZINE tablet 25 mg Q8H PRN    [START ON 7/11/2018] losartan tablet 100 mg Daily    nitroGLYCERIN SL tablet 0.4 mg Q5 Min PRN    ondansetron injection 4 mg Q8H PRN    [START ON 7/11/2018] pantoprazole EC tablet 40 mg Daily    sevelamer carbonate tablet 1,600 mg TID    sodium chloride 0.9% flush 3 mL Q8H    [START ON 7/11/2018] vitamin D 1000 units tablet 1,000 Units Daily     Family History     Problem Relation (Age of Onset)    Diabetes Mother, Father    Lung cancer Mother        Social History Main Topics    " Smoking status: Never Smoker    Smokeless tobacco: Never Used    Alcohol use No    Drug use: No    Sexual activity: Yes     Partners: Female     Review of Systems   Constitutional: Positive for activity change and fatigue. Negative for appetite change (but did cut out junk food ) and chills.   HENT: Negative.    Respiratory: Positive for shortness of breath. Negative for wheezing.    Cardiovascular: Negative for chest pain, palpitations and leg swelling.   Gastrointestinal: Positive for abdominal distention. Negative for abdominal pain.   Endocrine: Negative.    Genitourinary: Negative.    Musculoskeletal: Negative.    Neurological: Negative.    Hematological: Negative.    Psychiatric/Behavioral: Negative.      Objective:     Vital Signs (Most Recent):  Temp: 97.6 °F (36.4 °C) (07/10/18 1422)  Pulse: (!) 55 (07/10/18 1900)  Resp: 19 (07/10/18 1422)  BP: (!) 148/64 (07/10/18 1422)  SpO2: (!) 92 % (07/10/18 1422)  O2 Device (Oxygen Therapy): room air (07/10/18 1422) Vital Signs (24h Range):  Temp:  [97.4 °F (36.3 °C)-97.9 °F (36.6 °C)] 97.6 °F (36.4 °C)  Pulse:  [55-65] 55  Resp:  [18-20] 19  SpO2:  [90 %-96 %] 92 %  BP: (131-149)/(56-70) 148/64     Weight: 65.7 kg (144 lb 13.5 oz) (07/10/18 1422)  Body mass index is 18.6 kg/m².  Body surface area is 1.85 meters squared.    No intake/output data recorded.    Physical Exam   Constitutional: He is oriented to person, place, and time. He appears well-developed and well-nourished. No distress.   HENT:   Head: Atraumatic.   Eyes: EOM are normal.   Neck: Normal range of motion. Neck supple.   Cardiovascular: Normal rate and regular rhythm.    Murmur heard.  Pulmonary/Chest: He has no wheezes. He has no rales.   Diminished breath sound.    Abdominal: Soft. Bowel sounds are normal. There is no tenderness.   Musculoskeletal: Normal range of motion. He exhibits no edema.   Neurological: He is alert and oriented to person, place, and time.   Skin: Skin is warm and dry.   NA  AVF good bruit and thrill       Significant Labs:  All labs within the past 24 hours have been reviewed.    Significant Imaging:  Labs: Reviewed    Assessment/Plan:     ESRD on dialysis    ESRD due HTN on IHD x 2 years via NA AVF 4 hrs duration at [facility] under direction of Dr. Cope. EDW 64 kg? Minimal residual renal function. Last HD was yesterday 7/9 at [facility].   -Labs reviewed. Lytes and acid base okay. Hgb 10.2    Plan/Recommendations:  -Continue iHD MWF while inpatient. For HD tomorrow.   -Renal diet.   -No ESTELA-hgb at goal.             Thank you for your consult. I will follow-up with patient. Please contact us if you have any additional questions.    Barbara Ferguson NP  Nephrology  [facility]

## 2018-07-11 NOTE — ASSESSMENT & PLAN NOTE
Recurrent pleural effusion since 2016.     - will perform thoracentesis this afternoon.  - will send fluid for appropriate labs.  Please ensure serum LDH is ordered at some point today.  - patients dyspnea and pleural effusions pre-date his moderate-severe AS.    - will discuss further with patient regarding more permanent options (VATS vs Pleurx catheter) based on results following thoracentesis and possible re-accumulation.

## 2018-07-11 NOTE — SUBJECTIVE & OBJECTIVE
"Past Medical History:   Diagnosis Date    Anemia of chronic renal failure, stage 5     BPH (benign prostatic hyperplasia)     CAD (coronary artery disease)     CHF (congestive heart failure)     Chronic systolic heart failure 5/31/2018    CKD (chronic kidney disease) stage 5, GFR less than 15 ml/min     Diastolic dysfunction 12/16/2016    Dilated cardiomyopathy 5/7/2018    GERD (gastroesophageal reflux disease)     Hyperparathyroidism, secondary renal     Hypertension     Metabolic acidosis     MI (myocardial infarction) Feb 2012    Non-rheumatic mitral regurgitation 5/31/2018    Nonrheumatic aortic valve stenosis 5/31/2018    Pulmonary hypertension 12/16/2016    Stenosis of aortic and mitral valves     Aortic stenosis    TIA (transient ischemic attack)     Type 2 diabetes mellitus with diabetic nephropathy     history/ before wt loss    Valvular regurgitation     mitral regurgitation       Past Surgical History:   Procedure Laterality Date    ABDOMINAL SURGERY      PD catheter    BASAL CELL CARCINOMA EXCISION Left Jan 2011    left forehead    CHOLECYSTECTOMY  July 2010    ELBOW SURGERY Left 3/18/14    anterior submuscular transposition, ulnar nerve    EYE SURGERY Bilateral     bilateral cataracts    GASTRECTOMY      GASTRIC BYPASS  May 2014    gastric sleeve  June 2013    Malfunctioned requiring bypass    HERNIA REPAIR      ROTATOR CUFF REPAIR Left 2014    SHOULDER ARTHROSCOPY Left 11/18/14    RCR; glenoid labral repair; arch decompression       Review of patient's allergies indicates:   Allergen Reactions    Latex, natural rubber Rash and Blisters     Latex tape causes blisters    Ace inhibitors Other (See Comments)     Other reaction(s): Cough    Adhesive Dermatitis and Blisters     Please use Paper Tape    Morphine Hives, Itching, Dermatitis and Rash     Body Rash, Phlebitis, "My veins showed through the skin."    Iodine and iodide containing products      IVP dye     Current " Facility-Administered Medications   Medication Frequency    acetaminophen tablet 650 mg Q6H PRN    [START ON 7/11/2018] aspirin EC tablet 81 mg Daily    [START ON 7/11/2018] calcitRIOL capsule 0.5 mcg Daily    furosemide tablet 80 mg BID    hydrALAZINE tablet 25 mg Q8H PRN    [START ON 7/11/2018] losartan tablet 100 mg Daily    nitroGLYCERIN SL tablet 0.4 mg Q5 Min PRN    ondansetron injection 4 mg Q8H PRN    [START ON 7/11/2018] pantoprazole EC tablet 40 mg Daily    sevelamer carbonate tablet 1,600 mg TID    sodium chloride 0.9% flush 3 mL Q8H    [START ON 7/11/2018] vitamin D 1000 units tablet 1,000 Units Daily     Family History     Problem Relation (Age of Onset)    Diabetes Mother, Father    Lung cancer Mother        Social History Main Topics    Smoking status: Never Smoker    Smokeless tobacco: Never Used    Alcohol use No    Drug use: No    Sexual activity: Yes     Partners: Female     Review of Systems   Constitutional: Positive for activity change and fatigue. Negative for appetite change (but did cut out junk food ) and chills.   HENT: Negative.    Respiratory: Positive for shortness of breath. Negative for wheezing.    Cardiovascular: Negative for chest pain, palpitations and leg swelling.   Gastrointestinal: Positive for abdominal distention. Negative for abdominal pain.   Endocrine: Negative.    Genitourinary: Negative.    Musculoskeletal: Negative.    Neurological: Negative.    Hematological: Negative.    Psychiatric/Behavioral: Negative.      Objective:     Vital Signs (Most Recent):  Temp: 97.6 °F (36.4 °C) (07/10/18 1422)  Pulse: (!) 55 (07/10/18 1900)  Resp: 19 (07/10/18 1422)  BP: (!) 148/64 (07/10/18 1422)  SpO2: (!) 92 % (07/10/18 1422)  O2 Device (Oxygen Therapy): room air (07/10/18 1422) Vital Signs (24h Range):  Temp:  [97.4 °F (36.3 °C)-97.9 °F (36.6 °C)] 97.6 °F (36.4 °C)  Pulse:  [55-65] 55  Resp:  [18-20] 19  SpO2:  [90 %-96 %] 92 %  BP: (131-149)/(56-70) 148/64      Weight: 65.7 kg (144 lb 13.5 oz) (07/10/18 1422)  Body mass index is 18.6 kg/m².  Body surface area is 1.85 meters squared.    No intake/output data recorded.    Physical Exam   Constitutional: He is oriented to person, place, and time. He appears well-developed and well-nourished. No distress.   HENT:   Head: Atraumatic.   Eyes: EOM are normal.   Neck: Normal range of motion. Neck supple.   Cardiovascular: Normal rate and regular rhythm.    Murmur heard.  Pulmonary/Chest: He has no wheezes. He has no rales.   Diminished breath sound.    Abdominal: Soft. Bowel sounds are normal. There is no tenderness.   Musculoskeletal: Normal range of motion. He exhibits no edema.   Neurological: He is alert and oriented to person, place, and time.   Skin: Skin is warm and dry.   NA AVF good bruit and thrill       Significant Labs:  All labs within the past 24 hours have been reviewed.    Significant Imaging:  Labs: Reviewed

## 2018-07-11 NOTE — PLAN OF CARE
CM went to the room for DC needs assessment. Pt off the floor for testing. Cm will return to see the pt.  HD

## 2018-07-11 NOTE — SUBJECTIVE & OBJECTIVE
"Past Medical History:   Diagnosis Date    Anemia of chronic renal failure, stage 5     BPH (benign prostatic hyperplasia)     CAD (coronary artery disease)     CHF (congestive heart failure)     Chronic systolic heart failure 5/31/2018    CKD (chronic kidney disease) stage 5, GFR less than 15 ml/min     Diastolic dysfunction 12/16/2016    Dilated cardiomyopathy 5/7/2018    GERD (gastroesophageal reflux disease)     Hyperparathyroidism, secondary renal     Hypertension     Metabolic acidosis     MI (myocardial infarction) Feb 2012    Non-rheumatic mitral regurgitation 5/31/2018    Nonrheumatic aortic valve stenosis 5/31/2018    Pulmonary hypertension 12/16/2016    Stenosis of aortic and mitral valves     Aortic stenosis    TIA (transient ischemic attack)     Type 2 diabetes mellitus with diabetic nephropathy     history/ before wt loss    Valvular regurgitation     mitral regurgitation       Past Surgical History:   Procedure Laterality Date    ABDOMINAL SURGERY      PD catheter    BASAL CELL CARCINOMA EXCISION Left Jan 2011    left forehead    CHOLECYSTECTOMY  July 2010    ELBOW SURGERY Left 3/18/14    anterior submuscular transposition, ulnar nerve    EYE SURGERY Bilateral     bilateral cataracts    GASTRECTOMY      GASTRIC BYPASS  May 2014    gastric sleeve  June 2013    Malfunctioned requiring bypass    HERNIA REPAIR      ROTATOR CUFF REPAIR Left 2014    SHOULDER ARTHROSCOPY Left 11/18/14    RCR; glenoid labral repair; arch decompression       Review of patient's allergies indicates:   Allergen Reactions    Latex, natural rubber Rash and Blisters     Latex tape causes blisters    Ace inhibitors Other (See Comments)     Other reaction(s): Cough    Adhesive Dermatitis and Blisters     Please use Paper Tape    Morphine Hives, Itching, Dermatitis and Rash     Body Rash, Phlebitis, "My veins showed through the skin."    Iodine and iodide containing products      IVP dye       Family " History     Problem Relation (Age of Onset)    Diabetes Mother, Father    Lung cancer Mother        Social History Main Topics    Smoking status: Never Smoker    Smokeless tobacco: Never Used    Alcohol use No    Drug use: No    Sexual activity: Yes     Partners: Female         Review of Systems   Constitutional: Positive for activity change and fatigue. Negative for appetite change, diaphoresis and fever.   HENT: Negative for trouble swallowing.    Respiratory: Positive for cough and shortness of breath. Negative for apnea, choking, wheezing and stridor.    Cardiovascular: Positive for leg swelling. Negative for chest pain and palpitations.   Gastrointestinal: Negative for abdominal distention, abdominal pain, constipation and diarrhea.   Psychiatric/Behavioral: Negative for agitation.     Objective:     Vital Signs (Most Recent):  Temp: 97.6 °F (36.4 °C) (07/11/18 0811)  Pulse: 63 (07/11/18 1015)  Resp: 18 (07/11/18 0811)  BP: (!) 153/69 (07/11/18 1015)  SpO2: (!) 93 % (07/11/18 0710) Vital Signs (24h Range):  Temp:  [96.8 °F (36 °C)-98.1 °F (36.7 °C)] 97.6 °F (36.4 °C)  Pulse:  [55-70] 63  Resp:  [16-20] 18  SpO2:  [92 %-96 %] 93 %  BP: (121-167)/(56-82) 153/69     Weight: 71.4 kg (157 lb 6.5 oz)  Body mass index is 20.21 kg/m².      Intake/Output Summary (Last 24 hours) at 07/11/18 1034  Last data filed at 07/11/18 0600   Gross per 24 hour   Intake              361 ml   Output                0 ml   Net              361 ml       Physical Exam   Constitutional: He is oriented to person, place, and time. He appears well-nourished. No distress.   HENT:   Head: Normocephalic and atraumatic.   Nose: Nose normal.   Eyes: EOM are normal. Pupils are equal, round, and reactive to light.   Neck: Normal range of motion. Neck supple.   Cardiovascular: Normal rate and regular rhythm.  Exam reveals no gallop and no friction rub.    Murmur heard.  Pulmonary/Chest: No stridor. No respiratory distress. He has no wheezes. He  exhibits no tenderness.   Decreased breath sounds on right lower lobe with  Crackles in RML.  Crackles at left lung base.  Air entry otherwise equal     Abdominal: Soft. Bowel sounds are normal. He exhibits no distension. There is no tenderness.   Neurological: He is alert and oriented to person, place, and time.   Skin: Skin is warm and dry. He is not diaphoretic.   Psychiatric: He has a normal mood and affect. His behavior is normal. Judgment and thought content normal.       Vents:       Lines/Drains/Airways     Drain                 Hemodialysis AV Fistula Left upper arm -- days          Peripheral Intravenous Line                 Peripheral IV - Single Lumen 07/10/18 Right Forearm 1 day                Significant Labs:    CBC/Anemia Profile:    Recent Labs  Lab 07/10/18  1446 07/11/18  0423   WBC 11.29 12.07   HGB 10.2* 9.5*   HCT 32.6* 29.4*    222   * 101*   RDW 14.8* 14.4        Chemistries:    Recent Labs  Lab 07/10/18  1446 07/11/18  0423   * 135*   K 3.9 3.9    104   CO2 24 23   BUN 33* 42*   CREATININE 4.3* 4.8*   CALCIUM 8.4* 8.3*   ALBUMIN 2.3*  --    PROT 5.9*  --    BILITOT 0.9  --    ALKPHOS 117  --    ALT 7*  --    AST 16  --    MG  --  1.9   PHOS  --  3.5       Recent Lab Results       07/11/18  0423 07/10/18  1446      Immature Granulocytes 2.2(H) 2.0(H)     Immature Grans (Abs) 0.27  Comment:  Mild elevation in immature granulocytes is non specific and   can be seen in a variety of conditions including stress response,   acute inflammation, trauma and pregnancy. Correlation with other   laboratory and clinical findings is essential.  (H) 0.23  Comment:  Mild elevation in immature granulocytes is non specific and   can be seen in a variety of conditions including stress response,   acute inflammation, trauma and pregnancy. Correlation with other   laboratory and clinical findings is essential.  (H)     Albumin  2.3(L)     Alkaline Phosphatase  117     ALT  7(L)     Anion  Gap 8 8     AST  16     Baso # 0.05 0.04     Basophil% 0.4 0.4     Total Bilirubin  0.9  Comment:  For infants and newborns, interpretation of results should be based  on gestational age, weight and in agreement with clinical  observations.  Premature Infant recommended reference ranges:  Up to 24 hours.............<8.0 mg/dL  Up to 48 hours............<12.0 mg/dL  3-5 days..................<15.0 mg/dL  6-29 days.................<15.0 mg/dL       BUN, Bld 42(H) 33(H)     Calcium 8.3(L) 8.4(L)     Chloride 104 103     CO2 23 24     Creatinine 4.8(H) 4.3(H)     Differential Method Automated Automated     eGFR if  13.5(A) 15.6(A)     eGFR if non  11.7  Comment:  Calculation used to obtain the estimated glomerular filtration  rate (eGFR) is the CKD-EPI equation.   (A) 13.5  Comment:  Calculation used to obtain the estimated glomerular filtration  rate (eGFR) is the CKD-EPI equation.   (A)     Eos # 0.2 0.1     Eosinophil% 1.6 1.2     Estimated Avg Glucose 103      Glucose 73 141(H)     Gran # (ANC) 10.4(H) 10.0(H)     Gran% 86.4(H) 88.2(H)     Hematocrit 29.4(L) 32.6(L)     Hemoglobin 9.5(L) 10.2(L)     Hemoglobin A1C 5.2  Comment:  ADA Screening Guidelines:  5.7-6.4%  Consistent with prediabetes  >or=6.5%  Consistent with diabetes  High levels of fetal hemoglobin interfere with the HbA1C  assay. Heterozygous hemoglobin variants (HbS, HgC, etc)do  not significantly interfere with this assay.   However, presence of multiple variants may affect accuracy.        Coumadin Monitoring INR 1.1  Comment:  Coumadin Therapy:  2.0 - 3.0 for INR for all indicators except mechanical heart valves  and antiphospholipid syndromes which should use 2.5 - 3.5.        Lymph # 0.4(L) 0.3(L)     Lymph% 3.1(L) 2.6(L)     Magnesium 1.9      MCH 32.6(H) 32.7(H)     MCHC 32.3 31.3(L)     (H) 105(H)     Mono # 0.8 0.6     Mono% 6.3 5.6     MPV 9.4 9.2     nRBC 0 0     Phosphorus 3.5      Platelets 222 208      Potassium 3.9 3.9     Total Protein  5.9(L)     Protime 11.1      RBC 2.91(L) 3.12(L)     RDW 14.4 14.8(H)     Sodium 135(L) 135(L)     WBC 12.07 11.29           Significant Imaging:   I have reviewed all pertinent imaging results/findings within the past 24 hours.

## 2018-07-11 NOTE — PROGRESS NOTES
"Progress Note  Hospital Medicine    Provider team:    [facility] HOSP MED C  [facility] NEPHROLOGY DIALYSIS  Admit Date: 7/10/2018  Encounter Date: 07/11/2018     SUBJECTIVE:     Follow-up Visit for: Pleural effusion    HPI (See H&P for complete P,F,SHx):   68M with ESRD on HD, CAD s/p NSTEMI in 2012, recurrent pleural effusions (attributed to ESRD and hypoalbuminemia) and ascites - per patient was f/b Dr. Sarmiento prior, hx gastric bypass with suspected moderate-protein calorie malnutrition transferred from [place] by cardiology for acute on chronic combined HF thought possibly worsened by AoV disease (stenosis). Patient had prior HFpEF with WHO G2 pulmonary HTN but follow-up ECHO recently confirmed moderate to severe LV dysfunction with LVEF 30-35%. He had moderate to severe AS with mean gradient of 21 and ARMIN of 1.12. He had a SPECT study completed which was negative for ischemia. The patient had been treated for combined HF with diuretics and HD for volume management. The referring cardiologist (Dr. Calles) discussed transfer for TAVR evaluation. He suspects the patient will need a dobutamine stress ECHO to further evaluate.  When queried whether this evaluation needed to occur as inpatient, the referring MD spoke with Dr. Mcmanus and this was decided to be necessary for inpatient evaluation.     Further, per review of Dr. Calles's note: "Patient had CXR done on the 4th (July 4th) showing a large right pleural effusion with cardiomegaly without evidence of heart failure.  He had an ultrasound done of the pleural effusion and a thoracentesis. CT scan of the chest was done on the 5th, showing a large right pleural effusion with suggestion of some loculation, aeration of the lung on the right base as well as the apex, difficult to exclude neoplasm, cardiomegaly, third spacing of fluids, diffuse soft tissue edema, diffuse atherosclerotic vascular calcifications, mild nodular fullness of the left adrenal, which was unchanged, and " "cholecystectomy and postoperative changes at the GE junction and the stomach. An ECHO was done on July 5th, read by Dr. Ch to show mild LVH. EF reduced about 30% with elevated left heart filling pressures, pulmonary hypertension, severely increased right and left atrial sizes.  Aortic stenosis was felt to be moderate.  He had a gradient that was felt to possibly underestimate the severity of the stenosis. The mean gradient was 25 mmHg, dimensionless index 0.25."     ECHO 5/1/2018  CONCLUSIONS     1 - Upper limit of normal left ventricular enlargement.     2 - Moderately depressed left ventricular systolic function (EF 30-35%).     3 - Concentric hypertrophy.     4 - Right ventricular enlargement with mildly to moderately depressed systolic function.     5 - Biatrial enlargement.     6 - Moderate to severe aortic stenosis, ARMIN = 1.12 cm2, AVAi = 0.61 cm2/m2, peak velocity = 3.05 m/s, mean gradient = 21 mmHg.     7 - The mitral valve is moderately sclerotic with mildly restricted leaflet mobility.     8 - Trivial mitral stenosis, MVA = 3.14 cm2.     9 - Mild to moderate mitral regurgitation.     10 - Severe tricuspid regurgitation.     11 - Mild pulmonic regurgitation.     12 - Intermediate central venous pressure.     13 - Pulmonary hypertension. The estimated PA systolic pressure is 76 mmHg.      Nuclear stress 5/10/2018  Impression: NORMAL MYOCARDIAL PERFUSION  1. The perfusion scan is free of evidence for myocardial ischemia or injury.   2. Resting wall motion is physiologic.   3. Resting LV function is normal.  (normal is >= 51%)  4. The ventricular volumes are normal at rest and stress.   5. The extracardiac distribution of radioactivity is normal.   6. When compared to the previous study from 01/20/2016, there are no significant interval changes in the perfusion pattern.    Interval history:  Patient reports fatigue; he is not having chest pain or SOB. He was seen on HD. The patient appears a bit debilitated " "due to ongoing medical issues.    Review of Systems:  Review of Systems   Constitutional: Positive for weight loss. Negative for chills and fever.   HENT: Negative for congestion and sore throat.    Eyes: Negative for photophobia, pain and discharge.   Respiratory: Negative for cough, hemoptysis, sputum production and shortness of breath.    Cardiovascular: Negative for chest pain, palpitations and leg swelling.   Gastrointestinal: Negative for abdominal pain, diarrhea, nausea and vomiting.   Genitourinary: Negative for dysuria and urgency.   Musculoskeletal: Negative for myalgias and neck pain.   Skin: Negative for itching and rash.   Neurological: Positive for weakness. Negative for sensory change, focal weakness and headaches.   Endo/Heme/Allergies: Negative for polydipsia. Does not bruise/bleed easily.   Psychiatric/Behavioral: Negative for depression and suicidal ideas.       OBJECTIVE:       Intake/Output Summary (Last 24 hours) at 07/11/18 0849  Last data filed at 07/11/18 0600   Gross per 24 hour   Intake              361 ml   Output                0 ml   Net              361 ml     Vital Signs Range (Last 24H):  Temp:  [96.8 °F (36 °C)-98.1 °F (36.7 °C)]   Pulse:  [55-70]   Resp:  [16-20]   BP: (121-167)/(56-82)   SpO2:  [92 %-96 %]   Body mass index is 20.21 kg/m².    Objective:  General Appearance:  Comfortable, well-appearing, in no acute distress and not in pain.    Vital signs: (most recent): Blood pressure (!) 158/76, pulse 66, temperature 97.6 °F (36.4 °C), temperature source Oral, resp. rate 18, height 6' 2" (1.88 m), weight 71.4 kg (157 lb 6.5 oz), SpO2 (!) 93 %.  No fever.    Output: Producing urine and producing stool.    HEENT: Normal HEENT exam.    Lungs:  Normal effort.  Breath sounds clear to auscultation.  He is not in respiratory distress.  There are rales and decreased breath sounds.  No wheezes.    Heart: Normal rate.  Regular rhythm.  S1 normal and S2 normal.  Positive for murmur.  "   Chest: Symmetric chest wall expansion.   Abdomen: Abdomen is soft.  Bowel sounds are normal.   There is no abdominal tenderness.     Extremities: Normal range of motion.  There is no deformity, effusion, dependent edema or local swelling.    Pulses: Distal pulses are intact.    Neurological: Patient is alert and oriented to person, place and time.  Normal strength.    Pupils:  Pupils are equal, round, and reactive to light.    Skin:  Warm and dry.      Medications:  Medication list was reviewed in EPIC and changes noted under Assessment/Plan and MAR.    Laboratory:  Recent Labs      07/11/18   0423   WBC  12.07   RBC  2.91*   HGB  9.5*   HCT  29.4*   PLT  222   MCV  101*   MCH  32.6*   MCHC  32.3   GRAN  86.4*  10.4*   LYMPH  3.1*  0.4*   MONO  6.3  0.8   EOS  0.2      Recent Labs      07/11/18   0423   GLU  73   NA  135*   K  3.9   CL  104   CO2  23   BUN  42*   CREATININE  4.8*   CALCIUM  8.3*   ANIONGAP  8   MG  1.9   PHOS  3.5       ASSESSMENT/PLAN:     Active Hospital Problems    Diagnosis  POA    *Pleural effusion [J90]  Yes    Moderate protein malnutrition [E44.0]  Yes    Heart failure with reduced ejection fraction, NYHA class III [I50.20]  Yes     Chronic    Aortic stenosis [I35.0]  Yes    Pulmonary hypertension group 2 [I27.20]  Yes    Diastolic dysfunction [I51.9]  Yes    CKD (chronic kidney disease) stage V requiring chronic dialysis [N18.6, Z99.2]  Not Applicable    Type 2 diabetes mellitus with diabetic nephropathy [E11.21]  Yes     Chronic    Anemia of chronic renal failure, stage 5 [N18.5, D63.1]  Yes     Chronic    Coronary artery disease involving native coronary artery of native heart without angina pectoris [I25.10]  Yes    Renovascular hypertension [I15.0]  Yes      Resolved Hospital Problems    Diagnosis Date Resolved POA    ESRD on dialysis [N18.6, Z99.2] 07/10/2018 Not Applicable      AoV stenosis, non-rheumatic  - Interventional cardiology consulted  - Will do dobutamine  stress ECHO  - Preliminary discussions with Dr. Nunez: pleural effusion is not contraindication and recommended to hold carvedilol if BP will allow  - NPO for DSE  - Will f/u further formal interventional cardiology recommendations     Pleural effusion, R  - Thought 2/2 combined HF, hypoalbuminemia and ESRD  - Pulmonology consulted for thoracentesis  - No emergent need for procedure given patient not in respiratory distress and saturating well on RA  - Likely that pleural effusion was present even prior to transfer  - Volume management with HD  - Patient may need long term management with PleurX, d/w pulm/CCM Dr. Holloway     Acute on chronic combined heart failure, NYHA Class III  Hypertensive heart disease with heart failure  Pulmonary HTN WHO G2  - ECHO as above  - C/w GDMT: ARB and BB (once dobutamine ECHO completed)  - Okay to continue with maintenance diuretics  - Volume management with HD  - Strict I/O  - Daily weights     ESRD on HD  - Nephrology consulted for HD  - C/w sevelamer, vitamin D  - Avoid nephrotoxic agents     CAD native heart/artery without angina  - Unclear why not on ASA  - Will initiate ASA  - Must also consider initiation (inpt v outpt) of statin     Gastric bypass, history of   Moderate protein calorie malnutrition  - Patient may need further evaluation of nutritional deficiencies  - Nutrition consult  - Frequent small meals requested by patient     T2DM  - Recheck a1c  - Per patient is diet controlled  - No present indication for SSI     GI/DVT PPx in place.  NPO for procedures.  Full code.     Anticipated discharge date and disposition:   Disposition pending further evaluation by nephrology, pulmonology, and interventional cardiology.  Maninder Del Rio MD  [facility] Medicine

## 2018-07-11 NOTE — HPI
67 yo male with significant history HTN, CAD sp MI 2012, recent 2 D ecoh 5/1/18 combined systolic diastolic HF EF 30%/mod to severe AS/severe TVR, recurrent pleural effusions/ascites, hx of gastric bypass, TIA, and ESRD secondary to HTN x 2 yrs who was transferred from [facility] to [facility] for further evaluation of aortic stenosis/TAVR evaluation. Interventional Cardiology consulted. NPO for dobutamine stress echo. Pulmonary consult large right pleural effusion thoracentesis.     Nephrology consult for hemodialysis. ESRD due HTN on IHD x 2 years via NA AVF 4 hrs duration at [facility] under direction of Dr. Cope. EDW 64 kg? Minimal residual renal function. Last HD was yesterday 7/9 at [facility].

## 2018-07-11 NOTE — ASSESSMENT & PLAN NOTE
ESRD due HTN on IHD x 2 years via NA AVF 4 hrs duration at [facility] under direction of Dr. Cope. EDW 64 kg? Minimal residual renal function. Last HD was yesterday 7/9 at [facility].   -Labs reviewed. Lytes and acid base okay. Hgb 10.2    Plan/Recommendations:  -Continue iHD MWF while inpatient. For HD tomorrow.   -Renal diet.   -No ESTELA-hgb at goal.

## 2018-07-11 NOTE — PLAN OF CARE
"Problem: Patient Care Overview  Goal: Plan of Care Review  Outcome: Ongoing (interventions implemented as appropriate)  Patient free of falls/traumas/injuries. Skin clean, dry, and intact. Diminished breath sounds to right. CXR showed "white out" to right lung. SpO2 remains above 92% on room air. Pt in HD today. Scheduled dobutamine stress test for today. Here for a possible TAVR workup.  Patient educated on plan of care and verbalized understanding. Patients VS stable and no distress. Will continue to monitor.       "

## 2018-07-11 NOTE — PROCEDURES
"Tom Gage is a 68 y.o. male patient.    Temp: 97.6 °F (36.4 °C) (18 1215)  Pulse: 65 (18 1215)  Resp: 18 (18 121)  BP: (!) 160/75 (18 1215)  SpO2: (!) 93 % (18 0710)  Weight: 71.4 kg (157 lb 6.5 oz) (18)  Height: 6' 2" (188 cm) (18)       Thoracentesis  Date/Time: 2018 3:55 PM  Location procedure was performed: [facility] PULMONARY MEDICINE  Performed by: MAIKEL DUMONT  Authorized by: MAIKEL DUMONT   Assisting provider: SONI PETERSON  Pre-operative diagnosis: right pleural effusion  Post-operative diagnosis: right pleural effusion  Consent Done: Yes  Consent: Verbal consent obtained. Written consent obtained.  Risks and benefits: risks, benefits and alternatives were discussed  Consent given by: patient  Patient understanding: patient states understanding of the procedure being performed  Patient consent: the patient's understanding of the procedure matches consent given  Procedure consent: procedure consent matches procedure scheduled  Relevant documents: relevant documents present and verified  Test results: test results available and properly labeled  Site marked: the operative site was marked  Imaging studies: imaging studies available  Patient identity confirmed: , name and verbally with patient  Time out: Immediately prior to procedure a "time out" was called to verify the correct patient, procedure, equipment, support staff and site/side marked as required.  Procedure purpose: diagnostic and therapeutic  Indications: pleural effusion  Preparation: Patient was prepped and draped in the usual sterile fashion.  Local anesthesia used: yes    Anesthesia:  Local anesthesia used: yes  Local Anesthetic: lidocaine 1% without epinephrine  Patient sedated: no  Description of findings: 1250cc of serous fluid.   Preparation: skin prepped with alcohol  Patient position: sitting  Ultrasound guidance: yes  Location: right posterior  Puncture method: " over-the-needle catheter  Number of attempts: 1  Drainage amount: 1250 ml  Drainage characteristics: serous  Patient tolerance: Patient tolerated the procedure well with no immediate complications  Chest x-ray performed: yes  Technical procedures used: none  Significant surgical tasks conducted by the assistant(s): none  Complications: No  Estimated blood loss (mL): 1  Specimens: Yes  Implants: No          Kevin Joyner  7/11/2018     I was present for the procedure.    Dale Holloway MD

## 2018-07-11 NOTE — HPI
Patient of concern is a 68yo gentleman with history of HFpEF, WHO G2 Pulm HTN, ESRD on HD (previously denied for renal transplant), CAD with NSTEMI (2012), recurrent nephrotic pleural effusions and ascites who presented to [place] for symptoms of heart failure. His heart failure was managed with a combination of diuretics and dialysis.     He had an echo 12/2016 as part of renal transplant workup with mild AS and EF 55-60%, then a repeat echo 05/2018 showed change in LVEF to 30-35% with associated moderate AS (gradient of 21 and ARMIN 1.12). Stress test showed no ischemia. He has been referred here for AVR evaluation and possible TAVR.     As per patient and wife he has been getting progressively more confused and short of breath in the last week. Prior to this he had progressive anorexia and failure to thrive. He ended up at [place] due to his confusion and lethargy, at [place] he had thoracentesis x2, and paracentesis x1 prior to transferring here for AV stenosis workup. Currently he is somnolent, but appetite improved slightly.

## 2018-07-11 NOTE — PROGRESS NOTES
Patient sent to dialysis with transport. Patient VS stable, no signs of distress. Patient continued on tele.

## 2018-07-11 NOTE — ASSESSMENT & PLAN NOTE
Malnutrition in the context of Chronic Illness/Injury    Related to (etiology):  Early satiety and decreased appetite    Signs and Symptoms (as evidenced by):  Energy Intake: <50% of estimated energy requirement for 2 months  Body Fat Depletion: SHAYNE   Muscle Mass Depletion: SHAYNE  Weight Loss: 12% x 2 months    Interventions/Recommendations (treatment strategy):  See RD note from 07/11/2018. Will follow up with [facility].    Nutrition Diagnosis Status:  New

## 2018-07-11 NOTE — PROGRESS NOTES
[facility] NEPHROLOGY HEMODIALYSIS NOTE    Tom Gage is a 68 y.o. male currently on hemodialysis for removal of uremic toxins and volume.    Labs have been reviewed and the dialysate bath has been adjusted.    There are no symptoms of hypotension, chest pain, dyspnea, nausea or vomiting.    Labs:        Recent Labs  Lab 07/10/18  1446 07/11/18  0423   * 135*   K 3.9 3.9    104   CO2 24 23   BUN 33* 42*   CREATININE 4.3* 4.8*   CALCIUM 8.4* 8.3*   PHOS  --  3.5         Recent Labs  Lab 07/10/18  1446 07/11/18  0423   WBC 11.29 12.07   HGB 10.2* 9.5*   HCT 32.6* 29.4*    222       Assessment/Plan:    HD today for removal of uremic toxins and volume.

## 2018-07-11 NOTE — PLAN OF CARE
Problem: Patient Care Overview  Goal: Plan of Care Review  Outcome: Ongoing (interventions implemented as appropriate)  3.5 hour dialysis complete.  Blood rinsed back.  [place] pulled from NA fistula.  Pressure held x 5 minutes.  Hemostasis achieved.  Covered with gauze and paper tape.  +thrill +bruit.  Net UF 0.550L.  C/o leg cramping at the end of the tx.  Cramping relieved by a 200 ml bolus of normal saline.  Weight per bed after tx 69.3kg.  Transported from [facility] to echo stress lab via stretcher per transporter.

## 2018-07-11 NOTE — PLAN OF CARE
Problem: Patient Care Overview  Goal: Plan of Care Review  Outcome: Ongoing (interventions implemented as appropriate)  POC reviewed with pt. Reviewed NPO status after midnight with pt, verbalized understanding. VS stable. No acute events noted at this time. No complaints. Bed low locked and bed alarm in use; call light with in reach, wife at bedside. Will continue to monitor.

## 2018-07-11 NOTE — PROGRESS NOTES
Patient returned to room with transport. Patient VS stable, no signs of distress. Patient continued on tele.

## 2018-07-11 NOTE — CONSULTS
[facility]  Adult Nutrition  Consult Note    SUMMARY     Recommendations    Recommendation/Intervention:   1. Once medically appropriate, recommend regular diet to encourage intake if able with pt's fluid status. If with fluid issues, recommend low sodium.   2. Order Beneprotein 2 packets/meal to aid in pro intake. Will send Optisource ONS for pt to try to assess tolerance.   3. Encourage frequent, small meals as tolerated.   4. Consider checking B vitamin labs (thiamine, vit B12, folate) as these are common deficiencies in malnourished pts s/p gastric bypass.  5. RD following.    Goals: Intake >/=85% EEN/EPN with no further weight loss  Nutrition Goal Status: new  Communication of RD Recs:  (POC)    Reason for Assessment    Reason for Assessment: consult  Diagnosis: pulmonary disease (pleural effusion)  Relevant Medical History: HTN, GERD, CM, CHF, CAD, MI, CVA, ESRD on HD, s/p gastric sleeve converted to gastric bypass    General Information Comments: Pt SANAM x 2 attempts but wife present who provided pt's diet/weight history. Pt with significant wt loss around time of gastric sleeve/bypass. Wt had been ~180lb but has decreased to 157lb this admit. Pt with early satiety leading to intake <50% of usual intake. Long HD sessions also contributing to decreased intake. Pt has limited tolerance to ONS due to gastric surgery but wife agreeable to pt trying Optisource to assess tolerance. Agreeable to Beneprotein. In work-up for TAVR. Last HD this am.    Nutrition Discharge Planning: Adequate PO intake to prevent further wt loss    Nutrition Risk Screen    Nutrition Risk Screen: no indicators present    Nutrition/Diet History    Patient Reported Diet/Restrictions/Preferences: renal  Typical Food/Fluid Intake: ~50% of meals  Food Preferences: Prefers meats with sauces  Do you have any cultural, spiritual, Advent conflicts, given your current situation?: none  Use of Vitamin/Mineral/Herbal  "Supplements: Vit D  Factors Affecting Nutritional Intake: NPO, decreased appetite, early satiety    Anthropometrics    Temp: 97.6 °F (36.4 °C)  Height Method: Stated  Height: 6' 2" (188 cm)  Height (inches): 74 in  Weight Method: Standard Scale  Weight: 71.4 kg (157 lb 6.5 oz)  Weight (lb): 157.41 lb  Ideal Body Weight (IBW), Male: 190 lb  % Ideal Body Weight, Male (lb): 82.85 lb  BMI (Calculated): 20.3  Usual Body Weight (UBW), k.8 kg (2018)  % Usual Body Weight: 87.47  % Weight Change From Usual Weight: -12.71 %       Lab/Procedures/Meds    Pertinent Labs Reviewed: reviewed  Pertinent Labs Comments: Na 135, BUN 42, Crt 4.8  Pertinent Medications Reviewed: reviewed  Pertinent Medications Comments: calcitriol, lasix, losartan, pantoprazole, sevelamer, vit D    Physical Findings/Assessment    Overall Physical Appearance: underweight  Oral/Mouth Cavity: WDL  Skin: intact    Estimated/Assessed Needs    Weight Used For Calorie Calculations: 71.4 kg (157 lb 6.5 oz)  Energy Calorie Requirements (kcal):   Energy Need Method: Kcal/kg (30)  Protein Requirements:  gm (1.2-1.4 gm/kg)  Weight Used For Protein Calculations: 71.4 kg (157 lb 6.5 oz)  Fluid Requirements (mL): per MD     RDA Method (mL):        Nutrition Prescription Ordered    Current Diet Order: NPO    Evaluation of Received Nutrient/Fluid Intake    Comments: LBM 7/10  % Intake of Estimated Energy Needs: 0 - 25 %  % Meal Intake: NPO    Nutrition Risk    Level of Risk/Frequency of Follow-up:  (F/u 2x weekly)     Assessment and Plan    Moderate protein malnutrition    Malnutrition in the context of Chronic Illness/Injury    Related to (etiology):  Early satiety and decreased appetite    Signs and Symptoms (as evidenced by):  Energy Intake: <50% of estimated energy requirement for 2 months  Body Fat Depletion: SHAYNE   Muscle Mass Depletion: SHAYNE  Weight Loss: 12% x 2 months    Interventions/Recommendations (treatment strategy):  See RD note from " 07/11/2018. Will follow up with [facility].    Nutrition Diagnosis Status:  New           Monitor and Evaluation    Food and Nutrient Intake: energy intake, food and beverage intake  Food and Nutrient Adminstration: diet order  Physical Activity and Function: nutrition-related ADLs and IADLs  Anthropometric Measurements: weight, weight change, body mass index  Biochemical Data, Medical Tests and Procedures: electrolyte and renal panel, gastrointestinal profile, glucose/endocrine profile, inflammatory profile  Nutrition-Focused Physical Findings: overall appearance     Nutrition Follow-Up    RD Follow-up?: Yes

## 2018-07-12 LAB
ABO + RH BLD: NORMAL
ANION GAP SERPL CALC-SCNC: 8 MMOL/L
BASOPHILS # BLD AUTO: 0.06 K/UL
BASOPHILS NFR BLD: 0.5 %
BLD GP AB SCN CELLS X3 SERPL QL: NORMAL
BUN SERPL-MCNC: 27 MG/DL
CALCIUM SERPL-MCNC: 8.3 MG/DL
CHLORIDE SERPL-SCNC: 104 MMOL/L
CO2 SERPL-SCNC: 24 MMOL/L
CREAT SERPL-MCNC: 3.4 MG/DL
DIFFERENTIAL METHOD: ABNORMAL
EOSINOPHIL # BLD AUTO: 0.1 K/UL
EOSINOPHIL NFR BLD: 1 %
ERYTHROCYTE [DISTWIDTH] IN BLOOD BY AUTOMATED COUNT: 14.6 %
EST. GFR  (AFRICAN AMERICAN): 20.6 ML/MIN/1.73 M^2
EST. GFR  (NON AFRICAN AMERICAN): 17.8 ML/MIN/1.73 M^2
GLUCOSE SERPL-MCNC: 55 MG/DL
GRAM STN SPEC: NORMAL
GRAM STN SPEC: NORMAL
HCT VFR BLD AUTO: 31.6 %
HGB BLD-MCNC: 10 G/DL
IMM GRANULOCYTES # BLD AUTO: 0.21 K/UL
IMM GRANULOCYTES NFR BLD AUTO: 1.7 %
LYMPHOCYTES # BLD AUTO: 0.5 K/UL
LYMPHOCYTES NFR BLD: 3.7 %
MAGNESIUM SERPL-MCNC: 1.8 MG/DL
MCH RBC QN AUTO: 32.4 PG
MCHC RBC AUTO-ENTMCNC: 31.6 G/DL
MCV RBC AUTO: 102 FL
MONOCYTES # BLD AUTO: 0.7 K/UL
MONOCYTES NFR BLD: 5.5 %
NEUTROPHILS # BLD AUTO: 10.9 K/UL
NEUTROPHILS NFR BLD: 87.6 %
NRBC BLD-RTO: 0 /100 WBC
PHOSPHATE SERPL-MCNC: 2.8 MG/DL
PLATELET # BLD AUTO: 255 K/UL
PMV BLD AUTO: 9.2 FL
POTASSIUM SERPL-SCNC: 3.8 MMOL/L
RBC # BLD AUTO: 3.09 M/UL
SODIUM SERPL-SCNC: 136 MMOL/L
WBC # BLD AUTO: 12.41 K/UL

## 2018-07-12 PROCEDURE — 25000003 PHARM REV CODE 250: Mod: NTX | Performed by: INTERNAL MEDICINE

## 2018-07-12 PROCEDURE — 99233 SBSQ HOSP IP/OBS HIGH 50: CPT | Mod: NTX,,, | Performed by: HOSPITALIST

## 2018-07-12 PROCEDURE — B2111ZZ FLUOROSCOPY OF MULTIPLE CORONARY ARTERIES USING LOW OSMOLAR CONTRAST: ICD-10-PCS | Performed by: INTERNAL MEDICINE

## 2018-07-12 PROCEDURE — 93567 NJX CAR CTH SPRVLV AORTGRPHY: CPT | Mod: NTX,,, | Performed by: INTERNAL MEDICINE

## 2018-07-12 PROCEDURE — 92986 REVISION OF AORTIC VALVE: CPT | Mod: NTX,,, | Performed by: INTERNAL MEDICINE

## 2018-07-12 PROCEDURE — 4A023N7 MEASUREMENT OF CARDIAC SAMPLING AND PRESSURE, LEFT HEART, PERCUTANEOUS APPROACH: ICD-10-PCS | Performed by: INTERNAL MEDICINE

## 2018-07-12 PROCEDURE — 25000003 PHARM REV CODE 250: Mod: NTX | Performed by: PHYSICIAN ASSISTANT

## 2018-07-12 PROCEDURE — C1769 GUIDE WIRE: HCPCS | Mod: NTX

## 2018-07-12 PROCEDURE — 93010 ELECTROCARDIOGRAM REPORT: CPT | Mod: NTX,,, | Performed by: INTERNAL MEDICINE

## 2018-07-12 PROCEDURE — 63600175 PHARM REV CODE 636 W HCPCS: Mod: NTX

## 2018-07-12 PROCEDURE — 84100 ASSAY OF PHOSPHORUS: CPT | Mod: NTX

## 2018-07-12 PROCEDURE — A4216 STERILE WATER/SALINE, 10 ML: HCPCS | Mod: NTX | Performed by: HOSPITALIST

## 2018-07-12 PROCEDURE — 027F3ZZ DILATION OF AORTIC VALVE, PERCUTANEOUS APPROACH: ICD-10-PCS | Performed by: INTERNAL MEDICINE

## 2018-07-12 PROCEDURE — 99233 SBSQ HOSP IP/OBS HIGH 50: CPT | Mod: GC,NTX,, | Performed by: INTERNAL MEDICINE

## 2018-07-12 PROCEDURE — 25000003 PHARM REV CODE 250: Mod: NTX

## 2018-07-12 PROCEDURE — 25000003 PHARM REV CODE 250: Mod: NTX | Performed by: STUDENT IN AN ORGANIZED HEALTH CARE EDUCATION/TRAINING PROGRAM

## 2018-07-12 PROCEDURE — 63600175 PHARM REV CODE 636 W HCPCS: Mod: NTX | Performed by: INTERNAL MEDICINE

## 2018-07-12 PROCEDURE — G0269 OCCLUSIVE DEVICE IN VEIN ART: HCPCS | Mod: NTX

## 2018-07-12 PROCEDURE — 83735 ASSAY OF MAGNESIUM: CPT | Mod: NTX

## 2018-07-12 PROCEDURE — 20600001 HC STEP DOWN PRIVATE ROOM: Mod: NTX

## 2018-07-12 PROCEDURE — 99152 MOD SED SAME PHYS/QHP 5/>YRS: CPT | Mod: NTX,,, | Performed by: INTERNAL MEDICINE

## 2018-07-12 PROCEDURE — 93458 L HRT ARTERY/VENTRICLE ANGIO: CPT | Mod: 26,51,NTX, | Performed by: INTERNAL MEDICINE

## 2018-07-12 PROCEDURE — 80048 BASIC METABOLIC PNL TOTAL CA: CPT | Mod: NTX

## 2018-07-12 PROCEDURE — 85025 COMPLETE CBC W/AUTO DIFF WBC: CPT | Mod: NTX

## 2018-07-12 PROCEDURE — 36415 COLL VENOUS BLD VENIPUNCTURE: CPT | Mod: NTX

## 2018-07-12 PROCEDURE — 93010 ELECTROCARDIOGRAM REPORT: CPT | Mod: 77,NTX,, | Performed by: INTERNAL MEDICINE

## 2018-07-12 PROCEDURE — 93005 ELECTROCARDIOGRAM TRACING: CPT | Mod: NTX

## 2018-07-12 PROCEDURE — 25000003 PHARM REV CODE 250: Mod: NTX | Performed by: HOSPITALIST

## 2018-07-12 PROCEDURE — 86850 RBC ANTIBODY SCREEN: CPT | Mod: NTX

## 2018-07-12 RX ORDER — SODIUM CHLORIDE 9 MG/ML
INJECTION, SOLUTION INTRAVENOUS
Status: DISCONTINUED | OUTPATIENT
Start: 2018-07-12 | End: 2018-07-13

## 2018-07-12 RX ORDER — ASPIRIN 325 MG
325 TABLET ORAL ONCE
Status: COMPLETED | OUTPATIENT
Start: 2018-07-12 | End: 2018-07-12

## 2018-07-12 RX ORDER — FAMOTIDINE 20 MG/1
20 TABLET, FILM COATED ORAL EVERY 4 HOURS
Status: COMPLETED | OUTPATIENT
Start: 2018-07-12 | End: 2018-07-12

## 2018-07-12 RX ORDER — DIPHENHYDRAMINE HCL 50 MG
50 CAPSULE ORAL EVERY 6 HOURS
Status: DISCONTINUED | OUTPATIENT
Start: 2018-07-12 | End: 2018-07-13

## 2018-07-12 RX ORDER — DIPHENHYDRAMINE HCL 50 MG
50 CAPSULE ORAL ONCE
Status: COMPLETED | OUTPATIENT
Start: 2018-07-12 | End: 2018-07-12

## 2018-07-12 RX ORDER — SODIUM CHLORIDE 9 MG/ML
INJECTION, SOLUTION INTRAVENOUS ONCE
Status: COMPLETED | OUTPATIENT
Start: 2018-07-12 | End: 2018-07-13

## 2018-07-12 RX ORDER — CLOPIDOGREL 300 MG/1
600 TABLET, FILM COATED ORAL ONCE
Status: COMPLETED | OUTPATIENT
Start: 2018-07-12 | End: 2018-07-12

## 2018-07-12 RX ADMIN — CALCITRIOL 0.5 MCG: 0.25 CAPSULE, LIQUID FILLED ORAL at 09:07

## 2018-07-12 RX ADMIN — SEVELAMER CARBONATE 1600 MG: 800 TABLET, FILM COATED ORAL at 12:07

## 2018-07-12 RX ADMIN — FUROSEMIDE 80 MG: 80 TABLET ORAL at 06:07

## 2018-07-12 RX ADMIN — PANTOPRAZOLE SODIUM 40 MG: 40 TABLET, DELAYED RELEASE ORAL at 09:07

## 2018-07-12 RX ADMIN — SEVELAMER CARBONATE 1600 MG: 800 TABLET, FILM COATED ORAL at 06:07

## 2018-07-12 RX ADMIN — DIPHENHYDRAMINE HYDROCHLORIDE 50 MG: 50 CAPSULE ORAL at 12:07

## 2018-07-12 RX ADMIN — CLOPIDOGREL BISULFATE 600 MG: 300 TABLET, FILM COATED ORAL at 01:07

## 2018-07-12 RX ADMIN — SEVELAMER CARBONATE 1600 MG: 800 TABLET, FILM COATED ORAL at 09:07

## 2018-07-12 RX ADMIN — FAMOTIDINE 20 MG: 20 TABLET ORAL at 12:07

## 2018-07-12 RX ADMIN — VITAMIN D, TAB 1000IU (100/BT) 1000 UNITS: 25 TAB at 09:07

## 2018-07-12 RX ADMIN — FAMOTIDINE 20 MG: 20 TABLET ORAL at 02:07

## 2018-07-12 RX ADMIN — ASPIRIN 325 MG ORAL TABLET 325 MG: 325 PILL ORAL at 01:07

## 2018-07-12 RX ADMIN — PREDNISONE 50 MG: 5 TABLET ORAL at 02:07

## 2018-07-12 RX ADMIN — PREDNISONE 50 MG: 5 TABLET ORAL at 12:07

## 2018-07-12 RX ADMIN — SODIUM CHLORIDE, PRESERVATIVE FREE 3 ML: 5 INJECTION INTRAVENOUS at 05:07

## 2018-07-12 RX ADMIN — FUROSEMIDE 80 MG: 80 TABLET ORAL at 09:07

## 2018-07-12 RX ADMIN — LOSARTAN POTASSIUM 100 MG: 50 TABLET ORAL at 09:07

## 2018-07-12 RX ADMIN — DIPHENHYDRAMINE HYDROCHLORIDE 50 MG: 50 CAPSULE ORAL at 02:07

## 2018-07-12 RX ADMIN — SODIUM CHLORIDE, PRESERVATIVE FREE 3 ML: 5 INJECTION INTRAVENOUS at 01:07

## 2018-07-12 NOTE — PLAN OF CARE
Problem: Patient Care Overview  Goal: Plan of Care Review  Outcome: Ongoing (interventions implemented as appropriate)  Pt is A, A, Ox4. Calm, cooperative. Free of falls, trauma, and injuries. Skin intact ex skin tear to RUE, covered with nonadherent dressing. Pt educated on treatment plan, procedure, fall risk. Pt demonstrates and verbalizes understanding. VSS. Pt to cath lab today for balloon. Pt on Dialysis M, W, Fri with LAV fistula. Plan of care reviewed with pt.

## 2018-07-12 NOTE — PROGRESS NOTES
"Progress Note  Hospital Medicine    Provider team:    [facility] HOSP MED C  [facility] NEPHROLOGY DIALYSIS  Admit Date: 7/10/2018  Encounter Date: 07/12/2018     SUBJECTIVE:     Follow-up Visit for: Pleural effusion    HPI (See H&P for complete P,F,SHx):   68M with ESRD on HD, CAD s/p NSTEMI in 2012, recurrent pleural effusions (attributed to ESRD and hypoalbuminemia) and ascites - per patient was f/b Dr. Sarmiento prior, hx gastric bypass with suspected moderate-protein calorie malnutrition transferred from [place] by cardiology for acute on chronic combined HF thought possibly worsened by AoV disease (stenosis). Patient had prior HFpEF with WHO G2 pulmonary HTN but follow-up ECHO recently confirmed moderate to severe LV dysfunction with LVEF 30-35%. He had moderate to severe AS with mean gradient of 21 and ARMIN of 1.12. He had a SPECT study completed which was negative for ischemia. The patient had been treated for combined HF with diuretics and HD for volume management. The referring cardiologist (Dr. Calles) discussed transfer for TAVR evaluation. He suspects the patient will need a dobutamine stress ECHO to further evaluate.  When queried whether this evaluation needed to occur as inpatient, the referring MD spoke with Dr. Mcmanus and this was decided to be necessary for inpatient evaluation.     Further, per review of Dr. Calles's note: "Patient had CXR done on the 4th (July 4th) showing a large right pleural effusion with cardiomegaly without evidence of heart failure.  He had an ultrasound done of the pleural effusion and a thoracentesis. CT scan of the chest was done on the 5th, showing a large right pleural effusion with suggestion of some loculation, aeration of the lung on the right base as well as the apex, difficult to exclude neoplasm, cardiomegaly, third spacing of fluids, diffuse soft tissue edema, diffuse atherosclerotic vascular calcifications, mild nodular fullness of the left adrenal, which was unchanged, and " "cholecystectomy and postoperative changes at the GE junction and the stomach. An ECHO was done on July 5th, read by Dr. Ch to show mild LVH. EF reduced about 30% with elevated left heart filling pressures, pulmonary hypertension, severely increased right and left atrial sizes.  Aortic stenosis was felt to be moderate.  He had a gradient that was felt to possibly underestimate the severity of the stenosis. The mean gradient was 25 mmHg, dimensionless index 0.25."     ECHO 5/1/2018  CONCLUSIONS     1 - Upper limit of normal left ventricular enlargement.     2 - Moderately depressed left ventricular systolic function (EF 30-35%).     3 - Concentric hypertrophy.     4 - Right ventricular enlargement with mildly to moderately depressed systolic function.     5 - Biatrial enlargement.     6 - Moderate to severe aortic stenosis, ARMIN = 1.12 cm2, AVAi = 0.61 cm2/m2, peak velocity = 3.05 m/s, mean gradient = 21 mmHg.     7 - The mitral valve is moderately sclerotic with mildly restricted leaflet mobility.     8 - Trivial mitral stenosis, MVA = 3.14 cm2.     9 - Mild to moderate mitral regurgitation.     10 - Severe tricuspid regurgitation.     11 - Mild pulmonic regurgitation.     12 - Intermediate central venous pressure.     13 - Pulmonary hypertension. The estimated PA systolic pressure is 76 mmHg.      Nuclear stress 5/10/2018  Impression: NORMAL MYOCARDIAL PERFUSION  1. The perfusion scan is free of evidence for myocardial ischemia or injury.   2. Resting wall motion is physiologic.   3. Resting LV function is normal.  (normal is >= 51%)  4. The ventricular volumes are normal at rest and stress.   5. The extracardiac distribution of radioactivity is normal.   6. When compared to the previous study from 01/20/2016, there are no significant interval changes in the perfusion pattern.    DSE 7/11/2018  CONCLUSIONS     1 - Mildly to moderately depressed left ventricular systolic function (EF 40-45%).     2 - Concentric " hypertrophy.     3 - Biatrial enlargement.     4 - Impaired LV relaxation, elevated LAP (grade 2 diastolic dysfunction).     5 - Right ventricular enlargement with mildly depressed systolic function.     6 - Pulmonary hypertension. The estimated PA systolic pressure is 62 mmHg.     7 - Mild pulmonic regurgitation.     8 - Trivial pericardial effusion.     9 - Intermediate central venous pressure.     10 - Bilateral pleural effusion.     11 - Ascites is noted.     12 - Moderate to severe aortic stenosis, ARMIN = 0.99 cm2, AVAi = 0.51 cm2/m2, peak velocity = 3.9 m/s, mean gradient = 30 mmHg.     13 - Dobutamine challlenge c/w moderate to severe AS with peak velocity 4.2m/s, MG 42mmHg and ARMIN 0.98cm2.     Interval history:  Patient reports fatigue; he is not having chest pain or SOB. The patient appears a bit debilitated due to ongoing medical issues.  He remains positive about BAV today. Patient likely to transfer to CCU post-procedure.    Review of Systems:  Review of Systems   Constitutional: Positive for weight loss. Negative for chills and fever.   HENT: Negative for congestion and sore throat.    Eyes: Negative for photophobia, pain and discharge.   Respiratory: Negative for cough, hemoptysis, sputum production and shortness of breath.    Cardiovascular: Negative for chest pain, palpitations and leg swelling.   Gastrointestinal: Negative for abdominal pain, diarrhea, nausea and vomiting.   Genitourinary: Negative for dysuria and urgency.   Musculoskeletal: Negative for myalgias and neck pain.   Skin: Negative for itching and rash.   Neurological: Positive for weakness. Negative for sensory change, focal weakness and headaches.   Endo/Heme/Allergies: Negative for polydipsia. Does not bruise/bleed easily.   Psychiatric/Behavioral: Negative for depression and suicidal ideas.       OBJECTIVE:       Intake/Output Summary (Last 24 hours) at 07/12/18 0813  Last data filed at 07/12/18 0600   Gross per 24 hour   Intake       "       1030 ml   Output             1500 ml   Net             -470 ml     Vital Signs Range (Last 24H):  Temp:  [96.7 °F (35.9 °C)-97.8 °F (36.6 °C)]   Pulse:  [63-73]   Resp:  [16-18]   BP: (145-169)/(67-82)   SpO2:  [93 %-95 %]   Body mass index is 19.73 kg/m².    Objective:  General Appearance:  Comfortable, well-appearing, in no acute distress and not in pain.    Vital signs: (most recent): Blood pressure (!) 161/79, pulse 73, temperature 96.7 °F (35.9 °C), temperature source Oral, resp. rate 17, height 6' 2" (1.88 m), weight 69.7 kg (153 lb 10.6 oz), SpO2 (!) 94 %.  No fever.    Output: Producing urine and producing stool.    HEENT: Normal HEENT exam.    Lungs:  Normal effort.  Breath sounds clear to auscultation.  He is not in respiratory distress.  There are rales and decreased breath sounds.  No wheezes.    Heart: Normal rate.  Regular rhythm.  S1 normal and S2 normal.  Positive for murmur.    Chest: Symmetric chest wall expansion.   Abdomen: Abdomen is soft.  Bowel sounds are normal.   There is no abdominal tenderness.     Extremities: Normal range of motion.  There is no deformity, effusion, dependent edema or local swelling.    Pulses: Distal pulses are intact.    Neurological: Patient is alert and oriented to person, place and time.  Normal strength.    Pupils:  Pupils are equal, round, and reactive to light.    Skin:  Warm and dry.      Medications:  Medication list was reviewed in EPIC and changes noted under Assessment/Plan and MAR.    Laboratory:  Recent Labs      07/12/18   0344   WBC  12.41   RBC  3.09*   HGB  10.0*   HCT  31.6*   PLT  255   MCV  102*   MCH  32.4*   MCHC  31.6*   GRAN  87.6*  10.9*   LYMPH  3.7*  0.5*   MONO  5.5  0.7   EOS  0.1      Recent Labs      07/12/18   0344   GLU  55*   NA  136   K  3.8   CL  104   CO2  24   BUN  27*   CREATININE  3.4*   CALCIUM  8.3*   ANIONGAP  8   MG  1.8   PHOS  2.8       ASSESSMENT/PLAN:     Active Hospital Problems    Diagnosis  POA    *Pleural " effusion [J90]  Yes    Moderate protein malnutrition [E44.0]  Yes    Heart failure with reduced ejection fraction, NYHA class III [I50.20]  Yes     Chronic    Aortic stenosis [I35.0]  Yes    Pulmonary hypertension group 2 [I27.20]  Yes    Diastolic dysfunction [I51.9]  Yes    CKD (chronic kidney disease) stage V requiring chronic dialysis [N18.6, Z99.2]  Not Applicable    Type 2 diabetes mellitus with diabetic nephropathy [E11.21]  Yes     Chronic    Anemia of chronic renal failure, stage 5 [N18.5, D63.1]  Yes     Chronic    Coronary artery disease involving native coronary artery of native heart without angina pectoris [I25.10]  Yes    Renovascular hypertension [I15.0]  Yes      Resolved Hospital Problems    Diagnosis Date Resolved POA    ESRD on dialysis [N18.6, Z99.2] 07/10/2018 Not Applicable      AoV stenosis, non-rheumatic  - Interventional cardiology consulted  - Dobutamine stress ECHO confirms severe AoS  - Plan for BAV today, also with evaluation of coronary arteries  - Will f/u further formal interventional cardiology recommendations     Pleural effusion, R  - Thought 2/2 combined HF, hypoalbuminemia and ESRD  - Pulmonology consulted for thoracentesis  - Volume management with HD  - Appreciate pulm recs - defer PleurX due to risk of protein calorie malnutrition 2/2 continuous drainage     Acute on chronic combined heart failure, NYHA Class III  Hypertensive heart disease with heart failure  Pulmonary HTN WHO G2  - ECHO as above  - C/w GDMT: ARB and BB (once dobutamine ECHO completed)  - Okay to continue with maintenance diuretics  - Volume management with HD  - Strict I/O  - Daily weights     ESRD on HD  - Nephrology consulted for HD  - C/w sevelamer, vitamin D  - Avoid nephrotoxic agents     CAD native heart/artery without angina  - Unclear why not on ASA  - Will initiate ASA  - Plavix load given in case stent placed  - Must also consider initiation (inpt v outpt) of statin     Gastric bypass,  history of   Moderate protein calorie malnutrition  - Patient may need further evaluation of nutritional deficiencies  - Nutrition consult  - Frequent small meals requested by patient  - Check thiamine, folate, B12     T2DM  - a1c at goal  - Per patient is diet controlled  - No present indication for SSI     GI/DVT PPx in place.  NPO for procedures.  Full code.     Anticipated discharge date and disposition:   Disposition pending further evaluation by nephrology, pulmonology, and interventional cardiology.  Maninder Del Rio MD  [facility]

## 2018-07-12 NOTE — PROGRESS NOTES
[facility]  Pulmonology  Progress Note    Patient Name: Tom Gage  MRN: [id number]  Admission Date: 7/10/2018  Hospital Length of Stay: 2 days  Code Status: Full Code  Attending Provider: Maninder Del Rio MD  Primary Care Provider: Asael Poole MD   Principal Problem: Pleural effusion    Subjective:     No new subjective & objective note has been filed under this hospital service since the last note was generated.    Assessment/Plan:     * Pleural effusion    Recurrent pleural effusion since 2016.     - s/p thoracentesis with transudate effusion (light's criteria).  Workup thus far is suggestive of cardiac/renal etiology.  Mild improvement in symptoms and x-ray following procedure.  - patients dyspnea and pleural effusions pre-date his moderate-severe AS.    - Patient is currently comfortable and resting on room air.  Given this, there is no direct indication for plurex catheter and we have concerns about inducing protein- deficiency malnutrition 2/2 continuous drainage.  - if condition changes, and the patient desires a plurex catheter, he may follow up with Dr. Sarmiento for placement.   - will sign off at this time.                Kevin Joyner MD  Pulmonology  [facility]

## 2018-07-12 NOTE — PLAN OF CARE
Problem: Patient Care Overview  Goal: Plan of Care Review  Outcome: Ongoing (interventions implemented as appropriate)  Pt remained free from falls or injuries this shift. Pt independent in repositioning. No skin breakdown noticed. Pt rested well through the night after one time dose of benadryl given per pt request for sleep. HD yesterday. No SOB. Wife at bedside through the night. No c/o pain

## 2018-07-12 NOTE — NURSING TRANSFER
Nursing Transfer Note      7/12/2018     Transfer From: cath lab    Transfer via stretcher    Transfer with cardiac monitoring    Transported by cath lab staff    Medicines sent: n/a    Chart send with patient: Yes    Notified: spouse, daughter    Patient reassessed at: 7/12/2018 1650 (date, time)    Upon arrival to floor: cardiac monitor applied, patient oriented to room, call bell in reach and bed in lowest position

## 2018-07-12 NOTE — PLAN OF CARE
07/12/18 1052   Discharge Assessment   Assessment Type Discharge Planning Assessment   Confirmed/corrected address and phone number on facesheet? Yes   Assessment information obtained from? Patient;Caregiver;Medical Record   Expected Length of Stay (days) 4   Communicated expected length of stay with patient/caregiver yes   Prior to hospitilization cognitive status: Alert/Oriented   Prior to hospitalization functional status: Assistive Equipment;Needs Assistance   Current cognitive status: Alert/Oriented   Current Functional Status: Assistive Equipment;Needs Assistance   Lives With spouse   Able to Return to Prior Arrangements yes   Is patient able to care for self after discharge? Unable to determine at this time (comments)   Patient's perception of discharge disposition home or selfcare;home health   Readmission Within The Last 30 Days no previous admission in last 30 days   Patient currently being followed by outpatient case management? No   Patient currently receives any other outside agency services? No   Equipment Currently Used at Home rollator   Do you have any problems affording any of your prescribed medications? TBD   Is the patient taking medications as prescribed? yes   Does the patient have transportation home? Yes   Transportation Available family or friend will provide   Dialysis Name and Scheduled days [facility] [address] [place]   Does the patient receive services at the [facility]? No   Discharge Plan A Home with family;Home Health   Transferred from  with CHF and ESRD for TAVR eval. Lives with wife and requires her assistance with ADLs. Plan is to DC home. Will probably benefit from [facility].

## 2018-07-12 NOTE — NURSING TRANSFER
Nursing Transfer Note      7/12/2018     Transfer To: cath lab    Transfer via stretcher    Transfer with cardiac monitoring    Transported by cath lab staff    Medicines sent: n/a    Chart send with patient: Yes    Notified: spouse, daughter

## 2018-07-12 NOTE — PLAN OF CARE
Tom Gage is a 68 y.o. male referred by Dr Mcmanus for evaluation of severe AS (NYHA Class III sx).    The patient has undergone the following TAVR work-up:   ECHO (Date 7/11/18: ARMIN = 0.99 cm2, peak velocity = 3.9 m/s->4.2 ms/s, mean gradient = 30 mmHg -> 42 mmHg, LVEF: 40%  LHC 7/12/18: non obstructive CAD.  STS: 4.98 %   Frailty:4/4  Iliacs are pending  LVOT area by CTA is pending  Incidental findings on CT: pending  CT Surgery risk assessment:pending  Rhythm issues: 1st degree AVB  PFTs: FEV1 pending  Comorbidities: ESRD, HFrEF, deconditioned      Considering Mr. Esparza`s ADHF offered him BAV. He is cohrot C for MEL at this point.     Patient seen and case discussed with Dr. Nunez.      Miguelito Khanna MD  PGY-8  Heart Valve Fellow/Interventional Cardiology

## 2018-07-12 NOTE — ASSESSMENT & PLAN NOTE
Recurrent pleural effusion since 2016.     - s/p thoracentesis with transudate effusion (light's criteria).  Workup thus far is suggestive of cardiac/renal etiology.  Mild improvement in symptoms and x-ray following procedure.  - patients dyspnea and pleural effusions pre-date his moderate-severe AS.    - Patient is currently comfortable and resting on room air.  Given this, there is no direct indication for plurex catheter and we have concerns about inducing protein- deficiency malnutrition 2/2 continuous drainage.  - if condition changes, and the patient desires a plurex catheter, he may follow up with Dr. Sarmiento for placement.   - will sign off at this time.

## 2018-07-13 LAB
ANION GAP SERPL CALC-SCNC: 9 MMOL/L
BASOPHILS # BLD AUTO: 0.03 K/UL
BASOPHILS # BLD AUTO: 0.04 K/UL
BASOPHILS NFR BLD: 0.2 %
BASOPHILS NFR BLD: 0.2 %
BUN SERPL-MCNC: 40 MG/DL
CALCIUM SERPL-MCNC: 8.8 MG/DL
CHLORIDE SERPL-SCNC: 100 MMOL/L
CO2 SERPL-SCNC: 23 MMOL/L
CREAT SERPL-MCNC: 4.3 MG/DL
DIFFERENTIAL METHOD: ABNORMAL
DIFFERENTIAL METHOD: ABNORMAL
EOSINOPHIL # BLD AUTO: 0 K/UL
EOSINOPHIL # BLD AUTO: 0 K/UL
EOSINOPHIL NFR BLD: 0.1 %
EOSINOPHIL NFR BLD: 0.1 %
ERYTHROCYTE [DISTWIDTH] IN BLOOD BY AUTOMATED COUNT: 14.5 %
ERYTHROCYTE [DISTWIDTH] IN BLOOD BY AUTOMATED COUNT: 14.6 %
EST. GFR  (AFRICAN AMERICAN): 15.5 ML/MIN/1.73 M^2
EST. GFR  (NON AFRICAN AMERICAN): 13.4 ML/MIN/1.73 M^2
FOLATE SERPL-MCNC: 7.9 NG/ML
GLUCOSE SERPL-MCNC: 217 MG/DL
HBV SURFACE AG SERPL QL IA: NEGATIVE
HCT VFR BLD AUTO: 33.6 %
HCT VFR BLD AUTO: 33.7 %
HGB BLD-MCNC: 10.5 G/DL
HGB BLD-MCNC: 10.8 G/DL
IMM GRANULOCYTES # BLD AUTO: 0.2 K/UL
IMM GRANULOCYTES # BLD AUTO: 0.28 K/UL
IMM GRANULOCYTES NFR BLD AUTO: 1.4 %
IMM GRANULOCYTES NFR BLD AUTO: 1.6 %
LYMPHOCYTES # BLD AUTO: 0.3 K/UL
LYMPHOCYTES # BLD AUTO: 0.4 K/UL
LYMPHOCYTES NFR BLD: 2.2 %
LYMPHOCYTES NFR BLD: 2.4 %
MAGNESIUM SERPL-MCNC: 2 MG/DL
MCH RBC QN AUTO: 32.1 PG
MCH RBC QN AUTO: 32.4 PG
MCHC RBC AUTO-ENTMCNC: 31.2 G/DL
MCHC RBC AUTO-ENTMCNC: 32.1 G/DL
MCV RBC AUTO: 101 FL
MCV RBC AUTO: 103 FL
MONOCYTES # BLD AUTO: 0.1 K/UL
MONOCYTES # BLD AUTO: 0.3 K/UL
MONOCYTES NFR BLD: 0.5 %
MONOCYTES NFR BLD: 1.8 %
NEUTROPHILS # BLD AUTO: 14.1 K/UL
NEUTROPHILS # BLD AUTO: 16.6 K/UL
NEUTROPHILS NFR BLD: 93.9 %
NEUTROPHILS NFR BLD: 95.6 %
NRBC BLD-RTO: 0 /100 WBC
NRBC BLD-RTO: 0 /100 WBC
PHOSPHATE SERPL-MCNC: 4.5 MG/DL
PLATELET # BLD AUTO: 265 K/UL
PLATELET # BLD AUTO: 314 K/UL
PMV BLD AUTO: 9.1 FL
PMV BLD AUTO: 9.7 FL
POTASSIUM SERPL-SCNC: 4.8 MMOL/L
RBC # BLD AUTO: 3.27 M/UL
RBC # BLD AUTO: 3.33 M/UL
SODIUM SERPL-SCNC: 132 MMOL/L
VIT B12 SERPL-MCNC: 1226 PG/ML
WBC # BLD AUTO: 14.72 K/UL
WBC # BLD AUTO: 17.67 K/UL

## 2018-07-13 PROCEDURE — 83735 ASSAY OF MAGNESIUM: CPT | Mod: NTX

## 2018-07-13 PROCEDURE — 25000003 PHARM REV CODE 250: Mod: NTX | Performed by: NURSE PRACTITIONER

## 2018-07-13 PROCEDURE — 25000003 PHARM REV CODE 250: Mod: NTX | Performed by: INTERNAL MEDICINE

## 2018-07-13 PROCEDURE — 84425 ASSAY OF VITAMIN B-1: CPT | Mod: NTX

## 2018-07-13 PROCEDURE — 93010 ELECTROCARDIOGRAM REPORT: CPT | Mod: NTX,,, | Performed by: INTERNAL MEDICINE

## 2018-07-13 PROCEDURE — 90935 HEMODIALYSIS ONE EVALUATION: CPT | Mod: NTX

## 2018-07-13 PROCEDURE — 20600001 HC STEP DOWN PRIVATE ROOM: Mod: NTX

## 2018-07-13 PROCEDURE — 93005 ELECTROCARDIOGRAM TRACING: CPT | Mod: NTX

## 2018-07-13 PROCEDURE — 25000003 PHARM REV CODE 250: Mod: NTX | Performed by: HOSPITALIST

## 2018-07-13 PROCEDURE — 84100 ASSAY OF PHOSPHORUS: CPT | Mod: NTX

## 2018-07-13 PROCEDURE — 99223 1ST HOSP IP/OBS HIGH 75: CPT | Mod: NTX,,, | Performed by: NURSE PRACTITIONER

## 2018-07-13 PROCEDURE — 25000003 PHARM REV CODE 250: Mod: NTX | Performed by: STUDENT IN AN ORGANIZED HEALTH CARE EDUCATION/TRAINING PROGRAM

## 2018-07-13 PROCEDURE — 36415 COLL VENOUS BLD VENIPUNCTURE: CPT | Mod: NTX

## 2018-07-13 PROCEDURE — 80048 BASIC METABOLIC PNL TOTAL CA: CPT | Mod: NTX

## 2018-07-13 PROCEDURE — 87340 HEPATITIS B SURFACE AG IA: CPT | Mod: NTX

## 2018-07-13 PROCEDURE — 82746 ASSAY OF FOLIC ACID SERUM: CPT | Mod: NTX

## 2018-07-13 PROCEDURE — 85025 COMPLETE CBC W/AUTO DIFF WBC: CPT | Mod: NTX

## 2018-07-13 PROCEDURE — 86706 HEP B SURFACE ANTIBODY: CPT | Mod: NTX

## 2018-07-13 PROCEDURE — 82607 VITAMIN B-12: CPT | Mod: NTX

## 2018-07-13 PROCEDURE — 99233 SBSQ HOSP IP/OBS HIGH 50: CPT | Mod: NTX,,, | Performed by: HOSPITALIST

## 2018-07-13 RX ORDER — DIPHENHYDRAMINE HCL 50 MG
50 CAPSULE ORAL ONCE
Status: DISCONTINUED | OUTPATIENT
Start: 2018-07-14 | End: 2018-07-13

## 2018-07-13 RX ORDER — HEPARIN SODIUM 5000 [USP'U]/ML
5000 INJECTION, SOLUTION INTRAVENOUS; SUBCUTANEOUS EVERY 8 HOURS
Status: DISCONTINUED | OUTPATIENT
Start: 2018-07-13 | End: 2018-07-14 | Stop reason: HOSPADM

## 2018-07-13 RX ORDER — ATORVASTATIN CALCIUM 10 MG/1
10 TABLET, FILM COATED ORAL DAILY
Status: DISCONTINUED | OUTPATIENT
Start: 2018-07-14 | End: 2018-07-14 | Stop reason: HOSPADM

## 2018-07-13 RX ADMIN — SEVELAMER CARBONATE 1600 MG: 800 TABLET, FILM COATED ORAL at 05:07

## 2018-07-13 RX ADMIN — ACETAMINOPHEN 650 MG: 325 TABLET, FILM COATED ORAL at 08:07

## 2018-07-13 RX ADMIN — SEVELAMER CARBONATE 1600 MG: 800 TABLET, FILM COATED ORAL at 01:07

## 2018-07-13 RX ADMIN — CALCITRIOL 0.5 MCG: 0.25 CAPSULE, LIQUID FILLED ORAL at 01:07

## 2018-07-13 RX ADMIN — VITAMIN D, TAB 1000IU (100/BT) 1000 UNITS: 25 TAB at 01:07

## 2018-07-13 RX ADMIN — ASPIRIN 81 MG: 81 TABLET, COATED ORAL at 01:07

## 2018-07-13 RX ADMIN — PANTOPRAZOLE SODIUM 40 MG: 40 TABLET, DELAYED RELEASE ORAL at 01:07

## 2018-07-13 RX ADMIN — FUROSEMIDE 80 MG: 80 TABLET ORAL at 05:07

## 2018-07-13 RX ADMIN — FUROSEMIDE 80 MG: 80 TABLET ORAL at 01:07

## 2018-07-13 RX ADMIN — DIPHENHYDRAMINE HYDROCHLORIDE 50 MG: 50 CAPSULE ORAL at 12:07

## 2018-07-13 RX ADMIN — LOSARTAN POTASSIUM 100 MG: 50 TABLET ORAL at 01:07

## 2018-07-13 RX ADMIN — SEVELAMER CARBONATE 1600 MG: 800 TABLET, FILM COATED ORAL at 08:07

## 2018-07-13 RX ADMIN — SODIUM CHLORIDE: 0.9 INJECTION, SOLUTION INTRAVENOUS at 08:07

## 2018-07-13 NOTE — PLAN OF CARE
Problem: Patient Care Overview  Goal: Plan of Care Review  Outcome: Ongoing (interventions implemented as appropriate)  Pt remained stable throughout the day. No acute distress noted. Pt remained free from injury. VSS. Pt denies any chest pain or SOB. Pt understands plan of care. Will continue to monitor.

## 2018-07-13 NOTE — PLAN OF CARE
Problem: Patient Care Overview  Goal: Plan of Care Review  Outcome: Ongoing (interventions implemented as appropriate)  Patient tolerated 3.5 hour treatment well. 1507 removed. Needles removed; pressure held X 10 minutes. Hemostasis achieved. PRessure dressing applied. + thrill and bruit noted. Patient without complaint; returned to room via  with transport

## 2018-07-13 NOTE — ASSESSMENT & PLAN NOTE
Malnutrition in the context of Chronic Illness/Injury    Related to (etiology):  Early satiety and decreased appetite    Signs and Symptoms (as evidenced by):  Energy Intake: <50% of estimated energy requirement for 2 months  Body Fat Depletion: moderate depletion of triceps  Muscle Mass Depletion: moderate depletion of temples, clavicle region, interosseous muscle and lower extremities   Weight Loss: 12% x 2 months    Interventions/Recommendations (treatment strategy):  See RD note from 07/13/2018. Will follow up with [facility].    Nutrition Diagnosis Status:  Continues

## 2018-07-13 NOTE — PROGRESS NOTES
" [facility]  Adult Nutrition  Progress Note    SUMMARY       Recommendations    Recommendation/Intervention:   1. Continue current cardiac diet with Beneprotein. Pt with good intake at this time.   2. Order Optisource TID.   3. RD following.    Goals: Intake >/=85% EEN/EPN with no further weight loss  Nutrition Goal Status: progressing towards goal  Communication of RD Recs:  (POC)    Reason for Assessment    Reason for Assessment: RD follow-up  Diagnosis: pulmonary disease (pleural effusion)  Relevant Medical History: HTN, GERD, CM, CHF, CAD, MI, CVA, ESRD on HD, s/p gastric sleeve converted to gastric bypass    General Information Comments: Pt s/p BAV yesterday. Last HD this am. Pt and wife present. Pt eating lunch and had consumed ~75% at time of visit. Pt tolerated Optisource well so will order with each meal. Using Beneprotein.    Nutrition Discharge Planning: Adequate PO intake to prevent further wt loss    Nutrition Risk Screen    Nutrition Risk Screen: no indicators present    Nutrition/Diet History    Patient Reported Diet/Restrictions/Preferences: renal  Typical Food/Fluid Intake: ~50% of meals  Food Preferences: Prefers meats with sauces  Do you have any cultural, spiritual, Roman Catholic conflicts, given your current situation?: none  Use of Vitamin/Mineral/Herbal Supplements: Vit D  Factors Affecting Nutritional Intake: decreased appetite    Anthropometrics    Temp: 97.5 °F (36.4 °C)  Height Method: Stated  Height: 6' 2" (188 cm)  Height (inches): 74 in  Weight Method: Standard Scale  Weight: 71.6 kg (157 lb 13.6 oz)  Weight (lb): 157.85 lb  Ideal Body Weight (IBW), Male: 190 lb  % Ideal Body Weight, Male (lb): 83.08 lb  BMI (Calculated): 20.3  BMI Grade: 18.5-24.9 - normal  Usual Body Weight (UBW), k.8 kg (2018)  % Usual Body Weight: 87.47  % Weight Change From Usual Weight: -12.71 %       Lab/Procedures/Meds    Pertinent Labs Reviewed: reviewed  Pertinent Labs Comments: Radha 132, " BUN 40, Crt 4.3, Glu 217  Pertinent Medications Reviewed: reviewed  Pertinent Medications Comments: calcitriol, famotidine, lasix, losartan, pantoprazole, prednisone, sevelamer, vit D    Physical Findings/Assessment    Overall Physical Appearance: underweight, loss of subcutaneous fat, loss of muscle mass  Oral/Mouth Cavity: WDL  Skin: intact    Estimated/Assessed Needs    Weight Used For Calorie Calculations: 71.4 kg (157 lb 6.5 oz)  Energy Calorie Requirements (kcal): 2142  Energy Need Method: Kcal/kg (30)  Protein Requirements:  gm (1.2-1.4 gm/kg)  Weight Used For Protein Calculations: 71.4 kg (157 lb 6.5 oz)  Fluid Requirements (mL): per MD     RDA Method (mL): 2142       Nutrition Prescription Ordered    Current Diet Order: Cardiac  Oral Nutrition Supplement: Beneprotein    Evaluation of Received Nutrient/Fluid Intake    Comments: LBM 7/12  % Intake of Estimated Energy Needs: 50 - 75 %  % Meal Intake: 50 - 75 %    Nutrition Risk    Level of Risk/Frequency of Follow-up:  (F/u 2x weekly)     Assessment and Plan    Moderate protein malnutrition    Malnutrition in the context of Chronic Illness/Injury    Related to (etiology):  Early satiety and decreased appetite    Signs and Symptoms (as evidenced by):  Energy Intake: <50% of estimated energy requirement for 2 months  Body Fat Depletion: moderate depletion of triceps  Muscle Mass Depletion: moderate depletion of temples, clavicle region, interosseous muscle and lower extremities   Weight Loss: 12% x 2 months    Interventions/Recommendations (treatment strategy):  See RD note from 07/13/2018. Will follow up with [facility].    Nutrition Diagnosis Status:  Continues              Monitor and Evaluation    Food and Nutrient Intake: energy intake, food and beverage intake  Food and Nutrient Adminstration: diet order  Physical Activity and Function: nutrition-related ADLs and IADLs  Anthropometric Measurements: weight, weight change, body mass index  Biochemical Data,  Medical Tests and Procedures: electrolyte and renal panel, gastrointestinal profile, glucose/endocrine profile, inflammatory profile  Nutrition-Focused Physical Findings: overall appearance     Nutrition Follow-Up    RD Follow-up?: Yes

## 2018-07-13 NOTE — NURSING TRANSFER
Nursing Transfer Note      7/13/2018     Transfer To: dialysis    Transfer via stretcher    Transfer with cardiac monitoring    Transported by transport tech

## 2018-07-13 NOTE — SUBJECTIVE & OBJECTIVE
"No current facility-administered medications on file prior to encounter.      Current Outpatient Prescriptions on File Prior to Encounter   Medication Sig    calcitRIOL (ROCALTROL) 0.5 MCG Cap Take 0.5 mcg by mouth once daily.    carvedilol (COREG) 25 MG tablet Take 1 tablet (25 mg total) by mouth 2 (two) times daily with meals.    furosemide (LASIX) 80 MG tablet 80 mg every 12 (twelve) hours.     losartan (COZAAR) 100 MG tablet Take 1 tablet (100 mg total) by mouth once daily.    pantoprazole (PROTONIX) 40 MG tablet Take 40 mg by mouth 2 (two) times daily as needed.     sevelamer carbonate (RENVELA) 800 mg Tab Take 1,600 mg by mouth 3 (three) times daily. Takes two 800 mg tablets (1600 mg) with meals    vitamin D 1000 units Tab Take 1,000 Units by mouth once daily.    nitroGLYCERIN (NITROSTAT) 0.4 MG SL tablet Place 0.4 mg under the tongue every 5 (five) minutes as needed for Chest pain.       Review of patient's allergies indicates:   Allergen Reactions    Latex, natural rubber Rash and Blisters     Latex tape causes blisters    Ace inhibitors Other (See Comments)     Other reaction(s): Cough    Adhesive Dermatitis and Blisters     Please use Paper Tape    Morphine Hives, Itching, Dermatitis and Rash     Body Rash, Phlebitis, "My veins showed through the skin."    Iodine and iodide containing products      IVP dye       Past Medical History:   Diagnosis Date    Anemia of chronic renal failure, stage 5     BPH (benign prostatic hyperplasia)     CAD (coronary artery disease)     CHF (congestive heart failure)     Chronic systolic heart failure 5/31/2018    CKD (chronic kidney disease) stage 5, GFR less than 15 ml/min     Diastolic dysfunction 12/16/2016    Dilated cardiomyopathy 5/7/2018    GERD (gastroesophageal reflux disease)     Hyperparathyroidism, secondary renal     Hypertension     Metabolic acidosis     MI (myocardial infarction) Feb 2012    Non-rheumatic mitral regurgitation " 5/31/2018    Nonrheumatic aortic valve stenosis 5/31/2018    Pulmonary hypertension 12/16/2016    Stenosis of aortic and mitral valves     Aortic stenosis    TIA (transient ischemic attack)     Type 2 diabetes mellitus with diabetic nephropathy     history/ before wt loss    Valvular regurgitation     mitral regurgitation     Past Surgical History:   Procedure Laterality Date    ABDOMINAL SURGERY      PD catheter    BASAL CELL CARCINOMA EXCISION Left Jan 2011    left forehead    CHOLECYSTECTOMY  July 2010    ELBOW SURGERY Left 3/18/14    anterior submuscular transposition, ulnar nerve    EYE SURGERY Bilateral     bilateral cataracts    GASTRECTOMY      GASTRIC BYPASS  May 2014    gastric sleeve  June 2013    Malfunctioned requiring bypass    HERNIA REPAIR      ROTATOR CUFF REPAIR Left 2014    SHOULDER ARTHROSCOPY Left 11/18/14    RCR; glenoid labral repair; arch decompression     Family History     Problem Relation (Age of Onset)    Diabetes Mother, Father    Lung cancer Mother        Social History Main Topics    Smoking status: Never Smoker    Smokeless tobacco: Never Used    Alcohol use No    Drug use: No    Sexual activity: Yes     Partners: Female     Review of Systems   Constitutional: Negative for activity change, appetite change and fever.   HENT: Negative for congestion, dental problem, sneezing and sore throat.    Eyes: Negative for pain, discharge and visual disturbance.   Respiratory: Positive for shortness of breath. Negative for cough and wheezing.    Cardiovascular: Negative for chest pain, palpitations and leg swelling.   Gastrointestinal: Negative for abdominal distention, abdominal pain, constipation, diarrhea, nausea and vomiting.   Endocrine: Negative for polydipsia, polyphagia and polyuria.   Genitourinary: Negative for dysuria, frequency and urgency.   Musculoskeletal: Negative for back pain and gait problem.   Skin: Negative for rash and wound.   Neurological: Negative  for dizziness, seizures, syncope and headaches.   Hematological: Does not bruise/bleed easily.   Psychiatric/Behavioral: Negative for agitation and confusion. The patient is not nervous/anxious.      Objective:     Vital Signs (Most Recent):  Temp: 97.5 °F (36.4 °C) (07/13/18 0755)  Pulse: 80 (07/13/18 1340)  Resp: 16 (07/13/18 1340)  BP: (!) 172/80 (07/13/18 1340)  SpO2: 96 % (07/13/18 1340) Vital Signs (24h Range):  Temp:  [96.3 °F (35.7 °C)-98.2 °F (36.8 °C)] 97.5 °F (36.4 °C)  Pulse:  [69-86] 80  Resp:  [16-18] 16  SpO2:  [95 %-96 %] 96 %  BP: (116-172)/(64-89) 172/80     Weight: 71.6 kg (157 lb 13.6 oz)  Body mass index is 20.27 kg/m².    SpO2: 96 %  O2 Device (Oxygen Therapy): room air     Intake/Output - Last 3 Shifts       07/11 0700 - 07/12 0659 07/12 0700 - 07/13 0659 07/13 0700 - 07/14 0659    P.O. 180 240     Other 850  650    Total Intake(mL/kg) 1030 (14.8) 240 (3.4) 650 (9.1)    Urine (mL/kg/hr) 100 (0.1) 0 (0)     Other 1400 (0.8)  2157 (4.2)    Total Output 1500 0 2157    Net -470 +240 -1507           Urine Occurrence 3 x      Stool Occurrence 2 x             Lines/Drains/Airways     Drain                 Hemodialysis AV Fistula Left upper arm -- days          Peripheral Intravenous Line                 Peripheral IV - Single Lumen 07/10/18 Right Forearm 3 days                 STS Risk Score: 4.9%    Physical Exam   Constitutional: He is oriented to person, place, and time. He appears well-developed and well-nourished.   HENT:   Head: Normocephalic and atraumatic.   Eyes: Pupils are equal, round, and reactive to light.   Neck: Normal range of motion. Neck supple.   Cardiovascular: Normal rate and regular rhythm.    Murmur heard.  Pulmonary/Chest: Effort normal and breath sounds normal.   Abdominal: Soft. Bowel sounds are normal.   Musculoskeletal: Normal range of motion.   Neurological: He is alert and oriented to person, place, and time.   Skin: Skin is warm and dry.   Psychiatric: He has a normal  mood and affect. His behavior is normal.       Significant Labs:  CBC:   Recent Labs  Lab 07/13/18  0530   WBC 17.67*   RBC 3.27*   HGB 10.5*   HCT 33.7*      *   MCH 32.1*   MCHC 31.2*     CMP:   Recent Labs  Lab 07/11/18  1731  07/13/18  0530   GLU  --   < > 217*   CALCIUM  --   < > 8.8   PROT 6.5  --   --    NA  --   < > 132*   K  --   < > 4.8   CO2  --   < > 23   CL  --   < > 100   BUN  --   < > 40*   CREATININE  --   < > 4.3*   < > = values in this interval not displayed.    Significant Diagnostics:     stress ECHO 7/11/18    1 - Mildly to moderately depressed left ventricular systolic function (EF 40-45%).     2 - Concentric hypertrophy.     3 - Biatrial enlargement.     4 - Impaired LV relaxation, elevated LAP (grade 2 diastolic dysfunction).     5 - Right ventricular enlargement with mildly depressed systolic function.     6 - Pulmonary hypertension. The estimated PA systolic pressure is 62 mmHg.     7 - Mild pulmonic regurgitation.     8 - Trivial pericardial effusion.     9 - Intermediate central venous pressure.     10 - Bilateral pleural effusion.     11 - Ascites is noted.     12 - Moderate to severe aortic stenosis, ARMIN = 0.99 cm2, AVAi = 0.51 cm2/m2, peak velocity = 3.9 m/s, mean gradient = 30 mmHg.     13 - Dobutamine challlenge c/w moderate to severe AS with peak velocity 4.2m/s, MG 42mmHg and ARMIN 0.98cm2.     [facility] 7/12/18  - Left Main Coronary Artery:             The LM has luminal irregularities. There is ATA 3 flow.     - Left Anterior Descending Artery:             The LAD has luminal irregularities. There is ATA 3 flow.     - Left Circumflex Artery:             The LCX has luminal irregularities. There is ATA 3 flow.     - Right Coronary Artery:             The RCA has luminal irregularities. There is ATA 3 flow.     - Common Femoral Artery:             The right CFA is normal.

## 2018-07-13 NOTE — HPI
"Mr. Esparza is a 68 year old male who presents for cohort status as part of TAVR evaluation. PMHx includes ESRD on HD, CAD s/p NSTEMI in 2012, recurrent pleural effusions (attributed to ESRD and hypoalbuminemia) and ascites, hx gastric bypass in 2013 with suspected moderate-protein calorie malnutrition transferred from [place] by cardiology for acute on chronic combined HF thought possibly worsened by AoV disease (stenosis).     Patient had prior HFpEF with WHO G2 pulmonary HTN but follow-up ECHO recently confirmed moderate to severe LV dysfunction with LVEF 30-35%. He had moderate to severe AS with mean gradient of 21 and ARMIN of 1.12. He had a SPECT study completed which was negative for ischemia.      Patient had CXR done on 7/4/18 showing a large right pleural effusion.  He had an ultrasound done of the pleural effusion and a thoracentesis. CT scan of the chest was done on 7/5, showing a large right pleural effusion with suggestion of some loculation, aeration of the lung on the right base as well as the apex, difficult to exclude neoplasm, cardiomegaly, third spacing of fluids, diffuse soft tissue edema, diffuse atherosclerotic vascular calcifications, mild nodular fullness of the left adrenal, which was unchanged, and cholecystectomy and postoperative changes at the GE junction and the stomach. An ECHO was done on July 5th, read by Dr. Ch to show mild LVH. EF reduced about 30% with elevated left heart filling pressures, pulmonary hypertension, severely increased right and left atrial sizes.  Aortic stenosis was felt to be moderate.  He had a gradient that was felt to possibly underestimate the severity of the stenosis. The mean gradient was 25 mmHg, dimensionless index 0.25."    Patient is severely debilitated. Since his bypass surgery in 2013 (that required a revision shortly after) he has deteriorated in health- per wife. She states he will not eat and that he sleeps most of the time. She states it " has worsened over the past 6 weeks. He is now dependent on a walk behind walker/wheelchair. He lives at home with his wife who provides all his care. His daughter is a RN.

## 2018-07-13 NOTE — CONSULTS
[facility]  Cardiothoracic Surgery  Consult Note    Patient Name: Tom Gage  MRN: [id number]  Admission Date: 7/10/2018  Attending Physician: Maninder Del Rio MD  Referring Provider: Martinez Prasad    Patient information was obtained from patient, spouse/SO and past medical records.     Inpatient consult to Cardiothoracic Surgery  Consult performed by: DAVID FERNÁNDEZ  Consult ordered by: HIPOLITO TOLEDO  Reason for consult: TAVR eval         Subjective:     Principal Problem: Pleural effusion    History of Present Illness: Mr. Esparza is a 68 year old male who presents for cohort status as part of TAVR evaluation. PMHx includes ESRD on HD, CAD s/p NSTEMI in 2012, recurrent pleural effusions (attributed to ESRD and hypoalbuminemia) and ascites, hx gastric bypass in 2013 with suspected moderate-protein calorie malnutrition transferred from [place] by cardiology for acute on chronic combined HF thought possibly worsened by AoV disease (stenosis).     Patient had prior HFpEF with WHO G2 pulmonary HTN but follow-up ECHO recently confirmed moderate to severe LV dysfunction with LVEF 30-35%. He had moderate to severe AS with mean gradient of 21 and ARMIN of 1.12. He had a SPECT study completed which was negative for ischemia.      Patient had CXR done on 7/4/18 showing a large right pleural effusion.  He had an ultrasound done of the pleural effusion and a thoracentesis. CT scan of the chest was done on 7/5, showing a large right pleural effusion with suggestion of some loculation, aeration of the lung on the right base as well as the apex, difficult to exclude neoplasm, cardiomegaly, third spacing of fluids, diffuse soft tissue edema, diffuse atherosclerotic vascular calcifications, mild nodular fullness of the left adrenal, which was unchanged, and cholecystectomy and postoperative changes at the GE junction and the stomach. An ECHO was done on July 5th, read by Dr. Ch to show mild  "LVH. EF reduced about 30% with elevated left heart filling pressures, pulmonary hypertension, severely increased right and left atrial sizes.  Aortic stenosis was felt to be moderate.  He had a gradient that was felt to possibly underestimate the severity of the stenosis. The mean gradient was 25 mmHg, dimensionless index 0.25."    Patient is severely debilitated. Since his bypass surgery in 2013 (that required a revision shortly after) he has deteriorated in health- per wife. She states he will not eat and that he sleeps most of the time. She states it has worsened over the past 6 weeks. He is now dependent on a walk behind walker/wheelchair. He lives at home with his wife who provides all his care. His daughter is a RN.     No current facility-administered medications on file prior to encounter.      Current Outpatient Prescriptions on File Prior to Encounter   Medication Sig    calcitRIOL (ROCALTROL) 0.5 MCG Cap Take 0.5 mcg by mouth once daily.    carvedilol (COREG) 25 MG tablet Take 1 tablet (25 mg total) by mouth 2 (two) times daily with meals.    furosemide (LASIX) 80 MG tablet 80 mg every 12 (twelve) hours.     losartan (COZAAR) 100 MG tablet Take 1 tablet (100 mg total) by mouth once daily.    pantoprazole (PROTONIX) 40 MG tablet Take 40 mg by mouth 2 (two) times daily as needed.     sevelamer carbonate (RENVELA) 800 mg Tab Take 1,600 mg by mouth 3 (three) times daily. Takes two 800 mg tablets (1600 mg) with meals    vitamin D 1000 units Tab Take 1,000 Units by mouth once daily.    nitroGLYCERIN (NITROSTAT) 0.4 MG SL tablet Place 0.4 mg under the tongue every 5 (five) minutes as needed for Chest pain.       Review of patient's allergies indicates:   Allergen Reactions    Latex, natural rubber Rash and Blisters     Latex tape causes blisters    Ace inhibitors Other (See Comments)     Other reaction(s): Cough    Adhesive Dermatitis and Blisters     Please use Paper Tape    Morphine Hives, Itching, " "Dermatitis and Rash     Body Rash, Phlebitis, "My veins showed through the skin."    Iodine and iodide containing products      IVP dye       Past Medical History:   Diagnosis Date    Anemia of chronic renal failure, stage 5     BPH (benign prostatic hyperplasia)     CAD (coronary artery disease)     CHF (congestive heart failure)     Chronic systolic heart failure 5/31/2018    CKD (chronic kidney disease) stage 5, GFR less than 15 ml/min     Diastolic dysfunction 12/16/2016    Dilated cardiomyopathy 5/7/2018    GERD (gastroesophageal reflux disease)     Hyperparathyroidism, secondary renal     Hypertension     Metabolic acidosis     MI (myocardial infarction) Feb 2012    Non-rheumatic mitral regurgitation 5/31/2018    Nonrheumatic aortic valve stenosis 5/31/2018    Pulmonary hypertension 12/16/2016    Stenosis of aortic and mitral valves     Aortic stenosis    TIA (transient ischemic attack)     Type 2 diabetes mellitus with diabetic nephropathy     history/ before wt loss    Valvular regurgitation     mitral regurgitation     Past Surgical History:   Procedure Laterality Date    ABDOMINAL SURGERY      PD catheter    BASAL CELL CARCINOMA EXCISION Left Jan 2011    left forehead    CHOLECYSTECTOMY  July 2010    ELBOW SURGERY Left 3/18/14    anterior submuscular transposition, ulnar nerve    EYE SURGERY Bilateral     bilateral cataracts    GASTRECTOMY      GASTRIC BYPASS  May 2014    gastric sleeve  June 2013    Malfunctioned requiring bypass    HERNIA REPAIR      ROTATOR CUFF REPAIR Left 2014    SHOULDER ARTHROSCOPY Left 11/18/14    RCR; glenoid labral repair; arch decompression     Family History     Problem Relation (Age of Onset)    Diabetes Mother, Father    Lung cancer Mother        Social History Main Topics    Smoking status: Never Smoker    Smokeless tobacco: Never Used    Alcohol use No    Drug use: No    Sexual activity: Yes     Partners: Female     Review of Systems "   Constitutional: Negative for activity change, appetite change and fever.   HENT: Negative for congestion, dental problem, sneezing and sore throat.    Eyes: Negative for pain, discharge and visual disturbance.   Respiratory: Positive for shortness of breath. Negative for cough and wheezing.    Cardiovascular: Negative for chest pain, palpitations and leg swelling.   Gastrointestinal: Negative for abdominal distention, abdominal pain, constipation, diarrhea, nausea and vomiting.   Endocrine: Negative for polydipsia, polyphagia and polyuria.   Genitourinary: Negative for dysuria, frequency and urgency.   Musculoskeletal: Negative for back pain and gait problem.   Skin: Negative for rash and wound.   Neurological: Negative for dizziness, seizures, syncope and headaches.   Hematological: Does not bruise/bleed easily.   Psychiatric/Behavioral: Negative for agitation and confusion. The patient is not nervous/anxious.      Objective:     Vital Signs (Most Recent):  Temp: 97.5 °F (36.4 °C) (07/13/18 0755)  Pulse: 80 (07/13/18 1340)  Resp: 16 (07/13/18 1340)  BP: (!) 172/80 (07/13/18 1340)  SpO2: 96 % (07/13/18 1340) Vital Signs (24h Range):  Temp:  [96.3 °F (35.7 °C)-98.2 °F (36.8 °C)] 97.5 °F (36.4 °C)  Pulse:  [69-86] 80  Resp:  [16-18] 16  SpO2:  [95 %-96 %] 96 %  BP: (116-172)/(64-89) 172/80     Weight: 71.6 kg (157 lb 13.6 oz)  Body mass index is 20.27 kg/m².    SpO2: 96 %  O2 Device (Oxygen Therapy): room air     Intake/Output - Last 3 Shifts       07/11 0700 - 07/12 0659 07/12 0700 - 07/13 0659 07/13 0700 - 07/14 0659    P.O. 180 240     Other 850  650    Total Intake(mL/kg) 1030 (14.8) 240 (3.4) 650 (9.1)    Urine (mL/kg/hr) 100 (0.1) 0 (0)     Other 1400 (0.8)  2157 (4.2)    Total Output 1500 0 2157    Net -470 +240 -1507           Urine Occurrence 3 x      Stool Occurrence 2 x             Lines/Drains/Airways     Drain                 Hemodialysis AV Fistula Left upper arm -- days          Peripheral Intravenous  Line                 Peripheral IV - Single Lumen 07/10/18 Right Forearm 3 days                 STS Risk Score: 4.9%    Physical Exam   Constitutional: He is oriented to person, place, and time. He appears well-developed and well-nourished.   HENT:   Head: Normocephalic and atraumatic.   Eyes: Pupils are equal, round, and reactive to light.   Neck: Normal range of motion. Neck supple.   Cardiovascular: Normal rate and regular rhythm.    Murmur heard.  Pulmonary/Chest: Effort normal and breath sounds normal.   Abdominal: Soft. Bowel sounds are normal.   Musculoskeletal: Normal range of motion.   Neurological: He is alert and oriented to person, place, and time.   Skin: Skin is warm and dry.   Psychiatric: He has a normal mood and affect. His behavior is normal.       Significant Labs:  CBC:   Recent Labs  Lab 07/13/18  0530   WBC 17.67*   RBC 3.27*   HGB 10.5*   HCT 33.7*      *   MCH 32.1*   MCHC 31.2*     CMP:   Recent Labs  Lab 07/11/18  1731  07/13/18  0530   GLU  --   < > 217*   CALCIUM  --   < > 8.8   PROT 6.5  --   --    NA  --   < > 132*   K  --   < > 4.8   CO2  --   < > 23   CL  --   < > 100   BUN  --   < > 40*   CREATININE  --   < > 4.3*   < > = values in this interval not displayed.    Significant Diagnostics:     stress ECHO 7/11/18    1 - Mildly to moderately depressed left ventricular systolic function (EF 40-45%).     2 - Concentric hypertrophy.     3 - Biatrial enlargement.     4 - Impaired LV relaxation, elevated LAP (grade 2 diastolic dysfunction).     5 - Right ventricular enlargement with mildly depressed systolic function.     6 - Pulmonary hypertension. The estimated PA systolic pressure is 62 mmHg.     7 - Mild pulmonic regurgitation.     8 - Trivial pericardial effusion.     9 - Intermediate central venous pressure.     10 - Bilateral pleural effusion.     11 - Ascites is noted.     12 - Moderate to severe aortic stenosis, ARMIN = 0.99 cm2, AVAi = 0.51 cm2/m2, peak velocity =  3.9 m/s, mean gradient = 30 mmHg.     13 - Dobutamine challlenge c/w moderate to severe AS with peak velocity 4.2m/s, MG 42mmHg and ARMIN 0.98cm2.     [facility] 7/12/18  - Left Main Coronary Artery:             The LM has luminal irregularities. There is ATA 3 flow.     - Left Anterior Descending Artery:             The LAD has luminal irregularities. There is ATA 3 flow.     - Left Circumflex Artery:             The LCX has luminal irregularities. There is ATA 3 flow.     - Right Coronary Artery:             The RCA has luminal irregularities. There is ATA 3 flow.     - Common Femoral Artery:             The right CFA is normal.    Assessment/Plan:     NYHA Score: NYHA IV: inability to carry on any physical activity without discomfort    Aortic stenosis    68 year old male who presents for cohort status as part of TAVR evaluation. PMHx includes ESRD on HD since 2016, CAD s/p NSTEMI in 2012, recurrent pleural effusions (attributed to ESRD and hypoalbuminemia) and ascites, hx gastric bypass in 2013 with suspected moderate-protein calorie malnutrition.    Pt is considered high risk for surgery based on his severe debilitated status and co morbid conditons. He is wheel chair/walking dependent. He is malnourished. Please proceed with TAVR evaluation. Dr. Thomson to staff         Thank you for your consult. I will sign off. Please contact us if you have any additional questions.    Ananya Marcial NP  Cardiothoracic Surgery  [facility]

## 2018-07-13 NOTE — NURSING TRANSFER
Nursing Transfer Note      7/13/2018     Transfer To: pulmonary function lab    Transfer via wheelchair    Transfer with cardiac monitoring    Transported by transport tech

## 2018-07-13 NOTE — PROGRESS NOTES
"Progress Note  Hospital Medicine    Provider team:    [facility]  [facility]  Admit Date: 7/10/2018  Encounter Date: 07/13/2018     SUBJECTIVE:     Follow-up Visit for: Pleural effusion    HPI (See H&P for complete P,F,SHx):   68M with ESRD on HD, CAD s/p NSTEMI in 2012, recurrent pleural effusions (attributed to ESRD and hypoalbuminemia) and ascites - per patient was f/b Dr. Sarmiento prior, hx gastric bypass with suspected moderate-protein calorie malnutrition transferred from [place] by cardiology for acute on chronic combined HF thought possibly worsened by AoV disease (stenosis). Patient had prior HFpEF with WHO G2 pulmonary HTN but follow-up ECHO recently confirmed moderate to severe LV dysfunction with LVEF 30-35%. He had moderate to severe AS with mean gradient of 21 and ARMIN of 1.12. He had a SPECT study completed which was negative for ischemia. The patient had been treated for combined HF with diuretics and HD for volume management. The referring cardiologist (Dr. Calles) discussed transfer for TAVR evaluation. He suspects the patient will need a dobutamine stress ECHO to further evaluate.  When queried whether this evaluation needed to occur as inpatient, the referring MD spoke with Dr. Mcmanus and this was decided to be necessary for inpatient evaluation.     Further, per review of Dr. Calles's note: "Patient had CXR done on the 4th (July 4th) showing a large right pleural effusion with cardiomegaly without evidence of heart failure.  He had an ultrasound done of the pleural effusion and a thoracentesis. CT scan of the chest was done on the 5th, showing a large right pleural effusion with suggestion of some loculation, aeration of the lung on the right base as well as the apex, difficult to exclude neoplasm, cardiomegaly, third spacing of fluids, diffuse soft tissue edema, diffuse atherosclerotic vascular calcifications, mild nodular fullness of the left adrenal, which was unchanged, and " "cholecystectomy and postoperative changes at the GE junction and the stomach. An ECHO was done on July 5th, read by Dr. Ch to show mild LVH. EF reduced about 30% with elevated left heart filling pressures, pulmonary hypertension, severely increased right and left atrial sizes.  Aortic stenosis was felt to be moderate.  He had a gradient that was felt to possibly underestimate the severity of the stenosis. The mean gradient was 25 mmHg, dimensionless index 0.25."     ECHO 5/1/2018  CONCLUSIONS     1 - Upper limit of normal left ventricular enlargement.     2 - Moderately depressed left ventricular systolic function (EF 30-35%).     3 - Concentric hypertrophy.     4 - Right ventricular enlargement with mildly to moderately depressed systolic function.     5 - Biatrial enlargement.     6 - Moderate to severe aortic stenosis, ARMIN = 1.12 cm2, AVAi = 0.61 cm2/m2, peak velocity = 3.05 m/s, mean gradient = 21 mmHg.     7 - The mitral valve is moderately sclerotic with mildly restricted leaflet mobility.     8 - Trivial mitral stenosis, MVA = 3.14 cm2.     9 - Mild to moderate mitral regurgitation.     10 - Severe tricuspid regurgitation.     11 - Mild pulmonic regurgitation.     12 - Intermediate central venous pressure.     13 - Pulmonary hypertension. The estimated PA systolic pressure is 76 mmHg.      Nuclear stress 5/10/2018  Impression: NORMAL MYOCARDIAL PERFUSION  1. The perfusion scan is free of evidence for myocardial ischemia or injury.   2. Resting wall motion is physiologic.   3. Resting LV function is normal.  (normal is >= 51%)  4. The ventricular volumes are normal at rest and stress.   5. The extracardiac distribution of radioactivity is normal.   6. When compared to the previous study from 01/20/2016, there are no significant interval changes in the perfusion pattern.    DSE 7/11/2018  CONCLUSIONS     1 - Mildly to moderately depressed left ventricular systolic function (EF 40-45%).     2 - Concentric " hypertrophy.     3 - Biatrial enlargement.     4 - Impaired LV relaxation, elevated LAP (grade 2 diastolic dysfunction).     5 - Right ventricular enlargement with mildly depressed systolic function.     6 - Pulmonary hypertension. The estimated PA systolic pressure is 62 mmHg.     7 - Mild pulmonic regurgitation.     8 - Trivial pericardial effusion.     9 - Intermediate central venous pressure.     10 - Bilateral pleural effusion.     11 - Ascites is noted.     12 - Moderate to severe aortic stenosis, ARMIN = 0.99 cm2, AVAi = 0.51 cm2/m2, peak velocity = 3.9 m/s, mean gradient = 30 mmHg.     13 - Dobutamine challlenge c/w moderate to severe AS with peak velocity 4.2m/s, MG 42mmHg and ARMIN 0.98cm2.     Interval history:  Patient reports fatigue; he is not having chest pain or SOB. The patient appears a bit debilitated due to ongoing medical issues.  He completed BAV yesterday with success. Plan is for evaluation by CTS.  Structural team recommending PFT and CT TAVR. Family requesting PT/OT evaluation as they feel he will benefit from SNF.    Review of Systems:  Review of Systems   Constitutional: Positive for weight loss. Negative for chills and fever.   HENT: Negative for congestion and sore throat.    Eyes: Negative for photophobia, pain and discharge.   Respiratory: Negative for cough, hemoptysis, sputum production and shortness of breath.    Cardiovascular: Negative for chest pain, palpitations and leg swelling.   Gastrointestinal: Negative for abdominal pain, diarrhea, nausea and vomiting.   Genitourinary: Negative for dysuria and urgency.   Musculoskeletal: Negative for myalgias and neck pain.   Skin: Negative for itching and rash.   Neurological: Positive for weakness. Negative for sensory change, focal weakness and headaches.   Endo/Heme/Allergies: Negative for polydipsia. Does not bruise/bleed easily.   Psychiatric/Behavioral: Negative for depression and suicidal ideas.       OBJECTIVE:       Intake/Output  "Summary (Last 24 hours) at 07/13/18 1411  Last data filed at 07/13/18 1211   Gross per 24 hour   Intake              650 ml   Output             2157 ml   Net            -1507 ml     Vital Signs Range (Last 24H):  Temp:  [96.3 °F (35.7 °C)-98.2 °F (36.8 °C)]   Pulse:  [69-86]   Resp:  [16-18]   BP: (116-172)/(64-89)   SpO2:  [95 %-96 %]   Body mass index is 20.27 kg/m².    Objective:  General Appearance:  Comfortable, well-appearing, in no acute distress and not in pain.    Vital signs: (most recent): Blood pressure (!) 172/80, pulse 80, temperature 97.5 °F (36.4 °C), temperature source Oral, resp. rate 16, height 6' 2" (1.88 m), weight 71.6 kg (157 lb 13.6 oz), SpO2 96 %.  No fever.    Output: Producing urine and producing stool.    HEENT: Normal HEENT exam.    Lungs:  Normal effort.  Breath sounds clear to auscultation.  He is not in respiratory distress.  There are rales and decreased breath sounds.  No wheezes.    Heart: Normal rate.  Regular rhythm.  S1 normal and S2 normal.  Positive for murmur.    Chest: Symmetric chest wall expansion.   Abdomen: Abdomen is soft.  Bowel sounds are normal.   There is no abdominal tenderness.     Extremities: Normal range of motion.  There is no deformity, effusion, dependent edema or local swelling.    Pulses: Distal pulses are intact.    Neurological: Patient is alert and oriented to person, place and time.  Normal strength.    Pupils:  Pupils are equal, round, and reactive to light.    Skin:  Warm and dry.      Medications:  Medication list was reviewed in EPIC and changes noted under Assessment/Plan and MAR.    Laboratory:  Recent Labs      07/13/18   0530   WBC  17.67*   RBC  3.27*   HGB  10.5*   HCT  33.7*   PLT  314   MCV  103*   MCH  32.1*   MCHC  31.2*   GRAN  93.9*  16.6*   LYMPH  2.4*  0.4*   MONO  1.8*  0.3   EOS  0.0      Recent Labs      07/13/18   0530   GLU  217*   NA  132*   K  4.8   CL  100   CO2  23   BUN  40*   CREATININE  4.3*   CALCIUM  8.8   ANIONGAP  " 9   MG  2.0   PHOS  4.5       ASSESSMENT/PLAN:     Active Hospital Problems    Diagnosis  POA    *Pleural effusion [J90]  Yes    Moderate protein malnutrition [E44.0]  Yes    Heart failure with reduced ejection fraction, NYHA class III [I50.20]  Yes     Chronic    Aortic stenosis [I35.0]  Yes    Pulmonary hypertension group 2 [I27.20]  Yes    Diastolic dysfunction [I51.9]  Yes    CKD (chronic kidney disease) stage V requiring chronic dialysis [N18.6, Z99.2]  Not Applicable    Type 2 diabetes mellitus with diabetic nephropathy [E11.21]  Yes     Chronic    Anemia of chronic renal failure, stage 5 [N18.5, D63.1]  Yes     Chronic    Coronary artery disease involving native coronary artery of native heart without angina pectoris [I25.10]  Yes    Renovascular hypertension [I15.0]  Yes      Resolved Hospital Problems    Diagnosis Date Resolved POA    ESRD on dialysis [N18.6, Z99.2] 07/10/2018 Not Applicable      AoV stenosis, non-rheumatic  - Interventional cardiology consulted  - Dobutamine stress ECHO confirms severe AoS  - S/p BAV, also with evaluation of coronary arteries showing NOCAD  - CTS consulted - proceed with TAVR  - PFTs ordered  - CT TAVR ordered (confirmed with family, there is no history of iodinated-contrast allergy whatsoever)  - Will d/w nephrology if HD required after CT TAVR in AM  - PT/OT evaluation to see if SNF required for debility which should improve outcome    Pleural effusion, R  - Thought 2/2 combined HF, hypoalbuminemia and ESRD  - Pulmonology consulted for thoracentesis  - Volume management with HD  - Appreciate pulm recs - defer PleurX due to risk of protein calorie malnutrition 2/2 continuous drainage     Acute on chronic combined heart failure, NYHA Class III  Hypertensive heart disease with heart failure  Pulmonary HTN WHO G2  - ECHO as above  - C/w GDMT: ARB and BB (once dobutamine ECHO completed)  - Okay to continue with maintenance diuretics  - Volume management with HD  -  Strict I/O  - Daily weights     ESRD on HD  - Nephrology consulted for HD  - C/w sevelamer, vitamin D  - Avoid nephrotoxic agents     CAD native heart/artery without angina  - Unclear why not on ASA  - Will initiate ASA  - Initiate statin     Gastric bypass, history of   Moderate protein calorie malnutrition  - Nutrition consult  - Frequent small meals requested by patient  - Thiamine, folate, B12 normal     T2DM  - a1c at goal  - Per patient is diet controlled  - No present indication for SSI     GI/DVT PPx in place.  Cardiac diet  Full code.     Anticipated discharge date and disposition:   Will d/w nephrology if HD required in AM after planned CTA TAVR.  Patient needs PT/OT evaluation, may need SNF.  Maninder Del Rio MD  [facility]

## 2018-07-13 NOTE — PLAN OF CARE
Problem: Patient Care Overview  Goal: Plan of Care Review    Recommendations     Recommendation/Intervention:   1. Continue current cardiac diet with Beneprotein. Pt with good intake at this time.   2. Order Optisource TID.   3. RD following.     Goals: Intake >/=85% EEN/EPN with no further weight loss  Nutrition Goal Status: progressing towards goal

## 2018-07-13 NOTE — NURSING TRANSFER
Nursing Transfer Note      7/13/2018     Transfer From: pulmonary function lab    Transfer via wheelchair    Transfer with cardiac monitoring    Transported by transport tech

## 2018-07-13 NOTE — PROGRESS NOTES
Patient arrived via stretcher and weighed in bed. Pt c/o pain in groin (heart cath site); tylenol given as ordered. 0835 Needles inserted without difficulty to left upper arm fistula; secured. Maintenance HD initiated; lines secured. Patient without complaint.

## 2018-07-13 NOTE — NURSING TRANSFER
Nursing Transfer Note      7/13/2018     Transfer To: [facility]    Transfer via wheelchair    Transfer with cardiac monitoring    Transported by transport tech

## 2018-07-14 VITALS
OXYGEN SATURATION: 94 % | BODY MASS INDEX: 20.14 KG/M2 | HEIGHT: 74 IN | SYSTOLIC BLOOD PRESSURE: 168 MMHG | HEART RATE: 80 BPM | WEIGHT: 156.94 LBS | TEMPERATURE: 98 F | DIASTOLIC BLOOD PRESSURE: 84 MMHG | RESPIRATION RATE: 16 BRPM

## 2018-07-14 LAB
BACTERIA FLD CULT: NORMAL

## 2018-07-14 PROCEDURE — 97165 OT EVAL LOW COMPLEX 30 MIN: CPT | Mod: NTX

## 2018-07-14 PROCEDURE — A4216 STERILE WATER/SALINE, 10 ML: HCPCS | Mod: NTX | Performed by: HOSPITALIST

## 2018-07-14 PROCEDURE — G8987 SELF CARE CURRENT STATUS: HCPCS | Mod: CK,NTX

## 2018-07-14 PROCEDURE — G8988 SELF CARE GOAL STATUS: HCPCS | Mod: CJ,NTX

## 2018-07-14 PROCEDURE — 25000003 PHARM REV CODE 250: Mod: NTX | Performed by: HOSPITALIST

## 2018-07-14 PROCEDURE — 25500020 PHARM REV CODE 255: Mod: NTX | Performed by: HOSPITALIST

## 2018-07-14 PROCEDURE — 99239 HOSP IP/OBS DSCHRG MGMT >30: CPT | Mod: NTX,,, | Performed by: HOSPITALIST

## 2018-07-14 PROCEDURE — 25000003 PHARM REV CODE 250: Mod: NTX | Performed by: STUDENT IN AN ORGANIZED HEALTH CARE EDUCATION/TRAINING PROGRAM

## 2018-07-14 RX ORDER — ASPIRIN 81 MG/1
81 TABLET ORAL DAILY
Qty: 30 TABLET | Refills: 2 | Status: SHIPPED | OUTPATIENT
Start: 2018-07-15 | End: 2018-08-14

## 2018-07-14 RX ORDER — ATORVASTATIN CALCIUM 10 MG/1
10 TABLET, FILM COATED ORAL DAILY
Qty: 30 TABLET | Refills: 2 | Status: SHIPPED | OUTPATIENT
Start: 2018-07-15 | End: 2018-10-04

## 2018-07-14 RX ADMIN — CALCITRIOL 0.5 MCG: 0.25 CAPSULE, LIQUID FILLED ORAL at 09:07

## 2018-07-14 RX ADMIN — PANTOPRAZOLE SODIUM 40 MG: 40 TABLET, DELAYED RELEASE ORAL at 09:07

## 2018-07-14 RX ADMIN — LOSARTAN POTASSIUM 100 MG: 50 TABLET ORAL at 09:07

## 2018-07-14 RX ADMIN — VITAMIN D, TAB 1000IU (100/BT) 1000 UNITS: 25 TAB at 09:07

## 2018-07-14 RX ADMIN — SEVELAMER CARBONATE 1600 MG: 800 TABLET, FILM COATED ORAL at 01:07

## 2018-07-14 RX ADMIN — SODIUM CHLORIDE, PRESERVATIVE FREE 3 ML: 5 INJECTION INTRAVENOUS at 01:07

## 2018-07-14 RX ADMIN — FUROSEMIDE 80 MG: 80 TABLET ORAL at 09:07

## 2018-07-14 RX ADMIN — ATORVASTATIN CALCIUM 10 MG: 10 TABLET, FILM COATED ORAL at 09:07

## 2018-07-14 RX ADMIN — ASPIRIN 81 MG: 81 TABLET, COATED ORAL at 09:07

## 2018-07-14 RX ADMIN — IOHEXOL 100 ML: 350 INJECTION, SOLUTION INTRAVENOUS at 11:07

## 2018-07-14 NOTE — PLAN OF CARE
Problem: Occupational Therapy Goal  Goal: Occupational Therapy Goal  Goals to be met by: 7/22/18     Patient will increase functional independence with ADLs by performing:    UE Dressing with Modified [place].  LE Dressing with Modified [place].  Grooming while standing with Supervision.  Toileting from toilet with Supervision for hygiene and clothing management.   Rolling to Bilateral with Modified [place].   Supine to sit with Modified [place].  Stand pivot transfers with Modified [place].    Outcome: Ongoing (interventions implemented as appropriate)  OT eval completed.

## 2018-07-14 NOTE — PROGRESS NOTES
"Progress Note  Hospital Medicine    Provider team:    [facility]  [facility]  Admit Date: 7/10/2018  Encounter Date: 07/14/2018     SUBJECTIVE:     Follow-up Visit for: Pleural effusion    HPI (See H&P for complete P,F,SHx):   68M with ESRD on HD, CAD s/p NSTEMI in 2012, recurrent pleural effusions (attributed to ESRD and hypoalbuminemia) and ascites - per patient was f/b Dr. Sarmiento prior, hx gastric bypass with suspected moderate-protein calorie malnutrition transferred from [place] by cardiology for acute on chronic combined HF thought possibly worsened by AoV disease (stenosis). Patient had prior HFpEF with WHO G2 pulmonary HTN but follow-up ECHO recently confirmed moderate to severe LV dysfunction with LVEF 30-35%. He had moderate to severe AS with mean gradient of 21 and ARMIN of 1.12. He had a SPECT study completed which was negative for ischemia. The patient had been treated for combined HF with diuretics and HD for volume management. The referring cardiologist (Dr. Calles) discussed transfer for TAVR evaluation. He suspects the patient will need a dobutamine stress ECHO to further evaluate.  When queried whether this evaluation needed to occur as inpatient, the referring MD spoke with Dr. Mcmanus and this was decided to be necessary for inpatient evaluation.     Further, per review of Dr. Calles's note: "Patient had CXR done on the 4th (July 4th) showing a large right pleural effusion with cardiomegaly without evidence of heart failure.  He had an ultrasound done of the pleural effusion and a thoracentesis. CT scan of the chest was done on the 5th, showing a large right pleural effusion with suggestion of some loculation, aeration of the lung on the right base as well as the apex, difficult to exclude neoplasm, cardiomegaly, third spacing of fluids, diffuse soft tissue edema, diffuse atherosclerotic vascular calcifications, mild nodular fullness of the left adrenal, which was unchanged, and " "cholecystectomy and postoperative changes at the GE junction and the stomach. An ECHO was done on July 5th, read by Dr. Ch to show mild LVH. EF reduced about 30% with elevated left heart filling pressures, pulmonary hypertension, severely increased right and left atrial sizes.  Aortic stenosis was felt to be moderate.  He had a gradient that was felt to possibly underestimate the severity of the stenosis. The mean gradient was 25 mmHg, dimensionless index 0.25."     ECHO 5/1/2018  CONCLUSIONS     1 - Upper limit of normal left ventricular enlargement.     2 - Moderately depressed left ventricular systolic function (EF 30-35%).     3 - Concentric hypertrophy.     4 - Right ventricular enlargement with mildly to moderately depressed systolic function.     5 - Biatrial enlargement.     6 - Moderate to severe aortic stenosis, ARMIN = 1.12 cm2, AVAi = 0.61 cm2/m2, peak velocity = 3.05 m/s, mean gradient = 21 mmHg.     7 - The mitral valve is moderately sclerotic with mildly restricted leaflet mobility.     8 - Trivial mitral stenosis, MVA = 3.14 cm2.     9 - Mild to moderate mitral regurgitation.     10 - Severe tricuspid regurgitation.     11 - Mild pulmonic regurgitation.     12 - Intermediate central venous pressure.     13 - Pulmonary hypertension. The estimated PA systolic pressure is 76 mmHg.      Nuclear stress 5/10/2018  Impression: NORMAL MYOCARDIAL PERFUSION  1. The perfusion scan is free of evidence for myocardial ischemia or injury.   2. Resting wall motion is physiologic.   3. Resting LV function is normal.  (normal is >= 51%)  4. The ventricular volumes are normal at rest and stress.   5. The extracardiac distribution of radioactivity is normal.   6. When compared to the previous study from 01/20/2016, there are no significant interval changes in the perfusion pattern.    DSE 7/11/2018  CONCLUSIONS     1 - Mildly to moderately depressed left ventricular systolic function (EF 40-45%).     2 - Concentric " hypertrophy.     3 - Biatrial enlargement.     4 - Impaired LV relaxation, elevated LAP (grade 2 diastolic dysfunction).     5 - Right ventricular enlargement with mildly depressed systolic function.     6 - Pulmonary hypertension. The estimated PA systolic pressure is 62 mmHg.     7 - Mild pulmonic regurgitation.     8 - Trivial pericardial effusion.     9 - Intermediate central venous pressure.     10 - Bilateral pleural effusion.     11 - Ascites is noted.     12 - Moderate to severe aortic stenosis, ARMIN = 0.99 cm2, AVAi = 0.51 cm2/m2, peak velocity = 3.9 m/s, mean gradient = 30 mmHg.     13 - Dobutamine challlenge c/w moderate to severe AS with peak velocity 4.2m/s, MG 42mmHg and ARMIN 0.98cm2.     Interval history:  Patient reports fatigue; he is not having chest pain or SOB. The patient is seen by PT/OT and recommending of HH services with PT/OT. Patient and family are in agreement with this plan.  The CM on call will meet with patient and family.  Patient had CT TAVR today. He will be referred to interventional cardiology and pulmonology for further w/u of aortic stenosis and pleural effusion, respectively.    Review of Systems:  Review of Systems   Constitutional: Positive for weight loss. Negative for chills and fever.   HENT: Negative for congestion and sore throat.    Eyes: Negative for photophobia, pain and discharge.   Respiratory: Negative for cough, hemoptysis, sputum production and shortness of breath.    Cardiovascular: Negative for chest pain, palpitations and leg swelling.   Gastrointestinal: Negative for abdominal pain, diarrhea, nausea and vomiting.   Genitourinary: Negative for dysuria and urgency.   Musculoskeletal: Negative for myalgias and neck pain.   Skin: Negative for itching and rash.   Neurological: Positive for weakness. Negative for sensory change, focal weakness and headaches.   Endo/Heme/Allergies: Negative for polydipsia. Does not bruise/bleed easily.   Psychiatric/Behavioral:  "Negative for depression and suicidal ideas.       OBJECTIVE:       Intake/Output Summary (Last 24 hours) at 07/14/18 0948  Last data filed at 07/14/18 0600   Gross per 24 hour   Intake             1070 ml   Output             2157 ml   Net            -1087 ml     Vital Signs Range (Last 24H):  Temp:  [97.8 °F (36.6 °C)-98.7 °F (37.1 °C)]   Pulse:  [72-95]   Resp:  [16-18]   BP: (116-172)/(64-89)   SpO2:  [91 %-97 %]   Body mass index is 20.15 kg/m².    Objective:  General Appearance:  Comfortable, well-appearing, in no acute distress and not in pain.    Vital signs: (most recent): Blood pressure (!) 161/86, pulse 88, temperature 98.1 °F (36.7 °C), temperature source Oral, resp. rate 16, height 6' 2" (1.88 m), weight 71.2 kg (156 lb 15.5 oz), SpO2 (!) 94 %.  No fever.    Output: Producing urine and producing stool.    HEENT: Normal HEENT exam.    Lungs:  Normal effort.  Breath sounds clear to auscultation.  He is not in respiratory distress.  There are rales and decreased breath sounds.  No wheezes.    Heart: Normal rate.  Regular rhythm.  S1 normal and S2 normal.  Positive for murmur.    Chest: Symmetric chest wall expansion.   Abdomen: Abdomen is soft.  Bowel sounds are normal.   There is no abdominal tenderness.     Extremities: Normal range of motion.  There is no deformity, effusion, dependent edema or local swelling.    Pulses: Distal pulses are intact.    Neurological: Patient is alert and oriented to person, place and time.  Normal strength.    Pupils:  Pupils are equal, round, and reactive to light.    Skin:  Warm and dry.      Medications:  Medication list was reviewed in EPIC and changes noted under Assessment/Plan and MAR.    Laboratory:  Recent Labs      07/13/18   0530   WBC  17.67*   RBC  3.27*   HGB  10.5*   HCT  33.7*   PLT  314   MCV  103*   MCH  32.1*   MCHC  31.2*   GRAN  93.9*  16.6*   LYMPH  2.4*  0.4*   MONO  1.8*  0.3   EOS  0.0      Recent Labs      07/13/18   0530   GLU  217*   NA  132* "   K  4.8   CL  100   CO2  23   BUN  40*   CREATININE  4.3*   CALCIUM  8.8   ANIONGAP  9   MG  2.0   PHOS  4.5       ASSESSMENT/PLAN:     Active Hospital Problems    Diagnosis  POA    *Pleural effusion [J90]  Yes    Moderate protein malnutrition [E44.0]  Yes    Heart failure with reduced ejection fraction, NYHA class III [I50.20]  Yes     Chronic    Aortic stenosis [I35.0]  Yes    Pulmonary hypertension group 2 [I27.20]  Yes    Diastolic dysfunction [I51.9]  Yes    CKD (chronic kidney disease) stage V requiring chronic dialysis [N18.6, Z99.2]  Not Applicable    Type 2 diabetes mellitus with diabetic nephropathy [E11.21]  Yes     Chronic    Anemia of chronic renal failure, stage 5 [N18.5, D63.1]  Yes     Chronic    Coronary artery disease involving native coronary artery of native heart without angina pectoris [I25.10]  Yes    Renovascular hypertension [I15.0]  Yes      Resolved Hospital Problems    Diagnosis Date Resolved POA    ESRD on dialysis [N18.6, Z99.2] 07/10/2018 Not Applicable      AoV stenosis, non-rheumatic  - Interventional cardiology consulted  - Dobutamine stress ECHO confirms severe AoS  - S/p BAV, also with evaluation of coronary arteries showing NOCAD  - CTS consulted - rec proceed with TAVR  - PFTs ordered; patient will need repeat PFTs as outpt to improve accuracy  - CT TAVR ordered (confirmed with family, there is no history of iodinated-contrast allergy whatsoever)  - Per nephrology no HD required after CT TAVR, can f/u as scheduled on 7/16  - PT/OT rec HH PTOT    Pleural effusion, R  - Thought 2/2 combined HF, hypoalbuminemia and ESRD  - Pulmonology consulted for thoracentesis  - Volume management with HD  - Appreciate pulm recs - defer PleurX due to risk of protein calorie malnutrition 2/2 continuous drainage  - F/u with pulmonary as outpatient     Acute on chronic combined heart failure, NYHA Class III  Hypertensive heart disease with heart failure  Pulmonary HTN WHO G2  - ECHO as  above  - C/w GDMT: ARB and BB  - Okay to continue with maintenance diuretics  - Volume management with HD  - Strict I/O  - Daily weights     ESRD on HD  - Nephrology consulted for HD  - C/w sevelamer, vitamin D  - Avoid nephrotoxic agents     CAD native heart/artery without angina  - Unclear why not on ASA  - Will initiate ASA  - Initiate statin     Gastric bypass, history of   Moderate protein calorie malnutrition  - Nutrition consult  - Frequent small meals requested by patient  - Thiamine, folate, B12 normal     T2DM  - a1c at goal  - Per patient is diet controlled  - No present indication for SSI     GI/DVT PPx in place.  Cardiac diet  Full code.     Anticipated discharge date and disposition:   D/c home with HH PTOT. 45 minutes spent on discharge planning and counseling alone.  Maninder Del Rio MD  [facility]

## 2018-07-14 NOTE — PLAN OF CARE
[facility]    HOME HEALTH ORDERS  FACE TO FACE ENCOUNTER    Patient Name: Tom Gage  YOB: 1950    PCP: Asael Poole MD   [facility] [facility] [place] [address]  PCP Phone Number: [phone]  PCP Fax: [phone]    Encounter Date: 07/14/2018    Admit to Home Health    Diagnoses:  Active Hospital Problems    Diagnosis  POA    *Pleural effusion [J90]  Yes    Moderate protein malnutrition [E44.0]  Yes    Heart failure with reduced ejection fraction, NYHA class III [I50.20]  Yes     Chronic    Aortic stenosis [I35.0]  Yes    Pulmonary hypertension group 2 [I27.20]  Yes    Diastolic dysfunction [I51.9]  Yes    CKD (chronic kidney disease) stage V requiring chronic dialysis [N18.6, Z99.2]  Not Applicable    Type 2 diabetes mellitus with diabetic nephropathy [E11.21]  Yes     Chronic    Anemia of chronic renal failure, stage 5 [N18.5, D63.1]  Yes     Chronic    Coronary artery disease involving native coronary artery of native heart without angina pectoris [I25.10]  Yes    Renovascular hypertension [I15.0]  Yes      Resolved Hospital Problems    Diagnosis Date Resolved POA    ESRD on dialysis [N18.6, Z99.2] 07/10/2018 Not Applicable       Future Appointments  Date Time Provider Department Center   7/24/2018 8:00 AM Staci Jones MD [facility] C[facility]IO [place] [facility]   8/30/2018 8:30 AM Prudence Mcmanus MD [facility]     Follow-up Information     Asael Poole MD In 1 week.    Specialty:  Family Medicine  Contact information:  [facility] [facility] [place] [address]  [phone]                 I have seen and examined this patient face to face today. My clinical findings that support the need for the home health skilled services and home bound status are the following:  Weakness/numbness causing balance and gait disturbance due to Heart Failure, Coronary Heart Disease and Weakness/Debility making it taxing to leave home.    Allergies:  Review of patient's  "allergies indicates:   Allergen Reactions    Latex, natural rubber Rash and Blisters     Latex tape causes blisters    Ace inhibitors Other (See Comments)     Other reaction(s): Cough    Adhesive Dermatitis and Blisters     Please use Paper Tape    Morphine Hives, Itching, Dermatitis and Rash     Body Rash, Phlebitis, "My veins showed through the skin."       Diet: cardiac diet    Activities: ambulate in house with assistance    Nursing:   SN to complete comprehensive assessment including routine vital signs. Instruct on disease process and s/s of complications to report to MD. Review/verify medication list sent home with the patient at time of discharge  and instruct patient/caregiver as needed. Frequency may be adjusted depending on start of care date.    Notify MD if SBP > 160 or < 90; DBP > 90 or < 50; HR > 120 or < 50; Temp > 101    CONSULTS:    Physical Therapy to evaluate and treat. Evaluate for home safety and equipment needs; Establish/upgrade home exercise program. Perform / instruct on therapeutic exercises, gait training, transfer training, and Range of Motion.  Occupational Therapy to evaluate and treat. Evaluate home environment for safety and equipment needs. Perform/Instruct on transfers, ADL training, ROM, and therapeutic exercises.    MISCELLANEOUS CARE:  Heart Failure:      SN to instruct on the following:    Instruct on the definition of CHF.   Instruct on the signs/sympoms of CHF to be reported.   Instruct on and monitor daily weights.   Instruct on factors that cause exacerbation.   Instruct on action, dose, schedule, and side effects of medications.   Instruct on diet as prescribed.   Instruct on activity allowed.   Instruct on life-style modifications for life long management of CHF   SN to assess compliance with daily weights, diet, medications, fluid retention,    safety precautions, activities permitted and life-style modifications.   Additional 1-2 SN visits per week as needed for " signs and symptoms     of CHF exacerbation.      For Weight Gain > 2-3 lbs in 1 day or 4-6 lbs over 1 week notify PCP:    Medications: Review discharge medications with patient and family and provide education.      Current Discharge Medication List      START taking these medications    Details   aspirin (ECOTRIN) 81 MG EC tablet Take 1 tablet (81 mg total) by mouth once daily.  Qty: 30 tablet, Refills: 2      atorvastatin (LIPITOR) 10 MG tablet Take 1 tablet (10 mg total) by mouth once daily.  Qty: 30 tablet, Refills: 2         CONTINUE these medications which have NOT CHANGED    Details   calcitRIOL (ROCALTROL) 0.5 MCG Cap Take 0.5 mcg by mouth once daily.      carvedilol (COREG) 25 MG tablet Take 1 tablet (25 mg total) by mouth 2 (two) times daily with meals.  Qty: 180 tablet, Refills: 3    Associated Diagnoses: Dilated cardiomyopathy; Chronic systolic heart failure      furosemide (LASIX) 80 MG tablet 80 mg every 12 (twelve) hours.       losartan (COZAAR) 100 MG tablet Take 1 tablet (100 mg total) by mouth once daily.  Qty: 90 tablet, Refills: 3    Associated Diagnoses: Dilated cardiomyopathy; Chronic systolic heart failure      pantoprazole (PROTONIX) 40 MG tablet Take 40 mg by mouth 2 (two) times daily as needed.       sevelamer carbonate (RENVELA) 800 mg Tab Take 1,600 mg by mouth 3 (three) times daily. Takes two 800 mg tablets (1600 mg) with meals      vitamin D 1000 units Tab Take 1,000 Units by mouth once daily.      nitroGLYCERIN (NITROSTAT) 0.4 MG SL tablet Place 0.4 mg under the tongue every 5 (five) minutes as needed for Chest pain.           I certify that this patient is confined to his home and needs intermittent skilled nursing care, physical therapy and occupational therapy.

## 2018-07-14 NOTE — DISCHARGE SUMMARY
"Discharge Summary  Hospital Medicine    Attending Provider on Discharge: Maninder Del Rio     Discharging Team:    [facility]  [facility]     Date of Admission:  7/10/2018         Date of Discharge:  7/14/2018      Diagnoses:     Principal Problem(s):   Pleural effusion     Secondary Problems:  Active Hospital Problems    Diagnosis    *Pleural effusion    Moderate protein malnutrition    Heart failure with reduced ejection fraction, NYHA class III    Aortic stenosis    Pulmonary hypertension group 2    Diastolic dysfunction    CKD (chronic kidney disease) stage V requiring chronic dialysis    Type 2 diabetes mellitus with diabetic nephropathy    Anemia of chronic renal failure, stage 5    Coronary artery disease involving native coronary artery of native heart without angina pectoris    Renovascular hypertension        Hospital Course:      68M with ESRD on HD, CAD s/p NSTEMI in 2012, recurrent pleural effusions (attributed to ESRD and hypoalbuminemia) and ascites - per patient was f/b Dr. Sarmiento prior, hx gastric bypass with suspected moderate-protein calorie malnutrition transferred from [place] by cardiology for acute on chronic combined HF thought possibly worsened by AoV disease (stenosis). Patient had prior HFpEF with WHO G2 pulmonary HTN but follow-up ECHO recently confirmed moderate to severe LV dysfunction with LVEF 30-35%. He had moderate to severe AS with mean gradient of 21 and ARMIN of 1.12. He had a SPECT study completed which was negative for ischemia. The patient had been treated for combined HF with diuretics and HD for volume management. The referring cardiologist (Dr. Calles) discussed transfer for TAVR evaluation. He suspects the patient will need a dobutamine stress ECHO to further evaluate.  Further, per review of Dr. Calles's note: "Patient had CXR done on the 4th (July 4th) showing a large right pleural effusion with cardiomegaly without evidence of heart failure. " " He had an ultrasound done of the pleural effusion and a thoracentesis. CT scan of the chest was done on the 5th, showing a large right pleural effusion with suggestion of some loculation, aeration of the lung on the right base as well as the apex, difficult to exclude neoplasm, cardiomegaly, third spacing of fluids, diffuse soft tissue edema, diffuse atherosclerotic vascular calcifications, mild nodular fullness of the left adrenal, which was unchanged, and cholecystectomy and postoperative changes at the GE junction and the stomach. An ECHO was done on July 5th, read by Dr. Ch to show mild LVH. EF reduced about 30% with elevated left heart filling pressures, pulmonary hypertension, severely increased right and left atrial sizes.  Aortic stenosis was felt to be moderate.  He had a gradient that was felt to possibly underestimate the severity of the stenosis. The mean gradient was 25 mmHg, dimensionless index 0.25."     Interventional cardiology was consulted with DSE recommended with results as follows:     DSE 7/11/2018  CONCLUSIONS     1 - Mildly to moderately depressed left ventricular systolic function (EF 40-45%).     2 - Concentric hypertrophy.     3 - Biatrial enlargement.     4 - Impaired LV relaxation, elevated LAP (grade 2 diastolic dysfunction).     5 - Right ventricular enlargement with mildly depressed systolic function.     6 - Pulmonary hypertension. The estimated PA systolic pressure is 62 mmHg.     7 - Mild pulmonic regurgitation.     8 - Trivial pericardial effusion.     9 - Intermediate central venous pressure.     10 - Bilateral pleural effusion.     11 - Ascites is noted.     12 - Moderate to severe aortic stenosis, ARMIN = 0.99 cm2, AVAi = 0.51 cm2/m2, peak velocity = 3.9 m/s, mean gradient = 30 mmHg.     13 - Dobutamine challlenge c/w moderate to severe AS with peak velocity 4.2m/s, MG 42mmHg and ARMIN 0.98cm2.     Based on this finding, patient was recommended for BAV as bridge to TAVR which " occurred on 7/12.  Patient went for PFTs but lab said that may have effort dependent unreliability and to consider repeat as outpatient.  Patient had CT TAVR completed on day of d/c, 7/14.  Furthermore, patient was evaluated by CTS who recommended to proceed with TAVR w/u as patient not candidate for surgical AoV repair/replacement.     Regarding patient's pleural effusion, pulm was consulted and completed thoracentesis. They considered PleurX, however defer PleurX due to risk of protein calorie malnutrition 2/2 continuous drainage.  They recommend f/u with Dr. Sarmiento in clinic (who knows patient well and had followed before); ambulatory consult placed.    The patient is debilitated. Family request eval for SNF, but PT/OT feels patient functional to go home with  PT OT. Family and patient are in agreement with this plan.  Patient deemed stable for d/c on 7/14. Please see below for further details:    AoV stenosis, non-rheumatic  - Interventional cardiology consulted  - Dobutamine stress ECHO confirms severe AoS  - S/p BAV, also with evaluation of coronary arteries showing NOCAD  - CTS consulted - rec proceed with TAVR  - PFTs ordered; patient will need repeat PFTs as outpt to improve accuracy  - CT TAVR ordered (confirmed with family, there is no history of iodinated-contrast allergy whatsoever)  - Per nephrology no HD required after CT TAVR, can f/u as scheduled on 7/16  - PT/OT rec HH PTOT     Pleural effusion, R  - Thought 2/2 combined HF, hypoalbuminemia and ESRD  - Pulmonology consulted for thoracentesis  - Volume management with HD  - Appreciate pulm recs - defer PleurX due to risk of protein calorie malnutrition 2/2 continuous drainage  - F/u with pulmonary as outpatient     Acute on chronic combined heart failure, NYHA Class III  Hypertensive heart disease with heart failure  Pulmonary HTN WHO G2  - ECHO as above  - C/w GDMT: ARB and BB  - Okay to continue with maintenance diuretics  - Volume management with  HD  - Strict I/O  - Daily weights     ESRD on HD  - Nephrology consulted for HD  - C/w sevelamer, vitamin D  - Avoid nephrotoxic agents     CAD native heart/artery without angina  - Unclear why not on ASA  - Will initiate ASA  - Initiate statin     Gastric bypass, history of   Moderate protein calorie malnutrition  - Nutrition consult  - Frequent small meals requested by patient  - Thiamine, folate, B12 normal     T2DM  - a1c at goal  - Per patient is diet controlled  - No present indication for SSI    Significant Diagnostic Studies:   Labs:   Recent Labs      07/13/18   0530   WBC  17.67*   RBC  3.27*   HGB  10.5*   HCT  33.7*   PLT  314   MCV  103*   MCH  32.1*   MCHC  31.2*   GRAN  93.9*  16.6*   LYMPH  2.4*  0.4*   MONO  1.8*  0.3   EOS  0.0      Recent Labs      07/13/18   0530   GLU  217*   NA  132*   K  4.8   CL  100   CO2  23   BUN  40*   CREATININE  4.3*   CALCIUM  8.8   ANIONGAP  9   MG  2.0   PHOS  4.5      Radiology:      Results for orders placed during the hospital encounter of 07/10/18   X-Ray Chest PA And Lateral    Narrative EXAMINATION:  XR CHEST PA AND LATERAL    CLINICAL HISTORY:  Pleural effusion; status post thoracentesis;    TECHNIQUE:  PA and lateral views of the chest were performed.    COMPARISON:  Chest x-ray dated 07/10/2018.    FINDINGS:  The lung apices are obscured by the patient's chin.  Monitoring EKG leads are present.  The trachea is unremarkable.  There are calcifications of the aortic knob.  There is stable enlargement of the cardiomediastinal silhouette.  There is minimal decrease in the loculated right-sided pleural effusion.  There is minimal improved aeration of the right lung.  No pleural effusion identified on the left.  The left lung remains clear.  No definitive evidence of a pneumothorax is identified.  There are degenerative changes in the osseous structures.  There are postoperative changes in the right upper abdominal quadrant.      Impression Minimal decrease in  the loculated right-sided pleural effusion with mild increased aeration of the right lung.  No evidence of a pneumothorax.    Left lung remains clear.      Electronically signed by: Temo Schuler MD  Date:    07/11/2018  Time:    17:16     Cardiac Studies: DSE to eval AoV stenosis, Cath lab procedure    Significant Treatments/Procedures:   7/11: thoracentesis  7/12: BAV    Consults while in Hospital:   Cardiology, Cardiothoracic/Vascular Surgery and Pulmonary/Intensive care    Discharge Medications:      Current Discharge Medication List      START taking these medications    Details   aspirin (ECOTRIN) 81 MG EC tablet Take 1 tablet (81 mg total) by mouth once daily.  Qty: 30 tablet, Refills: 2      atorvastatin (LIPITOR) 10 MG tablet Take 1 tablet (10 mg total) by mouth once daily.  Qty: 30 tablet, Refills: 2         CONTINUE these medications which have NOT CHANGED    Details   calcitRIOL (ROCALTROL) 0.5 MCG Cap Take 0.5 mcg by mouth once daily.      carvedilol (COREG) 25 MG tablet Take 1 tablet (25 mg total) by mouth 2 (two) times daily with meals.  Qty: 180 tablet, Refills: 3    Associated Diagnoses: Dilated cardiomyopathy; Chronic systolic heart failure      furosemide (LASIX) 80 MG tablet 80 mg every 12 (twelve) hours.       losartan (COZAAR) 100 MG tablet Take 1 tablet (100 mg total) by mouth once daily.  Qty: 90 tablet, Refills: 3    Associated Diagnoses: Dilated cardiomyopathy; Chronic systolic heart failure      pantoprazole (PROTONIX) 40 MG tablet Take 40 mg by mouth 2 (two) times daily as needed.       sevelamer carbonate (RENVELA) 800 mg Tab Take 1,600 mg by mouth 3 (three) times daily. Takes two 800 mg tablets (1600 mg) with meals      vitamin D 1000 units Tab Take 1,000 Units by mouth once daily.      nitroGLYCERIN (NITROSTAT) 0.4 MG SL tablet Place 0.4 mg under the tongue every 5 (five) minutes as needed for Chest pain.            Discharge Diet:cardiac diet with Normal Fluid intake of 1500 - 2000 mL  per day    Activity: ambulate in house with assistance    Discharged Condition: Stable    Discharge Disposition: Home-Health Care Svc    Follow-up:   Future Appointments  Date Time Provider Department Center   7/24/2018 8:00 AM Staci Jones MD [facility] CARDIO [place]   8/30/2018 8:30 AM Prudence Mcmanus MD [facility] CARDIO [place] jori       Studies/Results pending on discharge:   Radiology: CTA TAVR    Maninder Del Rio MD  [facility]

## 2018-07-14 NOTE — PLAN OF CARE
Problem: Patient Care Overview  Goal: Plan of Care Review  Outcome: Revised  Pt discharged per MD orders.  Tele and IV discontinued.  Catheter tip intact x2.  Medication list and prescriptions reviewed; prescriptions sent to pt preferred pharmacy.  Pt verbalizes understanding of all written and verbal discharge instructions.  Pt awaiting escort arrival.  Will continue to monitor.

## 2018-07-14 NOTE — PLAN OF CARE
Patient ready for discharge home. CM spoke with wife about DME and home health. Wife would like BSC delivered to their home and would like to talk to [employer] about an upgraded rollator that turns into a wheelchair with footrests. Wife would also like [facility] . Left messages for Rae and [employer]. Will follow.      Spoke with [employer], they can't do BCS but will call wife to discuss wheelchair. BSC order sent to [facility], [phone]. Rec'd call back from [facility] . They can accept patient. Face sheet, H&P, medicine note and orders faxed to [phone].

## 2018-07-14 NOTE — PT/OT/SLP EVAL
Occupational Therapy   Evaluation    Name: Tom Gage  MRN: [id number]  Admitting Diagnosis:  Pleural effusion 2 Days Post-Op    Recommendations:     Discharge Recommendations: home health OT  Discharge Equipment Recommendations:  3-in-1 commode  Barriers to discharge:  None (spouse did state that she would not be able to perform heavy lifting on pt)    History:     Occupational Profile:  Living Environment & PLOF: Pt resides with spouse in 1 story house with no steps to enter.  Pt reports that his toilets are short & that he has both a bathtub & walk-in shower for bathing.  Pt reported that he was independent with ADL's & ambulation using rollator (except needing (A) with transfers on & off of toilet due to low height of toilet) however his wife reports that she has had to help him with ADL's.  Pt reports that he still drives however wife reports that pt has not driven in several months.  Pt is a retired  & enjoys remote control airplanes.  Equipment Owned:  rollator  Assistance upon Discharge: yes    Past Medical History:   Diagnosis Date    Anemia of chronic renal failure, stage 5     BPH (benign prostatic hyperplasia)     CAD (coronary artery disease)     CHF (congestive heart failure)     Chronic systolic heart failure 5/31/2018    CKD (chronic kidney disease) stage 5, GFR less than 15 ml/min     Diastolic dysfunction 12/16/2016    Dilated cardiomyopathy 5/7/2018    GERD (gastroesophageal reflux disease)     Hyperparathyroidism, secondary renal     Hypertension     Metabolic acidosis     MI (myocardial infarction) Feb 2012    Non-rheumatic mitral regurgitation 5/31/2018    Nonrheumatic aortic valve stenosis 5/31/2018    Pulmonary hypertension 12/16/2016    Stenosis of aortic and mitral valves     Aortic stenosis    TIA (transient ischemic attack)     Type 2 diabetes mellitus with diabetic nephropathy     history/ before wt loss    Valvular regurgitation     mitral  "regurgitation       Past Surgical History:   Procedure Laterality Date    ABDOMINAL SURGERY      PD catheter    BASAL CELL CARCINOMA EXCISION Left Jan 2011    left forehead    CHOLECYSTECTOMY  July 2010    ELBOW SURGERY Left 3/18/14    anterior submuscular transposition, ulnar nerve    EYE SURGERY Bilateral     bilateral cataracts    GASTRECTOMY      GASTRIC BYPASS  May 2014    gastric sleeve  June 2013    Malfunctioned requiring bypass    HERNIA REPAIR      ROTATOR CUFF REPAIR Left 2014    SHOULDER ARTHROSCOPY Left 11/18/14    RCR; glenoid labral repair; arch decompression       Subjective   "Can we short cut some of this?"  Chief Complaint: none stated  Patient/Family stated goals: to go home  Communicated with: RN prior to session.  Pain/Comfort:  · Pain Rating 1: 0/10  · Pain Rating Post-Intervention 1: 0/10    Patients cultural, spiritual, Cheondoism conflicts given the current situation: none stated    Objective:     Patient found with:  (all lines intact)    General Precautions: Standard, fall (cardiac)     Occupational Performance:    Bed Mobility:    · Patient completed Supine to Sit with stand by assistance    Functional Mobility/Transfers:  · Patient completed Sit <> Stand Transfer with stand by assistance  with  no assistive device  from slightly elevated bed  · Functional Mobility: Pt required SBA-CGA with taking ~ 5-6 steps in room without AD.    Activities of Daily Living:  · Upper Body Dressing: stand by assistance seated EOB  · Lower Body Dressing: stand by assistance donning socks seated EOB    Cognitive/Visual Perceptual:  Cognitive/Psychosocial Skills:     -       Oriented to: Person, Place, Time and Situation   -       Follows Commands/attention:Follows multistep  commands  -       Communication: clear/fluent  -       Memory: questionable based on wife's responses to pt statements  -       Safety awareness/insight to disability: impaired   -       Mood/Affect/Coping skills/emotional " "control: Appropriate to situation    Physical Exam:  Postural examination/scapula alignment:    -       Rounded shoulders  -       Forward head  -       Posterior pelvic tilt  Skin integrity: Tear of BUE and Thin  Sensation:    -       Intact  Dominant hand:    -       right  Upper Extremity Range of Motion:     -       Right Upper Extremity: WFL except 90* shoulder flexion  -       Left Upper Extremity: WFL except 90* shoulder flexion  Upper Extremity Strength:    -       Right Upper Extremity: 3-/5 shoulder flexion, all others 3+/5  -       Left Upper Extremity: 3-/5 shoulder flexion, all others 3+/5   Strength:    -       Right Upper Extremity: WFL  -       Left Upper Extremity: WFL    Patient left seated EOB with all lines intact, call button in reach, RN notified, spouse present and white board updated.    [facility] 6 Click:  [facility] Total Score: 18    Treatment & Education:  Provided education regarding role of OT, POC, & discharge recommendations with pt & family verbalizing understanding.  Pt had no further questions & when asked whether there were any concerns pt reported none.    Recommended HH OT & 3 in 1 commode for home upon discharge.  Education:    Assessment:     Tom Gage is a 68 y.o. male with a medical diagnosis of Pleural effusion.  He presents with fair participation and motivation.  Pt is at risk for falls.  Performance deficits affecting function are weakness, impaired endurance, impaired functional mobilty, impaired self care skills, impaired balance, decreased upper extremity function, decreased lower extremity function, decreased safety awareness, impaired cardiopulmonary response to activity.      Rehab Prognosis:  fair; patient would benefit from acute skilled OT services to address these deficits and reach maximum level of function.         Clinical Decision Makin.  OT Low:  "Pt evaluation falls under low complexity for evaluation coding due to performance deficits noted in " "1-3 areas as stated above and 0 co-morbities affecting current functional status. Data obtained from problem focused assessments. No modifications or assistance was required for completion of evaluation. Only brief occupational profile and history review completed."     Plan:     Patient to be seen 3 x/week to address the above listed problems via self-care/home management, therapeutic activities, therapeutic exercises  · Plan of Care Expires: 08/13/18  · Plan of Care Reviewed with: patient, spouse    This Plan of care has been discussed with the patient who was involved in its development and understands and is in agreement with the identified goals and treatment plan    GOALS:    Occupational Therapy Goals        Problem: Occupational Therapy Goal    Goal Priority Disciplines Outcome Interventions   Occupational Therapy Goal     OT, PT/OT Ongoing (interventions implemented as appropriate)    Description:  Goals to be met by: 7/22/18     Patient will increase functional independence with ADLs by performing:    UE Dressing with Modified [place].  LE Dressing with Modified [place].  Grooming while standing with Supervision.  Toileting from toilet with Supervision for hygiene and clothing management.   Rolling to Bilateral with Modified [place].   Supine to sit with Modified [place].  Stand pivot transfers with Modified [place].                      Time Tracking:     OT Date of Treatment: 07/14/18  OT Start Time: 0939  OT Stop Time: 0959  OT Total Time (min): 20 min    Billable Minutes:Evaluation 20    JEREL eVga  7/14/2018    "

## 2018-07-15 NOTE — PLAN OF CARE
Pt seen at the request of Dr. Del Rio prior to discharge. Pt had pre-discharge CT scan that showed hydropneumothorax - no need for intervention from pulmonary standpoint. Pt stable, no respiratory distress.     Explained to patient danger of repeated thoracenteses and eventual protein depletion. Surgical evaluation pursued in the past for possible pleurodesis, but the visit Mr. Gage and his wife declined intervention (per notes). Family wishes to pursue another surgical evaluation (CT Surgery).     David Courtney MD  Fellow, [facility]  Cell [phone]

## 2018-07-16 ENCOUNTER — DOCUMENTATION ONLY (OUTPATIENT)
Dept: CARDIAC CATH/INVASIVE PROCEDURES | Facility: HOSPITAL | Age: 68
End: 2018-07-16

## 2018-07-16 LAB — HBV SURFACE AB SER-ACNC: NEGATIVE M[IU]/ML

## 2018-07-16 NOTE — PLAN OF CARE
07/16/18 0751   Final Note   Assessment Type Final Discharge Note   Discharge Disposition Home-Health   Hospital Follow Up  Appt(s) scheduled? Yes

## 2018-07-16 NOTE — PROGRESS NOTES
"Tom Gage is a 68 y.o. male referred by Dr Mcmanus for evaluation of severe AS (NYHA Class III sx).     The patient has undergone the following TAVR work-up:   · ECHO (Date 7/11/18: ARMIN = 0.99 cm2, peak velocity = 3.9 m/s->4.2 ms/s, mean gradient = 30 mmHg -> 42 mmHg, LVEF: 40%  · LHC 7/12/18: non obstructive CAD.  · STS: 4.98 %   · Frailty:4/4  · Iliacs are >9.17 on R and >8.93 on L  · LVOT per JDT: Area= 4.52 cm2, Avg Diam= 24 mm (29.7 x 19.2 mm) (no 30% RR images available)  · Incidental findings on CT: Left upper lobe nodules measuring up to 0.6 cm.  For multiple solid nodules with any 6 mm or greater, Fleischner Society guidelines recommend follow up with non-contrast chest CT at 3-6 months and 18-24 months after discovery. Large right-sided hydropneumothorax with compressive atelectasis of the right lung.  · CT Surgery risk assessment: High risk for surgery due to debility and comorbidities per Dr. Thomson.   · Rhythm issues: 1st degree AVB  · PFTs: FEV1 pending  · Comorbidities: ESRD, HFrEF, malnutrition, deconditioned    OK for 29mm EvolutR TAVR via R TF access.        Patient was felt to be Cohort C and too frail to survive TAVR. He underwent BAV on 7/12/18 with immediate improvement in his symptoms. He was discharged home with Home Health to "prehab", and will be seen in clinic by Dr. Nunez in 1 month. If he has sufficiently rehabbed, he will be scheduled for 29 mm EvolutR TAVR via R TF access.   "

## 2018-07-17 LAB — VIT B1 SERPL-MCNC: 108 UG/L (ref 38–122)

## 2018-07-24 ENCOUNTER — OFFICE VISIT (OUTPATIENT)
Dept: CARDIOLOGY | Facility: CLINIC | Age: 68
End: 2018-07-24
Payer: MEDICARE

## 2018-07-24 VITALS
HEART RATE: 66 BPM | HEIGHT: 74 IN | DIASTOLIC BLOOD PRESSURE: 62 MMHG | WEIGHT: 167.75 LBS | BODY MASS INDEX: 21.53 KG/M2 | SYSTOLIC BLOOD PRESSURE: 115 MMHG

## 2018-07-24 DIAGNOSIS — I25.10 CORONARY ARTERY DISEASE INVOLVING NATIVE CORONARY ARTERY OF NATIVE HEART WITHOUT ANGINA PECTORIS: ICD-10-CM

## 2018-07-24 DIAGNOSIS — N18.6 ESRD (END STAGE RENAL DISEASE) ON DIALYSIS: ICD-10-CM

## 2018-07-24 DIAGNOSIS — I35.0 NONRHEUMATIC AORTIC VALVE STENOSIS: ICD-10-CM

## 2018-07-24 DIAGNOSIS — Z99.2 ESRD (END STAGE RENAL DISEASE) ON DIALYSIS: ICD-10-CM

## 2018-07-24 DIAGNOSIS — I27.20 PULMONARY HYPERTENSION: ICD-10-CM

## 2018-07-24 DIAGNOSIS — I15.0 RENOVASCULAR HYPERTENSION: ICD-10-CM

## 2018-07-24 DIAGNOSIS — I50.42 CHRONIC COMBINED SYSTOLIC AND DIASTOLIC CONGESTIVE HEART FAILURE: Primary | ICD-10-CM

## 2018-07-24 DIAGNOSIS — J90 PLEURAL EFFUSION: ICD-10-CM

## 2018-07-24 PROCEDURE — 99214 OFFICE O/P EST MOD 30 MIN: CPT | Mod: PBBFAC

## 2018-07-24 PROCEDURE — 99214 OFFICE O/P EST MOD 30 MIN: CPT | Mod: S$PBB,NTX,, | Performed by: INTERNAL MEDICINE

## 2018-07-24 PROCEDURE — 99999 PR PBB SHADOW E&M-EST. PATIENT-LVL IV: CPT | Mod: PBBFAC,TXP,,

## 2018-07-24 NOTE — PROGRESS NOTES
"Name: Tom Gage  : 1950  Date of Service: 2018      Reason for visit: Follow-up after balloon valvuloplasty and hospital discharge.     HPI: This is a 68 year old male here for follow-up after hospital discharge on 2018 for a balloon valvuloplasty on . He has a PMH significant for combined CHF with moderate to severe aortic stenosis, ESRD secondary to HTN on HD MWF, CAD s/p NSTEMI in , and recurrent pleural effusions thought to be secondary to combined ESRD and hypoalbuminemia. He originally presented to [facility] on  with generalized weakness and SOB and was found to have acute on chronic exacerbation of HFrEF. His most recent ECHO at that point from 2018 was significant for an EF of 33%, moderate-to-severe aortic stenosis with a mean gradient of 21 mmHg, and pulmonary hypertension (pressure 76 mmHg). A repeat ECHO at [facility] showed a reduced EF of 30%, moderate aortic stenosis, with a mean gradient of 25 mmHg that was felt to possibly underestimate the severity of his stenosis. He was mainly managed with diuretics and fluid management via inpatient dialysis. A SPECT study there was negative for ischemia. However, due to concern that his aortic stenosis was worsening he was transferred to [facility] for TAVR evaluation on 07/10. A dobutamine stress ECHO here on  was negative for stress-induced ischemia but significant for an EF of 40-45%, grade II diastolic dysfunction, and moderate-to-severe aortic stenosis with a mean gradient of 30 mmHg and an ARMIN of 0.99 cm2. Based on this finding, patient was recommended for BAV as a bridge to TAVR, which occurred on . Furthermore, patient was evaluated by CTS who recommended to proceed with TAVR w/u as patient not candidate for surgical AoV repair/replacement due to persisent hypoalbuminemia. He was discharged home on . This morning patient has no complaints. States he "feels much better" after the BAV. He " denies chest pain, palpitations, and SOB with rest or exertion. He sleeps in 3 pillows at night. He is able to participate in home PT/OT 4 times/week and reports improved appetite. Requests that he can discontinue his aspirin as he reports a large episode of bleeding from his dialysis port site on 07/20 after his session.       PMH:   Past Medical History:   Diagnosis Date    Anemia of chronic renal failure, stage 5     BPH (benign prostatic hyperplasia)     CAD (coronary artery disease)     CHF (congestive heart failure)     Chronic systolic heart failure 5/31/2018    CKD (chronic kidney disease) stage 5, GFR less than 15 ml/min     Diastolic dysfunction 12/16/2016    Dilated cardiomyopathy 5/7/2018    GERD (gastroesophageal reflux disease)     Hyperparathyroidism, secondary renal     Hypertension     Metabolic acidosis     MI (myocardial infarction) Feb 2012    Non-rheumatic mitral regurgitation 5/31/2018    Nonrheumatic aortic valve stenosis 5/31/2018    Pulmonary hypertension 12/16/2016    Stenosis of aortic and mitral valves     Aortic stenosis    TIA (transient ischemic attack)     Type 2 diabetes mellitus with diabetic nephropathy     history/ before wt loss    Valvular regurgitation     mitral regurgitation       Surgical History:   Past Surgical History:   Procedure Laterality Date    ABDOMINAL SURGERY      PD catheter    BASAL CELL CARCINOMA EXCISION Left Jan 2011    left forehead    CHOLECYSTECTOMY  July 2010    ELBOW SURGERY Left 3/18/14    anterior submuscular transposition, ulnar nerve    EYE SURGERY Bilateral     bilateral cataracts    GASTRECTOMY      GASTRIC BYPASS  May 2014    gastric sleeve  June 2013    Malfunctioned requiring bypass    HERNIA REPAIR      ROTATOR CUFF REPAIR Left 2014    SHOULDER ARTHROSCOPY Left 11/18/14    RCR; glenoid labral repair; arch decompression       Allergies:   Review of patient's allergies indicates:   Allergen Reactions    Latex,  "natural rubber Rash and Blisters     Latex tape causes blisters    Ace inhibitors Other (See Comments)     Other reaction(s): Cough    Adhesive Dermatitis and Blisters     Please use Paper Tape    Morphine Hives, Itching, Dermatitis and Rash     Body Rash, Phlebitis, "My veins showed through the skin."       Medications:     Current Outpatient Prescriptions:     atorvastatin (LIPITOR) 10 MG tablet, Take 1 tablet (10 mg total) by mouth once daily., Disp: 30 tablet, Rfl: 2    calcitRIOL (ROCALTROL) 0.5 MCG Cap, Take 0.5 mcg by mouth once daily., Disp: , Rfl:     carvedilol (COREG) 25 MG tablet, Take 1 tablet (25 mg total) by mouth 2 (two) times daily with meals., Disp: 180 tablet, Rfl: 3    furosemide (LASIX) 80 MG tablet, 80 mg every 12 (twelve) hours. , Disp: , Rfl:     losartan (COZAAR) 100 MG tablet, Take 1 tablet (100 mg total) by mouth once daily., Disp: 90 tablet, Rfl: 3    nitroGLYCERIN (NITROSTAT) 0.4 MG SL tablet, Place 0.4 mg under the tongue every 5 (five) minutes as needed for Chest pain., Disp: , Rfl:     pantoprazole (PROTONIX) 40 MG tablet, Take 40 mg by mouth 2 (two) times daily as needed. , Disp: , Rfl:     sevelamer carbonate (RENVELA) 800 mg Tab, Take 1,600 mg by mouth 3 (three) times daily. Takes two 800 mg tablets (1600 mg) with meals, Disp: , Rfl:     vitamin D 1000 units Tab, Take 1,000 Units by mouth once daily., Disp: , Rfl:     aspirin (ECOTRIN) 81 MG EC tablet, Take 1 tablet (81 mg total) by mouth once daily., Disp: 30 tablet, Rfl: 2    Family History:   Family History   Problem Relation Age of Onset    Lung cancer Mother         smoker    Diabetes Mother     Diabetes Father     Kidney disease Neg Hx     Hypertension Neg Hx     Coronary artery disease Neg Hx        Social History:   Social History     Social History    Marital status:      Spouse name: N/A    Number of children: N/A    Years of education: N/A     Occupational History    Not on file.     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     Flew in [employer] for 20 years    Lives with wife        Review of Systems:   Review of Systems   Constitutional: Negative for chills, fever and malaise/fatigue.   HENT: Negative for congestion and sore throat.    Eyes: Negative for blurred vision and redness.   Respiratory: Negative for cough and shortness of breath.    Cardiovascular: Negative for chest pain, palpitations, leg swelling and PND.   Gastrointestinal: Negative for abdominal pain, constipation, diarrhea, heartburn, nausea and vomiting.   Genitourinary: Negative for dysuria.   Musculoskeletal: Negative for back pain, myalgias and neck pain.   Skin: Negative for itching and rash.   Neurological: Negative for dizziness, sensory change, weakness and headaches.   Endo/Heme/Allergies: Bruises/bleeds easily.       Vitals:   Vitals:    07/24/18 0749   BP: 115/62   Pulse: 66   Weight: 76.1 kg (167 lb 12.3 oz)   Height: 6' 2" (1.88 m)       Physical Exam:   Physical Exam   Constitutional: Vital signs are normal.    male seated in wheelchair, in no acute distress.    HENT:   Mouth/Throat: Oropharynx is clear and moist and mucous membranes are normal.   Eyes: Pupils are equal, round, and reactive to light.   Neck: No JVD present.   Cardiovascular: Normal rate, regular rhythm and normal pulses.    Murmur heard.   Systolic murmur is present   Pulmonary/Chest: Effort normal.   Diminished breath sounds over the posterior right lower lobe.    Abdominal: Soft. Normal appearance and bowel sounds are normal. He exhibits no distension and no mass. There is no hepatomegaly. There is no tenderness.   Musculoskeletal:   Dialysis port located in the left arm. There " is minimal swelling of the distal lower extremities.    Skin: Skin is warm and dry.   There is multiple bruises of various stages located over the bilateral forearms and hands.      Assessment/Plan  68 year old male with PMH significant for combined CHF with moderate to severe aortic stenosis, ESRD secondary to HTN on HD MWF, CAD s/p NSTEMI in 2012, and recurrent pleural effusions here for follow-up after discharge from hospital on 07/14 for balloon valvuloplasty as a bridge to TAVR on 07/12, now improved clinically.     #1. Moderate-to-severe aortic stenosis status post balloon valvuloplasty.  -Improved. No chest pain, palpitations, or SOB with rest or exertion. Tolerating home PT/OT 4 times/week.   -Vital signs WNL; Likely cause of systolic ejection murmur on exam.   -Confirms he has follow-up in 08/2018 with interventional cardiology for continued management as bridge to TAVR.     #2. Combined CHF  -Approximately NYHA Class III, based on symptoms.   -Likely secondary to longstanding HTN versus worsening aortic stenosis.   -Based on dobutamine ECHO from 07/11 showing EF of 40% with grade II diastolic dysfunction, in addition to pulmonary hypertension and moderate-to-severe AS.   -BP controlled. No signs of fluid overload on exam.   -Continuing home Lasix 80 mg BID, losartan 100 mg daily, and carvedilol 25 mg BID.   -Volume management with HD on MWF.   -Encouraged continuing low-sodium diet.   -Encouraged patient closely monitor his dry weight after each dialysis session.     #3. CAD s/p NSTEMI.   -One NSTEMI noted in 2012; per patient and wife, no blockages were present on heart cath completed at that time. No documentation available in EPIC.   -No chest pain with exertion or rest.   -Last lipid panel in 03/2018: total cholesterol was 109, and LDL was 54. Continuing home atorvastatin.   -Will discontinue aspirin as patient is having increased bleeding after his dialysis sessions from his port site. Confirms he is  scheduled to have his port investigated.     #4. ESRD on HD  -Currently receiving dialysis on MWF.     #5. Recurrent pleural effusions  -Thought to be secondary to combination of ESRD and persistent hypoalbuminemia.   -Persistent right-sided effusion during last admission here, despite thoracentesis by pulmonary; was mostly volume managed with dialysis and diuretics.   -Likely cause of diminished breath sounds over the posterior right lobe on exam this morning.   -No pleuritic chest pain or SOB.   -Per patient and wife, they are coordinating with pulmonary as outpatient about possibly placing Pleur ex catheter for drainage but are unsure about viability due to possibly worsening the patient's nutritional status.     Disposition: Follow-up in 2 months with Dr. Staci Jones    Discussed with Dr. Staci Jones and Dr. Maninder Mcgee

## 2018-07-25 ENCOUNTER — LAB VISIT (OUTPATIENT)
Dept: LAB | Facility: HOSPITAL | Age: 68
End: 2018-07-25
Payer: MEDICARE

## 2018-07-25 DIAGNOSIS — Z76.82 AWAITING ORGAN TRANSPLANT STATUS: ICD-10-CM

## 2018-07-25 PROCEDURE — 86829 HLA CLASS I/II ANTIBODY QUAL: CPT | Mod: 91,PO,TXP

## 2018-07-25 PROCEDURE — 86829 HLA CLASS I/II ANTIBODY QUAL: CPT | Mod: PO,TXP

## 2018-07-31 ENCOUNTER — TELEPHONE (OUTPATIENT)
Dept: ADMINISTRATIVE | Facility: CLINIC | Age: 68
End: 2018-07-31

## 2018-07-31 LAB — HPRA INTERPRETATION: NORMAL

## 2018-08-10 ENCOUNTER — PATIENT MESSAGE (OUTPATIENT)
Dept: CARDIOLOGY | Facility: CLINIC | Age: 68
End: 2018-08-10

## 2018-08-14 ENCOUNTER — OFFICE VISIT (OUTPATIENT)
Dept: CARDIOLOGY | Facility: CLINIC | Age: 68
End: 2018-08-14
Payer: MEDICARE

## 2018-08-14 VITALS
HEIGHT: 74 IN | BODY MASS INDEX: 21.39 KG/M2 | WEIGHT: 166.69 LBS | SYSTOLIC BLOOD PRESSURE: 140 MMHG | OXYGEN SATURATION: 96 % | DIASTOLIC BLOOD PRESSURE: 65 MMHG | HEART RATE: 58 BPM

## 2018-08-14 DIAGNOSIS — I51.89 DIASTOLIC DYSFUNCTION: ICD-10-CM

## 2018-08-14 DIAGNOSIS — I42.0 DILATED CARDIOMYOPATHY: ICD-10-CM

## 2018-08-14 DIAGNOSIS — N13.8 BPH WITH URINARY OBSTRUCTION: ICD-10-CM

## 2018-08-14 DIAGNOSIS — N18.4 ANEMIA OF CHRONIC RENAL FAILURE, STAGE 4 (SEVERE): Chronic | ICD-10-CM

## 2018-08-14 DIAGNOSIS — I25.10 CORONARY ARTERY DISEASE INVOLVING NATIVE CORONARY ARTERY OF NATIVE HEART WITHOUT ANGINA PECTORIS: ICD-10-CM

## 2018-08-14 DIAGNOSIS — N18.6 CKD (CHRONIC KIDNEY DISEASE) STAGE V REQUIRING CHRONIC DIALYSIS: ICD-10-CM

## 2018-08-14 DIAGNOSIS — N40.1 BPH WITH URINARY OBSTRUCTION: ICD-10-CM

## 2018-08-14 DIAGNOSIS — Z99.2 CKD (CHRONIC KIDNEY DISEASE) STAGE V REQUIRING CHRONIC DIALYSIS: ICD-10-CM

## 2018-08-14 DIAGNOSIS — I35.0 NONRHEUMATIC AORTIC VALVE STENOSIS: Primary | ICD-10-CM

## 2018-08-14 DIAGNOSIS — D63.1 ANEMIA OF CHRONIC RENAL FAILURE, STAGE 4 (SEVERE): Chronic | ICD-10-CM

## 2018-08-14 DIAGNOSIS — E11.21 TYPE 2 DIABETES MELLITUS WITH DIABETIC NEPHROPATHY, WITHOUT LONG-TERM CURRENT USE OF INSULIN: Chronic | ICD-10-CM

## 2018-08-14 DIAGNOSIS — I35.0 SEVERE AORTIC STENOSIS: Primary | ICD-10-CM

## 2018-08-14 PROCEDURE — 99213 OFFICE O/P EST LOW 20 MIN: CPT | Mod: PBBFAC,NTX | Performed by: INTERNAL MEDICINE

## 2018-08-14 PROCEDURE — 99214 OFFICE O/P EST MOD 30 MIN: CPT | Mod: S$PBB,24,NTX, | Performed by: INTERNAL MEDICINE

## 2018-08-14 PROCEDURE — 99999 PR PBB SHADOW E&M-EST. PATIENT-LVL III: CPT | Mod: PBBFAC,TXP,, | Performed by: INTERNAL MEDICINE

## 2018-08-14 NOTE — PROGRESS NOTES
Subjective:    Patient ID:  Tom Gage is a 68 y.o. male who presents for follow-up of Aortic Stenosis      Referring Physician: Elli Mcmanus MD      HPI     Tom Gage is a 68 y.o. male referred by Dr Mcmanus for evaluation of Severe low flow low gradient aortic stenosis (NYHA Class III sx). He was transferred from [place] by cardiology in July, 2018 for acute on chronic combined HF thought to be due to severe AS and underwent BAV with 24 mm True balloon on 7/12/18.        PMHx includes ESRD on HD, CAD s/p NSTEMI in 2012, recurrent pleural effusions (attributed to ESRD and hypoalbuminemia) and ascites, hx gastric bypass in 2013 with suspected moderate-protein calorie malnutrition.              The patient has undergone the following TAVR work-up:   · ECHO (Date 7/11/18: ARMIN = 0.99 cm2, peak velocity = 3.9 m/s->4.2 ms/s, mean gradient = 30 mmHg -> 42 mmHg, LVEF: 40%  · LHC 7/12/18: non obstructive CAD.  · STS: 4.98 %   · Frailty:1/3  · Iliacs are >9.17 on R and >8.93 on L  · LVOT per JDT: Area= 4.52 cm2, Avg Diam= 24 mm (29.7 x 19.2 mm) (no 30% RR images available)  · Incidental findings on CT: Left upper lobe nodules measuring up to 0.6 cm.  For multiple solid nodules with any 6 mm or greater, Fleischner Society guidelines recommend follow up with non-contrast chest CT at 3-6 months and 18-24 months after discovery. Large right-sided hydropneumothorax with compressive atelectasis of the right lung.  · CT Surgery risk assessment: High risk for surgery due to debility and comorbidities per Dr. Thomson.   · Rhythm issues: 1st degree AVB  · PFTs: FEV1 pending  · Comorbidities: ESRD, HFrEF, malnutrition, deconditioned     OK for 29mm EvolutR TAVR via R TF access.           Review of Systems   Constitution: Negative for chills, decreased appetite, fever, weakness, night sweats, weight gain and weight loss.   HENT: Negative for congestion, hoarse voice, stridor and tinnitus.    Eyes: Negative for blurred  vision, pain and visual disturbance.   Cardiovascular: Positive for dyspnea on exertion. Negative for chest pain, claudication, irregular heartbeat, near-syncope, orthopnea, palpitations, paroxysmal nocturnal dyspnea and syncope.   Respiratory: Negative for cough, hemoptysis, shortness of breath, snoring and wheezing.    Endocrine: Negative for cold intolerance, heat intolerance and polyuria.   Hematologic/Lymphatic: Negative for bleeding problem. Does not bruise/bleed easily.   Skin: Negative for color change and rash.   Musculoskeletal: Negative for arthritis, back pain, joint pain, muscle cramps, myalgias and stiffness.   Gastrointestinal: Negative for abdominal pain, anorexia, constipation, diarrhea, dysphagia, heartburn, hemorrhoids, melena, nausea and vomiting.   Genitourinary: Negative for frequency, hematuria, hesitancy, nocturia and urgency.   Neurological: Negative for difficulty with concentration, dizziness, focal weakness, headaches, light-headedness, numbness, seizures, tremors and vertigo.   Psychiatric/Behavioral: Negative for altered mental status and depression. The patient does not have insomnia.    Allergic/Immunologic: Negative for hives.        Objective:    Physical Exam   Constitutional: He is oriented to person, place, and time. He appears well-developed and well-nourished.   HENT:   Head: Normocephalic and atraumatic.   Eyes: Conjunctivae are normal. Pupils are equal, round, and reactive to light.   Neck: Normal range of motion. No JVD present. No tracheal deviation present. No thyromegaly present.   Cardiovascular: Regular rhythm, S1 normal, S2 normal, intact distal pulses and normal pulses.  No extrasystoles are present. Exam reveals no S3.   Murmur heard.  Pulses:       Carotid pulses are 2+ on the right side, and 2+ on the left side.       Radial pulses are 2+ on the right side, and 2+ on the left side.        Femoral pulses are 2+ on the right side, and 2+ on the left side.        Dorsalis pedis pulses are 2+ on the right side, and 2+ on the left side.        Posterior tibial pulses are 2+ on the right side, and 2+ on the left side.   Late peaking crescendo decrescendo murmur    Pulmonary/Chest: Effort normal and breath sounds normal. No stridor. No respiratory distress. He has no wheezes. He has no rales.   Abdominal: Soft. Bowel sounds are normal. He exhibits no distension. There is no tenderness.   Musculoskeletal: Normal range of motion. He exhibits no edema or tenderness.   Neurological: He is alert and oriented to person, place, and time.   Skin: Skin is warm and dry. He is not diaphoretic. No erythema.   Psychiatric: He has a normal mood and affect.         Assessment:       1. Nonrheumatic aortic valve stenosis - symptomatic NYHA FC III    The patient has undergone the following TAVR work-up:   · ECHO (Date 7/11/18: ARMIN = 0.99 cm2, peak velocity = 3.9 m/s->4.2 ms/s, mean gradient = 30 mmHg -> 42 mmHg, LVEF: 40%  · LHC 7/12/18: non obstructive CAD.  · STS: 4.98 %   · Frailty:1/3  · Iliacs are >9.17 on R and >8.93 on L  · LVOT per JDT: Area= 4.52 cm2, Avg Diam= 24 mm (29.7 x 19.2 mm) (no 30% RR images available)  · Incidental findings on CT: Left upper lobe nodules measuring up to 0.6 cm.  For multiple solid nodules with any 6 mm or greater, Fleischner Society guidelines recommend follow up with non-contrast chest CT at 3-6 months and 18-24 months after discovery. Large right-sided hydropneumothorax with compressive atelectasis of the right lung.  · CT Surgery risk assessment: High risk for surgery due to debility and comorbidities per Dr. Thomson.   · Rhythm issues: 1st degree AVB  · PFTs: FEV1 pending  · Comorbidities: ESRD, HFrEF, malnutrition, deconditioned     OK for 29mm EvolutR TAVR via R TF access.        2. Coronary artery disease involving native coronary artery of native heart without angina pectoris    3. Diastolic dysfunction    4. Dilated cardiomyopathy    5. CKD (chronic  kidney disease) stage V requiring chronic dialysis    6. BPH with urinary obstruction    7. Anemia of chronic renal failure, stage 4 (severe)    8. Type 2 diabetes mellitus with diabetic nephropathy, without long-term current use of insulin         Plan:     Patient appears well s/p pre-hab. Had symptomatic relief s/p BAV. He has completed TAVR work up except PFTs. He is deemed high risk per CTS Dr. Thomson.    Will proceed with TAVR. He is 29mm EvolutR TAVR candidate via R TF access    Will see him in clinic pre-procedure to consent. All of the questions of patient and his wife were answered and they wish to proceed with procedure.       Miguelito carter MD  [id number]  Interventional Cardiology

## 2018-08-16 DIAGNOSIS — I35.0 SEVERE AORTIC STENOSIS: Primary | ICD-10-CM

## 2018-08-16 DIAGNOSIS — I50.9 CONGESTIVE HEART FAILURE, UNSPECIFIED HF CHRONICITY, UNSPECIFIED HEART FAILURE TYPE: ICD-10-CM

## 2018-08-16 RX ORDER — SODIUM CHLORIDE 9 MG/ML
3 INJECTION, SOLUTION INTRAVENOUS CONTINUOUS
Status: CANCELLED | OUTPATIENT
Start: 2018-08-16 | End: 2018-08-16

## 2018-08-16 RX ORDER — DIPHENHYDRAMINE HCL 25 MG
50 CAPSULE ORAL ONCE
Status: CANCELLED | OUTPATIENT
Start: 2018-08-16 | End: 2018-08-16

## 2018-08-17 LAB — FUNGUS SPEC CULT: NORMAL

## 2018-08-20 DIAGNOSIS — I35.0 NODULAR CALCIFIC AORTIC VALVE STENOSIS: Primary | ICD-10-CM

## 2018-08-20 RX ORDER — DIPHENHYDRAMINE HCL 25 MG
50 CAPSULE ORAL ONCE
Status: CANCELLED | OUTPATIENT
Start: 2018-08-20 | End: 2018-08-20

## 2018-09-12 LAB
ACID FAST MOD KINY STN SPEC: NORMAL
MYCOBACTERIUM SPEC QL CULT: NORMAL

## 2018-09-18 ENCOUNTER — DOCUMENTATION ONLY (OUTPATIENT)
Dept: CARDIOLOGY | Facility: CLINIC | Age: 68
End: 2018-09-18

## 2018-09-18 ENCOUNTER — HOSPITAL ENCOUNTER (OUTPATIENT)
Dept: CARDIOLOGY | Facility: CLINIC | Age: 68
Discharge: HOME | DRG: 266 | End: 2018-09-18
Attending: INTERNAL MEDICINE
Payer: MEDICARE

## 2018-09-18 DIAGNOSIS — I35.0 SEVERE AORTIC STENOSIS: ICD-10-CM

## 2018-09-18 LAB
AORTIC VALVE REGURGITATION: ABNORMAL
AORTIC VALVE STENOSIS: ABNORMAL
ESTIMATED PA SYSTOLIC PRESSURE: 61.29
MITRAL VALVE MOBILITY: NORMAL
MITRAL VALVE REGURGITATION: ABNORMAL
RETIRED EF AND QEF - SEE NOTES: 25 (ref 55–65)
TRICUSPID VALVE REGURGITATION: ABNORMAL

## 2018-09-18 PROCEDURE — 93306 TTE W/DOPPLER COMPLETE: CPT | Mod: 26,S$PBB,NTX, | Performed by: INTERNAL MEDICINE

## 2018-09-19 ENCOUNTER — ANESTHESIA EVENT (OUTPATIENT)
Dept: MEDSURG UNIT | Facility: HOSPITAL | Age: 68
DRG: 266 | End: 2018-09-19
Payer: MEDICARE

## 2018-09-19 ENCOUNTER — OFFICE VISIT (OUTPATIENT)
Dept: CARDIOLOGY | Facility: CLINIC | Age: 68
DRG: 266 | End: 2018-09-19
Payer: MEDICARE

## 2018-09-19 VITALS
OXYGEN SATURATION: 98 % | DIASTOLIC BLOOD PRESSURE: 72 MMHG | BODY MASS INDEX: 23.59 KG/M2 | HEIGHT: 72 IN | SYSTOLIC BLOOD PRESSURE: 151 MMHG | WEIGHT: 174.19 LBS | HEART RATE: 61 BPM

## 2018-09-19 DIAGNOSIS — I13.0 HYPERTENSIVE HEART AND KIDNEY DISEASE WITH HEART FAILURE: ICD-10-CM

## 2018-09-19 DIAGNOSIS — I35.0 NODULAR CALCIFIC AORTIC VALVE STENOSIS: Primary | ICD-10-CM

## 2018-09-19 DIAGNOSIS — I25.10 CORONARY ARTERY DISEASE INVOLVING NATIVE CORONARY ARTERY OF NATIVE HEART WITHOUT ANGINA PECTORIS: ICD-10-CM

## 2018-09-19 DIAGNOSIS — I35.0 NONRHEUMATIC AORTIC VALVE STENOSIS: Primary | ICD-10-CM

## 2018-09-19 DIAGNOSIS — I50.20 HEART FAILURE WITH REDUCED EJECTION FRACTION, NYHA CLASS III: Chronic | ICD-10-CM

## 2018-09-19 DIAGNOSIS — N18.9 CHRONIC KIDNEY DISEASE, UNSPECIFIED CKD STAGE: ICD-10-CM

## 2018-09-19 PROCEDURE — 99999 PR PBB SHADOW E&M-EST. PATIENT-LVL III: CPT | Mod: PBBFAC,TXP,,

## 2018-09-19 PROCEDURE — 99213 OFFICE O/P EST LOW 20 MIN: CPT | Mod: PBBFAC,TXP

## 2018-09-19 PROCEDURE — 99215 OFFICE O/P EST HI 40 MIN: CPT | Mod: S$PBB,NTX,, | Performed by: INTERNAL MEDICINE

## 2018-09-19 NOTE — H&P (VIEW-ONLY)
Subjective:    Patient ID:  Tom Gage is a 68 y.o. male who presents for treatment of AS    Referring Physician: Elli Mcmanus MD      HPI       Tom Gage is a 68 y.o. male referred by Dr Mcmanus for evaluation of Severe low flow low gradient aortic stenosis (NYHA Class III sx). He was transferred from [place] by cardiology in July, 2018 for acute on chronic combined HF thought to be due to severe AS and underwent BAV with 24 mm True balloon on 7/12/18.  He is currently NYHA Class II and much improved symptomatically from pre-BAV    PMHx includes ESRD on HD, CAD s/p NSTEMI in 2012, recurrent pleural effusions (attributed to ESRD and hypoalbuminemia) and ascites, hx gastric bypass in 2013 with suspected moderate-protein calorie malnutrition and macrocytic anemia from B12 Deficiency.  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       The patient has undergone the following TAVR work-up:   · ECHO (Date 7/11/18 DSE: ARMIN = 0.99 cm2, peak velocity = 3.9 m/s->4.2 ms/s, mean gradient = 30 mmHg -> 42 mmHg, LVEF: 40%  · LHC 7/12/18: non obstructive CAD.  · STS: 4.98 %   · Frailty: 2/4  · Iliacs are >9.2 on R and >8.6 on L  · LVOT Area  Is 4.89 cm2, Avg Diam = 25 mm (30.8 x 20) mm) by Dr. Cooney  · Incidental findings on CT: Left upper lobe nodules measuring up to 0.6 cm.  For multiple solid nodules with any 6 mm or greater, Fleischner Society guidelines recommend follow up with non-contrast chest CT at 3-6 months and 18-24 months after discovery. Large right-sided hydropneumothorax with compressive atelectasis of the right lung.  · CT Surgery risk assessment: High risk for surgery due to debility and comorbidities per Dr. Thomson.   · Rhythm issues: 1st degree AVB, LBBB  · PFTs: moderate FEV1 52%  · Comorbidities: ESRD, dilated CMP, non-ischemic, malnutrition, deconditioned     OK for 29mm Evolut R TAVR via R TF access.           Review of Systems    Constitution: Negative for chills, decreased appetite, fever, weakness, night sweats, weight gain and weight loss.   HENT: Negative for congestion, hoarse voice, stridor and tinnitus.    Eyes: Negative for blurred vision, pain and visual disturbance.   Cardiovascular: Positive for dyspnea on exertion. Negative for chest pain, claudication, irregular heartbeat, near-syncope, orthopnea, palpitations, paroxysmal nocturnal dyspnea and syncope.   Respiratory: Negative for cough, hemoptysis, shortness of breath, snoring and wheezing.    Endocrine: Negative for cold intolerance, heat intolerance and polyuria.   Hematologic/Lymphatic: Negative for bleeding problem. Does not bruise/bleed easily.   Skin: Negative for color change and rash.   Musculoskeletal: Negative for arthritis, back pain, joint pain, muscle cramps, myalgias and stiffness.   Gastrointestinal: Negative for abdominal pain, anorexia, constipation, diarrhea, dysphagia, heartburn, hemorrhoids, melena, nausea and vomiting.   Genitourinary: Negative for frequency, hematuria, hesitancy, nocturia and urgency.   Neurological: Negative for difficulty with concentration, dizziness, focal weakness, headaches, light-headedness, numbness, seizures, tremors and vertigo.   Psychiatric/Behavioral: Negative for altered mental status and depression. The patient does not have insomnia.    Allergic/Immunologic: Negative for hives.        Objective:    Physical Exam   Constitutional: He is oriented to person, place, and time. He appears well-developed and well-nourished.   HENT:   Head: Normocephalic and atraumatic.   Eyes: Conjunctivae are normal. Pupils are equal, round, and reactive to light.   Neck: Normal range of motion. No JVD present. No tracheal deviation present. No thyromegaly present.   Cardiovascular: Regular rhythm, S1 normal, S2 normal, intact distal pulses and normal pulses.  No extrasystoles are present. Exam reveals no S3.   Murmur heard.  Pulses:        Carotid pulses are 2+ on the right side, and 2+ on the left side.       Radial pulses are 2+ on the right side, and 2+ on the left side.        Femoral pulses are 2+ on the right side, and 2+ on the left side.       Dorsalis pedis pulses are 2+ on the right side, and 2+ on the left side.        Posterior tibial pulses are 2+ on the right side, and 2+ on the left side.   Late peaking crescendo decrescendo murmur    Pulmonary/Chest: Effort normal and breath sounds normal. No stridor. No respiratory distress. He has no wheezes. He has no rales.   Abdominal: Soft. Bowel sounds are normal. He exhibits no distension. There is no tenderness.   Musculoskeletal: Normal range of motion. He exhibits no edema or tenderness.   Neurological: He is alert and oriented to person, place, and time.   Skin: Skin is warm and dry. He is not diaphoretic. No erythema.   Psychiatric: He has a normal mood and affect.         Assessment:       1. Nonrheumatic aortic valve stenosis - symptomatic NYHA FC III    The patient has undergone the following TAVR work-up:   · ECHO (Date 7/11/18 DSE: ARMIN = 0.99 cm2, peak velocity = 3.9 m/s->4.2 ms/s, mean gradient = 30 mmHg -> 42 mmHg, LVEF: 40%  · LHC 7/12/18: non obstructive CAD.  · STS: 4.98 %   · Frailty: 2/4  · Iliacs are >9.2 on R and >8.6 on L  · LVOT Area  Is 4.89 cm2, Avg Diam = 25 mm (30.8 x 20) mm) by Dr. Cooney  · Incidental findings on CT: Left upper lobe nodules measuring up to 0.6 cm.  For multiple solid nodules with any 6 mm or greater, Fleischner Society guidelines recommend follow up with non-contrast chest CT at 3-6 months and 18-24 months after discovery. Large right-sided hydropneumothorax with compressive atelectasis of the right lung.  · CT Surgery risk assessment: High risk for surgery due to debility and comorbidities per Dr. Thomson.   · Rhythm issues: 1st degree AVB, LBBB  · PFTs: moderate FEV1 52%  · Comorbidities: ESRD, dilated CMP, non-ischemic, malnutrition,  deconditioned     OK for 29mm Evolut R TAVR via R TF access.     2. Coronary artery disease involving native coronary artery of native heart without angina pectoris    3. Diastolic dysfunction    4. Dilated cardiomyopathy    5. CKD (chronic kidney disease) stage V requiring chronic dialysis    6. BPH with urinary obstruction    7. Anemia of chronic renal failure, stage 4 (severe)    8. Type 2 diabetes mellitus with diabetic nephropathy, without long-term current use of insulin         Plan:     Patient appears well s/p pre-hab. Had symptomatic relief s/p BAV. He has completed TAVR work up except PFTs. He is deemed high risk per CTS Dr. Thomson.    Transcatheter Aortic valve replacement   1. Valve: 29 mm Evolut R  2. TAVR access: R TF access  3. Valvuloplasty balloon: 20 mm  4. Viabahn size if needed: 10 x 5   5. Antithrombotic therapy: plavix alone because of retinal bleeding and intolerance to ASA  6. Pacemaker risk factors: 1st degree AVB and LBBB  1. The patient was explained the the procedure carries around 20 % risk of permanent pacemaker requirement and that risk depends on the patients underlying conduction system.  7. Patient was educated abut the pathophysiology and natural history of severe aortic stenosis and was educated about the treatment options including surgical and transcatheter valve replacement. She agrees to be full code for the forseable future and to undergo workup for treatment of severe AS.   8. The risks, benefits, and alternatives of transcatheter aortic valve replacement were discussed with the patient. All questions were answered and informed consent was obtained. I had a detailed discussion with the patient regarding risk of stroke, MI, bleeding access site complications including limb loss, allergy, kidney failure including dialysis and death.  9. The patient understands the risks and benefits and wishes to go ahead with the procedure.  10. All patient's questions were answered    Will  proceed with TAVR on 9/20/18.  He is 29mm EvolutR TAVR candidate via R TF access    Staff:  I have personally taken the history and examined this patient and agree with the fellow's note as stated above and amended it accordingly :-)

## 2018-09-19 NOTE — PROGRESS NOTES
Subjective:    Patient ID:  Tom Gage is a 68 y.o. male who presents for treatment of AS    Referring Physician: Elli Mcmanus MD      HPI       Tom Gage is a 68 y.o. male referred by Dr Mcmanus for evaluation of Severe low flow low gradient aortic stenosis (NYHA Class III sx). He was transferred from [place] by cardiology in July, 2018 for acute on chronic combined HF thought to be due to severe AS and underwent BAV with 24 mm True balloon on 7/12/18.  He is currently NYHA Class II and much improved symptomatically from pre-BAV    PMHx includes ESRD on HD, CAD s/p NSTEMI in 2012, recurrent pleural effusions (attributed to ESRD and hypoalbuminemia) and ascites, hx gastric bypass in 2013 with suspected moderate-protein calorie malnutrition and macrocytic anemia from B12 Deficiency.  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       The patient has undergone the following TAVR work-up:   · ECHO (Date 7/11/18 DSE: ARMIN = 0.99 cm2, peak velocity = 3.9 m/s->4.2 ms/s, mean gradient = 30 mmHg -> 42 mmHg, LVEF: 40%  · LHC 7/12/18: non obstructive CAD.  · STS: 4.98 %   · Frailty: 2/4  · Iliacs are >9.2 on R and >8.6 on L  · LVOT Area  Is 4.89 cm2, Avg Diam = 25 mm (30.8 x 20) mm) by Dr. Cooney  · Incidental findings on CT: Left upper lobe nodules measuring up to 0.6 cm.  For multiple solid nodules with any 6 mm or greater, Fleischner Society guidelines recommend follow up with non-contrast chest CT at 3-6 months and 18-24 months after discovery. Large right-sided hydropneumothorax with compressive atelectasis of the right lung.  · CT Surgery risk assessment: High risk for surgery due to debility and comorbidities per Dr. Thomson.   · Rhythm issues: 1st degree AVB, LBBB  · PFTs: moderate FEV1 52%  · Comorbidities: ESRD, dilated CMP, non-ischemic, malnutrition, deconditioned     OK for 29mm Evolut R TAVR via R TF access.           Review of Systems    Constitution: Negative for chills, decreased appetite, fever, weakness, night sweats, weight gain and weight loss.   HENT: Negative for congestion, hoarse voice, stridor and tinnitus.    Eyes: Negative for blurred vision, pain and visual disturbance.   Cardiovascular: Positive for dyspnea on exertion. Negative for chest pain, claudication, irregular heartbeat, near-syncope, orthopnea, palpitations, paroxysmal nocturnal dyspnea and syncope.   Respiratory: Negative for cough, hemoptysis, shortness of breath, snoring and wheezing.    Endocrine: Negative for cold intolerance, heat intolerance and polyuria.   Hematologic/Lymphatic: Negative for bleeding problem. Does not bruise/bleed easily.   Skin: Negative for color change and rash.   Musculoskeletal: Negative for arthritis, back pain, joint pain, muscle cramps, myalgias and stiffness.   Gastrointestinal: Negative for abdominal pain, anorexia, constipation, diarrhea, dysphagia, heartburn, hemorrhoids, melena, nausea and vomiting.   Genitourinary: Negative for frequency, hematuria, hesitancy, nocturia and urgency.   Neurological: Negative for difficulty with concentration, dizziness, focal weakness, headaches, light-headedness, numbness, seizures, tremors and vertigo.   Psychiatric/Behavioral: Negative for altered mental status and depression. The patient does not have insomnia.    Allergic/Immunologic: Negative for hives.        Objective:    Physical Exam   Constitutional: He is oriented to person, place, and time. He appears well-developed and well-nourished.   HENT:   Head: Normocephalic and atraumatic.   Eyes: Conjunctivae are normal. Pupils are equal, round, and reactive to light.   Neck: Normal range of motion. No JVD present. No tracheal deviation present. No thyromegaly present.   Cardiovascular: Regular rhythm, S1 normal, S2 normal, intact distal pulses and normal pulses.  No extrasystoles are present. Exam reveals no S3.   Murmur heard.  Pulses:        Carotid pulses are 2+ on the right side, and 2+ on the left side.       Radial pulses are 2+ on the right side, and 2+ on the left side.        Femoral pulses are 2+ on the right side, and 2+ on the left side.       Dorsalis pedis pulses are 2+ on the right side, and 2+ on the left side.        Posterior tibial pulses are 2+ on the right side, and 2+ on the left side.   Late peaking crescendo decrescendo murmur    Pulmonary/Chest: Effort normal and breath sounds normal. No stridor. No respiratory distress. He has no wheezes. He has no rales.   Abdominal: Soft. Bowel sounds are normal. He exhibits no distension. There is no tenderness.   Musculoskeletal: Normal range of motion. He exhibits no edema or tenderness.   Neurological: He is alert and oriented to person, place, and time.   Skin: Skin is warm and dry. He is not diaphoretic. No erythema.   Psychiatric: He has a normal mood and affect.         Assessment:       1. Nonrheumatic aortic valve stenosis - symptomatic NYHA FC III    The patient has undergone the following TAVR work-up:   · ECHO (Date 7/11/18 DSE: ARMIN = 0.99 cm2, peak velocity = 3.9 m/s->4.2 ms/s, mean gradient = 30 mmHg -> 42 mmHg, LVEF: 40%  · LHC 7/12/18: non obstructive CAD.  · STS: 4.98 %   · Frailty: 2/4  · Iliacs are >9.2 on R and >8.6 on L  · LVOT Area  Is 4.89 cm2, Avg Diam = 25 mm (30.8 x 20) mm) by Dr. Cooney  · Incidental findings on CT: Left upper lobe nodules measuring up to 0.6 cm.  For multiple solid nodules with any 6 mm or greater, Fleischner Society guidelines recommend follow up with non-contrast chest CT at 3-6 months and 18-24 months after discovery. Large right-sided hydropneumothorax with compressive atelectasis of the right lung.  · CT Surgery risk assessment: High risk for surgery due to debility and comorbidities per Dr. Thomson.   · Rhythm issues: 1st degree AVB, LBBB  · PFTs: moderate FEV1 52%  · Comorbidities: ESRD, dilated CMP, non-ischemic, malnutrition,  deconditioned     OK for 29mm Evolut R TAVR via R TF access.     2. Coronary artery disease involving native coronary artery of native heart without angina pectoris    3. Diastolic dysfunction    4. Dilated cardiomyopathy    5. CKD (chronic kidney disease) stage V requiring chronic dialysis    6. BPH with urinary obstruction    7. Anemia of chronic renal failure, stage 4 (severe)    8. Type 2 diabetes mellitus with diabetic nephropathy, without long-term current use of insulin         Plan:     Patient appears well s/p pre-hab. Had symptomatic relief s/p BAV. He has completed TAVR work up except PFTs. He is deemed high risk per CTS Dr. Thomson.    Transcatheter Aortic valve replacement   1. Valve: 29 mm Evolut R  2. TAVR access: R TF access  3. Valvuloplasty balloon: 20 mm  4. Viabahn size if needed: 10 x 5   5. Antithrombotic therapy: plavix alone because of retinal bleeding and intolerance to ASA  6. Pacemaker risk factors: 1st degree AVB and LBBB  1. The patient was explained the the procedure carries around 20 % risk of permanent pacemaker requirement and that risk depends on the patients underlying conduction system.  7. Patient was educated abut the pathophysiology and natural history of severe aortic stenosis and was educated about the treatment options including surgical and transcatheter valve replacement. She agrees to be full code for the forseable future and to undergo workup for treatment of severe AS.   8. The risks, benefits, and alternatives of transcatheter aortic valve replacement were discussed with the patient. All questions were answered and informed consent was obtained. I had a detailed discussion with the patient regarding risk of stroke, MI, bleeding access site complications including limb loss, allergy, kidney failure including dialysis and death.  9. The patient understands the risks and benefits and wishes to go ahead with the procedure.  10. All patient's questions were answered    Will  proceed with TAVR on 9/20/18.  He is 29mm EvolutR TAVR candidate via R TF access    Staff:  I have personally taken the history and examined this patient and agree with the fellow's note as stated above and amended it accordingly :-)

## 2018-09-20 ENCOUNTER — ANESTHESIA (OUTPATIENT)
Dept: MEDSURG UNIT | Facility: HOSPITAL | Age: 68
DRG: 266 | End: 2018-09-20
Payer: MEDICARE

## 2018-09-20 ENCOUNTER — HOSPITAL ENCOUNTER (INPATIENT)
Facility: HOSPITAL | Age: 68
LOS: 2 days | Discharge: HOME HEALTH | DRG: 266 | End: 2018-09-22
Attending: INTERNAL MEDICINE | Admitting: INTERNAL MEDICINE
Payer: MEDICARE

## 2018-09-20 ENCOUNTER — ANESTHESIA EVENT (OUTPATIENT)
Dept: MEDSURG UNIT | Facility: HOSPITAL | Age: 68
DRG: 266 | End: 2018-09-20
Payer: MEDICARE

## 2018-09-20 DIAGNOSIS — I50.20 HEART FAILURE WITH REDUCED EJECTION FRACTION, NYHA CLASS III: Chronic | ICD-10-CM

## 2018-09-20 DIAGNOSIS — N18.6 ESRD (END STAGE RENAL DISEASE): ICD-10-CM

## 2018-09-20 DIAGNOSIS — I42.9 CARDIOMYOPATHY: ICD-10-CM

## 2018-09-20 DIAGNOSIS — I50.22 CHRONIC SYSTOLIC HEART FAILURE: ICD-10-CM

## 2018-09-20 DIAGNOSIS — I45.4 IVCD (INTRAVENTRICULAR CONDUCTION DEFECT): ICD-10-CM

## 2018-09-20 DIAGNOSIS — I44.7 LBBB (LEFT BUNDLE BRANCH BLOCK): ICD-10-CM

## 2018-09-20 DIAGNOSIS — I35.0 NONRHEUMATIC AORTIC VALVE STENOSIS: Primary | ICD-10-CM

## 2018-09-20 DIAGNOSIS — I50.20 HFREF (HEART FAILURE WITH REDUCED EJECTION FRACTION): ICD-10-CM

## 2018-09-20 DIAGNOSIS — Z95.0 PACEMAKER: ICD-10-CM

## 2018-09-20 DIAGNOSIS — R19.7 DIARRHEA: ICD-10-CM

## 2018-09-20 DIAGNOSIS — I35.0 NODULAR CALCIFIC AORTIC VALVE STENOSIS: ICD-10-CM

## 2018-09-20 DIAGNOSIS — I42.0 DILATED CARDIOMYOPATHY: ICD-10-CM

## 2018-09-20 DIAGNOSIS — I50.23 ACUTE ON CHRONIC SYSTOLIC HEART FAILURE: ICD-10-CM

## 2018-09-20 LAB
ABO + RH BLD: NORMAL
ANION GAP SERPL CALC-SCNC: 12 MMOL/L
BLD GP AB SCN CELLS X3 SERPL QL: NORMAL
BUN SERPL-MCNC: 30 MG/DL
CALCIUM SERPL-MCNC: 8.7 MG/DL
CHLORIDE SERPL-SCNC: 100 MMOL/L
CO2 SERPL-SCNC: 24 MMOL/L
CREAT SERPL-MCNC: 3.6 MG/DL
EST. GFR  (AFRICAN AMERICAN): 19.2 ML/MIN/1.73 M^2
EST. GFR  (NON AFRICAN AMERICAN): 16.6 ML/MIN/1.73 M^2
GLUCOSE SERPL-MCNC: 95 MG/DL
POTASSIUM SERPL-SCNC: 4.9 MMOL/L
SODIUM SERPL-SCNC: 136 MMOL/L

## 2018-09-20 PROCEDURE — 36620 INSERTION CATHETER ARTERY: CPT | Mod: Q0,NTX,, | Performed by: ANESTHESIOLOGY

## 2018-09-20 PROCEDURE — 27200676 HC TRANSDUCER MONITOR KIT DOUBLE: Mod: NTX | Performed by: ANESTHESIOLOGY

## 2018-09-20 PROCEDURE — 37000009 HC ANESTHESIA EA ADD 15 MINS: Mod: NTX | Performed by: INTERNAL MEDICINE

## 2018-09-20 PROCEDURE — 93005 ELECTROCARDIOGRAM TRACING: CPT | Mod: NTX

## 2018-09-20 PROCEDURE — D9220A PRA ANESTHESIA: Mod: Q0,NTX,, | Performed by: ANESTHESIOLOGY

## 2018-09-20 PROCEDURE — 25000003 PHARM REV CODE 250: Mod: NTX | Performed by: INTERNAL MEDICINE

## 2018-09-20 PROCEDURE — C1894 INTRO/SHEATH, NON-LASER: HCPCS | Mod: NTX

## 2018-09-20 PROCEDURE — 99233 SBSQ HOSP IP/OBS HIGH 50: CPT | Mod: NTX,,, | Performed by: NURSE PRACTITIONER

## 2018-09-20 PROCEDURE — 37000008 HC ANESTHESIA 1ST 15 MINUTES: Mod: NTX | Performed by: INTERNAL MEDICINE

## 2018-09-20 PROCEDURE — 27100025 HC TUBING, SET FLUID WARMER: Mod: NTX | Performed by: ANESTHESIOLOGY

## 2018-09-20 PROCEDURE — 27000191 HC C-V MONITORING: Mod: NTX

## 2018-09-20 PROCEDURE — 80048 BASIC METABOLIC PNL TOTAL CA: CPT | Mod: NTX

## 2018-09-20 PROCEDURE — 93010 ELECTROCARDIOGRAM REPORT: CPT | Mod: NTX,,, | Performed by: INTERNAL MEDICINE

## 2018-09-20 PROCEDURE — 27000221 HC OXYGEN, UP TO 24 HOURS: Mod: NTX

## 2018-09-20 PROCEDURE — A4216 STERILE WATER/SALINE, 10 ML: HCPCS | Mod: NTX | Performed by: ANESTHESIOLOGY

## 2018-09-20 PROCEDURE — 33210 INSERT ELECTRD/PM CATH SNGL: CPT | Mod: Q0,NTX,, | Performed by: ANESTHESIOLOGY

## 2018-09-20 PROCEDURE — 25000003 PHARM REV CODE 250: Mod: NTX | Performed by: PHYSICIAN ASSISTANT

## 2018-09-20 PROCEDURE — 27201647 CATH LAB PROCEDURE: Mod: NTX

## 2018-09-20 PROCEDURE — 20000000 HC ICU ROOM: Mod: NTX

## 2018-09-20 PROCEDURE — 33361 REPLACE AORTIC VALVE PERQ: CPT | Mod: 62,Q0,NTX, | Performed by: THORACIC SURGERY (CARDIOTHORACIC VASCULAR SURGERY)

## 2018-09-20 PROCEDURE — 99223 1ST HOSP IP/OBS HIGH 75: CPT | Mod: NTX,,, | Performed by: INTERNAL MEDICINE

## 2018-09-20 PROCEDURE — 63600175 PHARM REV CODE 636 W HCPCS: Mod: NTX

## 2018-09-20 PROCEDURE — 25000003 PHARM REV CODE 250: Mod: NTX | Performed by: ANESTHESIOLOGY

## 2018-09-20 PROCEDURE — C1751 CATH, INF, PER/CENT/MIDLINE: HCPCS | Mod: NTX | Performed by: ANESTHESIOLOGY

## 2018-09-20 PROCEDURE — 86901 BLOOD TYPING SEROLOGIC RH(D): CPT | Mod: NTX

## 2018-09-20 PROCEDURE — 02RF38Z REPLACEMENT OF AORTIC VALVE WITH ZOOPLASTIC TISSUE, PERCUTANEOUS APPROACH: ICD-10-PCS | Performed by: INTERNAL MEDICINE

## 2018-09-20 PROCEDURE — 5A1213Z PERFORMANCE OF CARDIAC PACING, INTERMITTENT: ICD-10-PCS | Performed by: INTERNAL MEDICINE

## 2018-09-20 PROCEDURE — 33361 REPLACE AORTIC VALVE PERQ: CPT | Mod: 62,Q0,NTX, | Performed by: INTERNAL MEDICINE

## 2018-09-20 PROCEDURE — 94761 N-INVAS EAR/PLS OXIMETRY MLT: CPT | Mod: NTX

## 2018-09-20 PROCEDURE — 63600175 PHARM REV CODE 636 W HCPCS: Mod: NTX | Performed by: INTERNAL MEDICINE

## 2018-09-20 PROCEDURE — 27100021 HC MULTIPORT INFUSION MANIFOLD: Mod: NTX | Performed by: ANESTHESIOLOGY

## 2018-09-20 PROCEDURE — 36415 COLL VENOUS BLD VENIPUNCTURE: CPT | Mod: NTX

## 2018-09-20 PROCEDURE — 25000003 PHARM REV CODE 250: Mod: NTX

## 2018-09-20 PROCEDURE — 27000239 HC STAND-BY BYPASS PUMP: Mod: NTX

## 2018-09-20 PROCEDURE — 27200659 HC CATH, PACING WITH LEADS: Mod: NTX | Performed by: ANESTHESIOLOGY

## 2018-09-20 PROCEDURE — 63600175 PHARM REV CODE 636 W HCPCS: Mod: NTX | Performed by: ANESTHESIOLOGY

## 2018-09-20 RX ORDER — DEXMEDETOMIDINE HYDROCHLORIDE 100 UG/ML
INJECTION, SOLUTION INTRAVENOUS
Status: DISCONTINUED | OUTPATIENT
Start: 2018-09-20 | End: 2018-09-20

## 2018-09-20 RX ORDER — SEVELAMER CARBONATE 800 MG/1
1600 TABLET, FILM COATED ORAL 3 TIMES DAILY
Status: DISCONTINUED | OUTPATIENT
Start: 2018-09-20 | End: 2018-09-22 | Stop reason: HOSPADM

## 2018-09-20 RX ORDER — HEPARIN SODIUM 1000 [USP'U]/ML
INJECTION, SOLUTION INTRAVENOUS; SUBCUTANEOUS
Status: DISCONTINUED | OUTPATIENT
Start: 2018-09-20 | End: 2018-09-20

## 2018-09-20 RX ORDER — CLOPIDOGREL BISULFATE 75 MG/1
75 TABLET ORAL DAILY
Status: DISCONTINUED | OUTPATIENT
Start: 2018-09-21 | End: 2018-09-20

## 2018-09-20 RX ORDER — LOSARTAN POTASSIUM 50 MG/1
100 TABLET ORAL DAILY
Status: DISCONTINUED | OUTPATIENT
Start: 2018-09-21 | End: 2018-09-21

## 2018-09-20 RX ORDER — LANOLIN ALCOHOL/MO/W.PET/CERES
800 CREAM (GRAM) TOPICAL
Status: DISCONTINUED | OUTPATIENT
Start: 2018-09-20 | End: 2018-09-22 | Stop reason: HOSPADM

## 2018-09-20 RX ORDER — PANTOPRAZOLE SODIUM 40 MG/1
40 TABLET, DELAYED RELEASE ORAL DAILY
Status: DISCONTINUED | OUTPATIENT
Start: 2018-09-21 | End: 2018-09-22 | Stop reason: HOSPADM

## 2018-09-20 RX ORDER — SODIUM,POTASSIUM PHOSPHATES 280-250MG
2 POWDER IN PACKET (EA) ORAL
Status: DISCONTINUED | OUTPATIENT
Start: 2018-09-20 | End: 2018-09-22 | Stop reason: HOSPADM

## 2018-09-20 RX ORDER — CLOPIDOGREL BISULFATE 75 MG/1
75 TABLET ORAL DAILY
Status: DISCONTINUED | OUTPATIENT
Start: 2018-09-20 | End: 2018-09-22 | Stop reason: HOSPADM

## 2018-09-20 RX ORDER — ONDANSETRON 2 MG/ML
4 INJECTION INTRAMUSCULAR; INTRAVENOUS EVERY 12 HOURS PRN
Status: DISCONTINUED | OUTPATIENT
Start: 2018-09-20 | End: 2018-09-22 | Stop reason: HOSPADM

## 2018-09-20 RX ORDER — SEVELAMER CARBONATE 800 MG/1
1600 TABLET, FILM COATED ORAL 3 TIMES DAILY
Status: DISCONTINUED | OUTPATIENT
Start: 2018-09-20 | End: 2018-09-20

## 2018-09-20 RX ORDER — ATORVASTATIN CALCIUM 10 MG/1
10 TABLET, FILM COATED ORAL DAILY
Status: DISCONTINUED | OUTPATIENT
Start: 2018-09-21 | End: 2018-09-22 | Stop reason: HOSPADM

## 2018-09-20 RX ORDER — DIPHENHYDRAMINE HCL 50 MG
50 CAPSULE ORAL ONCE
Status: COMPLETED | OUTPATIENT
Start: 2018-09-20 | End: 2018-09-20

## 2018-09-20 RX ORDER — CEFAZOLIN SODIUM 1 G/3ML
INJECTION, POWDER, FOR SOLUTION INTRAMUSCULAR; INTRAVENOUS
Status: DISCONTINUED | OUTPATIENT
Start: 2018-09-20 | End: 2018-09-20

## 2018-09-20 RX ORDER — SODIUM CHLORIDE 9 MG/ML
INJECTION, SOLUTION INTRAVENOUS CONTINUOUS PRN
Status: DISCONTINUED | OUTPATIENT
Start: 2018-09-20 | End: 2018-09-20

## 2018-09-20 RX ORDER — SODIUM CHLORIDE 9 MG/ML
INJECTION, SOLUTION INTRAVENOUS CONTINUOUS
Status: DISCONTINUED | OUTPATIENT
Start: 2018-09-20 | End: 2018-09-20

## 2018-09-20 RX ORDER — CEFAZOLIN SODIUM 1 G/3ML
1 INJECTION, POWDER, FOR SOLUTION INTRAMUSCULAR; INTRAVENOUS
Status: COMPLETED | OUTPATIENT
Start: 2018-09-20 | End: 2018-09-21

## 2018-09-20 RX ORDER — POTASSIUM CHLORIDE 20 MEQ/15ML
40 SOLUTION ORAL
Status: DISCONTINUED | OUTPATIENT
Start: 2018-09-20 | End: 2018-09-22 | Stop reason: HOSPADM

## 2018-09-20 RX ORDER — PROTAMINE SULFATE 10 MG/ML
INJECTION, SOLUTION INTRAVENOUS
Status: DISCONTINUED | OUTPATIENT
Start: 2018-09-20 | End: 2018-09-20

## 2018-09-20 RX ADMIN — SEVELAMER CARBONATE 1600 MG: 800 TABLET, FILM COATED ORAL at 06:09

## 2018-09-20 RX ADMIN — CLOPIDOGREL 75 MG: 75 TABLET, FILM COATED ORAL at 06:09

## 2018-09-20 RX ADMIN — SODIUM CHLORIDE, SODIUM GLUCONATE, SODIUM ACETATE, POTASSIUM CHLORIDE, MAGNESIUM CHLORIDE, SODIUM PHOSPHATE, DIBASIC, AND POTASSIUM PHOSPHATE: .53; .5; .37; .037; .03; .012; .00082 INJECTION, SOLUTION INTRAVENOUS at 07:09

## 2018-09-20 RX ADMIN — DEXMEDETOMIDINE HYDROCHLORIDE 1 MCG/KG/HR: 100 INJECTION, SOLUTION, CONCENTRATE INTRAVENOUS at 07:09

## 2018-09-20 RX ADMIN — CEFAZOLIN 1 G: 1 INJECTION, POWDER, FOR SOLUTION INTRAMUSCULAR; INTRAVENOUS at 06:09

## 2018-09-20 RX ADMIN — CEFAZOLIN 2 G: 330 INJECTION, POWDER, FOR SOLUTION INTRAMUSCULAR; INTRAVENOUS at 08:09

## 2018-09-20 RX ADMIN — PROTAMINE SULFATE 100 MG: 10 INJECTION, SOLUTION INTRAVENOUS at 08:09

## 2018-09-20 RX ADMIN — HEPARIN SODIUM 10000 UNITS: 1000 INJECTION INTRAVENOUS; SUBCUTANEOUS at 08:09

## 2018-09-20 RX ADMIN — DEXMEDETOMIDINE HYDROCHLORIDE 80 MCG: 100 INJECTION, SOLUTION, CONCENTRATE INTRAVENOUS at 07:09

## 2018-09-20 RX ADMIN — DIPHENHYDRAMINE HYDROCHLORIDE 50 MG: 50 CAPSULE ORAL at 06:09

## 2018-09-20 RX ADMIN — SODIUM CHLORIDE: 0.9 INJECTION, SOLUTION INTRAVENOUS at 07:09

## 2018-09-20 NOTE — ANESTHESIA POSTPROCEDURE EVALUATION
"Anesthesia Post Evaluation    Patient: Tom Gage    Procedure(s) Performed: Procedure(s) (LRB):  Replacement-valve-aortic (N/A)    Final Anesthesia Type: general  Patient location during evaluation: ICU  Patient participation: Yes- Able to Participate  Level of consciousness: awake and alert and oriented  Post-procedure vital signs: reviewed and stable  Pain management: adequate  Airway patency: patent  PONV status at discharge: No PONV  Anesthetic complications: no      Cardiovascular status: blood pressure returned to baseline, hemodynamically stable and stable  Respiratory status: unassisted, room air and spontaneous ventilation  Hydration status: euvolemic  Follow-up not needed.        Visit Vitals  BP (!) 164/74   Pulse (!) 55   Temp 35.7 °C (96.3 °F) (Oral)   Resp 15   Ht 6' 2" (1.88 m)   Wt 78.9 kg (174 lb)   SpO2 100%   BMI 22.34 kg/m²       Pain/Lary Score: Pain Assessment Performed: Yes (9/20/2018 11:00 AM)  Presence of Pain: denies (9/20/2018 11:00 AM)        "

## 2018-09-20 NOTE — CONSULTS
[facility]  Nephrology  Consult Note    Patient Name: Tom Gage  MRN: [id number]  Admission Date: 9/20/2018  Hospital Length of Stay: 0 days  Attending Provider: Refugio Cooney MD   Primary Care Physician: Asael Poole MD  Principal Problem:<principal problem not specified>    Inpatient consult to Nephrology  Consult performed by: Donny White NP  Consult ordered by: Jessenia Artis PA-C  Reason for consult: ESRD        Subjective:     HPI: Mr. Esparza is a 67 yo male with HTN, DM2, HFrEF, pHTN, AS s/p TAVR (09/20/18), and ESRD on iHD MWF who presented to the hospital on 09/20 for elective TAVR for symptomatic AS. Post procedure, found to have LBBB and is scheduled for EP lab tomorrow for evaluation for AICD.  Nephrology was consulted for ESRD management while in-patient.  He dialyzes at [facility] under the care of Dr. Cope.  He was initiated on peritoneal dialysis around 2 years ago, but continued to develop recurrent pleural effusions and had to be transitioned to hemodialysis.  According to the wife at bedside, patient was tolerating treatments on HD well, but continued to have worsening SOB and dyspnea on exertion with episodes of heart failure despite compliance with treatments and fluid restrictions and further work-up revealed severe AS and he was referred to IC at [facility] [place] [facility] for further evaluation and treatment in which he underwent a ballon valvuloplasty in July of 2018 with significant improvement in his symptoms.    His current dialysis prescription is 5.5 hours on Mondays and 4 hour treatments on Wednesday and Friday.  He continues to have residual renal function and continues on lasix at home BID.  He has an AVF to his NA ++ bruit/thrill    Past Medical History:   Diagnosis Date    Anemia of chronic renal failure, stage 5     BPH (benign prostatic hyperplasia)     CAD (coronary artery disease)     CHF (congestive heart failure)     Chronic systolic heart failure  5/31/2018    CKD (chronic kidney disease) stage 5, GFR less than 15 ml/min     Diastolic dysfunction 12/16/2016    Dilated cardiomyopathy 5/7/2018    GERD (gastroesophageal reflux disease)     Hyperparathyroidism, secondary renal     Hypertension     Metabolic acidosis     MI (myocardial infarction) Feb 2012    Non-rheumatic mitral regurgitation 5/31/2018    Nonrheumatic aortic valve stenosis 5/31/2018    Pulmonary hypertension 12/16/2016    Stenosis of aortic and mitral valves     Aortic stenosis    TIA (transient ischemic attack)     Type 2 diabetes mellitus with diabetic nephropathy     history/ before wt loss    Valvular regurgitation     mitral regurgitation       Past Surgical History:   Procedure Laterality Date    ABDOMINAL SURGERY      PD catheter    ASPIRATION-GROIN N/A 9/9/2016    Performed by [facility] OR    BASAL CELL CARCINOMA EXCISION Left Jan 2011    left forehead    BAV N/A 7/12/2018    Performed by L[facility]oa [facility] at [facility] CATH LAB    CHOLECYSTECTOMY  July 2010    ELBOW SURGERY Left 3/18/14    anterior submuscular transposition, ulnar nerve    EYE SURGERY Bilateral     bilateral cataracts    GASTRECTOMY      GASTRIC BYPASS  May 2014    gastric sleeve  June 2013    Malfunctioned requiring bypass    HEART CATH-RIGHT Right 1/20/2017    Performed by Ron Bernstein Jr., MD at [facility] CATH LAB    HERNIA REPAIR      INSERTION-PERITONEAL DIALYSIS CATHETER-LAPAROSCOPIC N/A 3/28/2016    Performed by Owen R[facility]os [facility] at [facility] OR 2ND FLR    LAPAROSCOPY-DIAGNOSTIC N/A 3/28/2016    Performed by [facility] at [facility] OR 2ND FLR    REMOVAL-CATHETER-DIALYSIS-PERITONEAL N/A 9/20/2017    Performed by [facility] at [facility] OR 2ND FLR    REPAIR-HERNIA-LAPAROSCOPIC  3/28/2016    Performed by Owen [facility] at [facility] OR 2ND FLR    REPAIR-HERNIA-LAPAROSCOPIC-INGUINAL Bilateral 3/28/2016    Performed by Owen DOSS  "[facility] [place] at [facility] OR 2ND FLR    REVISION PERITONEAL DIALYSIS CATHETER-LAPAROSCOPIC N/A 5/18/2016    Performed by [facility] at [facility] OR 2ND FLR    ROTATOR CUFF REPAIR Left 2014    SHOULDER ARTHROSCOPY Left 11/18/14    RCR; glenoid labral repair; arch decompression       Review of patient's allergies indicates:   Allergen Reactions    Asa [aspirin] Other (See Comments)     Retinal bleeding, severe    Latex, natural rubber Rash and Blisters     Latex tape causes blisters    Ace inhibitors Other (See Comments)     Other reaction(s): Cough    Adhesive Dermatitis and Blisters     Please use Paper Tape    Morphine Hives, Itching, Dermatitis and Rash     Body Rash, Phlebitis, "My veins showed through the skin."     Current Facility-Administered Medications   Medication Frequency    ceFAZolin injection 1 g Q8H    clopidogrel tablet 75 mg Daily    magnesium oxide tablet 800 mg PRN    magnesium oxide tablet 800 mg PRN    ondansetron injection 4 mg Q12H PRN    potassium chloride 10% oral solution 40 mEq PRN    potassium chloride 10% oral solution 40 mEq PRN    potassium, sodium phosphates 280-160-250 mg packet 2 packet PRN    potassium, sodium phosphates 280-160-250 mg packet 2 packet PRN    potassium, sodium phosphates 280-160-250 mg packet 2 packet PRN     Family History     Problem Relation (Age of Onset)    Diabetes Mother, Father    Lung cancer Mother        Tobacco Use    Smoking status: Never Smoker    Smokeless tobacco: Never Used   Substance and Sexual Activity    Alcohol use: No    Drug use: No    Sexual activity: Yes     Partners: Female     Review of Systems   Constitutional: Negative for activity change, appetite change and fever.   HENT: Negative for congestion, dental problem, sneezing and sore throat.    Eyes: Negative for pain, discharge and visual disturbance.   Respiratory: Negative for cough, shortness of breath and wheezing.    Cardiovascular: Negative for chest " pain, palpitations and leg swelling.   Gastrointestinal: Negative for abdominal distention, abdominal pain, constipation, diarrhea, nausea and vomiting.   Endocrine: Negative for polydipsia, polyphagia and polyuria.   Genitourinary: Negative for dysuria, frequency and urgency.   Musculoskeletal: Negative for back pain and gait problem.   Skin: Negative for rash and wound.   Neurological: Negative for dizziness, seizures, syncope and headaches.   Hematological: Does not bruise/bleed easily.   Psychiatric/Behavioral: Negative for agitation and confusion. The patient is not nervous/anxious.      Objective:     Vital Signs (Most Recent):  Temp: 96.3 °F (35.7 °C) (09/20/18 0611)  Pulse: (!) 55 (09/20/18 1403)  Resp: 15 (09/20/18 1403)  BP: (!) 164/74 (09/20/18 1403)  SpO2: 100 % (09/20/18 1403)  O2 Device (Oxygen Therapy): nasal cannula (09/20/18 1403) Vital Signs (24h Range):  Temp:  [96.3 °F (35.7 °C)] 96.3 °F (35.7 °C)  Pulse:  [53-62] 55  Resp:  [15-34] 15  SpO2:  [92 %-100 %] 100 %  BP: (145-164)/() 164/74  Arterial Line BP: (165-169)/(56-58) 165/56     Weight: 78.9 kg (174 lb) (09/20/18 0611)  Body mass index is 22.34 kg/m².  Body surface area is 2.03 meters squared.    No intake/output data recorded.    Physical Exam   Constitutional: He is oriented to person, place, and time. He appears well-developed. He has a sickly appearance. No distress. Nasal cannula in place.   HENT:   Head: Normocephalic and atraumatic.   Right Ear: External ear normal.   Left Ear: External ear normal.   Eyes: Conjunctivae and EOM are normal. Right eye exhibits no discharge. Left eye exhibits no discharge.   Neck: Normal range of motion. Neck supple.   Cardiovascular: Regular rhythm. Bradycardia present. Exam reveals no gallop and no friction rub.   Murmur heard.  Pulmonary/Chest: Effort normal. No respiratory distress. He has no wheezes. He has rales in the right middle field and the right lower field.   Abdominal: Soft. Bowel  sounds are normal. He exhibits no distension. There is no tenderness.   Musculoskeletal: He exhibits edema (2+ LE). He exhibits no deformity.   Neurological: He is alert and oriented to person, place, and time.   Skin: Skin is warm and dry. He is not diaphoretic. There is pallor.   Psychiatric: He has a normal mood and affect. His behavior is normal.       Significant Labs:  CBC:   Recent Labs   Lab  09/19/18   1332   WBC  5.81   RBC  2.62*   HGB  8.8*   HCT  29.2*   PLT  182   MCV  112*   MCH  33.6*   MCHC  30.1*     CMP:   Recent Labs   Lab  09/19/18   1332  09/20/18   1025   GLU  97  95   CALCIUM  8.7  8.7   ALBUMIN  3.0*   --    NA  141  136   K  5.0  4.9   CO2  29  24   CL  103  100   BUN  41*  30*   CREATININE  4.5*  3.6*         Assessment/Plan:     ESRD (end stage renal disease)    ESRD on iHD MWF  Krista Cope  5.5 hours-Monday  4 hours W/F  EDW ~ 174 lbs  + residual renal function  NA AVF    Plan/Recommendations:  Will switch to renal diet  No urgent need for RRT today.  Will plan for HD tomorrow in [facility] vs. ICU after EP   Recommend starting back on home lasix (80 mg PO BID)  If he develops SOB or worsening electrolyte abnormalities, please notify renal fellow on call tonight and will perform HD at bedside.            Donny Vasquez NP  Nephrology  [facility]  Pager:  [phone]

## 2018-09-20 NOTE — ANESTHESIA PREPROCEDURE EVALUATION
[facility]  Anesthesia Pre-Operative Evaluation         Patient Name: Tom Gage  YOB: 1950  MRN: [id number]    SUBJECTIVE:     Pre-operative evaluation for Procedure(s) (LRB):  CARDIAC ELECTROPHYSIOLOGY STUDY (N/A)     09/20/2018    Tom Gage is a 68 y.o. male with Hx of ESRD on HD with recurrent pleural effusions and ascites, non obstructive CAD s/p NSTEMI in 2012, CHF (EF 25-30%), moderate RV function, PASP 61, hx of gastric bypass in 2013 with suspected moderate protein calorie malnutrition and macrocytic anemia from B12 deficiency, hx of retinal bleeding on ASA, severe low flow gradient aortic stenosis (NYHA Class III sx).    Patient now s/p 29mm Evolut R TAVR via R TF access on 9/20.     Patient now presents for the above procedure(s).    Last dialysis was 9/19 outpatient at [facility]. Plan for dialysis 9/21.    LDA: None documented.       Pulmonary Artery Catheter Assessment  09/20/18 0741 (Active)   Dressing dressing dry and intact 9/20/2018 11:00 AM   Current Insertion Depth (cm) 69.5 cm 9/20/2018  9:30 AM   Number of days: 0            Peripheral IV - Single Lumen 09/20/18 0638 Right Forearm (Active)   Site Assessment Clean;Dry;Intact;No redness;No swelling 9/20/2018 11:00 AM   Line Status Saline locked 9/20/2018 11:00 AM   Dressing Status Clean;Dry;Intact 9/20/2018 11:00 AM   Dressing Change Due 09/27/18 9/20/2018 11:00 AM   Site Change Due 09/27/18 9/20/2018  9:30 AM   Reason Not Rotated Not due 9/20/2018 11:00 AM   Number of days: 0            Arterial Line 09/20/18 0706 Right Other (Comment) (Active)   Site Assessment Clean;Dry;Intact;No redness;No swelling 9/20/2018 11:00 AM   Line Status Pulsatile blood flow 9/20/2018 11:00 AM   Art Line Waveform Appropriate 9/20/2018 11:00 AM   Color/Movement/Sensation Capillary refill less than 3 sec 9/20/2018 11:00 AM   Dressing Type Transparent 9/20/2018 11:00 AM   Dressing Status Clean;Dry;Intact 9/20/2018 11:00 AM    Number of days: 0            Hemodialysis AV Fistula Left upper arm (Active)   Needle Size 15ga 7/13/2018  8:35 AM   Site Assessment Clean;Dry;Intact;No redness;No swelling 9/20/2018 11:00 AM   Patency Present;Thrill;Bruit 9/20/2018 11:00 AM   Status Deaccessed 9/20/2018 11:00 AM   Flows Good 7/13/2018  1:40 PM   Dressing Intervention New dressing 7/13/2018  1:40 PM   Dressing Status Dry;Intact;Clean 7/13/2018  7:36 PM   Site Condition No complications 7/13/2018  1:40 PM   Dressing Pressure dressing 7/13/2018  1:40 PM   Number of days:        Prev airway: None documented.  Grade I DL previously      Drips: None documented.      Patient Active Problem List   Diagnosis    Diarrhea    GERD (gastroesophageal reflux disease)    Renovascular hypertension    Anemia of chronic renal failure, stage 4 (severe)    Coronary artery disease involving native coronary artery of native heart without angina pectoris    Metabolic acidosis    Gastroesophageal reflux disease without esophagitis    Type 2 diabetes mellitus with diabetic nephropathy    Anemia of chronic renal failure, stage 5    Hyperparathyroidism, secondary renal    Organ transplant candidate    Pre-transplant evaluation for chronic kidney disease    Left groin pain    BPH with urinary obstruction    Internal hernia    CKD (chronic kidney disease) stage V requiring chronic dialysis    Constipation due to opioid therapy    Erectile dysfunction    Renal cyst    Pulmonary hypertension group 2    Diastolic dysfunction    Hypertensive heart and kidney disease with heart failure    Pleural effusion    S/P gastric bypass    ESRD (end stage renal disease)    Dilated cardiomyopathy    Chronic systolic heart failure    Nonrheumatic aortic valve stenosis    Non-rheumatic mitral regurgitation    Aortic stenosis    Moderate protein malnutrition    Heart failure with reduced ejection fraction, NYHA class III    Nodular calcific aortic valve stenosis  "      Review of patient's allergies indicates:   Allergen Reactions    Asa [aspirin] Other (See Comments)     Retinal bleeding, severe    Latex, natural rubber Rash and Blisters     Latex tape causes blisters    Ace inhibitors Other (See Comments)     Other reaction(s): Cough    Adhesive Dermatitis and Blisters     Please use Paper Tape    Morphine Hives, Itching, Dermatitis and Rash     Body Rash, Phlebitis, "My veins showed through the skin."       Current Inpatient Medications:   ceFAZolin (ANCEF) IVPB  1 g Intravenous Q8H    clopidogrel  75 mg Oral Daily       No current facility-administered medications on file prior to encounter.      Current Outpatient Medications on File Prior to Encounter   Medication Sig Dispense Refill    atorvastatin (LIPITOR) 10 MG tablet Take 1 tablet (10 mg total) by mouth once daily. 30 tablet 2    calcitRIOL (ROCALTROL) 0.5 MCG Cap Take 0.5 mcg by mouth once daily.      carvedilol (COREG) 25 MG tablet Take 1 tablet (25 mg total) by mouth 2 (two) times daily with meals. 180 tablet 3    furosemide (LASIX) 80 MG tablet 80 mg every 12 (twelve) hours.       losartan (COZAAR) 100 MG tablet Take 1 tablet (100 mg total) by mouth once daily. (Patient taking differently: Take 50 mg by mouth 2 (two) times daily. ) 90 tablet 3    pantoprazole (PROTONIX) 40 MG tablet Take 40 mg by mouth 2 (two) times daily as needed.       sevelamer carbonate (RENVELA) 800 mg Tab Take 1,600 mg by mouth 3 (three) times daily. Takes two 800 mg tablets (1600 mg) with meals      nitroGLYCERIN (NITROSTAT) 0.4 MG SL tablet Place 0.4 mg under the tongue every 5 (five) minutes as needed for Chest pain.      vitamin D 1000 units Tab Take 1,000 Units by mouth every 7 days.          Past Surgical History:   Procedure Laterality Date    ABDOMINAL SURGERY      PD catheter    ASPIRATION-GROIN N/A 9/9/2016    Performed by [facility] Provider at [facility] OR    BASAL CELL CARCINOMA EXCISION Left Jan 2011    " left forehead    BAV N/A 7/12/2018    Performed by [facility]lloa [facility] at [facility] CATH LAB    CHOLECYSTECTOMY  July 2010    ELBOW SURGERY Left 3/18/14    anterior submuscular transposition, ulnar nerve    EYE SURGERY Bilateral     bilateral cataracts    GASTRECTOMY      GASTRIC BYPASS  May 2014    gastric sleeve  June 2013    Malfunctioned requiring bypass    HEART CATH-RIGHT Right 1/20/2017    Performed by Ron Bernstein Jr., MD at [facility] CATH LAB    HERNIA REPAIR      INSERTION-PERITONEAL DIALYSIS CATHETER-LAPAROSCOPIC N/A 3/28/2016    Performed by Owen Ríos MD at [facility] OR 2ND FLR    LAPAROSCOPY-DIAGNOSTIC N/A 3/28/2016    Performed by Owen Ríos MD at [facility] OR 2ND FLR    REMOVAL-CATHETER-DIALYSIS-PERITONEAL N/A 9/20/2017    Performed by Owen Ríos MD at [facility] OR 2ND FLR    REPAIR-HERNIA-LAPAROSCOPIC  3/28/2016    Performed by Owen R[facility]os MD at [facility] OR 2ND FLR    REPAIR-HERNIA-LAPAROSCOPIC-INGUINAL Bilateral 3/28/2016    Performed by Owen R[facility]os MD at [facility] OR 2ND FLR    REVISION PERITONEAL DIALYSIS CATHETER-LAPAROSCOPIC N/A 5/18/2016    Performed by Owen R[facility]os MD at [facility] OR 2ND FLR    ROTATOR CUFF REPAIR Left 2014    SHOULDER ARTHROSCOPY Left 11/18/14    RCR; glenoid labral repair; arch decompression       Social History     Socioeconomic History    Marital status:      Spouse name: Not on file    Number of children: Not on file    Years of education: Not on file    Highest education level: Not on file   Social Needs    Financial resource strain: Not on file    Food insecurity - worry: Not on file    Food insecurity - inability: Not on file    Transportation needs - medical: Not on file    Transportation needs - non-medical: Not on file   Occupational History    Not on file   Tobacco Use    Smoking status: Never Smoker    Smokeless tobacco: Never Used   Substance and Sexual Activity    Alcohol use: No     Drug use: No    Sexual activity: Yes     Partners: Female   Other Topics Concern    Not on file   Social History Narrative    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     Flew in [employer] for 20 years    Lives with wife        OBJECTIVE:     Vital Signs Range (Last 24H):  Temp:  [35.7 °C (96.3 °F)]   Pulse:  [53-62]   Resp:  [15-34]   BP: (145-164)/()   SpO2:  [92 %-100 %]   Arterial Line BP: (165-169)/(56-58)       Significant Labs:  Lab Results   Component Value Date    WBC 5.81 09/19/2018    HGB 8.8 (L) 09/19/2018    HCT 29.2 (L) 09/19/2018     09/19/2018    CHOL 109 (L) 03/10/2018    TRIG 58 03/10/2018    HDL 43 03/10/2018    ALT 7 (L) 07/10/2018    AST 16 07/10/2018     09/20/2018    K 4.9 09/20/2018     09/20/2018    CREATININE 3.6 (H) 09/20/2018    BUN 30 (H) 09/20/2018    CO2 24 09/20/2018    TSH 4.653 (H) 09/20/2015    PSA 1.9 12/16/2016    INR 1.2 09/19/2018    HGBA1C 5.2 07/11/2018       Diagnostic Studies: No relevant studies.    EKG:   Sinus rhythm with 1st degree A-V block  Left bundle branch block  Abnormal ECG  When compared with ECG of 12-JUL-2018 17:17,  No significant change was found  Confirmed by Antoine Peters MD ([id number]) on 7/14/2018 6:29:23 PM    2D ECHO:  Results for orders placed or performed during the hospital encounter of 09/18/18   2D echo with color flow doppler   Result Value Ref Range    EF 25 (A) 55 - 65    Mitral Valve Regurgitation MILD TO MODERATE     Aortic Valve Regurgitation TRIVIAL     Aortic Valve Stenosis MODERATE TO SEVERE (A)     Est. PA Systolic Pressure 61.29 (A)     Mitral Valve Mobility NORMAL     Tricuspid Valve Regurgitation MODERATE TO SEVERE (A)          ASSESSMENT/PLAN:         Anesthesia Evaluation    I have reviewed the Patient Summary Reports.     I have reviewed the Medications.     Review of Systems  Anesthesia Hx:  No problems with previous Anesthesia  History of prior surgery of interest to airway management or  planning: Denies Family Hx of Anesthesia complications.   Denies Personal Hx of Anesthesia complications.   Hematology/Oncology:         -- Anemia:   Cardiovascular:   Exercise tolerance: poor Hypertension Valvular problems/Murmurs CAD   CHF     ECG has been reviewed. Symptoms much improved after valvuloplasty in July   Pulmonary:   Denies COPD.  Denies Asthma.  Denies Sleep Apnea. Hx of pleural effusions  Most recent drained in July, no issues since   Renal/:   Chronic Renal Disease, ESRD, Dialysis MWF HD with no issues   Hepatic/GI:   GERD, well controlled Denies Liver Disease.    Neurological:   TIA, Denies Seizures.    Endocrine:   Denies Diabetes.        Physical Exam  General:  Well nourished    Airway/Jaw/Neck:  Airway Findings: Mouth Opening: Normal Tongue: Normal  General Airway Assessment: Adult  Mallampati: II  Improves to II with phonation.  TM Distance: Normal, at least 6 cm  Jaw/Neck Findings:  Neck ROM: Normal ROM      Dental:  Dental Findings: In tact   Chest/Lungs:  Chest/Lungs Findings: Normal Respiratory Rate, Clear to auscultation     Heart/Vascular:  Heart Findings: Rate: Normal  Rhythm: Regular Rhythm  Sounds: Normal  Heart Murmur        Mental Status:  Mental Status Findings:  Alert and Oriented         Anesthesia Plan  Type of Anesthesia, risks & benefits discussed:  Anesthesia Type:  general, MAC  Patient's Preference:   Intra-op Monitoring Plan: standard ASA monitors and arterial line  Intra-op Monitoring Plan Comments:   Post Op Pain Control Plan: multimodal analgesia, IV/PO Opioids PRN and per primary service following discharge from PACU  Post Op Pain Control Plan Comments:   Induction:   IV  Beta Blocker:  Patient is not currently on a Beta-Blocker (No further documentation required).       Informed Consent: Patient understands risks and agrees with Anesthesia plan.  Questions answered. Anesthesia consent signed with patient.  ASA Score: 4     Day of Surgery Review of History &  Physical:            Ready For Surgery From Anesthesia Perspective.

## 2018-09-20 NOTE — CONSULTS
[facility]  Cardiac Electrophysiology  Consult Note    Admission Date: 9/20/2018  Code Status: Full Code   Attending Provider: Refugio Cooney MD  Consulting Provider: Sada Calderón MD  Principal Problem:<principal problem not specified>    Inpatient consult to Electrophysiology  Consult performed by: Sada Calderón MD  Consult ordered by: Miguelito Khanna MD        Subjective:     Chief Complaint:  Severe AS    HPI: Tom Gage is a 68 y.o. male referred by Dr Mcmanus for evaluation of Severe low flow low gradient aortic stenosis (NYHA Class III sx). He was transferred from [place] by cardiology in July, 2018 for acute on chronic combined HF thought to be due to severe AS and underwent BAV with 24 mm True balloon on 7/12/18.  EKG post BAV demonstrated SR 1st degree AVB with new LBBB. He is currently NYHA Class II and much improved symptomatically from pre-BAV     PMHx includes ESRD on HD, CAD s/p NSTEMI in 2012, recurrent pleural effusions (attributed to ESRD and hypoalbuminemia) and ascites, hx gastric bypass in 2013 with suspected moderate-protein calorie malnutrition and macrocytic anemia from B12 Deficiency.  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        The patient has undergone the following TAVR work-up:   · ECHO (Date 7/11/18 DSE: ARMIN = 0.99 cm2, peak velocity = 3.9 m/s->4.2 ms/s, mean gradient = 30 mmHg -> 42 mmHg, LVEF: 40%  · LHC 7/12/18: non obstructive CAD.  · STS: 4.98 %   · Frailty: 2/4  · Iliacs are >9.2 on R and >8.6 on L  · LVOT Area  Is 4.89 cm2, Avg Diam = 25 mm (30.8 x 20) mm) by Dr. Cooney  · Incidental findings on CT: Left upper lobe nodules measuring up to 0.6 cm.  For multiple solid nodules with any 6 mm or greater, Fleischner Society guidelines recommend follow up with non-contrast chest CT at 3-6 months and 18-24 months after discovery. Large right-sided hydropneumothorax with  compressive atelectasis of the right lung.  · CT Surgery risk assessment: High risk for surgery due to debility and comorbidities per Dr. Thomson.   · Rhythm issues: 1st degree AVB, LBBB  · PFTs: moderate FEV1 52%  · Comorbidities: ESRD, dilated CMP, non-ischemic, malnutrition, deconditioned       Patient is s/p 29mm Evolut R TAVR via R TF access on 9/20.        Echo 9/18/2018:    1 - Severely depressed left ventricular systolic function (EF 25-30%).     2 - Mildly to moderately depressed right ventricular systolic function .     3 - Moderate to severe aortic stenosis, ARMIN = 1.11 cm2, AVAi = 0.55 cm2/m2, peak velocity = 3.01 m/s, mean gradient = 21 mmHg.     4 - Trivial aortic regurgitation.     5 - Mild to moderate mitral regurgitation.     6 - Moderate to severe tricuspid regurgitation.     7 - Pulmonary hypertension. The estimated PA systolic pressure is 61 mmHg.     8 - Severe left atrial enlargement.     Past Medical History:   Diagnosis Date    Anemia of chronic renal failure, stage 5     BPH (benign prostatic hyperplasia)     CAD (coronary artery disease)     CHF (congestive heart failure)     Chronic systolic heart failure 5/31/2018    CKD (chronic kidney disease) stage 5, GFR less than 15 ml/min     Diastolic dysfunction 12/16/2016    Dilated cardiomyopathy 5/7/2018    GERD (gastroesophageal reflux disease)     Hyperparathyroidism, secondary renal     Hypertension     Metabolic acidosis     MI (myocardial infarction) Feb 2012    Non-rheumatic mitral regurgitation 5/31/2018    Nonrheumatic aortic valve stenosis 5/31/2018    Pulmonary hypertension 12/16/2016    Stenosis of aortic and mitral valves     Aortic stenosis    TIA (transient ischemic attack)     Type 2 diabetes mellitus with diabetic nephropathy     history/ before wt loss    Valvular regurgitation     mitral regurgitation       Past Surgical History:   Procedure Laterality Date    ABDOMINAL SURGERY      PD catheter     "ASPIRATION-GROIN N/A 9/9/2016    [facility] OR    BASAL CELL CARCINOMA EXCISION Left Jan 2011    left forehead    BAV N/A 7/12/2018    Performed by [facility] at [facility] CATH LAB    CHOLECYSTECTOMY  July 2010    ELBOW SURGERY Left 3/18/14    anterior submuscular transposition, ulnar nerve    EYE SURGERY Bilateral     bilateral cataracts    GASTRECTOMY      GASTRIC BYPASS  May 2014    gastric sleeve  June 2013    Malfunctioned requiring bypass    HEART CATH-RIGHT Right 1/20/2017    Performed by Ron Bernstein Jr., MD at [facility] CATH LAB    HERNIA REPAIR      INSERTION-PERITONEAL DIALYSIS CATHETER-LAPAROSCOPIC N/A 3/28/2016    Performed by Owen R[facility]os MD at [facility] OR 2ND FLR    LAPAROSCOPY-DIAGNOSTIC N/A 3/28/2016    Performed by [facility] at [facility] OR 2ND FLR    REMOVAL-CATHETER-DIALYSIS-PERITONEAL N/A 9/20/2017    Performed by Owen [facility] at [facility] OR 2ND FLR    REPAIR-HERNIA-LAPAROSCOPIC  3/28/2016    Performed by [facility] at [facility] OR 2ND FLR    REPAIR-HERNIA-LAPAROSCOPIC-INGUINAL Bilateral 3/28/2016    Performed by [facility] at [facility] OR 2ND FLR    REVISION PERITONEAL DIALYSIS CATHETER-LAPAROSCOPIC N/A 5/18/2016    Performed by Owen [facility] MD at [facility] OR 2ND FLR    ROTATOR CUFF REPAIR Left 2014    SHOULDER ARTHROSCOPY Left 11/18/14    RCR; glenoid labral repair; arch decompression       Review of patient's allergies indicates:   Allergen Reactions    Asa [aspirin] Other (See Comments)     Retinal bleeding, severe    Latex, natural rubber Rash and Blisters     Latex tape causes blisters    Ace inhibitors Other (See Comments)     Other reaction(s): Cough    Adhesive Dermatitis and Blisters     Please use Paper Tape    Morphine Hives, Itching, Dermatitis and Rash     Body Rash, Phlebitis, "My veins showed through the skin."       No current facility-administered medications " on file prior to encounter.      Current Outpatient Medications on File Prior to Encounter   Medication Sig    atorvastatin (LIPITOR) 10 MG tablet Take 1 tablet (10 mg total) by mouth once daily.    calcitRIOL (ROCALTROL) 0.5 MCG Cap Take 0.5 mcg by mouth once daily.    carvedilol (COREG) 25 MG tablet Take 1 tablet (25 mg total) by mouth 2 (two) times daily with meals.    furosemide (LASIX) 80 MG tablet 80 mg every 12 (twelve) hours.     losartan (COZAAR) 100 MG tablet Take 1 tablet (100 mg total) by mouth once daily. (Patient taking differently: Take 50 mg by mouth 2 (two) times daily. )    pantoprazole (PROTONIX) 40 MG tablet Take 40 mg by mouth 2 (two) times daily as needed.     sevelamer carbonate (RENVELA) 800 mg Tab Take 1,600 mg by mouth 3 (three) times daily. Takes two 800 mg tablets (1600 mg) with meals    nitroGLYCERIN (NITROSTAT) 0.4 MG SL tablet Place 0.4 mg under the tongue every 5 (five) minutes as needed for Chest pain.    vitamin D 1000 units Tab Take 1,000 Units by mouth every 7 days.      Family History     Problem Relation (Age of Onset)    Diabetes Mother, Father    Lung cancer Mother        Tobacco Use    Smoking status: Never Smoker    Smokeless tobacco: Never Used   Substance and Sexual Activity    Alcohol use: No    Drug use: No    Sexual activity: Yes     Partners: Female     Review of Systems   Constitution: Negative for chills, decreased appetite, diaphoresis, fever, weakness, malaise/fatigue, night sweats, weight gain and weight loss.   Eyes: Negative.    Cardiovascular: Negative for chest pain, irregular heartbeat, leg swelling, near-syncope, orthopnea, palpitations, paroxysmal nocturnal dyspnea and syncope.   Respiratory: Negative for cough, shortness of breath, sputum production and wheezing.    Endocrine: Negative.    Hematologic/Lymphatic: Negative for bleeding problem.   Skin: Negative for color change, flushing, rash and suspicious lesions.   Musculoskeletal: Positive  for muscle cramps.   Gastrointestinal: Negative for bloating, abdominal pain, change in bowel habit, constipation, diarrhea, heartburn, melena, nausea and vomiting.   Genitourinary: Negative for flank pain, frequency, hematuria, incomplete emptying and urgency.   Neurological: Negative for dizziness, focal weakness, headaches, light-headedness, numbness, paresthesias, seizures and sensory change.   Psychiatric/Behavioral: Negative for altered mental status. The patient is not nervous/anxious.      Objective:     Vital Signs (Most Recent):  Temp: 96.3 °F (35.7 °C) (09/20/18 0611)  Pulse: (!) 55 (09/20/18 1403)  Resp: 15 (09/20/18 1403)  BP: (!) 164/74 (09/20/18 1403)  SpO2: 100 % (09/20/18 1403) Vital Signs (24h Range):  Temp:  [96.3 °F (35.7 °C)] 96.3 °F (35.7 °C)  Pulse:  [53-62] 55  Resp:  [15-34] 15  SpO2:  [92 %-100 %] 100 %  BP: (145-164)/() 164/74  Arterial Line BP: (165-169)/(56-58) 165/56       Weight: 78.9 kg (174 lb)  Body mass index is 22.34 kg/m².    SpO2: 100 %  O2 Device (Oxygen Therapy): nasal cannula    Physical Exam   Constitutional: He is oriented to person, place, and time. He appears well-developed and well-nourished. No distress.   HENT:   Head: Normocephalic and atraumatic.   Mouth/Throat: Oropharynx is clear and moist.   Eyes: EOM are normal. Pupils are equal, round, and reactive to light.   Neck: Normal range of motion. Neck supple. No JVD present.   RIJ TVP in place   Cardiovascular: Normal rate and regular rhythm. Exam reveals no gallop and no friction rub.   No murmur heard.  Pulmonary/Chest: Effort normal and breath sounds normal. No respiratory distress. He has no wheezes. He has no rales.   Abdominal: Soft. Bowel sounds are normal. He exhibits no distension. There is no tenderness.   Musculoskeletal: Normal range of motion. He exhibits no edema.   Neurological: He is alert and oriented to person, place, and time.   Skin: Skin is warm and dry. No erythema.   Psychiatric: He has a  normal mood and affect. His behavior is normal. Judgment and thought content normal.       Significant Labs: All pertinent lab results from the last 24 hours have been reviewed.    Significant Imaging: Reviewed in EPIC        Assessment and Plan:     Nodular calcific aortic valve stenosis    In summary, patient with severe AS s/p TAVR. EP consulted for further management of TVP and if PPM is indicated prior to discharge.     EF 25-30% last echo 9/18/2018.     Pre-TAVR EKG: NSR 64 bpm, CARMELO 208, QRS 90, QTc 491  Post-TAVR EKG: SB 56 bpm, CARMELO 172,  with IVCD, QTc 561  Post-day 1 EKG:      Plan:   -- Continue TVP overnight. Current settings: BR 40, V output 25 mA, capture threshold 1.1.  -- Post-TAVR EKG with wide QRS and IVCD. Repeat EKG in AM to follow QRS width.  -- Make NPO >2400 for possible EPS +/- CRT-D implantation on 9/21.  -- Any changes to rhythm or acute events on telemetry please obtain EKG.  -- Routine post op management per structural team            Thank you for your consult.     Sada Calderón MD  Cardiac Electrophysiology  [facility]      Discussed with Dr. Watkins.

## 2018-09-20 NOTE — ANESTHESIA PREPROCEDURE EVALUATION
09/20/2018  Pre-operative evaluation for Procedure(s) (LRB):  Replacement-valve-aortic (N/A)    Tom Gage is a 68 y.o. male hx of low flow low gradient AS here for above procedure s/p BAV on 7/12/2018.  Hx of ESRD on HD with recurrent pleural effusions, non obstructive CAD, CHF (EF 25-30%), moderate RV function, PASP 61    Grade I DL previously    Patient Active Problem List   Diagnosis    Diarrhea    GERD (gastroesophageal reflux disease)    Renovascular hypertension    Anemia of chronic renal failure, stage 4 (severe)    Coronary artery disease involving native coronary artery of native heart without angina pectoris    Metabolic acidosis    Gastroesophageal reflux disease without esophagitis    Type 2 diabetes mellitus with diabetic nephropathy    Anemia of chronic renal failure, stage 5    Hyperparathyroidism, secondary renal    Organ transplant candidate    Pre-transplant evaluation for chronic kidney disease    Left groin pain    BPH with urinary obstruction    Internal hernia    CKD (chronic kidney disease) stage V requiring chronic dialysis    Constipation due to opioid therapy    Erectile dysfunction    Renal cyst    Pulmonary hypertension group 2    Diastolic dysfunction    Hypertensive heart and kidney disease with heart failure    Pleural effusion    S/P gastric bypass    ESRD (end stage renal disease)    Dilated cardiomyopathy    Chronic systolic heart failure    Nonrheumatic aortic valve stenosis    Non-rheumatic mitral regurgitation    Aortic stenosis    Moderate protein malnutrition    Heart failure with reduced ejection fraction, NYHA class III    Nodular calcific aortic valve stenosis       Review of patient's allergies indicates:   Allergen Reactions    Asa [aspirin] Other (See Comments)     Retinal bleeding, severe    Latex, natural rubber Rash and  "Blisters     Latex tape causes blisters    Ace inhibitors Other (See Comments)     Other reaction(s): Cough    Adhesive Dermatitis and Blisters     Please use Paper Tape    Morphine Hives, Itching, Dermatitis and Rash     Body Rash, Phlebitis, "My veins showed through the skin."       No current facility-administered medications on file prior to encounter.      Current Outpatient Medications on File Prior to Encounter   Medication Sig Dispense Refill    atorvastatin (LIPITOR) 10 MG tablet Take 1 tablet (10 mg total) by mouth once daily. 30 tablet 2    calcitRIOL (ROCALTROL) 0.5 MCG Cap Take 0.5 mcg by mouth once daily.      carvedilol (COREG) 25 MG tablet Take 1 tablet (25 mg total) by mouth 2 (two) times daily with meals. 180 tablet 3    furosemide (LASIX) 80 MG tablet 80 mg every 12 (twelve) hours.       losartan (COZAAR) 100 MG tablet Take 1 tablet (100 mg total) by mouth once daily. (Patient taking differently: Take 50 mg by mouth 2 (two) times daily. ) 90 tablet 3    pantoprazole (PROTONIX) 40 MG tablet Take 40 mg by mouth 2 (two) times daily as needed.       sevelamer carbonate (RENVELA) 800 mg Tab Take 1,600 mg by mouth 3 (three) times daily. Takes two 800 mg tablets (1600 mg) with meals      nitroGLYCERIN (NITROSTAT) 0.4 MG SL tablet Place 0.4 mg under the tongue every 5 (five) minutes as needed for Chest pain.      vitamin D 1000 units Tab Take 1,000 Units by mouth every 7 days.          Past Surgical History:   Procedure Laterality Date    ABDOMINAL SURGERY      PD catheter    ASPIRATION-GROIN N/A 9/9/2016    [facility]    BASAL CELL CARCINOMA EXCISION Left Jan 2011    left forehead    BAV N/A 7/12/2018    [facility]    CHOLECYSTECTOMY  July 2010    ELBOW SURGERY Left 3/18/14    anterior submuscular transposition, ulnar nerve    EYE SURGERY Bilateral     bilateral cataracts    GASTRECTOMY      GASTRIC " BYPASS  May 2014    gastric sleeve  June 2013    Malfunctioned requiring bypass    HEART CATH-RIGHT Right 1/20/2017    Performed by [facility] at [facility] CATH LAB    HERNIA REPAIR      INSERTION-PERITONEAL DIALYSIS CATHETER-LAPAROSCOPIC N/A 3/28/2016    Performed by Owen [facility] at [facility] OR 2ND FLR    LAPAROSCOPY-DIAGNOSTIC N/A 3/28/2016    Performed by [facility] at [facility] OR 2ND FLR    REMOVAL-CATHETER-DIALYSIS-PERITONEAL N/A 9/20/2017    Performed by [facility] at [facility] OR 2ND FLR    REPAIR-HERNIA-LAPAROSCOPIC  3/28/2016    Performed by [facility] at [facility] OR 2ND FLR    REPAIR-HERNIA-LAPAROSCOPIC-INGUINAL Bilateral 3/28/2016    Performed by [facility] at [facility] OR 2ND FLR    REVISION PERITONEAL DIALYSIS CATHETER-LAPAROSCOPIC N/A 5/18/2016    Performed by [facility] at [facility] OR 2ND FLR    ROTATOR CUFF REPAIR Left 2014    SHOULDER ARTHROSCOPY Left 11/18/14    RCR; glenoid labral repair; arch decompression       Social History     Socioeconomic History    Marital status:      Spouse name: Not on file    Number of children: Not on file    Years of education: Not on file    Highest education level: Not on file   Social Needs    Financial resource strain: Not on file    Food insecurity - worry: Not on file    Food insecurity - inability: Not on file    Transportation needs - medical: Not on file    Transportation needs - non-medical: Not on file   Occupational History    Not on file   Tobacco Use    Smoking status: Never Smoker    Smokeless tobacco: Never Used   Substance and Sexual Activity    Alcohol use: No    Drug use: No    Sexual activity: Yes     Partners: Female   Other Topics Concern    Not on file   Social History Narrative    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     Flew in [employer] for 20 years    Lives with wife          CBC:   Recent Labs      09/19/18    1332   WBC  5.81   RBC  2.62*   HGB  8.8*   HCT  29.2*   PLT  182   MCV  112*   MCH  33.6*   MCHC  30.1*       CMP:   Recent Labs      09/19/18   1332   NA  141   K  5.0   CL  103   CO2  29   BUN  41*   CREATININE  4.5*   GLU  97   CALCIUM  8.7   ALBUMIN  3.0*       INR  Recent Labs      09/19/18   1332   INR  1.2   APTT  24.9           Diagnostic Studies:      EKG:  Sinus rhythm with 1st degree A-V block  Left bundle branch block  Abnormal ECG  When compared with ECG of 12-JUL-2018 17:17,  No significant change was found  Confirmed by Lorraine HERNANDES, Antoine ([id number]) on 7/14/2018 6:29:23 PM    2D Echo:  Results for orders placed or performed during the hospital encounter of 09/18/18   2D echo with color flow doppler   Result Value Ref Range    EF 25 (A) 55 - 65    Mitral Valve Regurgitation MILD TO MODERATE     Aortic Valve Regurgitation TRIVIAL     Aortic Valve Stenosis MODERATE TO SEVERE (A)     Est. PA Systolic Pressure 61.29 (A)     Mitral Valve Mobility NORMAL     Tricuspid Valve Regurgitation MODERATE TO SEVERE (A)          Anesthesia Evaluation    I have reviewed the Patient Summary Reports.     I have reviewed the Medications.     Review of Systems  Anesthesia Hx:  No problems with previous Anesthesia  History of prior surgery of interest to airway management or planning: Denies Family Hx of Anesthesia complications.   Denies Personal Hx of Anesthesia complications.   Hematology/Oncology:         -- Anemia:   Cardiovascular:   Exercise tolerance: poor Hypertension Valvular problems/Murmurs CAD   CHF     ECG has been reviewed. Symptoms much improved after valvuloplasty in July   Pulmonary:   Denies COPD.  Denies Asthma.  Denies Sleep Apnea. Hx of pleural effusions  Most recent drained in July, no issues since   Renal/:   Chronic Renal Disease, ESRD, Dialysis MWF HD with no issues   Hepatic/GI:   GERD, well controlled Denies Liver Disease.    Neurological:   TIA, Denies Seizures.    Endocrine:   Denies Diabetes.         Physical Exam  General:  Well nourished    Airway/Jaw/Neck:  Airway Findings: Mouth Opening: Normal Tongue: Normal  General Airway Assessment: Adult  Mallampati: II  Improves to II with phonation.  TM Distance: Normal, at least 6 cm  Jaw/Neck Findings:  Neck ROM: Normal ROM      Dental:  Dental Findings: In tact   Chest/Lungs:  Chest/Lungs Findings: Normal Respiratory Rate, Clear to auscultation     Heart/Vascular:  Heart Findings: Rate: Normal  Rhythm: Regular Rhythm  Sounds: Normal  Heart Murmur        Mental Status:  Mental Status Findings:  Alert and Oriented         Anesthesia Plan  Type of Anesthesia, risks & benefits discussed:  Anesthesia Type:  general, MAC  Patient's Preference:   Intra-op Monitoring Plan: arterial line, central line and standard ASA monitors  Intra-op Monitoring Plan Comments:   Post Op Pain Control Plan: per primary service following discharge from PACU  Post Op Pain Control Plan Comments:   Induction:    Beta Blocker:  Patient is on a Beta-Blocker and has received one dose within the past 24 hours (No further documentation required).       Informed Consent: Patient understands risks and agrees with Anesthesia plan.  Questions answered. Anesthesia consent signed with patient.  ASA Score: 4     Day of Surgery Review of History & Physical:    H&P update referred to the provider.         Ready For Surgery From Anesthesia Perspective.

## 2018-09-20 NOTE — TRANSFER OF CARE
"Anesthesia Transfer of Care Note    Patient: Tom Gage    Procedure(s) Performed: Procedure(s) (LRB):  Replacement-valve-aortic (N/A)    Patient location: ICU    Anesthesia Type: general    Transport from OR: Transported from OR on 6-10 L/min O2 by face mask with adequate spontaneous ventilation. Continuous SpO2 monitoring in transport. Continuous ECG monitoring in transport. Continuos invasive BP monitoring in transport. Continuous CVP monitoring in transport    Post pain: adequate analgesia    Post assessment: no apparent anesthetic complications and tolerated procedure well    Post vital signs: stable    Level of consciousness: awake and alert    Nausea/Vomiting: no nausea/vomiting    Complications: none    Transfer of care protocol was followed      Last vitals:   Visit Vitals  BP (!) 162/79 (BP Location: Right arm, Patient Position: Lying)   Pulse 62   Temp 35.7 °C (96.3 °F) (Oral)   Resp 18   Ht 6' 2" (1.88 m)   Wt 78.9 kg (174 lb)   SpO2 95%   BMI 22.34 kg/m²     "

## 2018-09-20 NOTE — SUBJECTIVE & OBJECTIVE
Past Medical History:   Diagnosis Date    Anemia of chronic renal failure, stage 5     BPH (benign prostatic hyperplasia)     CAD (coronary artery disease)     CHF (congestive heart failure)     Chronic systolic heart failure 5/31/2018    CKD (chronic kidney disease) stage 5, GFR less than 15 ml/min     Diastolic dysfunction 12/16/2016    Dilated cardiomyopathy 5/7/2018    GERD (gastroesophageal reflux disease)     Hyperparathyroidism, secondary renal     Hypertension     Metabolic acidosis     MI (myocardial infarction) Feb 2012    Non-rheumatic mitral regurgitation 5/31/2018    Nonrheumatic aortic valve stenosis 5/31/2018    Pulmonary hypertension 12/16/2016    Stenosis of aortic and mitral valves     Aortic stenosis    TIA (transient ischemic attack)     Type 2 diabetes mellitus with diabetic nephropathy     history/ before wt loss    Valvular regurgitation     mitral regurgitation       Past Surgical History:   Procedure Laterality Date    ABDOMINAL SURGERY      PD catheter    ASPIRATION-GROIN N/A 9/9/2016    Performed by [facility]    BASAL CELL CARCINOMA EXCISION Left Jan 2011    left forehead    BAV N/A 7/12/2018    Performed by L[facility]lloa [facility] at [facility] CATH LAB    CHOLECYSTECTOMY  July 2010    ELBOW SURGERY Left 3/18/14    anterior submuscular transposition, ulnar nerve    EYE SURGERY Bilateral     bilateral cataracts    GASTRECTOMY      GASTRIC BYPASS  May 2014    gastric sleeve  June 2013    Malfunctioned requiring bypass    HEART CATH-RIGHT Right 1/20/2017    Performed by Ron Bernstein Jr., MD at [facility] CATH LAB    HERNIA REPAIR      INSERTION-PERITONEAL DIALYSIS CATHETER-LAPAROSCOPIC N/A 3/28/2016    Performed by Owen R[facility]os [facility] at [facility] OR 2ND FLR    LAPAROSCOPY-DIAGNOSTIC N/A 3/28/2016    Performed by Owen [facility] at [facility] OR 2ND FLR    REMOVAL-CATHETER-DIALYSIS-PERITONEAL N/A 9/20/2017    Performed by Owen DOSS  "[facility] [place] at [facility] OR 2ND FLR    REPAIR-HERNIA-LAPAROSCOPIC  3/28/2016    Performed by [facility] at [facility] OR 2ND FLR    REPAIR-HERNIA-LAPAROSCOPIC-INGUINAL Bilateral 3/28/2016    Performed by Owen [facility] at [facility] OR 2ND FLR    REVISION PERITONEAL DIALYSIS CATHETER-LAPAROSCOPIC N/A 5/18/2016    Performed by Owen R[facility] at [facility] OR 2ND FLR    ROTATOR CUFF REPAIR Left 2014    SHOULDER ARTHROSCOPY Left 11/18/14    RCR; glenoid labral repair; arch decompression       Review of patient's allergies indicates:   Allergen Reactions    Asa [aspirin] Other (See Comments)     Retinal bleeding, severe    Latex, natural rubber Rash and Blisters     Latex tape causes blisters    Ace inhibitors Other (See Comments)     Other reaction(s): Cough    Adhesive Dermatitis and Blisters     Please use Paper Tape    Morphine Hives, Itching, Dermatitis and Rash     Body Rash, Phlebitis, "My veins showed through the skin."       No current facility-administered medications on file prior to encounter.      Current Outpatient Medications on File Prior to Encounter   Medication Sig    atorvastatin (LIPITOR) 10 MG tablet Take 1 tablet (10 mg total) by mouth once daily.    calcitRIOL (ROCALTROL) 0.5 MCG Cap Take 0.5 mcg by mouth once daily.    carvedilol (COREG) 25 MG tablet Take 1 tablet (25 mg total) by mouth 2 (two) times daily with meals.    furosemide (LASIX) 80 MG tablet 80 mg every 12 (twelve) hours.     losartan (COZAAR) 100 MG tablet Take 1 tablet (100 mg total) by mouth once daily. (Patient taking differently: Take 50 mg by mouth 2 (two) times daily. )    pantoprazole (PROTONIX) 40 MG tablet Take 40 mg by mouth 2 (two) times daily as needed.     sevelamer carbonate (RENVELA) 800 mg Tab Take 1,600 mg by mouth 3 (three) times daily. Takes two 800 mg tablets (1600 mg) with meals    nitroGLYCERIN (NITROSTAT) 0.4 MG SL tablet Place 0.4 mg under the tongue every 5 (five) minutes as " needed for Chest pain.    vitamin D 1000 units Tab Take 1,000 Units by mouth every 7 days.      Family History     Problem Relation (Age of Onset)    Diabetes Mother, Father    Lung cancer Mother        Tobacco Use    Smoking status: Never Smoker    Smokeless tobacco: Never Used   Substance and Sexual Activity    Alcohol use: No    Drug use: No    Sexual activity: Yes     Partners: Female     Review of Systems   Constitution: Negative for chills, decreased appetite, diaphoresis, fever, weakness, malaise/fatigue, night sweats, weight gain and weight loss.   Eyes: Negative.    Cardiovascular: Negative for chest pain, irregular heartbeat, leg swelling, near-syncope, orthopnea, palpitations, paroxysmal nocturnal dyspnea and syncope.   Respiratory: Negative for cough, shortness of breath, sputum production and wheezing.    Endocrine: Negative.    Hematologic/Lymphatic: Negative for bleeding problem.   Skin: Negative for color change, flushing, rash and suspicious lesions.   Musculoskeletal: Positive for muscle cramps.   Gastrointestinal: Negative for bloating, abdominal pain, change in bowel habit, constipation, diarrhea, heartburn, melena, nausea and vomiting.   Genitourinary: Negative for flank pain, frequency, hematuria, incomplete emptying and urgency.   Neurological: Negative for dizziness, focal weakness, headaches, light-headedness, numbness, paresthesias, seizures and sensory change.   Psychiatric/Behavioral: Negative for altered mental status. The patient is not nervous/anxious.      Objective:     Vital Signs (Most Recent):  Temp: 96.3 °F (35.7 °C) (09/20/18 0611)  Pulse: (!) 55 (09/20/18 1403)  Resp: 15 (09/20/18 1403)  BP: (!) 164/74 (09/20/18 1403)  SpO2: 100 % (09/20/18 1403) Vital Signs (24h Range):  Temp:  [96.3 °F (35.7 °C)] 96.3 °F (35.7 °C)  Pulse:  [53-62] 55  Resp:  [15-34] 15  SpO2:  [92 %-100 %] 100 %  BP: (145-164)/() 164/74  Arterial Line BP: (165-169)/(56-58) 165/56       Weight:  78.9 kg (174 lb)  Body mass index is 22.34 kg/m².    SpO2: 100 %  O2 Device (Oxygen Therapy): nasal cannula    Physical Exam   Constitutional: He is oriented to person, place, and time. He appears well-developed and well-nourished. No distress.   HENT:   Head: Normocephalic and atraumatic.   Mouth/Throat: Oropharynx is clear and moist.   Eyes: EOM are normal. Pupils are equal, round, and reactive to light.   Neck: Normal range of motion. Neck supple. No JVD present.   RIJ TVP in place   Cardiovascular: Normal rate and regular rhythm. Exam reveals no gallop and no friction rub.   No murmur heard.  Pulmonary/Chest: Effort normal and breath sounds normal. No respiratory distress. He has no wheezes. He has no rales.   Abdominal: Soft. Bowel sounds are normal. He exhibits no distension. There is no tenderness.   Musculoskeletal: Normal range of motion. He exhibits no edema.   Neurological: He is alert and oriented to person, place, and time.   Skin: Skin is warm and dry. No erythema.   Psychiatric: He has a normal mood and affect. His behavior is normal. Judgment and thought content normal.       Significant Labs: All pertinent lab results from the last 24 hours have been reviewed.    Significant Imaging: Reviewed in EPIC

## 2018-09-20 NOTE — ASSESSMENT & PLAN NOTE
ESRD on iHD MWF  Krista Cope  5.5 hours-Monday  4 hours W/F  EDW ~ 174 lbs  + residual renal function  NA AVF    Plan/Recommendations:  Will switch to renal diet  No urgent need for RRT today.  Will plan for HD tomorrow in [facility] vs. ICU after EP   Recommend starting back on home lasix (80 mg PO BID)  If he develops SOB or worsening electrolyte abnormalities, please notify renal fellow on call tonight and will perform HD at bedside.

## 2018-09-20 NOTE — CONSULTS
"Cardiac Rehab     Tom Gage   [id number]   9/20/2018       Activity taught: Yes    Cardiac Rehab Phase Taught: Phase I & II    Risk Factors-Modifiable: nutrition, obesity, sedentary lifestyle, stress, exercise    Risk Factors-Non modifiable: age, gender    Teaching Method: Verbal, Written and Living with Heart Disease book.    Understanding/Response: Pt verbalized understanding, all questions answered.     Comments: S/P TAVR. Discussed cardiac rehab and risk factor modification. Dr. Cooney to refer pt at time of follow up appt.  Educational materials were used in the process and given to the patient. They included "Your Guide to Living with Heart Disease", Phase Two Cardiac Rehabilitation information along with a sample Mediterranean diet.The patient expressed understanding of the teaching and expressed desire to take a role in modifying the risk factors when they return home.    NISHANT Leavitt RN  Cardiac Rehab Nurse      "

## 2018-09-20 NOTE — ANESTHESIA PROCEDURE NOTES
Arterial    Diagnosis: severe AS  Doctor requesting consult: jenn    Patient location during procedure: done in OR  Procedure start time: 9/20/2018 7:06 AM  Timeout: 9/20/2018 7:05 AM  Procedure end time: 9/20/2018 7:15 AM  Staffing  Anesthesiologist: David Forman Jr., MD  Resident/CRNA: Jam Singh MD  Performed: anesthesiologist and resident/CRNA   Anesthesiologist was present at the time of the procedure.  Preanesthetic Checklist  Completed: patient identified, site marked, surgical consent, pre-op evaluation, timeout performed, IV checked, risks and benefits discussed, monitors and equipment checked and anesthesia consent givenArterial  Skin Prep: chlorhexidine gluconate and isopropyl alcohol  Local Infiltration: lidocaine  Orientation: right  Location: brachial  Catheter Size: 20 G  Catheter placement by Ultrasound guidance. Heme positive aspiration all ports.  Vessel Caliber: small, patent, compressibility normal  Vascular Doppler:  not done  Needle advanced into vessel with real time Ultrasound guidance.  Guidewire confirmed in vessel.Insertion Attempts: 3  Assessment  Dressing: secured with tape and tegaderm  Patient: Tolerated well  Additional Notes  Attempted radial twice, significantly calcified, brachial arterial line placed first attempt

## 2018-09-20 NOTE — HPI
Mr. Esparza is a 67 yo male with HTN, DM2, HFrEF, pHTN, AS s/p TAVR (09/20/18), and ESRD on iHD MWF who presented to the hospital on 09/20 for elective TAVR for symptomatic AS. Post procedure, found to have LBBB and is scheduled for EP lab tomorrow for evaluation for AICD.  Nephrology was consulted for ESRD management while in-patient.  He dialyzes at [facility] under the care of Dr. Cope.  He was initiated on peritoneal dialysis around 2 years ago, but continued to develop recurrent pleural effusions and had to be transitioned to hemodialysis.  According to the wife at bedside, patient was tolerating treatments on HD well, but continued to have worsening SOB and dyspnea on exertion with episodes of heart failure despite compliance with treatments and fluid restrictions and further work-up revealed severe AS and he was referred to IC at [facility] for further evaluation and treatment in which he underwent a ballon valvuloplasty in July of 2018 with significant improvement in his symptoms.    His current dialysis prescription is 5.5 hours on Mondays and 4 hour treatments on Wednesday and Friday.  He continues to have residual renal function and continues on lasix at home BID.  He has an AVF to his NA ++ bruit/thrill

## 2018-09-20 NOTE — ANESTHESIA PROCEDURE NOTES
[place]    Diagnosis: severe AS  Doctor requesting consult: jenn  Patient location during procedure: done in OR  Procedure start time: 9/20/2018 7:41 AM  Timeout: 9/20/2018 7:40 AM  Procedure end time: 9/20/2018 7:50 AM  Staffing  Anesthesiologist: David Forman Jr., MD  Resident/CRNA: Jam Singh MD  Performed: resident/CRNA   Anesthesiologist was present at the time of the procedure.  Preanesthetic Checklist  Completed: patient identified, site marked, surgical consent, pre-op evaluation, timeout performed, IV checked, risks and benefits discussed, monitors and equipment checked and anesthesia consent given  [place]  Skin Prep: chlorhexidine gluconate and isopropyl alcohol  Local Infiltration: lidocaine  Location: right,  internal jugular vein  Vessel Caliber: large, patent, compressibility normal  Vascular Doppler:  not done  Coaxial introducer size: 6.5.   Device: transvenous pacing catheter, fluoroscopy used.   Catheter Size: 6.5 Fr  Catheter placement by yes. Heme positive aspiration all ports.Sterile sheath used  Locked at: 40 cm.Insertion Attempts: 1  Indication: intravenous therapy, hemodynamic monitoring, transvenous pacing  Ultrasound Guidance  Needle advanced into vessel with real time Ultrasound guidance.  Guidewire confirmed in vessel.  Sterile sheath used.  Assessment  Central Line Bundle Protocol followed. Hand hygiene before procedure, surgical cap worn, surgical mask worn, sterile surgical gloves worn, large sterile drape used.  Verification: ultrasound and blood return  Dressing: secured with tape and tegaderm and sutured in place and taped  Patient: Tolerated Well

## 2018-09-20 NOTE — OP NOTE
[facility]  Cardiothoracic Surgery Operative Report      DATE OF PROCEDURE: 9/20/2018  ATTENDING SURGEONS: Refugio Cooney M.D., Loyd Nunez M.D., and Refugio Fregoso M.D.  PREOPERATIVE DIAGNOSIS:  Severe aortic stenosis.  POSTOPERATIVE DIAGNOSIS:  Severe aortic stenosis  ?  OPERATION PERFORMED: Transcatheter aortic valve insertion via transfemoral approach using a 29 mm Medtronic Corevalve Evolut  ANESTHESIA: General.  ESTIMATED BLOOD LOSS: 10 mL.  BRIEF HISTORY: This patient is a 68 year old male with symptomatic severe aortic stenosis.  The patient has had progressive dyspnea on exertion. This prompted a thoughtful and thorough evaluation, which demonstrated severe aortic stenosis. Given the comorbid conditions, a transcatheter valve insertion was recommended. The patient now presents for transcatheter aortic valve insertion.  PROCEDURE: After obtaining informed and written consent, the patient was brought to the cath lab and placed on the cath table in supine position. After induction of adequate anesthesia, a transvenous pacing wire was placed.  Bilateral femoral arterial and femoral venous access was obtained. A wire was advanced across the aortic valve. It was exchanged for stiff wire, and an aortic balloon was placed. Under rapid ventricular pacing, balloon valvuloplasty was performed. The balloon was then withdrawn, and a valve was advanced to the level of the aortic annulus. Once the team was satisfied that the valve was in proper position, it was deployed. Excellent positioning was obtained. Post-procedure echo demonstrated no aortic insufficiency. The femoral access sites were controlled using percutaneous techniques. The patient tolerated the procedure well, there were no complications. At the conclusion of the case, sponge and instrument counts were correct.

## 2018-09-20 NOTE — SUBJECTIVE & OBJECTIVE
Past Medical History:   Diagnosis Date    Anemia of chronic renal failure, stage 5     BPH (benign prostatic hyperplasia)     CAD (coronary artery disease)     CHF (congestive heart failure)     Chronic systolic heart failure 5/31/2018    CKD (chronic kidney disease) stage 5, GFR less than 15 ml/min     Diastolic dysfunction 12/16/2016    Dilated cardiomyopathy 5/7/2018    GERD (gastroesophageal reflux disease)     Hyperparathyroidism, secondary renal     Hypertension     Metabolic acidosis     MI (myocardial infarction) Feb 2012    Non-rheumatic mitral regurgitation 5/31/2018    Nonrheumatic aortic valve stenosis 5/31/2018    Pulmonary hypertension 12/16/2016    Stenosis of aortic and mitral valves     Aortic stenosis    TIA (transient ischemic attack)     Type 2 diabetes mellitus with diabetic nephropathy     history/ before wt loss    Valvular regurgitation     mitral regurgitation       Past Surgical History:   Procedure Laterality Date    ABDOMINAL SURGERY      PD catheter    ASPIRATION-GROIN N/A 9/9/2016    Performed by [facility] Diagnostic Provider at [facility] OR    BASAL CELL CARCINOMA EXCISION Left Jan 2011    left forehead    BAV N/A 7/12/2018    Performed by Loyd [facility]lloa MD at [facility] CATH LAB    CHOLECYSTECTOMY  July 2010    ELBOW SURGERY Left 3/18/14    anterior submuscular transposition, ulnar nerve    EYE SURGERY Bilateral     bilateral cataracts    GASTRECTOMY      GASTRIC BYPASS  May 2014    gastric sleeve  June 2013    Malfunctioned requiring bypass    HEART CATH-RIGHT Right 1/20/2017    Performed by Ron Bernstein Jr., MD at [facility] CATH LAB    HERNIA REPAIR      INSERTION-PERITONEAL DIALYSIS CATHETER-LAPAROSCOPIC N/A 3/28/2016    Performed by Owen Ríos MD at [facility] OR 2ND FLR    LAPAROSCOPY-DIAGNOSTIC N/A 3/28/2016    Performed by Owen Ríos MD at [facility] OR 2ND FLR    REMOVAL-CATHETER-DIALYSIS-PERITONEAL N/A 9/20/2017    Performed by Owen DOSS  "MD [place] at [facility] OR 2ND FLR    REPAIR-HERNIA-LAPAROSCOPIC  3/28/2016    Performed by Owen [facility] MD at [facility] OR 2ND FLR    REPAIR-HERNIA-LAPAROSCOPIC-INGUINAL Bilateral 3/28/2016    Performed by Owen R[facility]os MD at [facility] OR 2ND FLR    REVISION PERITONEAL DIALYSIS CATHETER-LAPAROSCOPIC N/A 5/18/2016    Performed by Owen R[facility]os MD at [facility] OR 2ND FLR    ROTATOR CUFF REPAIR Left 2014    SHOULDER ARTHROSCOPY Left 11/18/14    RCR; glenoid labral repair; arch decompression       Review of patient's allergies indicates:   Allergen Reactions    Asa [aspirin] Other (See Comments)     Retinal bleeding, severe    Latex, natural rubber Rash and Blisters     Latex tape causes blisters    Ace inhibitors Other (See Comments)     Other reaction(s): Cough    Adhesive Dermatitis and Blisters     Please use Paper Tape    Morphine Hives, Itching, Dermatitis and Rash     Body Rash, Phlebitis, "My veins showed through the skin."     Current Facility-Administered Medications   Medication Frequency    ceFAZolin injection 1 g Q8H    clopidogrel tablet 75 mg Daily    magnesium oxide tablet 800 mg PRN    magnesium oxide tablet 800 mg PRN    ondansetron injection 4 mg Q12H PRN    potassium chloride 10% oral solution 40 mEq PRN    potassium chloride 10% oral solution 40 mEq PRN    potassium, sodium phosphates 280-160-250 mg packet 2 packet PRN    potassium, sodium phosphates 280-160-250 mg packet 2 packet PRN    potassium, sodium phosphates 280-160-250 mg packet 2 packet PRN     Family History     Problem Relation (Age of Onset)    Diabetes Mother, Father    Lung cancer Mother        Tobacco Use    Smoking status: Never Smoker    Smokeless tobacco: Never Used   Substance and Sexual Activity    Alcohol use: No    Drug use: No    Sexual activity: Yes     Partners: Female     Review of Systems   Constitutional: Negative for activity change, appetite change and fever.   HENT: Negative for " congestion, dental problem, sneezing and sore throat.    Eyes: Negative for pain, discharge and visual disturbance.   Respiratory: Negative for cough, shortness of breath and wheezing.    Cardiovascular: Negative for chest pain, palpitations and leg swelling.   Gastrointestinal: Negative for abdominal distention, abdominal pain, constipation, diarrhea, nausea and vomiting.   Endocrine: Negative for polydipsia, polyphagia and polyuria.   Genitourinary: Negative for dysuria, frequency and urgency.   Musculoskeletal: Negative for back pain and gait problem.   Skin: Negative for rash and wound.   Neurological: Negative for dizziness, seizures, syncope and headaches.   Hematological: Does not bruise/bleed easily.   Psychiatric/Behavioral: Negative for agitation and confusion. The patient is not nervous/anxious.      Objective:     Vital Signs (Most Recent):  Temp: 96.3 °F (35.7 °C) (09/20/18 0611)  Pulse: (!) 55 (09/20/18 1403)  Resp: 15 (09/20/18 1403)  BP: (!) 164/74 (09/20/18 1403)  SpO2: 100 % (09/20/18 1403)  O2 Device (Oxygen Therapy): nasal cannula (09/20/18 1403) Vital Signs (24h Range):  Temp:  [96.3 °F (35.7 °C)] 96.3 °F (35.7 °C)  Pulse:  [53-62] 55  Resp:  [15-34] 15  SpO2:  [92 %-100 %] 100 %  BP: (145-164)/() 164/74  Arterial Line BP: (165-169)/(56-58) 165/56     Weight: 78.9 kg (174 lb) (09/20/18 0611)  Body mass index is 22.34 kg/m².  Body surface area is 2.03 meters squared.    No intake/output data recorded.    Physical Exam   Constitutional: He is oriented to person, place, and time. He appears well-developed. He has a sickly appearance. No distress. Nasal cannula in place.   HENT:   Head: Normocephalic and atraumatic.   Right Ear: External ear normal.   Left Ear: External ear normal.   Eyes: Conjunctivae and EOM are normal. Right eye exhibits no discharge. Left eye exhibits no discharge.   Neck: Normal range of motion. Neck supple.   Cardiovascular: Regular rhythm. Bradycardia present. Exam  reveals no gallop and no friction rub.   Murmur heard.  Pulmonary/Chest: Effort normal. No respiratory distress. He has no wheezes. He has rales in the right middle field and the right lower field.   Abdominal: Soft. Bowel sounds are normal. He exhibits no distension. There is no tenderness.   Musculoskeletal: He exhibits edema (2+ LE). He exhibits no deformity.   Neurological: He is alert and oriented to person, place, and time.   Skin: Skin is warm and dry. He is not diaphoretic. There is pallor.   Psychiatric: He has a normal mood and affect. His behavior is normal.       Significant Labs:  CBC:   Recent Labs   Lab  09/19/18   1332   WBC  5.81   RBC  2.62*   HGB  8.8*   HCT  29.2*   PLT  182   MCV  112*   MCH  33.6*   MCHC  30.1*     CMP:   Recent Labs   Lab  09/19/18   1332  09/20/18   1025   GLU  97  95   CALCIUM  8.7  8.7   ALBUMIN  3.0*   --    NA  141  136   K  5.0  4.9   CO2  29  24   CL  103  100   BUN  41*  30*   CREATININE  4.5*  3.6*

## 2018-09-20 NOTE — INTERVAL H&P NOTE
The patient has been examined and the H&P has been reviewed:    I concur with the findings and no changes have occurred since H&P was written.    Anesthesia/Surgery risks, benefits and alternative options discussed and understood by patient/family.          Active Hospital Problems    Diagnosis  POA    Nodular calcific aortic valve stenosis [I35.0]  Yes      Resolved Hospital Problems   No resolved problems to display.

## 2018-09-20 NOTE — HPI
Tom Gage is a 68 y.o. male referred by Dr Mcmanus for evaluation of Severe low flow low gradient aortic stenosis (NYHA Class III sx). He was transferred from [place] by cardiology in July, 2018 for acute on chronic combined HF thought to be due to severe AS and underwent BAV with 24 mm True balloon on 7/12/18.  He is currently NYHA Class II and much improved symptomatically from pre-BAV     PMHx includes ESRD on HD, CAD s/p NSTEMI in 2012, recurrent pleural effusions (attributed to ESRD and hypoalbuminemia) and ascites, hx gastric bypass in 2013 with suspected moderate-protein calorie malnutrition and macrocytic anemia from B12 Deficiency.  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        The patient has undergone the following TAVR work-up:   · ECHO (Date 7/11/18 DSE: ARMIN = 0.99 cm2, peak velocity = 3.9 m/s->4.2 ms/s, mean gradient = 30 mmHg -> 42 mmHg, LVEF: 40%  · LHC 7/12/18: non obstructive CAD.  · STS: 4.98 %   · Frailty: 2/4  · Iliacs are >9.2 on R and >8.6 on L  · LVOT Area  Is 4.89 cm2, Avg Diam = 25 mm (30.8 x 20) mm) by Dr. Cooney  · Incidental findings on CT: Left upper lobe nodules measuring up to 0.6 cm.  For multiple solid nodules with any 6 mm or greater, Fleischner Society guidelines recommend follow up with non-contrast chest CT at 3-6 months and 18-24 months after discovery. Large right-sided hydropneumothorax with compressive atelectasis of the right lung.  · CT Surgery risk assessment: High risk for surgery due to debility and comorbidities per Dr. Thomson.   · Rhythm issues: 1st degree AVB, LBBB  · PFTs: moderate FEV1 52%  · Comorbidities: ESRD, dilated CMP, non-ischemic, malnutrition, deconditioned     OK for 29mm Evolut R TAVR via R TF access.

## 2018-09-21 ENCOUNTER — ANESTHESIA (OUTPATIENT)
Dept: MEDSURG UNIT | Facility: HOSPITAL | Age: 68
DRG: 266 | End: 2018-09-21
Payer: MEDICARE

## 2018-09-21 DIAGNOSIS — I35.0 NODULAR CALCIFIC AORTIC VALVE STENOSIS: Primary | ICD-10-CM

## 2018-09-21 LAB
ANION GAP SERPL CALC-SCNC: 15 MMOL/L
BASOPHILS # BLD AUTO: 0.06 K/UL
BASOPHILS NFR BLD: 0.8 %
BUN SERPL-MCNC: 36 MG/DL
CALCIUM SERPL-MCNC: 8.3 MG/DL
CHLORIDE SERPL-SCNC: 102 MMOL/L
CO2 SERPL-SCNC: 22 MMOL/L
CREAT SERPL-MCNC: 4.3 MG/DL
DIASTOLIC DYSFUNCTION: YES
DIFFERENTIAL METHOD: ABNORMAL
EOSINOPHIL # BLD AUTO: 0.1 K/UL
EOSINOPHIL NFR BLD: 1.3 %
ERYTHROCYTE [DISTWIDTH] IN BLOOD BY AUTOMATED COUNT: 15.1 %
EST. GFR  (AFRICAN AMERICAN): 15.5 ML/MIN/1.73 M^2
EST. GFR  (NON AFRICAN AMERICAN): 13.4 ML/MIN/1.73 M^2
GLUCOSE SERPL-MCNC: 100 MG/DL (ref 70–110)
GLUCOSE SERPL-MCNC: 80 MG/DL
HCO3 UR-SCNC: 32.3 MMOL/L (ref 24–28)
HCT VFR BLD AUTO: 31 %
HCT VFR BLD CALC: 29 %PCV (ref 36–54)
HGB BLD-MCNC: 9.6 G/DL
IMM GRANULOCYTES # BLD AUTO: 0.02 K/UL
IMM GRANULOCYTES NFR BLD AUTO: 0.3 %
LYMPHOCYTES # BLD AUTO: 0.4 K/UL
LYMPHOCYTES NFR BLD: 4.9 %
MCH RBC QN AUTO: 33.9 PG
MCHC RBC AUTO-ENTMCNC: 31 G/DL
MCV RBC AUTO: 110 FL
MONOCYTES # BLD AUTO: 0.7 K/UL
MONOCYTES NFR BLD: 8.9 %
NEUTROPHILS # BLD AUTO: 6.3 K/UL
NEUTROPHILS NFR BLD: 83.8 %
NRBC BLD-RTO: 0 /100 WBC
PCO2 BLDA: 61.8 MMHG (ref 35–45)
PH SMN: 7.33 [PH] (ref 7.35–7.45)
PLATELET # BLD AUTO: 198 K/UL
PMV BLD AUTO: 10.3 FL
PO2 BLDA: 263 MMHG (ref 80–100)
POC ACTIVATED CLOTTING TIME K: 125 SEC (ref 74–137)
POC ACTIVATED CLOTTING TIME K: 136 SEC (ref 74–137)
POC ACTIVATED CLOTTING TIME K: 252 SEC (ref 74–137)
POC BE: 6 MMOL/L
POC IONIZED CALCIUM: 1.09 MMOL/L (ref 1.06–1.42)
POC SATURATED O2: 100 % (ref 95–100)
POC TCO2: 34 MMOL/L (ref 23–27)
POTASSIUM BLD-SCNC: 4.5 MMOL/L (ref 3.5–5.1)
POTASSIUM SERPL-SCNC: 4.8 MMOL/L
RBC # BLD AUTO: 2.83 M/UL
RETIRED EF AND QEF - SEE NOTES: 30 (ref 55–65)
SAMPLE: ABNORMAL
SAMPLE: ABNORMAL
SAMPLE: NORMAL
SAMPLE: NORMAL
SODIUM BLD-SCNC: 138 MMOL/L (ref 136–145)
SODIUM SERPL-SCNC: 139 MMOL/L
WBC # BLD AUTO: 7.52 K/UL

## 2018-09-21 PROCEDURE — 33225 L VENTRIC PACING LEAD ADD-ON: CPT | Mod: NTX,,, | Performed by: INTERNAL MEDICINE

## 2018-09-21 PROCEDURE — C1730 CATH, EP, 19 OR FEW ELECT: HCPCS | Mod: NTX

## 2018-09-21 PROCEDURE — 99291 CRITICAL CARE FIRST HOUR: CPT | Mod: NTX,,, | Performed by: INTERNAL MEDICINE

## 2018-09-21 PROCEDURE — 0JH609Z INSERTION OF CARDIAC RESYNCHRONIZATION DEFIBRILLATOR PULSE GENERATOR INTO CHEST SUBCUTANEOUS TISSUE AND FASCIA, OPEN APPROACH: ICD-10-PCS | Performed by: INTERNAL MEDICINE

## 2018-09-21 PROCEDURE — 37000008 HC ANESTHESIA 1ST 15 MINUTES: Mod: NTX | Performed by: INTERNAL MEDICINE

## 2018-09-21 PROCEDURE — 63600175 PHARM REV CODE 636 W HCPCS: Mod: NTX | Performed by: NURSE ANESTHETIST, CERTIFIED REGISTERED

## 2018-09-21 PROCEDURE — 85025 COMPLETE CBC W/AUTO DIFF WBC: CPT | Mod: NTX

## 2018-09-21 PROCEDURE — 63600175 PHARM REV CODE 636 W HCPCS: Mod: NTX | Performed by: INTERNAL MEDICINE

## 2018-09-21 PROCEDURE — 25000003 PHARM REV CODE 250: Mod: TXP

## 2018-09-21 PROCEDURE — 63600175 PHARM REV CODE 636 W HCPCS: Mod: NTX | Performed by: STUDENT IN AN ORGANIZED HEALTH CARE EDUCATION/TRAINING PROGRAM

## 2018-09-21 PROCEDURE — 80048 BASIC METABOLIC PNL TOTAL CA: CPT | Mod: NTX

## 2018-09-21 PROCEDURE — 63600175 PHARM REV CODE 636 W HCPCS: Mod: TXP

## 2018-09-21 PROCEDURE — 99233 SBSQ HOSP IP/OBS HIGH 50: CPT | Mod: NTX,,, | Performed by: NURSE PRACTITIONER

## 2018-09-21 PROCEDURE — 93307 TTE W/O DOPPLER COMPLETE: CPT | Mod: 26,NTX,, | Performed by: INTERNAL MEDICINE

## 2018-09-21 PROCEDURE — C1898 LEAD, PMKR, OTHER THAN TRANS: HCPCS | Mod: NTX

## 2018-09-21 PROCEDURE — 4A0234Z MEASUREMENT OF CARDIAC ELECTRICAL ACTIVITY, PERCUTANEOUS APPROACH: ICD-10-PCS | Performed by: INTERNAL MEDICINE

## 2018-09-21 PROCEDURE — 94761 N-INVAS EAR/PLS OXIMETRY MLT: CPT | Mod: NTX

## 2018-09-21 PROCEDURE — 33249 INSJ/RPLCMT DEFIB W/LEAD(S): CPT | Mod: NTX,,, | Performed by: INTERNAL MEDICINE

## 2018-09-21 PROCEDURE — 93307 TTE W/O DOPPLER COMPLETE: CPT | Mod: NTX

## 2018-09-21 PROCEDURE — 02H63KZ INSERTION OF DEFIBRILLATOR LEAD INTO RIGHT ATRIUM, PERCUTANEOUS APPROACH: ICD-10-PCS | Performed by: INTERNAL MEDICINE

## 2018-09-21 PROCEDURE — 93600 BUNDLE OF HIS RECORDING: CPT | Mod: 26,NTX,, | Performed by: INTERNAL MEDICINE

## 2018-09-21 PROCEDURE — 25000003 PHARM REV CODE 250: Mod: NTX | Performed by: STUDENT IN AN ORGANIZED HEALTH CARE EDUCATION/TRAINING PROGRAM

## 2018-09-21 PROCEDURE — 25500020 PHARM REV CODE 255: Mod: TXP

## 2018-09-21 PROCEDURE — 25000003 PHARM REV CODE 250: Mod: NTX | Performed by: PHYSICIAN ASSISTANT

## 2018-09-21 PROCEDURE — 37000009 HC ANESTHESIA EA ADD 15 MINS: Mod: NTX | Performed by: INTERNAL MEDICINE

## 2018-09-21 PROCEDURE — S0020 INJECTION, BUPIVICAINE HYDRO: HCPCS | Mod: TXP

## 2018-09-21 PROCEDURE — 80100014 HC HEMODIALYSIS 1:1: Mod: NTX

## 2018-09-21 PROCEDURE — 25000003 PHARM REV CODE 250: Mod: NTX | Performed by: INTERNAL MEDICINE

## 2018-09-21 PROCEDURE — C1894 INTRO/SHEATH, NON-LASER: HCPCS | Mod: NTX

## 2018-09-21 PROCEDURE — 02HL3KZ INSERTION OF DEFIBRILLATOR LEAD INTO LEFT VENTRICLE, PERCUTANEOUS APPROACH: ICD-10-PCS | Performed by: INTERNAL MEDICINE

## 2018-09-21 PROCEDURE — D9220A PRA ANESTHESIA: Mod: CRNA,NTX,, | Performed by: NURSE ANESTHETIST, CERTIFIED REGISTERED

## 2018-09-21 PROCEDURE — 02HK3KZ INSERTION OF DEFIBRILLATOR LEAD INTO RIGHT VENTRICLE, PERCUTANEOUS APPROACH: ICD-10-PCS | Performed by: INTERNAL MEDICINE

## 2018-09-21 PROCEDURE — 27100006 EP LAB PROCEDURE: Mod: NTX

## 2018-09-21 PROCEDURE — D9220A PRA ANESTHESIA: Mod: ANES,NTX,, | Performed by: ANESTHESIOLOGY

## 2018-09-21 PROCEDURE — 25000003 PHARM REV CODE 250: Mod: NTX | Performed by: NURSE ANESTHETIST, CERTIFIED REGISTERED

## 2018-09-21 PROCEDURE — 4A023FZ MEASUREMENT OF CARDIAC RHYTHM, PERCUTANEOUS APPROACH: ICD-10-PCS | Performed by: INTERNAL MEDICINE

## 2018-09-21 PROCEDURE — 20000000 HC ICU ROOM: Mod: NTX

## 2018-09-21 RX ORDER — CEFAZOLIN SODIUM 1 G/3ML
1 INJECTION, POWDER, FOR SOLUTION INTRAMUSCULAR; INTRAVENOUS
Status: COMPLETED | OUTPATIENT
Start: 2018-09-21 | End: 2018-09-22

## 2018-09-21 RX ORDER — EPHEDRINE SULFATE 50 MG/ML
INJECTION, SOLUTION INTRAVENOUS
Status: DISCONTINUED | OUTPATIENT
Start: 2018-09-21 | End: 2018-09-21

## 2018-09-21 RX ORDER — CEFAZOLIN SODIUM 1 G/3ML
2 INJECTION, POWDER, FOR SOLUTION INTRAMUSCULAR; INTRAVENOUS
Status: DISCONTINUED | OUTPATIENT
Start: 2018-09-21 | End: 2018-09-22 | Stop reason: HOSPADM

## 2018-09-21 RX ORDER — KETAMINE HYDROCHLORIDE 10 MG/ML
INJECTION, SOLUTION INTRAMUSCULAR; INTRAVENOUS
Status: DISCONTINUED | OUTPATIENT
Start: 2018-09-21 | End: 2018-09-21

## 2018-09-21 RX ORDER — ONDANSETRON 2 MG/ML
INJECTION INTRAMUSCULAR; INTRAVENOUS
Status: DISCONTINUED | OUTPATIENT
Start: 2018-09-21 | End: 2018-09-21

## 2018-09-21 RX ORDER — LOSARTAN POTASSIUM 100 MG/1
50 TABLET ORAL 2 TIMES DAILY
Qty: 90 TABLET | Refills: 3 | Status: SHIPPED | OUTPATIENT
Start: 2018-09-21 | End: 2019-09-21

## 2018-09-21 RX ORDER — GLYCOPYRROLATE 0.2 MG/ML
INJECTION INTRAMUSCULAR; INTRAVENOUS
Status: DISCONTINUED | OUTPATIENT
Start: 2018-09-21 | End: 2018-09-21

## 2018-09-21 RX ORDER — PROPOFOL 10 MG/ML
VIAL (ML) INTRAVENOUS CONTINUOUS PRN
Status: DISCONTINUED | OUTPATIENT
Start: 2018-09-21 | End: 2018-09-21

## 2018-09-21 RX ORDER — SODIUM CHLORIDE 9 MG/ML
INJECTION, SOLUTION INTRAVENOUS CONTINUOUS PRN
Status: DISCONTINUED | OUTPATIENT
Start: 2018-09-21 | End: 2018-09-21

## 2018-09-21 RX ORDER — CEPHALEXIN 500 MG/1
CAPSULE ORAL
Qty: 5 CAPSULE | Refills: 0 | Status: ON HOLD | OUTPATIENT
Start: 2018-09-21 | End: 2018-10-24 | Stop reason: HOSPADM

## 2018-09-21 RX ORDER — LOSARTAN POTASSIUM 50 MG/1
50 TABLET ORAL 2 TIMES DAILY
Status: DISCONTINUED | OUTPATIENT
Start: 2018-09-21 | End: 2018-09-22 | Stop reason: HOSPADM

## 2018-09-21 RX ORDER — ACETAMINOPHEN 325 MG/1
650 TABLET ORAL EVERY 4 HOURS PRN
Status: DISCONTINUED | OUTPATIENT
Start: 2018-09-21 | End: 2018-09-22 | Stop reason: HOSPADM

## 2018-09-21 RX ORDER — PHENYLEPHRINE HYDROCHLORIDE 10 MG/ML
INJECTION INTRAVENOUS
Status: DISCONTINUED | OUTPATIENT
Start: 2018-09-21 | End: 2018-09-21

## 2018-09-21 RX ORDER — TRAMADOL HYDROCHLORIDE 50 MG/1
50 TABLET ORAL EVERY 8 HOURS PRN
Status: DISCONTINUED | OUTPATIENT
Start: 2018-09-21 | End: 2018-09-22 | Stop reason: HOSPADM

## 2018-09-21 RX ORDER — SODIUM CHLORIDE 9 MG/ML
INJECTION, SOLUTION INTRAVENOUS ONCE
Status: DISCONTINUED | OUTPATIENT
Start: 2018-09-21 | End: 2018-09-22 | Stop reason: HOSPADM

## 2018-09-21 RX ORDER — CLOPIDOGREL BISULFATE 75 MG/1
75 TABLET ORAL DAILY
Qty: 30 TABLET | Refills: 11 | Status: SHIPPED | OUTPATIENT
Start: 2018-09-22 | End: 2019-11-05

## 2018-09-21 RX ORDER — MIDAZOLAM HYDROCHLORIDE 1 MG/ML
INJECTION, SOLUTION INTRAMUSCULAR; INTRAVENOUS
Status: DISCONTINUED | OUTPATIENT
Start: 2018-09-21 | End: 2018-09-21

## 2018-09-21 RX ADMIN — PHENYLEPHRINE HYDROCHLORIDE 200 MCG: 10 INJECTION INTRAVENOUS at 01:09

## 2018-09-21 RX ADMIN — CEFAZOLIN 2 G: 330 INJECTION, POWDER, FOR SOLUTION INTRAMUSCULAR; INTRAVENOUS at 12:09

## 2018-09-21 RX ADMIN — PROPOFOL 50 MCG/KG/MIN: 10 INJECTION, EMULSION INTRAVENOUS at 11:09

## 2018-09-21 RX ADMIN — CEFAZOLIN 1 G: 330 INJECTION, POWDER, FOR SOLUTION INTRAMUSCULAR; INTRAVENOUS at 10:09

## 2018-09-21 RX ADMIN — EPHEDRINE SULFATE 15 MG: 50 INJECTION, SOLUTION INTRAMUSCULAR; INTRAVENOUS; SUBCUTANEOUS at 01:09

## 2018-09-21 RX ADMIN — SODIUM CHLORIDE 0.4 MCG/KG/MIN: 9 INJECTION, SOLUTION INTRAVENOUS at 02:09

## 2018-09-21 RX ADMIN — ATORVASTATIN CALCIUM 10 MG: 10 TABLET, FILM COATED ORAL at 09:09

## 2018-09-21 RX ADMIN — EPHEDRINE SULFATE 25 MG: 50 INJECTION, SOLUTION INTRAMUSCULAR; INTRAVENOUS; SUBCUTANEOUS at 11:09

## 2018-09-21 RX ADMIN — TRAMADOL HYDROCHLORIDE 50 MG: 50 TABLET, FILM COATED ORAL at 09:09

## 2018-09-21 RX ADMIN — SEVELAMER CARBONATE 1600 MG: 800 TABLET, FILM COATED ORAL at 06:09

## 2018-09-21 RX ADMIN — CALCIUM CHLORIDE 1 G: 100 INJECTION, SOLUTION INTRAVENOUS at 12:09

## 2018-09-21 RX ADMIN — MIDAZOLAM 1 MG: 1 INJECTION INTRAMUSCULAR; INTRAVENOUS at 11:09

## 2018-09-21 RX ADMIN — EPHEDRINE SULFATE 10 MG: 50 INJECTION, SOLUTION INTRAMUSCULAR; INTRAVENOUS; SUBCUTANEOUS at 12:09

## 2018-09-21 RX ADMIN — KETAMINE HYDROCHLORIDE 20 MG: 10 INJECTION, SOLUTION INTRAMUSCULAR; INTRAVENOUS at 12:09

## 2018-09-21 RX ADMIN — PHENYLEPHRINE HYDROCHLORIDE 200 MCG: 10 INJECTION INTRAVENOUS at 02:09

## 2018-09-21 RX ADMIN — ACETAMINOPHEN 650 MG: 325 TABLET ORAL at 08:09

## 2018-09-21 RX ADMIN — LOSARTAN POTASSIUM 50 MG: 50 TABLET, FILM COATED ORAL at 08:09

## 2018-09-21 RX ADMIN — PANTOPRAZOLE SODIUM 40 MG: 40 TABLET, DELAYED RELEASE ORAL at 09:09

## 2018-09-21 RX ADMIN — GLYCOPYRROLATE 0.1 MG: 0.2 INJECTION, SOLUTION INTRAMUSCULAR; INTRAVENOUS at 12:09

## 2018-09-21 RX ADMIN — SODIUM CHLORIDE: 0.9 INJECTION, SOLUTION INTRAVENOUS at 11:09

## 2018-09-21 RX ADMIN — LOSARTAN POTASSIUM 50 MG: 50 TABLET, FILM COATED ORAL at 09:09

## 2018-09-21 RX ADMIN — CEFAZOLIN 1 G: 1 INJECTION, POWDER, FOR SOLUTION INTRAMUSCULAR; INTRAVENOUS at 02:09

## 2018-09-21 RX ADMIN — PHENYLEPHRINE HYDROCHLORIDE 100 MCG: 10 INJECTION INTRAVENOUS at 01:09

## 2018-09-21 RX ADMIN — CLOPIDOGREL 75 MG: 75 TABLET, FILM COATED ORAL at 09:09

## 2018-09-21 RX ADMIN — SODIUM BICARBONATE: 84 INJECTION, SOLUTION INTRAVENOUS at 09:09

## 2018-09-21 NOTE — PLAN OF CARE
Problem: Patient Care Overview  Goal: Plan of Care Review  Outcome: Ongoing (interventions implemented as appropriate)    No acute events throughout day. See vital signs and assessments in flowsheets. See below for updates on today's progress.     Pulmonary: sats WNL RA    Cardiovascular: HR 50's-70's; 's-180's/80's-90's; afebrile; TVP in place    Neurological: intact    Gastrointestinal: BM x1; kept NPO after MN    Genitourinary: voids per urinal    Endocrine: WNL    Integumentary/Other: Fem site CDI    Infusions: cordis    Patient progressing towards goals as tolerated, plan of care communicated and reviewed with Tom Gage and family. All concerns addressed. Will continue to monitor.

## 2018-09-21 NOTE — BRIEF OP NOTE
Patient is s/p [facility] [employer] CRTD;    Tolerated procedure well. No acute complication noted.  Post op care per protocol.  Will monitor in recovery on tele overnight  Get an echo to r/o effusion.   Device parameters wnl  Ancef 1 gram q8 hours x 2 doses (ordered)  Sling to left arm - wear for 48 hours, then only at night for 6 weeks.  NO HEPARIN PRODUCTS  Keflex 500 mg TID for 4 days at discharge  No lifting left elbow above shoulder height  No lifting over 5 pounds   Chest Xray (ordered)  Follow up in device clinic in 1 week and with Dr. Mcmanus in 3 months.

## 2018-09-21 NOTE — SUBJECTIVE & OBJECTIVE
Interval History: No acute issues overnight.     Patient examined at bedside this morning. No complaints offered. Denies chest pain, palpitations, shortness of breath, nausea/vomiting.     Review of Systems   Constitution: Negative for chills, diaphoresis and fever.   Cardiovascular: Negative for chest pain, dyspnea on exertion, irregular heartbeat, leg swelling, near-syncope, orthopnea, palpitations and paroxysmal nocturnal dyspnea.   Respiratory: Negative for cough and shortness of breath.    Gastrointestinal: Negative for abdominal pain, change in bowel habit, nausea and vomiting.   Neurological: Negative for headaches and light-headedness.   Psychiatric/Behavioral: Negative for altered mental status. The patient is not nervous/anxious.      Objective:     Vital Signs (Most Recent):  Temp: 97.8 °F (36.6 °C) (09/21/18 0701)  Pulse: 67 (09/21/18 0915)  Resp: (!) 26 (09/21/18 0915)  BP: (!) 201/82 (09/21/18 0900)  SpO2: 96 % (09/21/18 0915) Vital Signs (24h Range):  Temp:  [97.8 °F (36.6 °C)-98.7 °F (37.1 °C)] 97.8 °F (36.6 °C)  Pulse:  [54-67] 67  Resp:  [15-26] 26  SpO2:  [90 %-100 %] 96 %  BP: (150-201)/() 201/82  Arterial Line BP: (128-180)/(59-91) 180/71     Weight: 78.9 kg (174 lb)  Body mass index is 22.34 kg/m².     SpO2: 96 %  O2 Device (Oxygen Therapy): room air    Physical Exam  Constitutional: He is oriented to person, place, and time. He appears well-developed and well-nourished. No distress.   HENT:   Head: Normocephalic and atraumatic.   Mouth/Throat: Oropharynx is clear and moist.   Eyes: EOM are normal. Pupils are equal, round, and reactive to light.   Neck: Normal range of motion. Neck supple. No JVD present.   RIJ TVP in place   Cardiovascular: Normal rate and regular rhythm. Exam reveals no gallop and no friction rub.   No murmur heard.  Pulmonary/Chest: Effort normal and breath sounds normal. No respiratory distress. He has no wheezes. He has no rales.   Abdominal: Soft. Bowel sounds are  normal. He exhibits no distension. There is no tenderness.   Musculoskeletal: Normal range of motion. He exhibits no edema.   Neurological: He is alert and oriented to person, place, and time.   Skin: Skin is warm and dry. No erythema.   Psychiatric: He has a normal mood and affect. His behavior is normal. Judgment and thought content normal.       Significant Labs: All pertinent lab results from the last 24 hours have been reviewed.    Significant Imaging: Reviewed in EPIC

## 2018-09-21 NOTE — PROGRESS NOTES
[facility]  Interventional Cardiology  Progress Note    Patient Name: Tom Gage  MRN: [id number]  Admission Date: 9/20/2018  Hospital Length of Stay: 1 days  Code Status: Full Code   Attending Physician: Refugio Cooney MD   Primary Care Physician: Asael Poole MD  Principal Problem:Nodular calcific aortic valve stenosis    Subjective:     Interval History: No events overnight. No complaints this AM. Plan for EPS +/- ICD today.     Objective:     Vital Signs (Most Recent):  Temp: 97.8 °F (36.6 °C) (09/21/18 0701)  Pulse: 67 (09/21/18 0915)  Resp: (!) 26 (09/21/18 0915)  BP: (!) 201/82 (09/21/18 0900)  SpO2: 96 % (09/21/18 0915) Vital Signs (24h Range):  Temp:  [97.8 °F (36.6 °C)-98.7 °F (37.1 °C)] 97.8 °F (36.6 °C)  Pulse:  [57-67] 67  Resp:  [16-26] 26  SpO2:  [90 %-100 %] 96 %  BP: (150-201)/() 201/82  Arterial Line BP: (147-180)/(62-87) 180/71     Weight: 78.9 kg (173 lb 15.1 oz)  Body mass index is 22.33 kg/m².    SpO2: 96 %  O2 Device (Oxygen Therapy): room air      Intake/Output Summary (Last 24 hours) at 9/21/2018 1457  Last data filed at 9/21/2018 1000  Gross per 24 hour   Intake 226.17 ml   Output 100 ml   Net 126.17 ml       Lines/Drains/Airways     Central Venous Catheter Line                 Introducer 09/20/18 right internal jugular 1 day          Drain                 Hemodialysis AV Fistula Left upper arm -- days          Peripheral Intravenous Line                 Peripheral IV - Single Lumen 09/20/18 0638 Right Forearm 1 day         Peripheral IV - Single Lumen 09/20/18 2100 Anterior;Right Forearm less than 1 day                Physical Exam   Constitutional: He is oriented to person, place, and time. He appears well-developed and well-nourished.   HENT:   Head: Normocephalic and atraumatic.   Eyes: EOM are normal. Pupils are equal, round, and reactive to light.   Neck: Neck supple. JVD present. No tracheal deviation present. No thyromegaly present.    Cardiovascular: Normal rate, regular rhythm, S1 normal, S2 normal, intact distal pulses and normal pulses. PMI is not displaced. Exam reveals no gallop and no friction rub.   No murmur heard.  Pulmonary/Chest: Effort normal. No respiratory distress. He has decreased breath sounds in the right lower field and the left lower field. He has no wheezes. He has no rales. He exhibits no tenderness.   Abdominal: Soft. Bowel sounds are normal. He exhibits no distension and no mass. There is no tenderness.   Musculoskeletal: Normal range of motion. He exhibits no edema or tenderness.   Neurological: He is alert and oriented to person, place, and time.   Skin: Skin is warm and dry. No rash noted.   Psychiatric: He has a normal mood and affect. His behavior is normal.       Significant Labs:   BMP:   Recent Labs   Lab  09/20/18   1025  09/21/18   0303   GLU  95  80   NA  136  139   K  4.9  4.8   CL  100  102   CO2  24  22*   BUN  30*  36*   CREATININE  3.6*  4.3*   CALCIUM  8.7  8.3*    and CBC   Recent Labs   Lab  09/21/18   0303   WBC  7.52   HGB  9.6*   HCT  31.0*   PLT  198     Assessment and Plan:     Patient is a 68 y.o. male presenting with:    Acute on chronic combined systolic (congestive) and diastolic (congestive) heart failure    Fluid balance + with evidence of volume overload on physical exam.  CVP 26 during case yesterday.   Plan for HD tonight with volume removal.   Nephrology recommends Lasix 80mg BID at d/c.         Nodular calcific aortic valve stenosis    S/p successful right transfemoral aortic valve replacement with 29 mm EvolutR valve.  Post procedure echo: No perivalvular leak, mean aortic gradient 6 mmHg, Vmax 1.2m/s.  On ASA and Plavix.         Moderate protein malnutrition    Albumin 3.0.  Encourage increased protein intake.         Dilated cardiomyopathy    EF= 40%          ESRD (end stage renal disease)    On HD M/W/F.  Nephrology consulted for inpatient HD. Appreciate their assistance.          Type 2 diabetes mellitus with diabetic nephropathy    BS stable.   On SSI while inpatient.         Anemia of chronic renal failure with postoperative blood loss anemia    Expected post-TAVR.   Asymptomatic.   No indication for transfusion at this time.           OOB to chair.   IS at least 10x/hr.   EPS +/- BiV AICD today.   If no ICD, will d/c home this evening after HD.   If ICD needed, will step down to floor, HD tonight, and anticipate d/c in AM.       VTE Risk Mitigation (From admission, onward)        Ordered     IP VTE LOW RISK PATIENT  Once      09/21/18 0820          Jessenia Artis PA-C  Interventional Cardiology  [facility]  [phone]

## 2018-09-21 NOTE — SUBJECTIVE & OBJECTIVE
"Interval History:   EMMANUEL.  Reports significant improvement in his UOP this morning and in good spirits.  Electrolytes stable.  Plans for EP today.  Possible discharge today after hemodialysis    Review of patient's allergies indicates:   Allergen Reactions    Asa [aspirin] Other (See Comments)     Retinal bleeding, severe    Latex, natural rubber Rash and Blisters     Latex tape causes blisters    Ace inhibitors Other (See Comments)     Other reaction(s): Cough    Adhesive Dermatitis and Blisters     Please use Paper Tape    Morphine Hives, Itching, Dermatitis and Rash     Body Rash, Phlebitis, "My veins showed through the skin."     Current Facility-Administered Medications   Medication Frequency    0.9%  NaCl infusion Once    atorvastatin tablet 10 mg Daily    ceFAZolin injection 2 g On Call Procedure    clopidogrel tablet 75 mg Daily    losartan tablet 50 mg BID    magnesium oxide tablet 800 mg PRN    magnesium oxide tablet 800 mg PRN    ondansetron injection 4 mg Q12H PRN    pantoprazole EC tablet 40 mg Daily    potassium chloride 10% oral solution 40 mEq PRN    potassium chloride 10% oral solution 40 mEq PRN    potassium, sodium phosphates 280-160-250 mg packet 2 packet PRN    potassium, sodium phosphates 280-160-250 mg packet 2 packet PRN    potassium, sodium phosphates 280-160-250 mg packet 2 packet PRN    sevelamer carbonate tablet 1,600 mg TID    sodium bicarbonate 1 mEq/mL (8.4 %) 150 mEq in dextrose 5 % 1,000 mL infusion Continuous       Objective:     Vital Signs (Most Recent):  Temp: 97.8 °F (36.6 °C) (09/21/18 0701)  Pulse: 67 (09/21/18 0915)  Resp: (!) 26 (09/21/18 0915)  BP: (!) 201/82 (09/21/18 0900)  SpO2: 96 % (09/21/18 0915)  O2 Device (Oxygen Therapy): room air (09/21/18 0915) Vital Signs (24h Range):  Temp:  [97.3 °F (36.3 °C)-98.7 °F (37.1 °C)] 97.8 °F (36.6 °C)  Pulse:  [53-67] 67  Resp:  [15-26] 26  SpO2:  [90 %-100 %] 96 %  BP: (147-201)/() 201/82  Arterial Line " BP: (128-180)/(55-91) 180/71     Weight: 78.9 kg (174 lb) (09/20/18 0611)  Body mass index is 22.34 kg/m².  Body surface area is 2.03 meters squared.    I/O last 3 completed shifts:  In: 100 [I.V.:100]  Out: 100 [Urine:100]    Physical Exam   Constitutional: He is oriented to person, place, and time. He appears well-developed. He has a sickly appearance. No distress. Nasal cannula in place.   HENT:   Head: Normocephalic and atraumatic.   Right Ear: External ear normal.   Left Ear: External ear normal.   Eyes: Conjunctivae and EOM are normal. Right eye exhibits no discharge. Left eye exhibits no discharge.   Neck: Normal range of motion. Neck supple.   Cardiovascular: Regular rhythm. Bradycardia present. Exam reveals no gallop and no friction rub.   Murmur heard.  Pulmonary/Chest: Effort normal. No respiratory distress. He has no decreased breath sounds. He has no wheezes. He has no rales.   Abdominal: Soft. Bowel sounds are normal. He exhibits no distension. There is no tenderness.   Musculoskeletal: He exhibits edema (2+ LE). He exhibits no deformity.   Neurological: He is alert and oriented to person, place, and time.   Skin: Skin is warm and dry. He is not diaphoretic. There is pallor.   Psychiatric: He has a normal mood and affect. His behavior is normal.       Significant Labs:  CBC:   Recent Labs   Lab  09/21/18   0303   WBC  7.52   RBC  2.83*   HGB  9.6*   HCT  31.0*   PLT  198   MCV  110*   MCH  33.9*   MCHC  31.0*     CMP:   Recent Labs   Lab  09/19/18   1332   09/21/18   0303   GLU  97   < >  80   CALCIUM  8.7   < >  8.3*   ALBUMIN  3.0*   --    --    NA  141   < >  139   K  5.0   < >  4.8   CO2  29   < >  22*   CL  103   < >  102   BUN  41*   < >  36*   CREATININE  4.5*   < >  4.3*    < > = values in this interval not displayed.

## 2018-09-21 NOTE — TRANSFER OF CARE
"Anesthesia Transfer of Care Note    Patient: Tom Gage    Procedure(s) Performed: Procedure(s) (LRB):  CARDIAC ELECTROPHYSIOLOGY STUDY (N/A)    Patient location: ICU    Anesthesia Type: general    Transport from OR: Transported from OR on 6-10 L/min O2 by face mask with adequate spontaneous ventilation    Post pain: adequate analgesia    Post assessment: no apparent anesthetic complications    Post vital signs: stable    Level of consciousness: awake    Nausea/Vomiting: no nausea/vomiting    Complications: none    Transfer of care protocol was followed      Last vitals:   Visit Vitals  BP (!) 201/82 (BP Location: Right arm, Patient Position: Lying)   Pulse 67   Temp 36.6 °C (97.8 °F) (Oral)   Resp (!) 26   Ht 6' 2" (1.88 m)   Wt 78.9 kg (173 lb 15.1 oz)   SpO2 96%   BMI 22.33 kg/m²     "

## 2018-09-21 NOTE — PROGRESS NOTES
Pt arrived back on unit x2 RN. Somnolent but arousable. TVP removed, AICD incision site to right chest, clean dry and intact. VSS, will continue to monitor.

## 2018-09-21 NOTE — PROGRESS NOTES
Pt transported to  study by RN and transport. Chart sent with patient. Wife updated on plan of care.

## 2018-09-21 NOTE — PROGRESS NOTES
[facility]  Nephrology  Progress Note    Patient Name: Tom Gage  MRN: [id number]  Admission Date: 9/20/2018  Hospital Length of Stay: 1 days  Attending Provider: Refugio Cooney MD   Primary Care Physician: Asael Poole MD  Principal Problem:<principal problem not specified>    Subjective:     HPI: Mr. Esparza is a 69 yo male with HTN, DM2, HFrEF, pHTN, AS s/p TAVR (09/20/18), and ESRD on iHD MWF who presented to the hospital on 09/20 for elective TAVR for symptomatic AS. Post procedure, found to have LBBB and is scheduled for EP lab tomorrow for evaluation for AICD.  Nephrology was consulted for ESRD management while in-patient.  He dialyzes [facility] under the care of Dr. Cope.  He was initiated on peritoneal dialysis around 2 years ago, but continued to develop recurrent pleural effusions and had to be transitioned to hemodialysis.  According to the wife at bedside, patient was tolerating treatments on HD well, but continued to have worsening SOB and dyspnea on exertion with episodes of heart failure despite compliance with treatments and fluid restrictions and further work-up revealed severe AS and he was referred [place] [facility] [place] and treatment in which he underwent a ballon valvuloplasty in July of 2018 with significant improvement in his symptoms.    His current dialysis prescription is 5.5 hours on Mondays and 4 hour treatments on Wednesday and Friday.  He continues to have residual renal function and continues on lasix at home BID.  He has an AVF to his NA ++ bruit/thrill    Interval History:   NAEON.  Reports significant improvement in his UOP this morning and in good spirits.  Electrolytes stable.  Plans for EP today.  Possible discharge today after hemodialysis    Review of patient's allergies indicates:   Allergen Reactions    Asa [aspirin] Other (See Comments)     Retinal bleeding, severe    Latex, natural rubber Rash and Blisters     Latex tape  "causes blisters    Ace inhibitors Other (See Comments)     Other reaction(s): Cough    Adhesive Dermatitis and Blisters     Please use Paper Tape    Morphine Hives, Itching, Dermatitis and Rash     Body Rash, Phlebitis, "My veins showed through the skin."     Current Facility-Administered Medications   Medication Frequency    0.9%  NaCl infusion Once    atorvastatin tablet 10 mg Daily    ceFAZolin injection 2 g On Call Procedure    clopidogrel tablet 75 mg Daily    losartan tablet 50 mg BID    magnesium oxide tablet 800 mg PRN    magnesium oxide tablet 800 mg PRN    ondansetron injection 4 mg Q12H PRN    pantoprazole EC tablet 40 mg Daily    potassium chloride 10% oral solution 40 mEq PRN    potassium chloride 10% oral solution 40 mEq PRN    potassium, sodium phosphates 280-160-250 mg packet 2 packet PRN    potassium, sodium phosphates 280-160-250 mg packet 2 packet PRN    potassium, sodium phosphates 280-160-250 mg packet 2 packet PRN    sevelamer carbonate tablet 1,600 mg TID    sodium bicarbonate 1 mEq/mL (8.4 %) 150 mEq in dextrose 5 % 1,000 mL infusion Continuous       Objective:     Vital Signs (Most Recent):  Temp: 97.8 °F (36.6 °C) (09/21/18 0701)  Pulse: 67 (09/21/18 0915)  Resp: (!) 26 (09/21/18 0915)  BP: (!) 201/82 (09/21/18 0900)  SpO2: 96 % (09/21/18 0915)  O2 Device (Oxygen Therapy): room air (09/21/18 0915) Vital Signs (24h Range):  Temp:  [97.3 °F (36.3 °C)-98.7 °F (37.1 °C)] 97.8 °F (36.6 °C)  Pulse:  [53-67] 67  Resp:  [15-26] 26  SpO2:  [90 %-100 %] 96 %  BP: (147-201)/() 201/82  Arterial Line BP: (128-180)/(55-91) 180/71     Weight: 78.9 kg (174 lb) (09/20/18 0611)  Body mass index is 22.34 kg/m².  Body surface area is 2.03 meters squared.    I/O last 3 completed shifts:  In: 100 [I.V.:100]  Out: 100 [Urine:100]    Physical Exam   Constitutional: He is oriented to person, place, and time. He appears well-developed. He has a sickly appearance. No distress. Nasal cannula " in place.   HENT:   Head: Normocephalic and atraumatic.   Right Ear: External ear normal.   Left Ear: External ear normal.   Eyes: Conjunctivae and EOM are normal. Right eye exhibits no discharge. Left eye exhibits no discharge.   Neck: Normal range of motion. Neck supple.   Cardiovascular: Regular rhythm. Bradycardia present. Exam reveals no gallop and no friction rub.   Murmur heard.  Pulmonary/Chest: Effort normal. No respiratory distress. He has no decreased breath sounds. He has no wheezes. He has no rales.   Abdominal: Soft. Bowel sounds are normal. He exhibits no distension. There is no tenderness.   Musculoskeletal: He exhibits edema (2+ LE). He exhibits no deformity.   Neurological: He is alert and oriented to person, place, and time.   Skin: Skin is warm and dry. He is not diaphoretic. There is pallor.   Psychiatric: He has a normal mood and affect. His behavior is normal.       Significant Labs:  CBC:   Recent Labs   Lab  09/21/18   0303   WBC  7.52   RBC  2.83*   HGB  9.6*   HCT  31.0*   PLT  198   MCV  110*   MCH  33.9*   MCHC  31.0*     CMP:   Recent Labs   Lab  09/19/18   1332   09/21/18   0303   GLU  97   < >  80   CALCIUM  8.7   < >  8.3*   ALBUMIN  3.0*   --    --    NA  141   < >  139   K  5.0   < >  4.8   CO2  29   < >  22*   CL  103   < >  102   BUN  41*   < >  36*   CREATININE  4.5*   < >  4.3*    < > = values in this interval not displayed.            Assessment/Plan:     ESRD (end stage renal disease)    ESRD on iHD MWF  Krista Cope  5.5 hours-Monday  4 hours W/F  EDW ~ 174 lbs  + residual renal function  NA AVF    Plan/Recommendations:  HD today for metabolic clearance/volume management.  UF goal 2L, will reassess after weight today at the dialysis unit  4 hour duration  Currently receiving bicarb gtt?  Discuss with EP, recommend to discontinue  Would discharge home with lasix 80 mg PO BID          Donny Vasquez NP  Nephrology  [facility]  Pager:  [phone]

## 2018-09-21 NOTE — PLAN OF CARE
09/21/18 1149   Discharge Assessment   Assessment Type Discharge Planning Assessment   Confirmed/corrected address and phone number on facesheet? Yes   Assessment information obtained from? Medical Record   Expected Length of Stay (days) 3   Communicated expected length of stay with patient/caregiver yes   Prior to hospitilization cognitive status: Alert/Oriented   Prior to hospitalization functional status: Assistive Equipment;Needs Assistance   Current cognitive status: Alert/Oriented   Current Functional Status: Assistive Equipment;Needs Assistance   Facility Arrived From: home for planned TAVR   Lives With spouse   Able to Return to Prior Arrangements yes   Is patient able to care for self after discharge? No   Who are your caregiver(s) and their phone number(s)? spouse teddy [phone] daughter Alpa [phone]   Patient's perception of discharge disposition home health   Readmission Within The Last 30 Days no previous admission in last 30 days   Patient currently being followed by outpatient case management? No   Patient currently receives any other outside agency services? No   Equipment Currently Used at Home rollator;bedside commode   Do you have any problems affording any of your prescribed medications? No   Is the patient taking medications as prescribed? yes   Does the patient have transportation home? Yes   Transportation Available family or friend will provide;car   Dialysis Name and Scheduled days [facility] [address] [facility]   Does the patient receive services at the Coumadin Clinic? No   Discharge Plan A Home with family;Home Health  (Pt is [facility] with [facility] for PT/OT 4 x/week)   Discharge Plan B Skilled Nursing Facility  (Pt already had fucntional mobility issues, uses a rollator for ambulation. Now s/p TAVR w/ need for PPM. Pt may require skilled level of care to transition to home.)   Patient/Family In Agreement With Plan other (see comments)  (will discuss  with Interventional Cardiology servcie and family s/p PPM placement)     Pt had functional mobility difficulties prior to this admission. Uses a rollator for ambulation. Wife requested a new rollator that converts to a wheelchair with foot rests on last admission in July of this year. He was current with [facility] for PT/OT 4 x/week prior to admit and will need resumption of home health care orders prior to discharge. He will need an inpatient PT/OT consult to help determine appropriate level of care needs prior to discharge. The spouse helps the patient with ADL's. He is also an HD patient MWF. A Cardiac Rehab Referral was initiated however an OP cardiac rehab can not be done if the patient is already receiving Home Health for PT/OT. Due to his prior to hospitalization functional status and new need for immobility of UE s/p PPM placement the patient should be reevaluated for Home family support and resumption of HH services versus SNF. Will discuss with Interventional Cardiology Service and help with discharge planning upon request. Will call Critical access hospital to confirm services. Will fax discharge summary to [facility] once patient is ready for discharge or to notify of transfer to SNF if it is determine the patient requires a subacute setting prior to discharge to home.

## 2018-09-21 NOTE — HPI
Tom Gage is a 68 y.o. male referred by Dr Mcmanus for evaluation of Severe low flow low gradient aortic stenosis (NYHA Class III sx). He was transferred from [place] by cardiology in July, 2018 for acute on chronic combined HF thought to be due to severe AS and underwent BAV with 24 mm True balloon on 7/12/18.  He is currently NYHA Class II and much improved symptomatically from pre-BAV     PMHx includes ESRD on HD, CAD s/p NSTEMI in 2012, recurrent pleural effusions (attributed to ESRD and hypoalbuminemia) and ascites, hx gastric bypass in 2013 with suspected moderate-protein calorie malnutrition and macrocytic anemia from B12 Deficiency.  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        The patient has undergone the following TAVR work-up:   · ECHO (Date 7/11/18 DSE: ARMIN = 0.99 cm2, peak velocity = 3.9 m/s->4.2 ms/s, mean gradient = 30 mmHg -> 42 mmHg, LVEF: 40%  · LHC 7/12/18: non obstructive CAD.  · STS: 4.98 %   · Frailty: 2/4  · Iliacs are >9.2 on R and >8.6 on L  · LVOT Area  Is 4.89 cm2, Avg Diam = 25 mm (30.8 x 20) mm) by Dr. Cooney  · Incidental findings on CT: Left upper lobe nodules measuring up to 0.6 cm.  For multiple solid nodules with any 6 mm or greater, Fleischner Society guidelines recommend follow up with non-contrast chest CT at 3-6 months and 18-24 months after discovery. Large right-sided hydropneumothorax with compressive atelectasis of the right lung.  · CT Surgery risk assessment: High risk for surgery due to debility and comorbidities per Dr. Thomson.   · Rhythm issues: 1st degree AVB, LBBB  · PFTs: moderate FEV1 52%  · Comorbidities: ESRD, dilated CMP, non-ischemic, malnutrition, deconditioned     OK for 29mm Evolut R TAVR via R TF access.

## 2018-09-21 NOTE — PLAN OF CARE
Problem: Patient Care Overview  Goal: Plan of Care Review  Outcome: Ongoing (interventions implemented as appropriate)  See flowsheets for VS and assessments. See below for updates on progress made.    Neuro: AAOx4     Cardiovascular: TVP in place at 69.5 cm. Pacing at 40. HR at 50s-60s. Pt up in chair 6 hrs post TAVR    Pulmonary: BS clear, equal bilaterally     GI: No BM this shift. Appetite good. BS normoactive    : No UO. Pt receives dialysis M W F     Integumentary: Scabs and bruising on both arms. Line insertion sites CDI     Infusions: 10 cc running to cordis     POC: Possible GUSTAVO and permanent pacemaker placement tomorrow     Patient progressing toward goals as tolerated. POC communicated and reviewed with Tom Gage and family. All concerns address. WCSHYANN.

## 2018-09-21 NOTE — PT/OT/SLP PROGRESS
Physical Therapy      Patient Name:  Tom Gage   MRN:  [id number]    Patient not seen today. On first attempt, pt with TVP still intact. On second attempt, pt out of room to EP lab. Will follow-up when appropriate.      Kaylie Bach, PT, DPT  9/21/2018  [phone]

## 2018-09-21 NOTE — PROGRESS NOTES
[facility]  Cardiac Electrophysiology  Progress Note    Admission Date: 9/20/2018  Code Status: Full Code   Attending Physician: Refugio Cooney MD   Expected Discharge Date:   Principal Problem:<principal problem not specified>    Subjective:     Interval History: No acute issues overnight.     Patient examined at bedside this morning. No complaints offered. Denies chest pain, palpitations, shortness of breath, nausea/vomiting.     Review of Systems   Constitution: Negative for chills, diaphoresis and fever.   Cardiovascular: Negative for chest pain, dyspnea on exertion, irregular heartbeat, leg swelling, near-syncope, orthopnea, palpitations and paroxysmal nocturnal dyspnea.   Respiratory: Negative for cough and shortness of breath.    Gastrointestinal: Negative for abdominal pain, change in bowel habit, nausea and vomiting.   Neurological: Negative for headaches and light-headedness.   Psychiatric/Behavioral: Negative for altered mental status. The patient is not nervous/anxious.      Objective:     Vital Signs (Most Recent):  Temp: 97.8 °F (36.6 °C) (09/21/18 0701)  Pulse: 67 (09/21/18 0915)  Resp: (!) 26 (09/21/18 0915)  BP: (!) 201/82 (09/21/18 0900)  SpO2: 96 % (09/21/18 0915) Vital Signs (24h Range):  Temp:  [97.8 °F (36.6 °C)-98.7 °F (37.1 °C)] 97.8 °F (36.6 °C)  Pulse:  [54-67] 67  Resp:  [15-26] 26  SpO2:  [90 %-100 %] 96 %  BP: (150-201)/() 201/82  Arterial Line BP: (128-180)/(59-91) 180/71     Weight: 78.9 kg (174 lb)  Body mass index is 22.34 kg/m².     SpO2: 96 %  O2 Device (Oxygen Therapy): room air    Physical Exam  Constitutional: He is oriented to person, place, and time. He appears well-developed and well-nourished. No distress.   HENT:   Head: Normocephalic and atraumatic.   Mouth/Throat: Oropharynx is clear and moist.   Eyes: EOM are normal. Pupils are equal, round, and reactive to light.   Neck: Normal range of motion. Neck supple. No JVD present.   RIJ TVP in place    Cardiovascular: Normal rate and regular rhythm. Exam reveals no gallop and no friction rub.   No murmur heard.  Pulmonary/Chest: Effort normal and breath sounds normal. No respiratory distress. He has no wheezes. He has no rales.   Abdominal: Soft. Bowel sounds are normal. He exhibits no distension. There is no tenderness.   Musculoskeletal: Normal range of motion. He exhibits no edema.   Neurological: He is alert and oriented to person, place, and time.   Skin: Skin is warm and dry. No erythema.   Psychiatric: He has a normal mood and affect. His behavior is normal. Judgment and thought content normal.        Significant Labs: All pertinent lab results from the last 24 hours have been reviewed.    Significant Imaging: Reviewed in EPIC    Assessment and Plan:     Nodular calcific aortic valve stenosis     In summary, patient with severe AS s/p TAVR (9/20/2018). Other hx notable for ESRD on HD, CAD s/p NSTEMI, recurrent pleural effusions and ascites (attributed to ESRD/hypoalbuminemia).. EP consulted for further management of TVP and if PPM is indicated prior to discharge.      LVEF 25-30% on last echo 9/18/2018.     Pre-TAVR EKG: NSR 64 bpm, CARMELO 208, QRS 90, QTc 491  Post-TAVR EKG: SB 56 bpm, CARMELO 172,  with IVCD, QTc 561  Post-day 1 EKG: SR with 1st degree AVB, CARMELO 218, , QTc 553     Plan:   -- Plan for EPS +/- CRT-D implantation today (Dr. Mcmanus). Consents obtained and placed in chart.  -- Continue NPO status.   -- Hold heparin products.   -- Continue TVP pending EPS results.   -- Routine postop management per structural team.                Sada Calderón MD  Cardiac Electrophysiology  [facility]    Discussed with Dr. Mcmanus.

## 2018-09-21 NOTE — HOSPITAL COURSE
Mr. Gage was admitted and underwent successful placement of a ***mm *** TAVR via TF access. There were no complications and she was taken to the CCU in stable condition. He developed a new IVCD post-TAVR. TVP was left in place and EP was consulted per protocol. He remained stable overnight. The following morning, his IVCD had not resolved, so he was taken for EP Study. HV intervals were abnormal and he underwent placement of a BiV ICD. He returned to the CCU in stable condition. He required HD for volume removal for acute on chronic combined heart failure that evening. He remained stable overnight. The following morning he ambulated without difficulty. He was eager to go home. It was felt he was stable for discharge.

## 2018-09-21 NOTE — SUBJECTIVE & OBJECTIVE
Interval History: No events overnight. No complaints this AM. Plan for EPS +/- ICD today.     Objective:     Vital Signs (Most Recent):  Temp: 97.8 °F (36.6 °C) (09/21/18 0701)  Pulse: 67 (09/21/18 0915)  Resp: (!) 26 (09/21/18 0915)  BP: (!) 201/82 (09/21/18 0900)  SpO2: 96 % (09/21/18 0915) Vital Signs (24h Range):  Temp:  [97.8 °F (36.6 °C)-98.7 °F (37.1 °C)] 97.8 °F (36.6 °C)  Pulse:  [57-67] 67  Resp:  [16-26] 26  SpO2:  [90 %-100 %] 96 %  BP: (150-201)/() 201/82  Arterial Line BP: (147-180)/(62-87) 180/71     Weight: 78.9 kg (173 lb 15.1 oz)  Body mass index is 22.33 kg/m².    SpO2: 96 %  O2 Device (Oxygen Therapy): room air      Intake/Output Summary (Last 24 hours) at 9/21/2018 1457  Last data filed at 9/21/2018 1000  Gross per 24 hour   Intake 226.17 ml   Output 100 ml   Net 126.17 ml       Lines/Drains/Airways     Central Venous Catheter Line                 Introducer 09/20/18 right internal jugular 1 day          Drain                 Hemodialysis AV Fistula Left upper arm -- days          Peripheral Intravenous Line                 Peripheral IV - Single Lumen 09/20/18 0638 Right Forearm 1 day         Peripheral IV - Single Lumen 09/20/18 2100 Anterior;Right Forearm less than 1 day                Physical Exam   Constitutional: He is oriented to person, place, and time. He appears well-developed and well-nourished.   HENT:   Head: Normocephalic and atraumatic.   Eyes: EOM are normal. Pupils are equal, round, and reactive to light.   Neck: Neck supple. JVD present. No tracheal deviation present. No thyromegaly present.   Cardiovascular: Normal rate, regular rhythm, S1 normal, S2 normal, intact distal pulses and normal pulses. PMI is not displaced. Exam reveals no gallop and no friction rub.   No murmur heard.  Pulmonary/Chest: Effort normal. No respiratory distress. He has decreased breath sounds in the right lower field and the left lower field. He has no wheezes. He has no rales. He exhibits no  tenderness.   Abdominal: Soft. Bowel sounds are normal. He exhibits no distension and no mass. There is no tenderness.   Musculoskeletal: Normal range of motion. He exhibits no edema or tenderness.   Neurological: He is alert and oriented to person, place, and time.   Skin: Skin is warm and dry. No rash noted.   Psychiatric: He has a normal mood and affect. His behavior is normal.       Significant Labs:   BMP:   Recent Labs   Lab  09/20/18   1025  09/21/18   0303   GLU  95  80   NA  136  139   K  4.9  4.8   CL  100  102   CO2  24  22*   BUN  30*  36*   CREATININE  3.6*  4.3*   CALCIUM  8.7  8.3*    and CBC   Recent Labs   Lab  09/21/18   0303   WBC  7.52   HGB  9.6*   HCT  31.0*   PLT  198

## 2018-09-21 NOTE — PLAN OF CARE
Problem: Patient Care Overview  Goal: Plan of Care Review  Outcome: Ongoing (interventions implemented as appropriate)    No acute events throughout day. See vital signs and assessments in flowsheets. See below for updates on today's progress.     Pulmonary: Pt stats % on RA    Cardiovascular: pacemaker/ AICD placed today with no complications, -160s/60-90s, HR 90-120s    Neurological: WNL    Gastrointestinal: no BM today    Genitourinary: pt on hemodialysis, no urine throughout shift    Endocrine: WNL    Integumentary/Other: dressing to right chest wall, clean dry and intact. Ice pack applied to site postop.    Infusions: none    Patient progressing towards goals as tolerated, plan of care communicated and reviewed with Tom Gage and family. All concerns addressed. Will continue to monitor.

## 2018-09-21 NOTE — ASSESSMENT & PLAN NOTE
S/p successful right transfemoral aortic valve replacement with 29 mm EvolutR valve.  Post procedure echo: No perivalvular leak, mean aortic gradient 6 mmHg, Vmax 1.2m/s.  On ASA and Plavix.

## 2018-09-21 NOTE — ASSESSMENT & PLAN NOTE
ESRD on iHD MWF  [facility]  5.5 hours-Monday  4 hours W/F  EDW ~ 174 lbs  + residual renal function  NA AVF    Plan/Recommendations:  HD today for metabolic clearance/volume management.  UF goal 2L, will reassess after weight today at the dialysis unit  4 hour duration  Currently receiving bicarb gtt?  Discuss with EP, recommend to discontinue

## 2018-09-21 NOTE — ASSESSMENT & PLAN NOTE
Fluid balance + with evidence of volume overload on physical exam.  CVP 26 during case yesterday.   Plan for HD tonight with volume removal.   Nephrology recommends Lasix 80mg BID at d/c.

## 2018-09-22 VITALS
HEART RATE: 72 BPM | SYSTOLIC BLOOD PRESSURE: 141 MMHG | HEIGHT: 74 IN | WEIGHT: 173.94 LBS | RESPIRATION RATE: 18 BRPM | TEMPERATURE: 98 F | OXYGEN SATURATION: 96 % | BODY MASS INDEX: 22.32 KG/M2 | DIASTOLIC BLOOD PRESSURE: 96 MMHG

## 2018-09-22 PROCEDURE — 25000003 PHARM REV CODE 250: Mod: NTX | Performed by: INTERNAL MEDICINE

## 2018-09-22 PROCEDURE — 25000003 PHARM REV CODE 250: Mod: NTX | Performed by: PHYSICIAN ASSISTANT

## 2018-09-22 PROCEDURE — 63600175 PHARM REV CODE 636 W HCPCS: Mod: NTX | Performed by: INTERNAL MEDICINE

## 2018-09-22 RX ADMIN — CEFAZOLIN 1 G: 330 INJECTION, POWDER, FOR SOLUTION INTRAMUSCULAR; INTRAVENOUS at 06:09

## 2018-09-22 RX ADMIN — CLOPIDOGREL 75 MG: 75 TABLET, FILM COATED ORAL at 08:09

## 2018-09-22 RX ADMIN — PANTOPRAZOLE SODIUM 40 MG: 40 TABLET, DELAYED RELEASE ORAL at 08:09

## 2018-09-22 RX ADMIN — LOSARTAN POTASSIUM 50 MG: 50 TABLET, FILM COATED ORAL at 08:09

## 2018-09-22 RX ADMIN — SEVELAMER CARBONATE 1600 MG: 800 TABLET, FILM COATED ORAL at 08:09

## 2018-09-22 RX ADMIN — ATORVASTATIN CALCIUM 10 MG: 10 TABLET, FILM COATED ORAL at 08:09

## 2018-09-22 NOTE — SUBJECTIVE & OBJECTIVE
Interval History: Patient had mild confusion overnight but wife states that is normal.Feels well this am.No complaints, Oriented x 3.Site looks clean dry intact.Aquacel dressing in place.    Review of Systems   Constitution: Negative for chills, diaphoresis and fever.   Cardiovascular: Negative for chest pain, dyspnea on exertion, irregular heartbeat, leg swelling, near-syncope, orthopnea, palpitations and paroxysmal nocturnal dyspnea.   Respiratory: Negative for cough and shortness of breath.    Gastrointestinal: Negative for abdominal pain, change in bowel habit, nausea and vomiting.   Neurological: Negative for headaches and light-headedness.   Psychiatric/Behavioral: Negative for altered mental status. The patient is not nervous/anxious.      Objective:     Vital Signs (Most Recent):  Temp: 98.2 °F (36.8 °C) (09/22/18 0700)  Pulse: 72 (09/22/18 0800)  Resp: 18 (09/22/18 0800)  BP: (!) 141/96 (09/22/18 0800)  SpO2: 96 % (09/22/18 0800) Vital Signs (24h Range):  Temp:  [97 °F (36.1 °C)-98.7 °F (37.1 °C)] 98.2 °F (36.8 °C)  Pulse:  [59-78] 72  Resp:  [10-24] 18  SpO2:  [93 %-99 %] 96 %  BP: (107-158)/(53-96) 141/96     Weight: 78.9 kg (173 lb 15.1 oz)  Body mass index is 22.33 kg/m².     SpO2: 96 %  O2 Device (Oxygen Therapy): room air    Physical Exam   Constitutional: He is oriented to person, place, and time. He appears well-developed and well-nourished. No distress.   HENT:   Head: Normocephalic and atraumatic.   Mouth/Throat: Oropharynx is clear and moist.   Eyes: EOM are normal. Pupils are equal, round, and reactive to light.   Neck: Normal range of motion. Neck supple. No JVD present.   Cardiovascular: Normal rate and regular rhythm. Exam reveals no gallop and no friction rub.   No murmur heard.  Pulmonary/Chest: Effort normal and breath sounds normal. No respiratory distress. He has no wheezes. He has no rales.   PPM site intact without redness, swelling   Abdominal: Soft. Bowel sounds are normal. He  exhibits no distension. There is no tenderness.   Musculoskeletal: Normal range of motion. He exhibits no edema.   Neurological: He is alert and oriented to person, place, and time.   Skin: Skin is warm and dry. No erythema.   Psychiatric: He has a normal mood and affect. His behavior is normal. Judgment and thought content normal.       Significant Labs:   CMP:   Recent Labs   Lab  09/20/18   1025  09/21/18   0303   NA  136  139   K  4.9  4.8   CL  100  102   CO2  24  22*   GLU  95  80   BUN  30*  36*   CREATININE  3.6*  4.3*   CALCIUM  8.7  8.3*   ANIONGAP  12  15   ESTGFRAFRICA  19.2*  15.5*   EGFRNONAA  16.6*  13.4*    and CBC:   Recent Labs   Lab  09/21/18   0303   WBC  7.52   HGB  9.6*   HCT  31.0*   PLT  198       Significant Imaging: Echocardiogram:   2D echo with color flow doppler:   Results for orders placed or performed during the hospital encounter of 09/18/18   2D echo with color flow doppler   Result Value Ref Range    EF 25 (A) 55 - 65    Mitral Valve Regurgitation MILD TO MODERATE     Aortic Valve Regurgitation TRIVIAL     Aortic Valve Stenosis MODERATE TO SEVERE (A)     Est. PA Systolic Pressure 61.29 (A)     Mitral Valve Mobility NORMAL     Tricuspid Valve Regurgitation MODERATE TO SEVERE (A)

## 2018-09-22 NOTE — PLAN OF CARE
Problem: Patient Care Overview  Goal: Plan of Care Review  Outcome: Ongoing (interventions implemented as appropriate)    No acute events throughout day. See vital signs and assessments in flowsheets. See below for updates on today's progress.     Pulmonary: sats WNL RA    Cardiovascular: HR 50's-70's; pressures WNL; afebrile    Neurological: pt. Becoming increasingly delirious    Gastrointestinal: BM x1    Genitourinary: HD treatment with no issues; oliguric    Endocrine: WNL    Integumentary/Other: tears and surgical dressing monitored, CDI    Infusions: cordis    Patient progressing towards goals as tolerated, plan of care communicated and reviewed with Tom Gage and family. All concerns addressed. Will continue to monitor.

## 2018-09-22 NOTE — PROGRESS NOTES
[facility]  Cardiac Electrophysiology  Progress Note    Admission Date: 9/20/2018  Code Status: Full Code   Attending Physician: Refugio Cooney MD   Expected Discharge Date: 9/22/2018  Principal Problem:Nodular calcific aortic valve stenosis    Subjective:     Interval History: Patient had mild confusion overnight but wife states that is normal.Feels well this am.No complaints, Oriented x 3.Site looks clean dry intact.Aquacel dressing in place.    [id number] V paced in 70s    Review of Systems   Constitution: Negative for chills, diaphoresis and fever.   Cardiovascular: Negative for chest pain, dyspnea on exertion, irregular heartbeat, leg swelling, near-syncope, orthopnea, palpitations and paroxysmal nocturnal dyspnea.   Respiratory: Negative for cough and shortness of breath.    Gastrointestinal: Negative for abdominal pain, change in bowel habit, nausea and vomiting.   Neurological: Negative for headaches and light-headedness.   Psychiatric/Behavioral: Negative for altered mental status. The patient is not nervous/anxious.      Objective:     Vital Signs (Most Recent):  Temp: 98.2 °F (36.8 °C) (09/22/18 0700)  Pulse: 72 (09/22/18 0800)  Resp: 18 (09/22/18 0800)  BP: (!) 141/96 (09/22/18 0800)  SpO2: 96 % (09/22/18 0800) Vital Signs (24h Range):  Temp:  [97 °F (36.1 °C)-98.7 °F (37.1 °C)] 98.2 °F (36.8 °C)  Pulse:  [59-78] 72  Resp:  [10-24] 18  SpO2:  [93 %-99 %] 96 %  BP: (107-158)/(53-96) 141/96     Weight: 78.9 kg (173 lb 15.1 oz)  Body mass index is 22.33 kg/m².     SpO2: 96 %  O2 Device (Oxygen Therapy): room air    Physical Exam   Constitutional: He is oriented to person, place, and time. He appears well-developed and well-nourished. No distress.   HENT:   Head: Normocephalic and atraumatic.   Mouth/Throat: Oropharynx is clear and moist.   Eyes: EOM are normal. Pupils are equal, round, and reactive to light.   Neck: Normal range of motion. Neck supple. No JVD present.    Cardiovascular: Normal rate and regular rhythm. Exam reveals no gallop and no friction rub.   No murmur heard.  Pulmonary/Chest: Effort normal and breath sounds normal. No respiratory distress. He has no wheezes. He has no rales.   PPM site intact without redness, swelling   Abdominal: Soft. Bowel sounds are normal. He exhibits no distension. There is no tenderness.   Musculoskeletal: Normal range of motion. He exhibits no edema.   Neurological: He is alert and oriented to person, place, and time.   Skin: Skin is warm and dry. No erythema.   Psychiatric: He has a normal mood and affect. His behavior is normal. Judgment and thought content normal.       Significant Labs:   CMP:   Recent Labs   Lab  09/20/18   1025  09/21/18   0303   NA  136  139   K  4.9  4.8   CL  100  102   CO2  24  22*   GLU  95  80   BUN  30*  36*   CREATININE  3.6*  4.3*   CALCIUM  8.7  8.3*   ANIONGAP  12  15   ESTGFRAFRICA  19.2*  15.5*   EGFRNONAA  16.6*  13.4*    and CBC:   Recent Labs   Lab  09/21/18   0303   WBC  7.52   HGB  9.6*   HCT  31.0*   PLT  198       Significant Imaging: Echocardiogram:   2D echo with color flow doppler:   Results for orders placed or performed during the hospital encounter of 09/18/18   2D echo with color flow doppler   Result Value Ref Range    EF 25 (A) 55 - 65    Mitral Valve Regurgitation MILD TO MODERATE     Aortic Valve Regurgitation TRIVIAL     Aortic Valve Stenosis MODERATE TO SEVERE (A)     Est. PA Systolic Pressure 61.29 (A)     Mitral Valve Mobility NORMAL     Tricuspid Valve Regurgitation MODERATE TO SEVERE (A)      Assessment and Plan:     * Nodular calcific aortic valve stenosis    Patient is s/p [employer] CRTD;    Tolerated procedure well. No acute complication noted.  Echo negative for pericardial effusion  CXR with stable lead position, no pneumothorax   Keflex x 5 days renally dosed  Sling to left arm - wear for 48 hours, then only at night for 6 weeks.  NO HEPARIN PRODUCTS  No lifting left  elbow above shoulder height  No lifting over 5 pounds  Follow up in device clinic in 1 week and with Dr. Mcmanus in 3 months.  Ok to discharge from EP perspective  Aquacel dressing to stay on till seen in device clinic                Aaron Polo MD  Cardiac Electrophysiology  [facility]

## 2018-09-22 NOTE — NURSING
Patient in adequate condition for discharge. Discharge instructions given to patient and wife with read back verification. Belongings with patient. LDAs removed. Right chest AICD incision c/d/i.

## 2018-09-22 NOTE — DISCHARGE INSTRUCTIONS
Sling to left arm - wear for 48 hours, then only at night for 6 weeks.  Keflex 500 mg TID for 4 days at discharge  No lifting left elbow above shoulder height or No lifting over 5 pounds for 6 weeks  Follow up in device clinic in 1 week and with Dr. Mcmanus in 3 months.

## 2018-09-22 NOTE — PROGRESS NOTES
4 hour bedside HD completed. 500 ml fluid removed. Dressings applied to left upper arm AVF and hemostatis achieved in 10 minutes. + thrill and bruit noted. Patient tolerated tx well.

## 2018-09-22 NOTE — DISCHARGE SUMMARY
[facility]  Interventional Cardiology  Discharge Summary      Patient Name: Tom Gage  MRN: [id number]  Admission Date: 9/20/2018  Hospital Length of Stay: 2 days  Discharge Date and Time:  09/22/2018 9:00 AM  Attending Physician: Refugio Cooney MD  Discharging Provider: Surya Quinones MD  Primary Care Physician: Asael Poole MD    HPI:    Tom Gage is a 68 y.o. male referred by Dr Mcmanus for evaluation of Severe low flow low gradient aortic stenosis (NYHA Class III sx). He was transferred from [place] by cardiology in July, 2018 for acute on chronic combined HF thought to be due to severe AS and underwent BAV with 24 mm True balloon on 7/12/18.  He is currently NYHA Class II and much improved symptomatically from pre-BAV     PMHx includes ESRD on HD, CAD s/p NSTEMI in 2012, recurrent pleural effusions (attributed to ESRD and hypoalbuminemia) and ascites, hx gastric bypass in 2013 with suspected moderate-protein calorie malnutrition and macrocytic anemia from B12 Deficiency.  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        The patient has undergone the following TAVR work-up:   · ECHO (Date 7/11/18 DSE: ARMIN = 0.99 cm2, peak velocity = 3.9 m/s->4.2 ms/s, mean gradient = 30 mmHg -> 42 mmHg, LVEF: 40%  · LHC 7/12/18: non obstructive CAD.  · STS: 4.98 %   · Frailty: 2/4  · Iliacs are >9.2 on R and >8.6 on L  · LVOT Area  Is 4.89 cm2, Avg Diam = 25 mm (30.8 x 20) mm) by Dr. Cooney  · Incidental findings on CT: Left upper lobe nodules measuring up to 0.6 cm.  For multiple solid nodules with any 6 mm or greater, Fleischner Society guidelines recommend follow up with non-contrast chest CT at 3-6 months and 18-24 months after discovery. Large right-sided hydropneumothorax with compressive atelectasis of the right lung.  · CT Surgery risk assessment: High risk for surgery due to debility and comorbidities per   B[place].   · Rhythm issues: 1st degree AVB, LBBB  · PFTs: moderate FEV1 52%  · Comorbidities: ESRD, dilated CMP, non-ischemic, malnutrition, deconditioned     OK for 29mm Evolut R TAVR via R TF access.     Procedure(s) (LRB):  CARDIAC ELECTROPHYSIOLOGY STUDY (N/A)     Indwelling Lines/Drains at time of discharge:  Lines/Drains/Airways     Central Venous Catheter Line                 Introducer 09/20/18 right internal jugular 2 days          Drain                 Hemodialysis AV Fistula Left upper arm -- days                Hospital Course:  Mr. Gage was admitted and underwent successful placement of a 29mm Evolut TAVR via TF access. There were no complications and she was taken to the CCU in stable condition. He developed a new IVCD post-TAVR. TVP was left in place and EP was consulted per protocol. He remained stable overnight. The following morning, his IVCD had not resolved, so he was taken for EP Study. HV intervals were abnormal and he underwent placement of a BiV ICD. He returned to the CCU in stable condition. He required HD for volume removal for acute on chronic combined heart failure that evening. He remained stable overnight. The following morning he ambulated without difficulty. He was eager to go home. It was felt he was stable for discharge.     Consults (From admission, onward)        Status Ordering Provider     Inpatient consult to Electrophysiology  Once     Provider:  (Not yet assigned)    Completed HIPOLITO TOLEDO     Inpatient consult to Nephrology  Once     Provider:  (Not yet assigned)    Completed NASH OLIVO              Pending Diagnostic Studies:     Procedure Component Value Units Date/Time    EKG 12-lead [[id number]]     Order Status:  Sent Lab Status:  No result         * Nodular calcific aortic valve stenosis    S/p successful right transfemoral aortic valve replacement with 29 mm EvolutR valve.  Post procedure echo: No perivalvular leak, mean aortic gradient 6 mmHg, Vmax  1.2m/s.  On ASA and Plavix.         Acute on chronic combined systolic (congestive) and diastolic (congestive) heart failure    Fluid balance + with evidence of volume overload on physical exam.  CVP 26 during case yesterday.   Plan for HD tonight with volume removal.   Nephrology recommends Lasix 80mg BID at d/c.         Moderate protein malnutrition    Albumin 3.0.  Encourage increased protein intake.         Dilated cardiomyopathy    EF= 40%          ESRD (end stage renal disease)    On HD M/W/F.  Nephrology consulted for inpatient HD. Appreciate their assistance.         Type 2 diabetes mellitus with diabetic nephropathy    BS stable.   On SSI while inpatient.         Anemia of chronic renal failure with postoperative blood loss anemia    Expected post-TAVR.   Asymptomatic.   No indication for transfusion at this time.             Discharged Condition: good    Follow Up:  Follow-up Information     Refugio Cooney MD In 1 month.    Specialties:  Cardiology, INTERVENTIONAL CARDIOLOGY  Contact information:  [address]  [place] [id number] [phone]             [facility] In 1 week.           Theron Mcmanus MD In 6 weeks.    Specialties:  Electrophysiology, Cardiovascular Disease, Cardiology  Contact information:  [address] [facility] [id number] [phone]                 Patient Instructions:   No discharge procedures on file.  Medications:  Reconciled Home Medications:      Medication List      START taking these medications    cephALEXin 500 MG capsule  Commonly known as:  KEFLEX  Take 1 pill (500mg) by mouth once daily. Take pill after dialysis on dialysis days.     clopidogrel 75 mg tablet  Commonly known as:  PLAVIX  Take 1 tablet (75 mg total) by mouth once daily.        CONTINUE taking these medications    atorvastatin 10 MG tablet  Commonly known as:  LIPITOR  Take 1 tablet (10 mg total) by mouth once daily.     calcitRIOL 0.5 MCG Cap  Commonly known as:   ROCALTROL  Take 0.5 mcg by mouth once daily.     carvedilol 25 MG tablet  Commonly known as:  COREG  Take 1 tablet (25 mg total) by mouth 2 (two) times daily with meals.     furosemide 80 MG tablet  Commonly known as:  LASIX  80 mg every 12 (twelve) hours.     losartan 100 MG tablet  Commonly known as:  COZAAR  Take 0.5 tablets (50 mg total) by mouth 2 (two) times daily.     nitroGLYCERIN 0.4 MG SL tablet  Commonly known as:  NITROSTAT  Place 0.4 mg under the tongue every 5 (five) minutes as needed for Chest pain.     pantoprazole 40 MG tablet  Commonly known as:  PROTONIX  Take 40 mg by mouth 2 (two) times daily as needed.     RENVELA 800 mg Tab  Generic drug:  sevelamer carbonate  Take 1,600 mg by mouth 3 (three) times daily. Takes two 800 mg tablets (1600 mg) with meals     vitamin D 1000 units Tab  Commonly known as:  VITAMIN D3  Take 1,000 Units by mouth every 7 days.                Surya Quinones MD  Interventional Cardiology  [facility]

## 2018-09-22 NOTE — ASSESSMENT & PLAN NOTE
Patient is s/p [facility] [employer] CRTD;    Tolerated procedure well. No acute complication noted.  Echo negative for pericardial effusion  CXR with stable lead position, no pneumothorax   Keflex x 5 days renally dosed  Sling to left arm - wear for 48 hours, then only at night for 6 weeks.  NO HEPARIN PRODUCTS  No lifting left elbow above shoulder height  No lifting over 5 pounds  Follow up in device clinic in 1 week and with Dr. Mcmanus in 3 months.  Ok to discharge from EP perspective

## 2018-09-23 NOTE — ANESTHESIA POSTPROCEDURE EVALUATION
"Anesthesia Post Evaluation    Patient: Tom Gage    Procedure(s) Performed: Procedure(s) (LRB):  CARDIAC ELECTROPHYSIOLOGY STUDY (N/A)    Final Anesthesia Type: general  Patient location during evaluation: PACU  Patient participation: Yes- Able to Participate  Level of consciousness: awake and alert and oriented  Post-procedure vital signs: reviewed and stable  Pain management: adequate  Airway patency: patent  PONV status at discharge: No PONV  Anesthetic complications: no      Cardiovascular status: blood pressure returned to baseline and hemodynamically stable  Respiratory status: unassisted, room air and spontaneous ventilation  Hydration status: euvolemic  Follow-up not needed.        Visit Vitals  BP (!) 141/96 (BP Location: Right leg, Patient Position: Lying)   Pulse 72   Temp 36.8 °C (98.2 °F) (Oral)   Resp 18   Ht 6' 2" (1.88 m)   Wt 78.9 kg (173 lb 15.1 oz)   SpO2 96%   BMI 22.33 kg/m²       Pain/Lary Score: Pain Assessment Performed: Yes (9/22/2018  7:00 AM)  Presence of Pain: denies (9/22/2018  7:00 AM)        "

## 2018-09-24 NOTE — PLAN OF CARE
09/24/18 0845   Final Note   Assessment Type Final Discharge Note   Discharge Disposition Home   Discharge planning education complete? Yes  (by bedside RN)   What phone number can be called within the next 1-3 days to see how you are doing after discharge? [phone]   Hospital Follow Up  Appt(s) scheduled? Yes  (by interventional and EP clinic curse schedulers)   Discharge plans and expectations educations in teach back method with documentation complete? Yes   Offered [facility] [employer] [facility] -- Bedside Delivery? n/a   Right Care Referral Info   Post Acute Recommendation No Care     Pt was discharge over the weekend.    Future Appointments   Date Time Provider Department Center   10/23/2018  7:45 AM LAB, APPOINTMENT [facility] LAB VNP [facility]   10/23/2018  8:00 AM ECHO, [facility] NOM ECH[facility] Jordan Salazar   10/23/2018  9:00 AM [facility] CARDVAL Jordan Salazar

## 2018-09-25 ENCOUNTER — PATIENT OUTREACH (OUTPATIENT)
Dept: ADMINISTRATIVE | Facility: CLINIC | Age: 68
End: 2018-09-25

## 2018-09-25 ENCOUNTER — TELEPHONE (OUTPATIENT)
Dept: ELECTROPHYSIOLOGY | Facility: CLINIC | Age: 68
End: 2018-09-25

## 2018-09-25 ENCOUNTER — PATIENT MESSAGE (OUTPATIENT)
Dept: ELECTROPHYSIOLOGY | Facility: CLINIC | Age: 68
End: 2018-09-25

## 2018-09-25 DIAGNOSIS — I50.43 ACUTE ON CHRONIC COMBINED SYSTOLIC AND DIASTOLIC CONGESTIVE HEART FAILURE: ICD-10-CM

## 2018-09-25 DIAGNOSIS — I42.0 DILATED CARDIOMYOPATHY: Primary | ICD-10-CM

## 2018-09-25 DIAGNOSIS — I50.42 CHRONIC COMBINED SYSTOLIC AND DIASTOLIC HEART FAILURE: ICD-10-CM

## 2018-09-25 NOTE — TELEPHONE ENCOUNTER
Spoke to pts wife. She explained her concerns. I placed on hold and spoke to valente. Pt is scheduled on 10/2. Wife accepted appt. Informed pts wife to call if there is anything else I can assist her with.  ----- Message from Donna Lopez sent at 9/25/2018  8:08 AM CDT -----  Contact: Patient's wife   The Pt's wife is calling to schedule his 1 week f/u. He goes to dialysis on Monday, Wednesday, Friday @ 10:30. The Pt's wife would like to see if he can get his appointment on 9/27? Please call her back @ [phone]. Thanks, Donna

## 2018-09-25 NOTE — PATIENT INSTRUCTIONS
Biventricular Pacemaker and ICD (Biventricular ICD)     You have a condition called heart failure. It is also known as congestive heart failure (CHF). This condition causes symptoms such as getting tired very quickly and being short of breath. To help treat these symptoms, your healthcare provider is recommending a biventricular pacemaker and implantable cardioverter defibrillator (ICD). This is sometimes called a biventricular ICD. Or it is called cardiac resynchronization pacing with an ICD (CRT-D).  A biventricular pacemaker and ICD is a small, lightweight device powered by batteries. This device helps keep your heart pumping normally. It also protects you from dangerous heart rhythms. Read on to learn more about this device and how it works.  Understanding the heart  The heart is made up of 4 sections (chambers) that pump to move blood through the heart. The top 2 chambers are the left atrium and right atrium. These are filling chambers of the heart. The bottom chambers are the left ventricle and right ventricle. These are the pumping chambers of the heart. The heart has an electrical system. This system sends signals that make the atria and ventricles work together. This causes the heart to beat and move blood through the heart and lungs and out to the body.  How the device works  Heart failure weakens your heart muscle. As a result, the ventricles dont pump as strongly as they should.  The pathways that carry the heart's electrical signals are located in the heart muscle. They can also be damaged by CHF. This can cause a bundle branch block. A bundle branch block can throw off the timing of the heart's contraction. This can make the heart's squeezing contraction even weaker.  A biventricular pacemaker and ICD help keep the heart pumping in a more normal way. The pacemaker device keeps the heart from beating too slowly. It tries to restore the normal squeezing pattern of the heart. This is called  "resynchronization pacing. This can lead to more efficient and stronger heart contraction. The ICD part of the device detects dangerously fast heart rhythms and stops them. If the device detects an abnormally fast heartbeat that can cause cardiac arrest, it will send a "shock" to the heart. The shock stops this dangerous heart rhythm and restores a normal heartbeat.  Before the procedure  Make sure to:  · Not eat or drink after midnight or 8 hours before your surgery  · Follow instructions from your doctor about bathing the night before and the morning of your procedure. You may need to use a special cleaning solution.  · Tell your doctor what medicines you take. This includes over-the-counter medicines such as ibuprofen. It also includes vitamins, herbs, and other supplements. You may need to stop taking some medicines before the procedure, such as blood thinners and aspirin.  · Tell your doctor if you are sensitive or allergic to anything. This includes medicines, latex, tape, and anesthetic medicines.  · Ask a family member or friend to take you home from the hospital  Tests before your procedure  Before your procedure you may need tests such as:  · Chest X-ray  · Blood tests, to test your general health  During the procedure  · You will get medicine (anesthesia) so you wont feel pain. Most likely, you will get medicine (sedation) that will make you drowsy or lightly asleep. The doctor will inject local pain medicine to numb the skin on your chest where the cut (incision) will be made.  · The doctor will make an incision where the device will be implanted. This is most often on the left side of the chest just below the collarbone (clavicle).  · The doctor makes a small pocket for the generator under the skin.  · The doctor will put a thin, flexible tube (catheter) into a vein leading to the right atrium. He or she will guide the devices wires (leads) through the catheter to the heart. The doctor will use an " X-ray monitor to move the leads into the right ventricle. He or she will usually also put a lead in the right atrium.  · The doctor will put the lead for the left ventricle into a vein that runs along the outside of this chamber. He or she will also use the X-ray monitor to put this lead in the correct spot. The device will stimulate the left ventricle from the outside. The other leads will stimulate the heart from inside the chambers.  · The doctor will attach the generator to the leads. He or she will send pulses through the leads to test the generator. This testing may cause your heart to race.  · The doctor will then put the generator into the pocket under your skin.  · The doctor will program the device based on the heart rate thats best for you. He or she will check the device to see that it is working.  · The doctor will close the skin with stitches (sutures), surgical glue, or staples. Any stitches used will dissolve over time. If glue is used, it will seal the cut and prevent infection. A bandage will be put on the area.  After the procedure  You will spend several hours in a recovery room. Once you are stable and awake, you will be put in a room that can monitor your heart rhythm. Your healthcare team will also watch your breathing and other vital signs. Youll be given pain medicine if you need it.  The team may place the arm on the side of the device in a sling. This is just for a short time, to keep the arm and shoulder still.  You will have a chest X-ray to make sure the leads are where they should be. The doctor will also check that your lungs were not injured during the procedure.  You can resume a normal diet as soon as you are able. You will be watched overnight. The team will check the device the next morning. You will likely go home the day after your procedure. You may need to take antibiotics for several days to prevent infection.  Recovering at home  Follow all the instructions your  healthcare provider gives you for medicines, bathing, exercise, diet, and wound care. Ask your doctor when you can go back to work or start driving again.  Dont raise your arm above your shoulder on the side of your incision until your doctor says its OK to do so. This gives the leads a chance to secure themselves inside your heart.  Follow-up care  Make sure to keep all your follow-up appointments. This is so your doctor can download information from your device and check its settings. Be sure to tell your doctor how the device is working for you.  Most devices can now be connected to a wireless home monitoring system via the internet. The monitor can download information from your device and send it to your doctor. This lets your doctor make sure the device is working as it should.  Life with a biventricular pacemaker and ICD  · Carry an ID card. When you first get your pacemaker, youll be given an ID card to carry. This card contains important information about the device. Show it to any doctor, dentist, or other provider you visit. Pacemakers may set off metal detectors. So you may need to show your card to security personnel.  · Be careful when using a cell phone. Hold it to the ear farthest from your pacemaker. Dont carry the phone in your breast pocket, even when its turned off.  · Avoid very strong magnets. These include those used for an MRI or in hand-held security wands. Show your ID card when you go through security.  · Avoid strong electrical fields. These are made by radio transmitting towers and ham radios. They are also made by heavy-duty electrical equipment. A running engine makes an electrical field. Don't lean over the open weinberg of a running car. Most household and yard appliances will not cause any problems. If you use any large power tools, such as an industrial , talk to your doctor.   · Keep an eye on the battery. The battery inside the device is checked regularly. It will last  5 to 7 years, depending on how often it paces or has to stop dangerous heart rhythms. Once it starts to run down, you will need to have it changed. This is like the implantation surgery, but it takes less time. This is because the battery/generator is the only part that needs to be replaced.  · Be careful when driving. Your device has a defibrillator built in. You may not be allowed to drive for the first 6 months after you get the device. You may also not be allowed to drive for 6 months after the defibrillator delivers a shock. You will not be allowed to work as a  if you have an ICD.     When should I call my healthcare provider?  Call 911 if you have chest pain or trouble breathing.  Call your healthcare provider if you have any of the following:  · Redness, severe swelling, severe pain, drainage, bleeding, or warmth at the incision site  · Incision site or opens up or does not heal well  · A fever of 100.4°F (38.0°C) or higher, or as directed by your healthcare provider  · Pain around the generator that gets worse  · Swelling in the arms or hands on the side of the incision  · Sudden weight gain  · Twitching chest or abdominal muscles  · Frequent or constant hiccups  · A shock from your device  · Very fast heartbeat that doesnt stop  · Generator feels loose or like it is wiggling in the pocket under the skin    Date Last Reviewed: 3/17/2016  © 0750-0570 [employer]. [address] [place] [address], [place] [id number]. All rights reserved. This information is not intended as a substitute for professional medical care. Always follow your healthcare professional's instructions.

## 2018-10-02 ENCOUNTER — CLINICAL SUPPORT (OUTPATIENT)
Dept: ELECTROPHYSIOLOGY | Facility: CLINIC | Age: 68
End: 2018-10-02
Payer: MEDICARE

## 2018-10-02 ENCOUNTER — TELEPHONE (OUTPATIENT)
Dept: CARDIOLOGY | Facility: CLINIC | Age: 68
End: 2018-10-02

## 2018-10-02 DIAGNOSIS — I44.7 LBBB (LEFT BUNDLE BRANCH BLOCK): ICD-10-CM

## 2018-10-02 DIAGNOSIS — Z95.810 ICD (IMPLANTABLE CARDIOVERTER-DEFIBRILLATOR), BIVENTRICULAR, IN SITU: Primary | ICD-10-CM

## 2018-10-02 DIAGNOSIS — Z95.810 ICD (IMPLANTABLE CARDIOVERTER-DEFIBRILLATOR), BIVENTRICULAR, IN SITU: ICD-10-CM

## 2018-10-02 DIAGNOSIS — I42.8 NICM (NONISCHEMIC CARDIOMYOPATHY): ICD-10-CM

## 2018-10-02 DIAGNOSIS — E11.21 TYPE 2 DIABETES MELLITUS WITH DIABETIC NEPHROPATHY, WITHOUT LONG-TERM CURRENT USE OF INSULIN: Primary | Chronic | ICD-10-CM

## 2018-10-02 PROCEDURE — 93284 PRGRMG EVAL IMPLANTABLE DFB: CPT | Mod: PBBFAC,NTX | Performed by: INTERNAL MEDICINE

## 2018-10-02 NOTE — TELEPHONE ENCOUNTER
----- Message from Ainsley Zhou MA sent at 10/2/2018  2:42 PM CDT -----      ----- Message -----  From: Shital Waters  Sent: 10/2/2018   2:34 PM  To: [facility] Foss Staff    Pt in for ICD post op f/u today; has questions regarding Lipitor - was given at hospital discharge by Dr. Maninder Del Rio.    1) Pt would like to know if he actually needs this because his lipids have been fine in the past.    2) If he does need Lipitor, he has no remaining refills and will need cardiology to refill to [facility] on [address].    Please contact pt's wife, Padmaja, at [phone].    Thanks,  Shital

## 2018-10-04 ENCOUNTER — HOSPITAL ENCOUNTER (EMERGENCY)
Facility: HOSPITAL | Age: 68
Discharge: HOME | End: 2018-10-05
Attending: EMERGENCY MEDICINE
Payer: MEDICARE

## 2018-10-04 ENCOUNTER — TELEPHONE (OUTPATIENT)
Dept: CARDIOLOGY | Facility: CLINIC | Age: 68
End: 2018-10-04

## 2018-10-04 VITALS
HEIGHT: 74 IN | RESPIRATION RATE: 11 BRPM | SYSTOLIC BLOOD PRESSURE: 126 MMHG | HEART RATE: 66 BPM | BODY MASS INDEX: 24 KG/M2 | OXYGEN SATURATION: 94 % | WEIGHT: 187 LBS | DIASTOLIC BLOOD PRESSURE: 62 MMHG | TEMPERATURE: 98 F
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
## 2018-10-04 DIAGNOSIS — R07.9 CHEST PAIN IN ADULT: ICD-10-CM

## 2018-10-04 PROCEDURE — 99284 EMERGENCY DEPT VISIT MOD MDM: CPT | Mod: ,,, | Performed by: EMERGENCY MEDICINE

## 2018-10-04 PROCEDURE — 25000003 PHARM REV CODE 250: Mod: NTX | Performed by: STUDENT IN AN ORGANIZED HEALTH CARE EDUCATION/TRAINING PROGRAM

## 2018-10-04 PROCEDURE — 93010 ELECTROCARDIOGRAM REPORT: CPT | Mod: NTX,,, | Performed by: INTERNAL MEDICINE

## 2018-10-04 PROCEDURE — 99284 EMERGENCY DEPT VISIT MOD MDM: CPT | Mod: NTX

## 2018-10-04 RX ORDER — HYDROCODONE BITARTRATE AND ACETAMINOPHEN 5; 325 MG/1; MG/1
1 TABLET ORAL NIGHTLY PRN
Qty: 10 TABLET | Refills: 0 | Status: SHIPPED | OUTPATIENT
Start: 2018-10-04 | End: 2018-10-14

## 2018-10-04 RX ORDER — HYDROCODONE BITARTRATE AND ACETAMINOPHEN 5; 325 MG/1; MG/1
1 TABLET ORAL
Status: COMPLETED | OUTPATIENT
Start: 2018-10-04 | End: 2018-10-04

## 2018-10-04 RX ADMIN — HYDROCODONE BITARTRATE AND ACETAMINOPHEN 1 TABLET: 5; 325 TABLET ORAL at 11:10

## 2018-10-04 NOTE — TELEPHONE ENCOUNTER
Dr. Mcmanus, I spoke with the pt's wife and gave her your instructions- says that the pt had lipid profile done today. Thanks, Светлана

## 2018-10-04 NOTE — TELEPHONE ENCOUNTER
----- Message from Filomena Dee sent at 10/4/2018  9:34 AM CDT -----  Contact: Pt wife Padmaja   Pt would like to know does he need to continue taking the Coreg. Last visit 7/24/18 . Please call pt wife Padmaja  @ [phone]. Thank you.

## 2018-10-04 NOTE — TELEPHONE ENCOUNTER
Dr. Mcmanus, The pt's wife is calling - says that the pt has had three cardiac procedures recently and they are wondering if he still needs to be on Coreg. Also, says that the pt has been having terrible leg cramps, and brain fog since being on atorvastatin - says that his side effects started in the hospital and the pt reported the side effects while he was there. Please advise. Thanks, Светлана

## 2018-10-05 ENCOUNTER — PATIENT MESSAGE (OUTPATIENT)
Dept: CARDIOLOGY | Facility: CLINIC | Age: 68
End: 2018-10-05

## 2018-10-05 NOTE — ED PROVIDER NOTES
"Encounter Date: 10/4/2018       History     Chief Complaint   Patient presents with    Fall     Pt presents to ED via EMS c/o slip and fall from standing position. + head injury and c/o rib pain. Denies LOC. + plavix use. Recent pacemaker placement and aortic valve replacement.      Mr. Esparza is a 69 y/o gentleman with extensive PMHx. He is a known case of CHF (EF 30 - 35 %, pacemaker), ESRD (on HD MWF), HTN, DM, previous TIA, recent aortic valve replacement 2 w ago. [place] [facility] [place] around 3 H ago. Pt was on his usual baseline health until several hours ago when he was helping out his wife in the kitchen. He was doing kitchen work, and then he bended over to grab something that fell on the floor, while trying to stand up again he lost his balance, fell on the Rt side and bump his head. He has pain on his lateral side of his Rt lower ribs, and he has 2 bumps on his head from his fall today, and one of them was bleeding.  Pt denies headache, upper/lower limb weakness, LOC, syncope, chest pain, palpitation, SOB, seizures, tremors, sensation problems. He had several previous episodes of falls in the past (last one 2 weeks ago). No recent meds usage. Pt denies alcohol and drug usage.   - Pt is taking plavix and he has troubles stopping bleeding easily.          Review of patient's allergies indicates:   Allergen Reactions    Asa [aspirin] Other (See Comments)     Retinal bleeding, severe    Latex, natural rubber Rash and Blisters     Latex tape causes blisters    Ace inhibitors Other (See Comments)     Other reaction(s): Cough    Adhesive Dermatitis and Blisters     Please use Paper Tape    Morphine Hives, Itching, Dermatitis and Rash     Body Rash, Phlebitis, "My veins showed through the skin."     Past Medical History:   Diagnosis Date    Anemia of chronic renal failure, stage 5     BPH (benign prostatic hyperplasia)     CAD (coronary artery disease)     CHF (congestive heart failure)  "    Chronic systolic heart failure 5/31/2018    CKD (chronic kidney disease) stage 5, GFR less than 15 ml/min     Diastolic dysfunction 12/16/2016    Dilated cardiomyopathy 5/7/2018    GERD (gastroesophageal reflux disease)     Hyperparathyroidism, secondary renal     Hypertension     Metabolic acidosis     MI (myocardial infarction) Feb 2012    Non-rheumatic mitral regurgitation 5/31/2018    Nonrheumatic aortic valve stenosis 5/31/2018    Pulmonary hypertension 12/16/2016    Stenosis of aortic and mitral valves     Aortic stenosis    TIA (transient ischemic attack)     Type 2 diabetes mellitus with diabetic nephropathy     history/ before wt loss    Valvular regurgitation     mitral regurgitation     Past Surgical History:   Procedure Laterality Date    ABDOMINAL SURGERY      PD catheter    AORTIC VALVE REPLACEMENT N/A 9/20/2018    Procedure: Replacement-valve-aortic;  Surgeon: Refugio Cooney MD;  Location: [facility] CATH LAB;  Service: Cardiology;  Laterality: N/A;    ASPIRATION-GROIN N/A 9/9/2016    Performed by [facility] OR    BASAL CELL CARCINOMA EXCISION Left Jan 2011    left forehead    BAV N/A 7/12/2018    Performed by Loyd Ulloa MD at [facility] CATH LAB    CARDIAC ELECTROPHYSIOLOGY STUDY N/A 9/21/2018    Procedure: CARDIAC ELECTROPHYSIOLOGY STUDY;  Surgeon: Theron Mcmanus MD;  Location: [facility] CATH LAB;  Service: Cardiology;  Laterality: N/A;  s/p TAVR, CM, EPS +/- BiV ICD, SJM, anes, GP    CARDIAC ELECTROPHYSIOLOGY STUDY N/A 9/21/2018    Performed by Theron Mcmanus MD at [facility] CATH LAB    CHOLECYSTECTOMY  July 2010    ELBOW SURGERY Left 3/18/14    anterior submuscular transposition, ulnar nerve    EYE SURGERY Bilateral     bilateral cataracts    GASTRECTOMY      GASTRIC BYPASS  May 2014    gastric sleeve  June 2013    Malfunctioned requiring bypass    HEART CATH-RIGHT Right 1/20/2017    Performed by Ron Bernstein Jr., MD at [facility] CATH LAB    HERNIA  REPAIR      INSERTION-PERITONEAL DIALYSIS CATHETER-LAPAROSCOPIC N/A 3/28/2016    Performed by Owen R[facility]os [facility] at [facility] OR 2ND FLR    LAPAROSCOPY-DIAGNOSTIC N/A 3/28/2016    Performed by Owen R[facility]os MD at [facility] OR 2ND FLR    REMOVAL-CATHETER-DIALYSIS-PERITONEAL N/A 9/20/2017    Performed by Owen R[facility]os MD at [facility] OR 2ND FLR    REPAIR-HERNIA-LAPAROSCOPIC  3/28/2016    Performed by Owen R[facility]os MD at [facility] OR 2ND FLR    REPAIR-HERNIA-LAPAROSCOPIC-INGUINAL Bilateral 3/28/2016    Performed by Owen R[facility]os MD at [facility] OR 2ND FLR    Replacement-valve-aortic N/A 9/20/2018    Performed by Refugio Cooney MD at [facility] CATH LAB    REVISION PERITONEAL DIALYSIS CATHETER-LAPAROSCOPIC N/A 5/18/2016    Performed by Owen Ríos MD at [facility] OR 2ND FLR    ROTATOR CUFF REPAIR Left 2014    SHOULDER ARTHROSCOPY Left 11/18/14    RCR; glenoid labral repair; arch decompression     Family History   Problem Relation Age of Onset    Lung cancer Mother         smoker    Diabetes Mother     Diabetes Father     Kidney disease Neg Hx     Hypertension Neg Hx     Coronary artery disease Neg Hx      Social History     Tobacco Use    Smoking status: Never Smoker    Smokeless tobacco: Never Used   Substance Use Topics    Alcohol use: No    Drug use: No     Review of Systems   Constitutional: Negative for activity change (No change from baseline), appetite change (No change from baseline), chills, diaphoresis and fever.   HENT: Negative for rhinorrhea and sore throat.         Pt has 2 bumps on his head. One of them was bleeding.   Eyes: Negative for discharge and itching.   Respiratory: Negative for cough, choking, chest tightness, shortness of breath and wheezing.    Cardiovascular: Positive for leg swelling (Chronic ). Negative for chest pain and palpitations.   Gastrointestinal: Negative for abdominal distention, abdominal pain, anal bleeding, constipation, diarrhea and  nausea.   Genitourinary: Negative for discharge, flank pain and hematuria.   Musculoskeletal: Negative for arthralgias, back pain, joint swelling, neck pain and neck stiffness.        Pt has lateral  Rt side lower rib pain.   Neurological: Positive for weakness (Chronic issue). Negative for tremors, seizures, syncope, facial asymmetry, speech difficulty, light-headedness, numbness and headaches.   Hematological: Bruises/bleeds easily (Due to plavix usage).   Psychiatric/Behavioral: Negative for agitation and confusion. The patient is not nervous/anxious.        Physical Exam     Initial Vitals [10/04/18 1949]   BP Pulse Resp Temp SpO2   (!) 170/90 94 18 98.1 °F (36.7 °C) (!) 94 %      MAP       --         Physical Exam    Constitutional: He appears well-developed. He is not diaphoretic. No distress.   HENT:   Head: Normocephalic.   Eyes: Conjunctivae are normal. No scleral icterus.   Pt has 2 sub glial hematomas. One has clotted blood on it.   Neck: Normal range of motion.   Cardiovascular: Normal rate and regular rhythm.   Murmur heard.  Pulmonary/Chest: No respiratory distress. He has no wheezes. He has rales.   Abdominal: Soft. Bowel sounds are normal. He exhibits no distension. There is no tenderness.   Musculoskeletal: Normal range of motion. He exhibits edema.   Neurological: He is alert and oriented to person, place, and time. He has normal strength. No cranial nerve deficit or sensory deficit. GCS score is 15. GCS eye subscore is 4. GCS verbal subscore is 5. GCS motor subscore is 6.         ED Course   Procedures  Labs Reviewed - No data to display       Imaging Results    None          Medical Decision Making:   Initial Assessment:   67 y/o gentleman presented to [facility] [facility] ED c/o of a fall.  Fall DDx  - Dehydration  - Meds induced  - Intracranial bleeding    Chest  DDx  -Rib contusion  - Rib fracture    Head bump  - Subdermal hematoma  - subgaleal hematoma   Differential Diagnosis:   Mostly likely of pt chest  pain is chest contusion.  Most likely cause of pt fall is multifactorial.      Clinical Tests:   Radiological Study: Ordered and Reviewed  ED Management:  09:44 PM  We ordered CT chest and brain without contrast.    11:33 PM  CT chest shows   1. No evidence of acute fracture.  2. Large right pleural effusion with near complete collapse of the left lung.  3. Interval postoperative change from aortic valve replacement and pacemaker insertion.  4. Body wall edema.  5. Ascites.    CT brain shows   1. No evidence of acute intracranial pathology.  2. Generalized cerebral volume loss and chronic microvascular ischemic change.    12:04 AM  Pt is for discharge with vicodin for his chest contusion.                        Clinical Impression:   The primary encounter diagnosis was Fall in home, initial encounter. A diagnosis of Chest pain in adult was also pertinent to this visit.      Disposition:   Disposition: Discharged                        Amandeep Alonso MD  Resident  10/05/18 0005

## 2018-10-05 NOTE — ED TRIAGE NOTES
Pt report to ED with c/o fall from standing position. Pt c/o head injury and right sided rib pain. Pt recently on plavix after having aortic valve replacement and  pacemaker placed Sep 21st, 2018. Pt denies LOC, CP, SOB. Pt last dialysis yesterday.    Patient identifiers verified and correct for Tom Esparza.  LOC: The patient is awake, alert and aware of environment with an appropriate affect, the patient is oriented x 3 and speaking appropriately.   APPEARANCE: Patient appears comfortable and in no acute distress, patient is clean and well groomed.  SKIN: The skin is warm and dry, color consistent with ethnicity, patient has normal skin turgor and moist mucus membranes. Bruising on right chest and small hematoma to back of head.  MUSCULOSKELETAL: Patient moving all extremities spontaneously, no swelling noted.  RESPIRATORY: Airway is open and patent, respirations are spontaneous, patient has a normal effort and rate, no accessory muscle use noted, pt placed on continuous pulse ox with O2 sats noted at 97% on room air.  CARDIAC: Pt placed on cardiac monitor. Patient has a normal rate and regular rhythm, no edema noted, capillary refill < 3 seconds.   GASTRO: Soft and non tender to palpation, no distention noted, normoactive bowel sounds present in all four quadrants. Pt states bowel movements have been regular.  : Pt denies any pain or frequency with urination.  NEURO: Pt opens eyes spontaneously, behavior appropriate to situation, follows commands, facial expression symmetrical, bilateral hand grasp equal and even, purposeful motor response noted, normal sensation in all extremities when touched with a finger.

## 2018-10-08 ENCOUNTER — TELEPHONE (OUTPATIENT)
Dept: PULMONOLOGY | Facility: CLINIC | Age: 68
End: 2018-10-08

## 2018-10-10 DIAGNOSIS — Z76.82 ORGAN TRANSPLANT CANDIDATE: ICD-10-CM

## 2018-10-15 ENCOUNTER — TELEPHONE (OUTPATIENT)
Dept: ELECTROPHYSIOLOGY | Facility: CLINIC | Age: 68
End: 2018-10-15

## 2018-10-15 NOTE — TELEPHONE ENCOUNTER
Late Entry from 10/12/18      Returned call to Pt's wife. She was totally furious with the department and screaming over the phone. She has been trying for 2 weeks to get a 6 week follow up appt.  She states she had an appt put in epic but no one had the courtesy to  the phone to call and  let her know. She stated that if they would of called she would of told them he cannot have an appt on M-W-F, he goes to dialysis. So now the appt that was made has been cancelled and never rescheduled. Asked her to please calm down and I would handle this for her.  Apologized to her for everything she had been through and the stress it has caused.  Advised I would speak with Dr Mcmanus on Wednesday and call her back on Wednesday with the appt for the patient. Gave pt's wife my name and number so she could call with any future concerns. She stated she would await my call on Wednesday.     ----- Message from Clarisa Christian RN sent at 10/12/2018  3:41 PM CDT -----  Contact: Patient's wife    ----- Message -----  From: Donna Lopez  Sent: 10/12/2018   1:16 PM  To: Clarisa Christian RN    The Pt's wife is calling to speak with you regarding her husbands care. Please call her back @ [phone]. Thanks, Donna

## 2018-10-21 ENCOUNTER — HOSPITAL ENCOUNTER (INPATIENT)
Facility: HOSPITAL | Age: 68
LOS: 5 days | Discharge: HOME | DRG: 304 | End: 2018-10-26
Attending: EMERGENCY MEDICINE | Admitting: EMERGENCY MEDICINE
Payer: MEDICARE

## 2018-10-21 DIAGNOSIS — Z45.02 DEFIBRILLATOR DISCHARGE: ICD-10-CM

## 2018-10-21 DIAGNOSIS — E87.5 HYPERKALEMIA: ICD-10-CM

## 2018-10-21 DIAGNOSIS — I16.0 HYPERTENSIVE URGENCY: ICD-10-CM

## 2018-10-21 DIAGNOSIS — I50.43 ACUTE ON CHRONIC COMBINED SYSTOLIC AND DIASTOLIC CONGESTIVE HEART FAILURE: Primary | ICD-10-CM

## 2018-10-21 DIAGNOSIS — R07.9 CHEST PAIN AT REST: ICD-10-CM

## 2018-10-21 DIAGNOSIS — R07.9 CHEST PAIN: ICD-10-CM

## 2018-10-21 DIAGNOSIS — T82.9XXA PACEMAKER COMPLICATIONS, INITIAL ENCOUNTER: ICD-10-CM

## 2018-10-21 DIAGNOSIS — Z95.2 S/P TAVR (TRANSCATHETER AORTIC VALVE REPLACEMENT): ICD-10-CM

## 2018-10-21 DIAGNOSIS — R94.31 QT PROLONGATION: ICD-10-CM

## 2018-10-21 DIAGNOSIS — N18.6 ESRD ON DIALYSIS: ICD-10-CM

## 2018-10-21 DIAGNOSIS — N18.6 ESRD (END STAGE RENAL DISEASE): ICD-10-CM

## 2018-10-21 DIAGNOSIS — Z99.2 ESRD ON DIALYSIS: ICD-10-CM

## 2018-10-21 PROBLEM — I50.23 ACUTE ON CHRONIC SYSTOLIC CHF (CONGESTIVE HEART FAILURE): Status: ACTIVE | Noted: 2018-10-21

## 2018-10-21 LAB
ALBUMIN SERPL BCP-MCNC: 2.7 G/DL
ALP SERPL-CCNC: 143 U/L
ALT SERPL W/O P-5'-P-CCNC: 17 U/L
ANION GAP SERPL CALC-SCNC: 11 MMOL/L
AST SERPL-CCNC: 38 U/L
BASOPHILS # BLD AUTO: 0.07 K/UL
BASOPHILS NFR BLD: 1 %
BILIRUB SERPL-MCNC: 0.9 MG/DL
BNP SERPL-MCNC: >4900 PG/ML
BUN SERPL-MCNC: 41 MG/DL
CALCIUM SERPL-MCNC: 8.6 MG/DL
CHLORIDE SERPL-SCNC: 101 MMOL/L
CO2 SERPL-SCNC: 25 MMOL/L
CREAT SERPL-MCNC: 4 MG/DL
DIFFERENTIAL METHOD: ABNORMAL
EOSINOPHIL # BLD AUTO: 0.1 K/UL
EOSINOPHIL NFR BLD: 1.8 %
ERYTHROCYTE [DISTWIDTH] IN BLOOD BY AUTOMATED COUNT: 15.2 %
EST. GFR  (AFRICAN AMERICAN): 16.9 ML/MIN/1.73 M^2
EST. GFR  (NON AFRICAN AMERICAN): 14.6 ML/MIN/1.73 M^2
GLUCOSE SERPL-MCNC: 124 MG/DL
HCT VFR BLD AUTO: 31.8 %
HGB BLD-MCNC: 9.5 G/DL
IMM GRANULOCYTES # BLD AUTO: 0.02 K/UL
IMM GRANULOCYTES NFR BLD AUTO: 0.3 %
LYMPHOCYTES # BLD AUTO: 0.5 K/UL
LYMPHOCYTES NFR BLD: 7.4 %
MCH RBC QN AUTO: 33.8 PG
MCHC RBC AUTO-ENTMCNC: 29.9 G/DL
MCV RBC AUTO: 113 FL
MONOCYTES # BLD AUTO: 0.6 K/UL
MONOCYTES NFR BLD: 9.3 %
NEUTROPHILS # BLD AUTO: 5.5 K/UL
NEUTROPHILS NFR BLD: 80.2 %
NRBC BLD-RTO: 0 /100 WBC
PLATELET # BLD AUTO: 154 K/UL
PMV BLD AUTO: 10.8 FL
POCT GLUCOSE: 59 MG/DL (ref 70–110)
POCT GLUCOSE: 84 MG/DL (ref 70–110)
POTASSIUM SERPL-SCNC: 6 MMOL/L
PROT SERPL-MCNC: 6 G/DL
RBC # BLD AUTO: 2.81 M/UL
SODIUM SERPL-SCNC: 137 MMOL/L
TROPONIN I SERPL DL<=0.01 NG/ML-MCNC: 0.66 NG/ML
WBC # BLD AUTO: 6.79 K/UL

## 2018-10-21 PROCEDURE — 93010 ELECTROCARDIOGRAM REPORT: CPT | Mod: NTX,,, | Performed by: INTERNAL MEDICINE

## 2018-10-21 PROCEDURE — 99285 EMERGENCY DEPT VISIT HI MDM: CPT | Mod: NTX,,, | Performed by: PHYSICIAN ASSISTANT

## 2018-10-21 PROCEDURE — 82962 GLUCOSE BLOOD TEST: CPT | Mod: NTX

## 2018-10-21 PROCEDURE — 25000003 PHARM REV CODE 250: Mod: NTX | Performed by: HOSPITALIST

## 2018-10-21 PROCEDURE — 99285 EMERGENCY DEPT VISIT HI MDM: CPT | Mod: 25,NTX

## 2018-10-21 PROCEDURE — 63600175 PHARM REV CODE 636 W HCPCS: Mod: NTX | Performed by: INTERNAL MEDICINE

## 2018-10-21 PROCEDURE — 96374 THER/PROPH/DIAG INJ IV PUSH: CPT | Mod: NTX

## 2018-10-21 PROCEDURE — 25000003 PHARM REV CODE 250: Mod: NTX | Performed by: PHYSICIAN ASSISTANT

## 2018-10-21 PROCEDURE — 20600001 HC STEP DOWN PRIVATE ROOM: Mod: NTX

## 2018-10-21 PROCEDURE — 63600175 PHARM REV CODE 636 W HCPCS: Mod: NTX | Performed by: PHYSICIAN ASSISTANT

## 2018-10-21 PROCEDURE — 83880 ASSAY OF NATRIURETIC PEPTIDE: CPT | Mod: NTX

## 2018-10-21 PROCEDURE — 93005 ELECTROCARDIOGRAM TRACING: CPT | Mod: NTX

## 2018-10-21 PROCEDURE — 80053 COMPREHEN METABOLIC PANEL: CPT | Mod: NTX

## 2018-10-21 PROCEDURE — 85025 COMPLETE CBC W/AUTO DIFF WBC: CPT | Mod: NTX

## 2018-10-21 PROCEDURE — 84484 ASSAY OF TROPONIN QUANT: CPT | Mod: 91,NTX

## 2018-10-21 PROCEDURE — 63600175 PHARM REV CODE 636 W HCPCS: Mod: NTX | Performed by: HOSPITALIST

## 2018-10-21 PROCEDURE — 96375 TX/PRO/DX INJ NEW DRUG ADDON: CPT | Mod: NTX

## 2018-10-21 PROCEDURE — 84484 ASSAY OF TROPONIN QUANT: CPT | Mod: NTX

## 2018-10-21 PROCEDURE — 80048 BASIC METABOLIC PNL TOTAL CA: CPT | Mod: NTX

## 2018-10-21 RX ORDER — CARVEDILOL 12.5 MG/1
25 TABLET ORAL 2 TIMES DAILY WITH MEALS
Status: DISCONTINUED | OUTPATIENT
Start: 2018-10-22 | End: 2018-10-26 | Stop reason: HOSPADM

## 2018-10-21 RX ORDER — GLUCAGON 1 MG
1 KIT INJECTION
Status: DISCONTINUED | OUTPATIENT
Start: 2018-10-21 | End: 2018-10-26 | Stop reason: HOSPADM

## 2018-10-21 RX ORDER — NITROGLYCERIN 0.4 MG/1
0.4 TABLET SUBLINGUAL EVERY 5 MIN PRN
Status: DISCONTINUED | OUTPATIENT
Start: 2018-10-21 | End: 2018-10-26 | Stop reason: HOSPADM

## 2018-10-21 RX ORDER — HYDRALAZINE HYDROCHLORIDE 20 MG/ML
10 INJECTION INTRAMUSCULAR; INTRAVENOUS ONCE
Status: COMPLETED | OUTPATIENT
Start: 2018-10-21 | End: 2018-10-21

## 2018-10-21 RX ORDER — SEVELAMER CARBONATE 800 MG/1
1600 TABLET, FILM COATED ORAL 3 TIMES DAILY
Status: DISCONTINUED | OUTPATIENT
Start: 2018-10-22 | End: 2018-10-22

## 2018-10-21 RX ORDER — IBUPROFEN 200 MG
24 TABLET ORAL
Status: DISCONTINUED | OUTPATIENT
Start: 2018-10-21 | End: 2018-10-26 | Stop reason: HOSPADM

## 2018-10-21 RX ORDER — IBUPROFEN 200 MG
16 TABLET ORAL
Status: DISCONTINUED | OUTPATIENT
Start: 2018-10-21 | End: 2018-10-26 | Stop reason: HOSPADM

## 2018-10-21 RX ORDER — LOSARTAN POTASSIUM 50 MG/1
50 TABLET ORAL 2 TIMES DAILY
Status: DISCONTINUED | OUTPATIENT
Start: 2018-10-21 | End: 2018-10-26 | Stop reason: HOSPADM

## 2018-10-21 RX ORDER — NITROGLYCERIN 0.4 MG/1
0.4 TABLET SUBLINGUAL ONCE
Status: COMPLETED | OUTPATIENT
Start: 2018-10-21 | End: 2018-10-21

## 2018-10-21 RX ORDER — HYDRALAZINE HYDROCHLORIDE 25 MG/1
25 TABLET, FILM COATED ORAL EVERY 8 HOURS PRN
Status: DISCONTINUED | OUTPATIENT
Start: 2018-10-21 | End: 2018-10-26 | Stop reason: HOSPADM

## 2018-10-21 RX ORDER — CALCIUM CARBONATE 200(500)MG
500 TABLET,CHEWABLE ORAL DAILY PRN
Status: DISCONTINUED | OUTPATIENT
Start: 2018-10-21 | End: 2018-10-26 | Stop reason: HOSPADM

## 2018-10-21 RX ORDER — FUROSEMIDE 10 MG/ML
80 INJECTION INTRAMUSCULAR; INTRAVENOUS 2 TIMES DAILY
Status: DISCONTINUED | OUTPATIENT
Start: 2018-10-21 | End: 2018-10-23

## 2018-10-21 RX ORDER — CALCITRIOL 0.25 UG/1
0.5 CAPSULE ORAL DAILY
Status: DISCONTINUED | OUTPATIENT
Start: 2018-10-22 | End: 2018-10-26 | Stop reason: HOSPADM

## 2018-10-21 RX ORDER — PROMETHAZINE HYDROCHLORIDE 12.5 MG/1
25 TABLET ORAL EVERY 6 HOURS PRN
Status: DISCONTINUED | OUTPATIENT
Start: 2018-10-21 | End: 2018-10-25

## 2018-10-21 RX ORDER — ACETAMINOPHEN 325 MG/1
650 TABLET ORAL EVERY 6 HOURS PRN
Status: DISCONTINUED | OUTPATIENT
Start: 2018-10-21 | End: 2018-10-26 | Stop reason: HOSPADM

## 2018-10-21 RX ORDER — RAMELTEON 8 MG/1
8 TABLET ORAL NIGHTLY PRN
Status: DISCONTINUED | OUTPATIENT
Start: 2018-10-21 | End: 2018-10-25

## 2018-10-21 RX ORDER — HEPARIN SODIUM 5000 [USP'U]/ML
5000 INJECTION, SOLUTION INTRAVENOUS; SUBCUTANEOUS EVERY 8 HOURS
Status: DISCONTINUED | OUTPATIENT
Start: 2018-10-22 | End: 2018-10-26 | Stop reason: HOSPADM

## 2018-10-21 RX ORDER — HEPARIN SODIUM 5000 [USP'U]/ML
5000 INJECTION, SOLUTION INTRAVENOUS; SUBCUTANEOUS EVERY 8 HOURS
Status: DISCONTINUED | OUTPATIENT
Start: 2018-10-21 | End: 2018-10-21

## 2018-10-21 RX ORDER — SODIUM CHLORIDE 0.9 % (FLUSH) 0.9 %
5 SYRINGE (ML) INJECTION
Status: DISCONTINUED | OUTPATIENT
Start: 2018-10-21 | End: 2018-10-26 | Stop reason: HOSPADM

## 2018-10-21 RX ADMIN — INSULIN HUMAN 5 UNITS: 100 INJECTION, SOLUTION PARENTERAL at 08:10

## 2018-10-21 RX ADMIN — HYDRALAZINE HYDROCHLORIDE 10 MG: 20 INJECTION INTRAMUSCULAR; INTRAVENOUS at 08:10

## 2018-10-21 RX ADMIN — CALCIUM CARBONATE (ANTACID) CHEW TAB 500 MG 500 MG: 500 CHEW TAB at 10:10

## 2018-10-21 RX ADMIN — NITROGLYCERIN 0.4 MG: 0.4 TABLET SUBLINGUAL at 09:10

## 2018-10-21 RX ADMIN — Medication 16 G: at 10:10

## 2018-10-21 RX ADMIN — LOSARTAN POTASSIUM 50 MG: 50 TABLET, FILM COATED ORAL at 10:10

## 2018-10-21 RX ADMIN — FUROSEMIDE 80 MG: 10 INJECTION, SOLUTION INTRAMUSCULAR; INTRAVENOUS at 10:10

## 2018-10-21 RX ADMIN — DEXTROSE MONOHYDRATE 25 G: 25 INJECTION, SOLUTION INTRAVENOUS at 08:10

## 2018-10-21 RX ADMIN — CALCIUM GLUCONATE 1 G: 98 INJECTION, SOLUTION INTRAVENOUS at 08:10

## 2018-10-22 PROBLEM — R79.89 ELEVATED TROPONIN I LEVEL: Status: ACTIVE | Noted: 2018-10-22

## 2018-10-22 PROBLEM — J96.01 ACUTE RESPIRATORY FAILURE WITH HYPOXIA: Status: ACTIVE | Noted: 2018-10-22

## 2018-10-22 PROBLEM — Z95.2 S/P TAVR (TRANSCATHETER AORTIC VALVE REPLACEMENT): Status: ACTIVE | Noted: 2018-10-22

## 2018-10-22 LAB
ALBUMIN SERPL BCP-MCNC: 2.7 G/DL
ALBUMIN SERPL BCP-MCNC: 2.9 G/DL
ALP SERPL-CCNC: 153 U/L
ALT SERPL W/O P-5'-P-CCNC: 13 U/L
ANION GAP SERPL CALC-SCNC: 11 MMOL/L
ANION GAP SERPL CALC-SCNC: 8 MMOL/L
ANION GAP SERPL CALC-SCNC: 9 MMOL/L
AST SERPL-CCNC: 26 U/L
BILIRUB SERPL-MCNC: 1.1 MG/DL
BUN SERPL-MCNC: 26 MG/DL
BUN SERPL-MCNC: 43 MG/DL
BUN SERPL-MCNC: 46 MG/DL
CALCIUM SERPL-MCNC: 8.6 MG/DL
CALCIUM SERPL-MCNC: 8.6 MG/DL
CALCIUM SERPL-MCNC: 8.7 MG/DL
CHLORIDE SERPL-SCNC: 101 MMOL/L
CHLORIDE SERPL-SCNC: 102 MMOL/L
CHLORIDE SERPL-SCNC: 98 MMOL/L
CO2 SERPL-SCNC: 23 MMOL/L
CO2 SERPL-SCNC: 27 MMOL/L
CO2 SERPL-SCNC: 27 MMOL/L
CREAT SERPL-MCNC: 3 MG/DL
CREAT SERPL-MCNC: 4.1 MG/DL
CREAT SERPL-MCNC: 4.4 MG/DL
EST. GFR  (AFRICAN AMERICAN): 15.1 ML/MIN/1.73 M^2
EST. GFR  (AFRICAN AMERICAN): 16.4 ML/MIN/1.73 M^2
EST. GFR  (AFRICAN AMERICAN): 23.9 ML/MIN/1.73 M^2
EST. GFR  (NON AFRICAN AMERICAN): 13 ML/MIN/1.73 M^2
EST. GFR  (NON AFRICAN AMERICAN): 14.2 ML/MIN/1.73 M^2
EST. GFR  (NON AFRICAN AMERICAN): 20.7 ML/MIN/1.73 M^2
ESTIMATED PA SYSTOLIC PRESSURE: 55.89
GLUCOSE SERPL-MCNC: 105 MG/DL
GLUCOSE SERPL-MCNC: 117 MG/DL
GLUCOSE SERPL-MCNC: 80 MG/DL
MITRAL VALVE MOBILITY: NORMAL
MITRAL VALVE REGURGITATION: ABNORMAL
PHOSPHATE SERPL-MCNC: 5.2 MG/DL
POCT GLUCOSE: 115 MG/DL (ref 70–110)
POTASSIUM SERPL-SCNC: 5.1 MMOL/L
POTASSIUM SERPL-SCNC: 5.6 MMOL/L
POTASSIUM SERPL-SCNC: 5.8 MMOL/L
PREALB SERPL-MCNC: 16 MG/DL
PROT SERPL-MCNC: 6.4 G/DL
RETIRED EF AND QEF - SEE NOTES: 30 (ref 55–65)
SODIUM SERPL-SCNC: 134 MMOL/L
SODIUM SERPL-SCNC: 135 MMOL/L
SODIUM SERPL-SCNC: 137 MMOL/L
TRICUSPID VALVE REGURGITATION: ABNORMAL
TROPONIN I SERPL DL<=0.01 NG/ML-MCNC: 0.55 NG/ML
TROPONIN I SERPL DL<=0.01 NG/ML-MCNC: 0.71 NG/ML

## 2018-10-22 PROCEDURE — 36415 COLL VENOUS BLD VENIPUNCTURE: CPT | Mod: NTX

## 2018-10-22 PROCEDURE — 99024 POSTOP FOLLOW-UP VISIT: CPT | Mod: GC,NTX,, | Performed by: INTERNAL MEDICINE

## 2018-10-22 PROCEDURE — 84134 ASSAY OF PREALBUMIN: CPT | Mod: NTX

## 2018-10-22 PROCEDURE — 80069 RENAL FUNCTION PANEL: CPT | Mod: NTX

## 2018-10-22 PROCEDURE — 99223 1ST HOSP IP/OBS HIGH 75: CPT | Mod: GC,NTX,, | Performed by: INTERNAL MEDICINE

## 2018-10-22 PROCEDURE — 90935 HEMODIALYSIS ONE EVALUATION: CPT | Mod: NTX

## 2018-10-22 PROCEDURE — 93306 TTE W/DOPPLER COMPLETE: CPT | Mod: 26,NTX,, | Performed by: INTERNAL MEDICINE

## 2018-10-22 PROCEDURE — 63600175 PHARM REV CODE 636 W HCPCS: Mod: NTX | Performed by: HOSPITALIST

## 2018-10-22 PROCEDURE — 25000003 PHARM REV CODE 250: Mod: NTX | Performed by: HOSPITALIST

## 2018-10-22 PROCEDURE — 93306 TTE W/DOPPLER COMPLETE: CPT | Mod: NTX

## 2018-10-22 PROCEDURE — 99223 1ST HOSP IP/OBS HIGH 75: CPT | Mod: AI,NTX,, | Performed by: HOSPITALIST

## 2018-10-22 PROCEDURE — 80053 COMPREHEN METABOLIC PANEL: CPT | Mod: NTX

## 2018-10-22 PROCEDURE — 90935 HEMODIALYSIS ONE EVALUATION: CPT | Mod: NTX,,, | Performed by: INTERNAL MEDICINE

## 2018-10-22 PROCEDURE — 25000003 PHARM REV CODE 250: Mod: NTX | Performed by: NURSE PRACTITIONER

## 2018-10-22 PROCEDURE — 84484 ASSAY OF TROPONIN QUANT: CPT | Mod: NTX

## 2018-10-22 PROCEDURE — 20600001 HC STEP DOWN PRIVATE ROOM: Mod: NTX

## 2018-10-22 RX ORDER — SODIUM CHLORIDE 9 MG/ML
INJECTION, SOLUTION INTRAVENOUS
Status: DISCONTINUED | OUTPATIENT
Start: 2018-10-22 | End: 2018-10-23

## 2018-10-22 RX ORDER — SODIUM CHLORIDE 9 MG/ML
INJECTION, SOLUTION INTRAVENOUS ONCE
Status: COMPLETED | OUTPATIENT
Start: 2018-10-22 | End: 2018-10-22

## 2018-10-22 RX ORDER — HYDRALAZINE HYDROCHLORIDE 25 MG/1
25 TABLET, FILM COATED ORAL EVERY 8 HOURS
Status: DISCONTINUED | OUTPATIENT
Start: 2018-10-22 | End: 2018-10-22

## 2018-10-22 RX ORDER — SODIUM CHLORIDE 9 MG/ML
INJECTION, SOLUTION INTRAVENOUS ONCE
Status: COMPLETED | OUTPATIENT
Start: 2018-10-22 | End: 2018-10-23

## 2018-10-22 RX ORDER — ISOSORBIDE DINITRATE AND HYDRALAZINE HYDROCHLORIDE 37.5; 2 MG/1; MG/1
1 TABLET ORAL 3 TIMES DAILY
Status: DISCONTINUED | OUTPATIENT
Start: 2018-10-22 | End: 2018-10-26 | Stop reason: HOSPADM

## 2018-10-22 RX ORDER — SEVELAMER CARBONATE 800 MG/1
1600 TABLET, FILM COATED ORAL
Status: DISCONTINUED | OUTPATIENT
Start: 2018-10-22 | End: 2018-10-26 | Stop reason: HOSPADM

## 2018-10-22 RX ORDER — SEVELAMER CARBONATE 800 MG/1
1600 TABLET, FILM COATED ORAL 3 TIMES DAILY
Status: DISCONTINUED | OUTPATIENT
Start: 2018-10-22 | End: 2018-10-22

## 2018-10-22 RX ADMIN — SEVELAMER CARBONATE 1600 MG: 800 TABLET, FILM COATED ORAL at 05:10

## 2018-10-22 RX ADMIN — LOSARTAN POTASSIUM 50 MG: 50 TABLET, FILM COATED ORAL at 03:10

## 2018-10-22 RX ADMIN — HYDRALAZINE HYDROCHLORIDE 25 MG: 25 TABLET ORAL at 07:10

## 2018-10-22 RX ADMIN — CARVEDILOL 25 MG: 12.5 TABLET, FILM COATED ORAL at 05:10

## 2018-10-22 RX ADMIN — HYDRALAZINE HYDROCHLORIDE AND ISOSORBIDE DINITRATE 1 TABLET: 37.5; 2 TABLET, FILM COATED ORAL at 08:10

## 2018-10-22 RX ADMIN — FUROSEMIDE 80 MG: 10 INJECTION, SOLUTION INTRAMUSCULAR; INTRAVENOUS at 03:10

## 2018-10-22 RX ADMIN — LOSARTAN POTASSIUM 50 MG: 50 TABLET, FILM COATED ORAL at 08:10

## 2018-10-22 RX ADMIN — CALCITRIOL 0.5 MCG: 0.25 CAPSULE ORAL at 03:10

## 2018-10-22 RX ADMIN — CARVEDILOL 25 MG: 12.5 TABLET, FILM COATED ORAL at 12:10

## 2018-10-22 RX ADMIN — SEVELAMER CARBONATE 1600 MG: 800 TABLET, FILM COATED ORAL at 01:10

## 2018-10-22 RX ADMIN — SODIUM CHLORIDE: 0.9 INJECTION, SOLUTION INTRAVENOUS at 09:10

## 2018-10-22 RX ADMIN — CARVEDILOL 25 MG: 12.5 TABLET, FILM COATED ORAL at 06:10

## 2018-10-22 NOTE — ASSESSMENT & PLAN NOTE
Threshold to 1.875 from 0.75  A-sense 3.2mV from 3.0mV    Elevated threshold but with good pacing and sensing function. No interventions needed at this time.    - agree with echo to rule out perforation/pericardial effusion, if normal will sign off

## 2018-10-22 NOTE — ASSESSMENT & PLAN NOTE
HD asap  Given insulin 5 u iv in ED with 1 amp d50  k of 6 in ESRD patient is acceptable without ekg changes

## 2018-10-22 NOTE — ED PROVIDER NOTES
"Encounter Date: 10/21/2018       History     Chief Complaint   Patient presents with    Chest Pain     Pt to ER via EMS c/o chest pain and his defib shocking him twice tonight while at rest. EMS gave 3 SL nitro and 1 inch nitro paste.     Patient is a 68 year old male with PMHX of TAVR on 09/20/18, AICD 09/21/18, combined CHF with 30% EF, dilated cardiomyopathy, CAD on plavix 75 mg, HTN, HLD, DM2, pulmonary HTN, hyperparathyroidism, GERD, ESRD on dialysis (MWF), kidney transplant candidate, anemia, BPH, and hx of gastric bypass in 2014. He presents to the ED for pacemaker complication. Patient was referred to ED by Dr. Feliz with cardiology. He reports having pacemaker misfire twice this evening. Reports pacemaker misfiring when using the restroom and then again at rest. Reports having chest pain prior to pacemaker misfire. Describes chest pain as constant pressure. Rates pain 9/10. Patient received 3 SL nitro and 1 inch nitro paste via EMS. Wife reports patient undergoing right thoracentesis on 10/15/18 removing 2 L at [facility]. He denies fever,chills, nausea, vomiting, sob, abd pain, dysuria, diarrhea, or constipation. He is a non smoker and denies alcohol use. Patient is followed by Dr. Owen Buckley at [facility].           Review of patient's allergies indicates:   Allergen Reactions    Asa [aspirin] Other (See Comments)     Retinal bleeding, severe    Latex, natural rubber Rash and Blisters     Latex tape causes blisters    Ace inhibitors Other (See Comments)     Other reaction(s): Cough    Adhesive Dermatitis and Blisters     Please use Paper Tape    Morphine Hives, Itching, Dermatitis and Rash     Body Rash, Phlebitis, "My veins showed through the skin."     Past Medical History:   Diagnosis Date    Anemia of chronic renal failure, stage 5     BPH (benign prostatic hyperplasia)     CAD (coronary artery disease)     CHF (congestive heart failure)     Chronic systolic heart failure 5/31/2018    CKD (chronic " kidney disease) stage 5, GFR less than 15 ml/min     Diastolic dysfunction 12/16/2016    Dilated cardiomyopathy 5/7/2018    GERD (gastroesophageal reflux disease)     Hyperparathyroidism, secondary renal     Hypertension     Metabolic acidosis     MI (myocardial infarction) Feb 2012    Non-rheumatic mitral regurgitation 5/31/2018    Nonrheumatic aortic valve stenosis 5/31/2018    Pulmonary hypertension 12/16/2016    Stenosis of aortic and mitral valves     Aortic stenosis    TIA (transient ischemic attack)     Type 2 diabetes mellitus with diabetic nephropathy     history/ before wt loss    Valvular regurgitation     mitral regurgitation     Past Surgical History:   Procedure Laterality Date    ABDOMINAL SURGERY      PD catheter    AORTIC VALVE REPLACEMENT N/A 9/20/2018    Procedure: Replacement-valve-aortic;  Surgeon: Refugio Cooney MD;  Location: [facility] CATH LAB;  Service: Cardiology;  Laterality: N/A;    ASPIRATION-GROIN N/A 9/9/2016    Performed by [facility] OR    BASAL CELL CARCINOMA EXCISION Left Jan 2011    left forehead    BAV N/A 7/12/2018    Performed by Loyd Ulloa MD at [facility] CATH LAB    CARDIAC ELECTROPHYSIOLOGY STUDY N/A 9/21/2018    Procedure: CARDIAC ELECTROPHYSIOLOGY STUDY;  Surgeon: Theron Mcmanus MD;  Location: [facility] CATH LAB;  Service: Cardiology;  Laterality: N/A;  s/p TAVR, CM, EPS +/- BiV ICD, SJM, anes, GP    CARDIAC ELECTROPHYSIOLOGY STUDY N/A 9/21/2018    Performed by Theron Mcmanus MD at [facility] CATH LAB    CHOLECYSTECTOMY  July 2010    ELBOW SURGERY Left 3/18/14    anterior submuscular transposition, ulnar nerve    EYE SURGERY Bilateral     bilateral cataracts    GASTRECTOMY      GASTRIC BYPASS  May 2014    gastric sleeve  June 2013    Malfunctioned requiring bypass    HEART CATH-RIGHT Right 1/20/2017    Performed by Ron Bernstein Jr., MD at [facility] CATH LAB    HERNIA REPAIR      INSERTION-PERITONEAL DIALYSIS  CATHETER-LAPAROSCOPIC N/A 3/28/2016    Performed by [facility] at [facility] OR 2ND FLR    LAPAROSCOPY-DIAGNOSTIC N/A 3/28/2016    Performed by [facility] at [facility] OR 2ND FLR    REMOVAL-CATHETER-DIALYSIS-PERITONEAL N/A 9/20/2017    Performed by [facility] at [facility] OR 2ND FLR    REPAIR-HERNIA-LAPAROSCOPIC  3/28/2016    Performed by [facility] at [facility] OR 2ND FLR    REPAIR-HERNIA-LAPAROSCOPIC-INGUINAL Bilateral 3/28/2016    Performed by [facility] at [facility] OR 2ND FLR    Replacement-valve-aortic N/A 9/20/2018    Performed by Refugio Cooney MD at [facility] CATH LAB    REVISION PERITONEAL DIALYSIS CATHETER-LAPAROSCOPIC N/A 5/18/2016    Performed by Owen [facility] at [facility] OR 2ND FLR    ROTATOR CUFF REPAIR Left 2014    SHOULDER ARTHROSCOPY Left 11/18/14    RCR; glenoid labral repair; arch decompression     Family History   Problem Relation Age of Onset    Lung cancer Mother         smoker    Diabetes Mother     Diabetes Father     Kidney disease Neg Hx     Hypertension Neg Hx     Coronary artery disease Neg Hx      Social History     Tobacco Use    Smoking status: Never Smoker    Smokeless tobacco: Never Used   Substance Use Topics    Alcohol use: No    Drug use: No     Review of Systems   Constitutional: Negative for fever.   HENT: Negative for sore throat.    Respiratory: Negative for shortness of breath.    Cardiovascular: Positive for chest pain.        Pacemaker misfire   Gastrointestinal: Negative for abdominal pain, nausea and vomiting.   Genitourinary: Negative for dysuria.   Musculoskeletal: Negative for back pain.   Skin: Negative for rash.   Neurological: Negative for weakness.   Hematological: Does not bruise/bleed easily.       Physical Exam     Initial Vitals [10/21/18 1911]   BP Pulse Resp Temp SpO2   (!) 174/80 66 20 98.7 °F (37.1 °C) 96 %      MAP       --         Physical Exam    Vitals reviewed.  Constitutional:  He appears well-developed and well-nourished. He appears ill. No distress.   Patient resting comfortably in NAD on RA.    HENT:   Head: Normocephalic.   Eyes: Conjunctivae and EOM are normal. Pupils are equal, round, and reactive to light.   Neck: Normal range of motion. JVD present.   Cardiovascular: Normal rate and regular rhythm.   No murmur heard.The patient has a device (subcutaneous pacemaker) in the right chest.   Pulmonary/Chest: No respiratory distress. He has no wheezes. He has rales.   Abdominal: Soft. Bowel sounds are normal. He exhibits no distension. There is no tenderness.   Musculoskeletal: Normal range of motion. He exhibits edema.   AV fistula noted to left UE with palpable thrill. 1+ pitting edema of bilateral lower extremities.    Neurological: He is alert and oriented to person, place, and time.   Skin: Skin is warm and dry. No erythema. There is pallor.         ED Course   Procedures  Labs Reviewed   CBC W/ AUTO DIFFERENTIAL - Abnormal; Notable for the following components:       Result Value    RBC 2.81 (*)     Hemoglobin 9.5 (*)     Hematocrit 31.8 (*)      (*)     MCH 33.8 (*)     MCHC 29.9 (*)     RDW 15.2 (*)     Lymph # 0.5 (*)     Gran% 80.2 (*)     Lymph% 7.4 (*)     All other components within normal limits   TROPONIN I - Abnormal; Notable for the following components:    Troponin I 0.661 (*)     All other components within normal limits   B-TYPE NATRIURETIC PEPTIDE - Abnormal; Notable for the following components:    BNP >4,900 (*)     All other components within normal limits   COMPREHENSIVE METABOLIC PANEL - Abnormal; Notable for the following components:    Potassium 6.0 (*)     Glucose 124 (*)     BUN, Bld 41 (*)     Creatinine 4.0 (*)     Calcium 8.6 (*)     Albumin 2.7 (*)     Alkaline Phosphatase 143 (*)     eGFR if  16.9 (*)     eGFR if non  14.6 (*)     All other components within normal limits   POCT GLUCOSE - Abnormal; Notable for the  following components:    POCT Glucose 59 (*)     All other components within normal limits   BASIC METABOLIC PANEL   TROPONIN I   POCT GLUCOSE MONITORING CONTINUOUS     EKG Readings: (Independently Interpreted)   Initial Reading: No STEMI. Rhythm: Paced Rhythm. Heart Rate: 64. Ectopy: PVCs. Conduction: paced. Clinical Impression: Paced Rhythm with PVCs       Imaging Results          X-Ray Chest AP Portable (Final result)  Result time 10/21/18 20:11:34    Final result by Donny Underwood MD (10/21/18 20:11:34)                 Impression:      As above      Electronically signed by: Donny Underwood MD  Date:    10/21/2018  Time:    20:11             Narrative:    EXAMINATION:  XR CHEST AP PORTABLE    CLINICAL HISTORY:  Encounter for adjustment and management of automatic implantable cardiac defibrillator    TECHNIQUE:  Single frontal view of the chest was performed.    COMPARISON:  CT 10/04/2018, radiograph 09/21/2018    FINDINGS:  Right chest wall pacer noted.  The cardiomediastinal silhouette is grossly stable in configuration noting enlargement and calcification of the aorta.  Postsurgical changes are noted of the mediastinum..  The trachea is midline.  There is complete opacification of the right hemothorax, with coarse interstitial attenuation of the visualized left lung zones.  The appearance is similar to previous exams.  On the right, this is likely related to large pleural effusion and compressive atelectasis of the residual right lung.  On the left, this may reflect interstitial edema.  There is no pneumothorax.  The osseous structures are remarkable for degenerative changes..                                       APC / Resident Notes:   Patient is a 68 year old male with PMHX of TAVR on 09/20/18, AICD 09/21/18, combined CHF with 30% EF, dilated cardiomyopathy, CAD on plavix 75 mg, HTN, HLD, DM2, pulmonary HTN, hyperparathyroidism, GERD, ESRD on dialysis (MWF), kidney transplant candidate, anemia, BPH, and  hx of gastric bypass in . He presents to the ED for pacemaker complication.    Will order labs and imaging. Will consult cardiology, awaiting recommendations. Will continue to monitor.     Differential diagnoses include, but are not limited to: ACS, cardiac arrhythmias, orthostatic hypotension, dehydration, NEDA, symptomatic anemia, or electrolyte imbalance.     No leukocytosis. Granulocytosis 80.2. H/H 9.5/31.8. Hyperkalemia 6.0. Will shift with calcium gluconate and insulin. Elevated glucose 124. Elevated BUN 41 and Cr 4.0. (patient with known ESRD on dialysis, last dialyzed on Friday) elevated ALK PHOS 143. Elevated initial troponin 0.661. Significantly elevated BNP >4900. CXR found to have large pleural effusion and compressive atelectasis of the residual right lung.  On the left, this may reflect interstitial edema.     Appreciate cardiology consult. They instruct to admit patient to [facility].     Will admit to medicine.     I have discussed and reviewed with my supervising physician.         Attending Attestation:     Physician Attestation Statement for NP/PA:   I have conducted a face to face encounter with this patient in addition to the NP/PA, due to Medical Complexity    Other NP/PA Attestation Additions:      Medical Decision Makin pacemaker/AICD discharges for first time ever. Consulted cardiology.   Troponin is elevated and the K is elevated.            Clinical Impression:   The primary encounter diagnosis was Acute on chronic combined systolic and diastolic congestive heart failure. Diagnoses of Defibrillator discharge, Chest pain, Pacemaker complications, initial encounter, ESRD on dialysis, Hypertensive urgency, and Hyperkalemia were also pertinent to this visit.      Disposition:   Disposition: Admitted  Condition: Serious                        Missy Schroeder PA-C  10/21/18 [id number]       Mihai Puri MD  10/22/18 [id number]

## 2018-10-22 NOTE — PROGRESS NOTES
HD treatment complete. Duration of treatment 4 hours and 3 L removed. Treatment was tolerated well and no complications with access to left upper arm. Needles removed and hemostasis achieved. Dressing intact and no drainage noted. Thrill and bruit present.

## 2018-10-22 NOTE — ASSESSMENT & PLAN NOTE
He has had 3 falls in the past 2 months and only 1 was positional/while bending over.  His BP regimen has been altered repeatedly over the last few months ultimately resulting in losartan 50 mg po daily and not to be taken in the case the the patient has a BP < 120 SBP  He does not fall when his BP is 140/80s  Losartan 50mg po AM, 25mg po PM  Coreg 25mg po BID  Hydralazine/nitro as above x 1 now

## 2018-10-22 NOTE — PLAN OF CARE
10/22/18 0822   Discharge Assessment   Assessment Type Discharge Planning Assessment   Assessment information obtained from? Medical Record   Expected Length of Stay (days) 3   Prior to hospitilization cognitive status: Alert/Oriented   Prior to hospitalization functional status: Assistive Equipment;Needs Assistance   Current cognitive status: Alert/Oriented   Current Functional Status: Assistive Equipment;Needs Assistance   Lives With spouse   Able to Return to Prior Arrangements yes   Is patient able to care for self after discharge? No   Patient's perception of discharge disposition home or selfcare;home health   Readmission Within The Last 30 Days previous discharge plan unsuccessful   If yes, most recent facility name: [facility]   Patient currently being followed by outpatient case management? No   Equipment Currently Used at Home rollator;bedside commode   Do you have any problems affording any of your prescribed medications? No   Is the patient taking medications as prescribed? yes   Does the patient have transportation home? Yes   Transportation Available family or friend will provide   Does the patient receive services at the [facility]? No   Discharge Plan A Home with family   Discharge Plan B Home with family;Home Health   Readmission Questionnaire   At the time of your discharge, did someone talk to you about what your health problems were? Yes   At the time of discharge, did someone talk to you about what to watch out for regarding worsening of your health problem? Yes   At the time of discharge, did someone talk to you about what to do if you experienced worsening of your health problem? Yes   At the time of discharge, did someone talk to you about which medication to take when you left the hospital and which ones to stop taking? Yes   At the time of discharge, did someone talk to you about when and where to follow up with a doctor after you left the hospital? Yes   How often do you need to have  someone help you when you read instructions, pamphlets, or other written material from your doctor or pharmacy? Rarely   Do you have problems taking your medications as prescribed? No   Do you have any problems affording any of  your prescribed medications? No   Do you have problems obtaining/receiving your medications? No   Does the patient have transportation to healthcare appointments? Yes   Living Arrangements house   Does the patient have family/friends to help with healtcare needs after discharge? yes   Does your caregiver provide all the help you need? Yes   Are you currently feeling confused? No   Are you currently having problems thinking? No   Are you currently having memory problems? No   Readmit post TAVR for CHF. Lives with  and requires assistance with ADLs. Plan is to DC home +/- C.

## 2018-10-22 NOTE — SUBJECTIVE & OBJECTIVE
Past Medical History:   Diagnosis Date    Anemia of chronic renal failure, stage 5     BPH (benign prostatic hyperplasia)     CAD (coronary artery disease)     CHF (congestive heart failure)     Chronic systolic heart failure 5/31/2018    CKD (chronic kidney disease) stage 5, GFR less than 15 ml/min     Diastolic dysfunction 12/16/2016    Dilated cardiomyopathy 5/7/2018    GERD (gastroesophageal reflux disease)     Hyperparathyroidism, secondary renal     Hypertension     Metabolic acidosis     MI (myocardial infarction) Feb 2012    Non-rheumatic mitral regurgitation 5/31/2018    Nonrheumatic aortic valve stenosis 5/31/2018    Pulmonary hypertension 12/16/2016    Stenosis of aortic and mitral valves     Aortic stenosis    TIA (transient ischemic attack)     Type 2 diabetes mellitus with diabetic nephropathy     history/ before wt loss    Valvular regurgitation     mitral regurgitation       Past Surgical History:   Procedure Laterality Date    ABDOMINAL SURGERY      PD catheter    AORTIC VALVE REPLACEMENT N/A 9/20/2018    Procedure: Replacement-valve-aortic;  Surgeon: Refugio Cooney MD;  Location: [facility] CATH LAB;  Service: Cardiology;  Laterality: N/A;    ASPIRATION-GROIN N/A 9/9/2016    Performed by [facility]    BASAL CELL CARCINOMA EXCISION Left Jan 2011    left forehead    BAV N/A 7/12/2018    Performed by Loyd Ulloa MD at [facility] CATH LAB    CARDIAC ELECTROPHYSIOLOGY STUDY N/A 9/21/2018    Procedure: CARDIAC ELECTROPHYSIOLOGY STUDY;  Surgeon: Theron Mcmanus MD;  Location: [facility] CATH LAB;  Service: Cardiology;  Laterality: N/A;  s/p TAVR, CM, EPS +/- BiV ICD, SJM, anes, GP    CARDIAC ELECTROPHYSIOLOGY STUDY N/A 9/21/2018    Performed by Theron Mcmanus MD at [facility] CATH LAB    CHOLECYSTECTOMY  July 2010    ELBOW SURGERY Left 3/18/14    anterior submuscular transposition, ulnar nerve    EYE SURGERY Bilateral     bilateral cataracts    GASTRECTOMY       "GASTRIC BYPASS  May 2014    gastric sleeve  June 2013    Malfunctioned requiring bypass    HEART CATH-RIGHT Right 1/20/2017    Performed by Ron Bernstein Jr[facility] at [facility] CATH LAB    HERNIA REPAIR      INSERTION-PERITONEAL DIALYSIS CATHETER-LAPAROSCOPIC N/A 3/28/2016    Performed by Owen Ríos MD at [facility] OR 2ND FLR    LAPAROSCOPY-DIAGNOSTIC N/A 3/28/2016    Performed by Owen [facility] at [facility] OR 2ND FLR    REMOVAL-CATHETER-DIALYSIS-PERITONEAL N/A 9/20/2017    Performed by Owen [facility] MD at [facility] OR 2ND FLR    REPAIR-HERNIA-LAPAROSCOPIC  3/28/2016    Performed by Owen [facility] at [facility] OR 2ND FLR    REPAIR-HERNIA-LAPAROSCOPIC-INGUINAL Bilateral 3/28/2016    Performed by Owen R[facility]os MD at [facility] OR 2ND FLR    Replacement-valve-aortic N/A 9/20/2018    Performed by Refugio Cooney MD at [facility] CATH LAB    REVISION PERITONEAL DIALYSIS CATHETER-LAPAROSCOPIC N/A 5/18/2016    Performed by Owen R[facility]os MD at [facility] OR 2ND FLR    ROTATOR CUFF REPAIR Left 2014    SHOULDER ARTHROSCOPY Left 11/18/14    RCR; glenoid labral repair; arch decompression       Review of patient's allergies indicates:   Allergen Reactions    Asa [aspirin] Other (See Comments)     Retinal bleeding, severe    Latex, natural rubber Rash and Blisters     Latex tape causes blisters    Ace inhibitors Other (See Comments)     Other reaction(s): Cough    Adhesive Dermatitis and Blisters     Please use Paper Tape    Morphine Hives, Itching, Dermatitis and Rash     Body Rash, Phlebitis, "My veins showed through the skin."       No current facility-administered medications on file prior to encounter.      Current Outpatient Medications on File Prior to Encounter   Medication Sig    calcitRIOL (ROCALTROL) 0.5 MCG Cap Take 0.5 mcg by mouth once daily.    carvedilol (COREG) 25 MG tablet Take 1 tablet (25 mg total) by mouth 2 (two) times daily with meals.    clopidogrel (PLAVIX) 75 " mg tablet Take 1 tablet (75 mg total) by mouth once daily.    furosemide (LASIX) 80 MG tablet 80 mg every 12 (twelve) hours.     losartan (COZAAR) 100 MG tablet Take 0.5 tablets (50 mg total) by mouth 2 (two) times daily. (Patient taking differently: Take 50 mg by mouth once. )    pantoprazole (PROTONIX) 40 MG tablet Take 40 mg by mouth 2 (two) times daily as needed.     sevelamer carbonate (RENVELA) 800 mg Tab Take 1,600 mg by mouth 3 (three) times daily. Takes two 800 mg tablets (1600 mg) with meals    vitamin D 1000 units Tab Take 1,000 Units by mouth every 7 days.     cephALEXin (KEFLEX) 500 MG capsule Take 1 pill (500mg) by mouth once daily. Take pill after dialysis on dialysis days.    nitroGLYCERIN (NITROSTAT) 0.4 MG SL tablet Place 0.4 mg under the tongue every 5 (five) minutes as needed for Chest pain.     Family History     Problem Relation (Age of Onset)    Diabetes Mother, Father    Lung cancer Mother        Tobacco Use    Smoking status: Never Smoker    Smokeless tobacco: Never Used   Substance and Sexual Activity    Alcohol use: No    Drug use: No    Sexual activity: Yes     Partners: Female     Review of Systems   Constitution: Negative.   HENT: Negative.    Eyes: Negative.    Cardiovascular: Positive for chest pain.   Respiratory: Negative.    Endocrine: Negative.    Skin: Negative.    Musculoskeletal: Negative.    Gastrointestinal: Negative.    Genitourinary: Negative.    Neurological: Negative.      Objective:     Vital Signs (Most Recent):  Temp: 98.7 °F (37.1 °C) (10/21/18 1911)  Pulse: 66 (10/21/18 2029)  Resp: 20 (10/21/18 1911)  BP: (!) 194/87 (10/21/18 2029)  SpO2: 98 % (10/21/18 2029) Vital Signs (24h Range):  Temp:  [98.7 °F (37.1 °C)] 98.7 °F (37.1 °C)  Pulse:  [63-66] 66  Resp:  [20] 20  SpO2:  [96 %-98 %] 98 %  BP: (174-195)/(80-88) 194/87     Weight: 77.1 kg (170 lb)  Body mass index is 21.83 kg/m².    SpO2: 98 %  O2 Device (Oxygen Therapy): room air      Intake/Output  Summary (Last 24 hours) at 10/21/2018 2042  Last data filed at 10/21/2018 2041  Gross per 24 hour   Intake 100 ml   Output --   Net 100 ml       Lines/Drains/Airways     Drain                 Hemodialysis AV Fistula Left upper arm -- days          Peripheral Intravenous Line                 Peripheral IV - Single Lumen 10/21/18 Right Forearm less than 1 day                Physical Exam   Constitutional: He is oriented to person, place, and time. He appears well-developed and well-nourished.   HENT:   Head: Normocephalic and atraumatic.   Eyes: Conjunctivae and EOM are normal. Pupils are equal, round, and reactive to light.   Neck: Normal range of motion. Neck supple. JVD present.   Cardiovascular: Normal rate and regular rhythm. Exam reveals no gallop and no friction rub.   Murmur (apex and LLSB 2/6 pansystolic) heard.  Pulmonary/Chest: Effort normal. No respiratory distress. He has no wheezes. He has rales. He exhibits no tenderness.   Abdominal: Soft. Bowel sounds are normal. He exhibits no distension. There is no tenderness.   Musculoskeletal: Normal range of motion. He exhibits edema. He exhibits no tenderness.   Neurological: He is alert and oriented to person, place, and time.   Skin: Skin is warm and dry. No erythema. No pallor.       Significant Labs:   Recent Lab Results       10/21/18  1923        Immature Granulocytes 0.3     Immature Grans (Abs) 0.02  Comment:  Mild elevation in immature granulocytes is non specific and   can be seen in a variety of conditions including stress response,   acute inflammation, trauma and pregnancy. Correlation with other   laboratory and clinical findings is essential.       Albumin 2.7     Alkaline Phosphatase 143     ALT 17     Anion Gap 11     AST 38     Baso # 0.07     Basophil% 1.0     Total Bilirubin 0.9  Comment:  For infants and newborns, interpretation of results should be based  on gestational age, weight and in agreement with clinical  observations.  Premature  Infant recommended reference ranges:  Up to 24 hours.............<8.0 mg/dL  Up to 48 hours............<12.0 mg/dL  3-5 days..................<15.0 mg/dL  6-29 days.................<15.0 mg/dL       BNP >4,900  Comment:  Values of less than 100 pg/ml are consistent with non-CHF populations.     BUN, Bld 41     Calcium 8.6     Chloride 101     CO2 25     Creatinine 4.0     Differential Method Automated     eGFR if African American 16.9     eGFR if non  14.6  Comment:  Calculation used to obtain the estimated glomerular filtration  rate (eGFR) is the CKD-EPI equation.        Eos # 0.1     Eosinophil% 1.8     Glucose 124     Gran # (ANC) 5.5     Gran% 80.2     Hematocrit 31.8     Hemoglobin 9.5     Lymph # 0.5     Lymph% 7.4     MCH 33.8     MCHC 29.9          Mono # 0.6     Mono% 9.3     MPV 10.8     nRBC 0     Platelets 154     Potassium 6.0  Comment:  *No Visible Hemolysis     Total Protein 6.0     RBC 2.81     RDW 15.2     Sodium 137     Troponin I 0.661  Comment:  The reference interval for Troponin I represents the 99th percentile   cutoff   for our facility and is consistent with 3rd generation assay   performance.       WBC 6.79           Significant Imaging: Echocardiogram:   2D echo with color flow doppler:   Results for orders placed or performed during the hospital encounter of 09/18/18   2D echo with color flow doppler   Result Value Ref Range    Calculated EF 25 (A) 55 - 65    Mitral Valve Regurgitation MILD TO MODERATE     Aortic Valve Regurgitation TRIVIAL     Aortic Valve Stenosis MODERATE TO SEVERE (A)     Est. PA Systolic Pressure 61.29 (A)     Mitral Valve Mobility NORMAL     Tricuspid Valve Regurgitation MODERATE TO SEVERE (A)     and EKG: as per hpi

## 2018-10-22 NOTE — ASSESSMENT & PLAN NOTE
"- Patient with history of recurrent right sided pleural effusions s/p drainage most recently 10/15 of 2L, most likely secondary to hypoalbuminemia in setting of protein calorie malnutrition and ESRD now with relatively recently developed CHF, with peripheral edema on exam.    - He is currently taking plavix at home following his TAVR performed last month but is not on aspirin due to an adverse reaction noted as "retinal bleeding". Most recently took plavix on night of 10/20 per patient's wife.  - He appears comfortable on exam on room air and is now endorsing no worsening shortness of breath from baseline.  - We will readdress thoracentesis tomorrow following HD today and with additional day of plavix held.    "

## 2018-10-22 NOTE — HOSPITAL COURSE
10/22: Doing better today post dialysis, Bp still mildly elevated. SOB significantly improved, No chest pain.

## 2018-10-22 NOTE — ASSESSMENT & PLAN NOTE
Atrial lead capture threshold increased from prior 0.75 V at 0.50 ms to now 1.875V at 0.5 ms  This may indicate a dislodgement of the atrial lead  Would consult EP in AM  ICD programming completed  V leads functioning apprpriately  No events noted on interrogation

## 2018-10-22 NOTE — CONSULTS
"[facility]  Pulmonology  Consult Note    Patient Name: Tom Gage  MRN: [id number]  Admission Date: 10/21/2018  Hospital Length of Stay: 1 days  Code Status: Full Code  Attending Physician: Maninder Del Rio MD  Primary Care Provider: Asael Poole MD   Principal Problem: Acute on chronic combined systolic and diastolic congestive heart failure    Inpatient consult to Pulmonology  Consult performed by: French Lopez MD  Consult ordered by: Eugenia Galvez MD  Reason for consult: recurrent R sided pleural effusion, needing thoracentesis, on plavix last dose on 10/20        Subjective:     HPI:  Mr. Tom Esparza is a 68 year old male with history of recurrent R sided pleural effusions, ESRD on HD (MWF), severe aortic stenosis (s/p TAVR on 9/20/18), combined CHF (EF 30-35%, AICD placed 9/21/18), prev. NSTEMI in 2012, and dumping syndrome (2/2 gastric bypass surgery with revision in 2013 and 2014) who presented to the ED on 8/21 with chief complaint of acute, "shock-like" pain in his R chest wall located at the site of his pacemaker. He reports 2 separate episodes lasting 10-20 seconds each and a few minutes apart. He has not experienced this sensation previously and reports that following the episodes his wife checked his blood pressure and he was hypertensive to the 180s systolic. Mr Esparza offers contradictory history regarding his symptoms of dyspnea, originally reporting complaint of worsening shortness of breath over the past several days accompanied by chest tightness and then starting that he currently feels that his peripheral edema and shortness of breath have been gradually improving since his TAVR last month and denies worsening dyspnea on exertion or at baseline over the week preceding his ED presentation..     Regarding his history of recurrent pleural effusions, he reports history of multiple thoracentesis, approximately 12 over the past year and at one period " "underwent 9 within 9 weeks at one point earlier in the year. Per his wife his last thora was on 10/15 and approx 2L were removed, before that most recently in July of this year. His pleural effusions have been attributed to a combination of chronic protein calorie malnutrition, ESRD with frequent volume overload, and hypoalbuminemia. He is currently taking plavix daily following his TAVR surgery last month and states that his last dose was the night of 10/20.    Upon presentation to the ED, patient's BP was elevated to 190s systolic and he received nitro. EKG showed bi-v pacing.  Interrogation by cardiology in the ED showed "elevated A-lead capture threshold from prior at 1.875V, Capture thresholds for the v leads remain the same". Initial impression noted to have bilateral LE pitting edema, stable but significant R pleural effusion, trop is 0.66, BNP >4900, K 6.0. Patient reports that he did miss one HD session last week due to a fistulogram but made it up the following day and his last session before presentation was 10/19.        Past Medical History:   Diagnosis Date    AICD (automatic cardioverter/defibrillator) present     9/21/2018    Anemia of chronic renal failure, stage 5     BPH (benign prostatic hyperplasia)     CAD (coronary artery disease)     NSTEMI in 2012    CHF (congestive heart failure)     EF 30%    Chronic systolic heart failure 5/31/2018    Diastolic dysfunction 12/16/2016    Dilated cardiomyopathy 5/7/2018    ESRD (end stage renal disease) on dialysis     MWF    GERD (gastroesophageal reflux disease)     Hyperparathyroidism, secondary renal     Hypertension     Metabolic acidosis     MI (myocardial infarction) Feb 2012    Non-rheumatic mitral regurgitation 5/31/2018    Nonrheumatic aortic valve stenosis 5/31/2018    Pleural effusion on right     Pulmonary hypertension 12/16/2016    S/P TAVR (transcatheter aortic valve replacement)     9/20/18    Status post gastric bypass " for obesity     2013    Stenosis of aortic and mitral valves     Aortic stenosis    TIA (transient ischemic attack)     Type 2 diabetes mellitus with diabetic nephropathy     history/ before wt loss    Valvular regurgitation     mitral regurgitation       Past Surgical History:   Procedure Laterality Date    ABDOMINAL SURGERY      PD catheter    AORTIC VALVE REPLACEMENT N/A 9/20/2018    Procedure: Replacement-valve-aortic;  Surgeon: Refugio Cooney MD;  Location: [facility] CATH LAB;  Service: Cardiology;  Laterality: N/A;    ASPIRATION-GROIN N/A 9/9/2016    Performed by [facility] Diagnostic Provider at [facility] OR    BASAL CELL CARCINOMA EXCISION Left Jan 2011    left forehead    BAV N/A 7/12/2018    Performed by Loyd Ulloa MD at [facility] CATH LAB    CARDIAC DEFIBRILLATOR PLACEMENT  09/21/2018    CARDIAC ELECTROPHYSIOLOGY STUDY N/A 9/21/2018    Procedure: CARDIAC ELECTROPHYSIOLOGY STUDY;  Surgeon: Theron Mcmanus MD;  Location: [facility] CATH LAB;  Service: Cardiology;  Laterality: N/A;  s/p TAVR, CM, EPS +/- BiV ICD, SJM, anes, GP    CARDIAC ELECTROPHYSIOLOGY STUDY N/A 9/21/2018    Performed by Theron Mcmanus MD at [facility] CATH LAB    CHOLECYSTECTOMY  July 2010    ELBOW SURGERY Left 3/18/14    anterior submuscular transposition, ulnar nerve    EYE SURGERY Bilateral     bilateral cataracts    GASTRECTOMY      GASTRIC BYPASS  May 2014    gastric sleeve  June 2013    Malfunctioned requiring bypass    HEART CATH-RIGHT Right 1/20/2017    Performed by Ron Bernstein Jr., MD at [facility] CATH LAB    HERNIA REPAIR      INSERTION-PERITONEAL DIALYSIS CATHETER-LAPAROSCOPIC N/A 3/28/2016    Performed by Owen Ríos MD at [facility] OR 2ND FLR    LAPAROSCOPY-DIAGNOSTIC N/A 3/28/2016    Performed by Owen Ríos MD at [facility] OR 2ND FLR    REMOVAL-CATHETER-DIALYSIS-PERITONEAL N/A 9/20/2017    Performed by Owen Ríos MD at [facility] OR 2ND FLR    REPAIR-HERNIA-LAPAROSCOPIC  3/28/2016    Performed by  "Owen R[facility]os MD at [facility] OR 2ND FLR    REPAIR-HERNIA-LAPAROSCOPIC-INGUINAL Bilateral 3/28/2016    Performed by Owen Ríos MD at [facility] OR 2ND FLR    Replacement-valve-aortic N/A 9/20/2018    Performed by Refugio Cooney MD at [facility] CATH LAB    REVISION PERITONEAL DIALYSIS CATHETER-LAPAROSCOPIC N/A 5/18/2016    Performed by Owen R[facility]os MD at [facility] OR 2ND FLR    ROTATOR CUFF REPAIR Left 2014    SHOULDER ARTHROSCOPY Left 11/18/14    RCR; glenoid labral repair; arch decompression       Review of patient's allergies indicates:   Allergen Reactions    Asa [aspirin] Other (See Comments)     Retinal bleeding, severe    Latex, natural rubber Rash and Blisters     Latex tape causes blisters    Adhesive Dermatitis and Blisters     Please use Paper Tape    Morphine Hives, Itching, Dermatitis and Rash     Body Rash, Phlebitis, "My veins showed through the skin."    Ace inhibitors Other (See Comments)     Other reaction(s): Cough       Family History     Problem Relation (Age of Onset)    Diabetes Mother, Father    Lung cancer Mother        Tobacco Use    Smoking status: Never Smoker    Smokeless tobacco: Never Used   Substance and Sexual Activity    Alcohol use: No    Drug use: No    Sexual activity: Yes     Partners: Female         Review of Systems   Constitutional: Positive for activity change and fatigue. Negative for chills and fever.   HENT: Negative for sore throat and trouble swallowing.    Respiratory: Positive for cough, chest tightness and shortness of breath. Negative for wheezing.    Cardiovascular: Positive for chest pain and leg swelling. Negative for palpitations.   Gastrointestinal: Positive for abdominal distention and diarrhea (chronic). Negative for abdominal pain, nausea and vomiting.   Endocrine: Negative for polydipsia and polyuria.   Genitourinary: Positive for decreased urine volume and difficulty urinating. Negative for frequency and urgency. "   Musculoskeletal: Negative for arthralgias and myalgias.   Neurological: Positive for weakness. Negative for syncope and light-headedness.   Psychiatric/Behavioral: Positive for confusion, decreased concentration and sleep disturbance.     Objective:     Vital Signs (Most Recent):  Temp: 97.6 °F (36.4 °C) (10/22/18 0915)  Pulse: 85 (10/22/18 1300)  Resp: 18 (10/22/18 1300)  BP: (!) 186/82 (10/22/18 1300)  SpO2: 95 % (10/22/18 0759) Vital Signs (24h Range):  Temp:  [97.6 °F (36.4 °C)-98.7 °F (37.1 °C)] 97.6 °F (36.4 °C)  Pulse:  [60-85] 85  Resp:  [18-20] 18  SpO2:  [93 %-100 %] 95 %  BP: (130-195)/(61-88) 186/82     Weight: 84.2 kg (185 lb 10 oz)  Body mass index is 23.83 kg/m².      Intake/Output Summary (Last 24 hours) at 10/22/2018 1318  Last data filed at 10/22/2018 0600  Gross per 24 hour   Intake 500 ml   Output 0 ml   Net 500 ml       Physical Exam   Constitutional: He appears well-developed and well-nourished. No distress.   HENT:   Head: Normocephalic and atraumatic.   Mouth/Throat: No oropharyngeal exudate.   Eyes: EOM are normal. Scleral icterus is present.   Neck: Normal range of motion. Neck supple. JVD present.   Cardiovascular: Normal rate, regular rhythm and intact distal pulses.   Murmur heard.  Pulmonary/Chest: Effort normal. No respiratory distress. He has no wheezes. He has rales (LLL). He exhibits no tenderness.   On 2L NC  Decreased breath sounds in all right lung fields, worst in RLL  AICD in place on R chest wall   Abdominal: Soft. He exhibits no mass. There is no tenderness. There is no guarding.   Musculoskeletal: He exhibits edema (1+ pitting). He exhibits no tenderness.   Lymphadenopathy:     He has no cervical adenopathy.   Neurological: He is alert. He has normal strength. He displays no atrophy and no tremor. No sensory deficit. He displays no seizure activity. GCS eye subscore is 4. GCS verbal subscore is 5. GCS motor subscore is 6.   Skin: Skin is warm and dry. He is not  "diaphoretic.   Psychiatric: He has a normal mood and affect. His behavior is normal. Thought content normal. His speech is delayed and tangential. He exhibits abnormal recent memory and abnormal remote memory. He is attentive.   Nursing note and vitals reviewed.         Lines/Drains/Airways     Drain                 Hemodialysis AV Fistula Left upper arm -- days          Peripheral Intravenous Line                 Peripheral IV - Single Lumen 10/21/18 Right Forearm 1 day                Significant Labs:    CBC/Anemia Profile:  Recent Labs   Lab 10/21/18  1923   WBC 6.79   HGB 9.5*   HCT 31.8*      *   RDW 15.2*        Chemistries:  Recent Labs   Lab 10/21/18  1923 10/21/18  2329 10/22/18  0639    135* 134*   K 6.0* 5.6* 5.8*    101 98   CO2 25 23 27   BUN 41* 43* 46*   CREATININE 4.0* 4.1* 4.4*   CALCIUM 8.6* 8.6* 8.6*   ALBUMIN 2.7*  --  2.7*   PROT 6.0  --   --    BILITOT 0.9  --   --    ALKPHOS 143*  --   --    ALT 17  --   --    AST 38  --   --    PHOS  --   --  5.2*       Significant Imaging:   CXR: I have reviewed all pertinent results/findings within the past 24 hours and my personal findings are:  as stated in HPI    Assessment/Plan:     Pleural effusion on right    - Patient with history of recurrent right sided pleural effusions s/p drainage most recently 10/15 of 2L, most likely secondary to hypoalbuminemia in setting of protein calorie malnutrition and ESRD now with relatively recently developed CHF, with peripheral edema on exam.    - He is currently taking plavix at home following his TAVR performed last month but is not on aspirin due to an adverse reaction noted as "retinal bleeding". Most recently took plavix on night of 10/20 per patient's wife.  - He appears comfortable on exam on room air and is now endorsing no worsening shortness of breath from baseline.  - We will readdress thoracentesis tomorrow following HD today and with additional day of plavix held.           "   Thank you for your consult. I will follow-up with patient. Please contact us if you have any additional questions.     French Lopez MD PGY-1  Pulmonology  [facility]

## 2018-10-22 NOTE — HPI
"Mr. Tom Esparza is a 68 year old male with history of recurrent R sided pleural effusions, ESRD on HD (MWF), severe aortic stenosis (s/p TAVR on 9/20/18), combined CHF (EF 30-35%, AICD placed 9/21/18), prev. NSTEMI in 2012, and dumping syndrome (2/2 gastric bypass surgery with revision in 2013 and 2014) who presented to the ED on 8/21 with chief complaint of acute, "shock-like" pain in his R chest wall located at the site of his pacemaker. He reports 2 separate episodes lasting 10-20 seconds each and a few minutes apart. He has not experienced this sensation previously and reports that following the episodes his wife checked his blood pressure and he was hypertensive to the 180s systolic. Mr Esparza offers contradictory history regarding his symptoms of dyspnea, originally reporting complaint of worsening shortness of breath over the past several days accompanied by chest tightness and then starting that he currently feels that his peripheral edema and shortness of breath have been gradually improving since his TAVR last month and denies worsening dyspnea on exertion or at baseline over the week preceding his ED presentation..     Regarding his history of recurrent pleural effusions, he reports history of multiple thoracentesis, approximately 12 over the past year and at one period underwent 9 within 9 weeks at one point earlier in the year. Per his wife his last thora was on 10/15 and approx 2L were removed, before that most recently in July of this year. His pleural effusions have been attributed to a combination of chronic protein calorie malnutrition, ESRD with frequent volume overload, and hypoalbuminemia. He is currently taking plavix daily following his TAVR surgery last month and states that his last dose was the night of 10/20.    Upon presentation to the ED, patient's BP was elevated to 190s systolic and he received nitro. EKG showed bi-v pacing.  Interrogation by cardiology in the ED showed "elevated " "A-lead capture threshold from prior at 1.875V, Capture thresholds for the v leads remain the same". Initial impression noted to have bilateral LE pitting edema, stable but significant R pleural effusion, trop is 0.66, BNP >4900, K 6.0. Patient reports that he did miss one HD session last week due to a fistulogram but made it up the following day and his last session before presentation was 10/19.      "

## 2018-10-22 NOTE — NURSING
Results for ALFREDO ROPER (MRN [id number]) as of 10/22/2018 03:59   Ref. Range 10/21/2018 19:23 10/21/2018 23:29   Troponin I Latest Ref Range: 0.000 - 0.026 ng/mL 0.661 (H) 0.713 (H)     Notified Erlinda HERNANDES of patient troponin trending upward. No new orders received. Patient next troponin draw due at 0500 thi morning. Will cont to monitor patient.

## 2018-10-22 NOTE — PT/OT/SLP PROGRESS
Physical Therapy      Patient Name:  Tom Gage   MRN:  [id number]    Patient not seen today . Pt out of room for HD during Am and PM attempt. Will follow-up when appropriate    Kaylie Bach PT, DPT  10/22/2018  [phone]

## 2018-10-22 NOTE — ED TRIAGE NOTES
68 year old male presents with history of ESRD on dialysis and recent pacemaker/AICD placement presents to the ED via EMS c/o chest pain that began just PTA. States that he was laying on the sofa and began having chest pain. Defibrillator discharged twice per patient.

## 2018-10-22 NOTE — PROGRESS NOTES
HD treatment started. No complications with access to left upper arm. Lines secured and telemetry in place. Complains of SOB. O2 @ 2L applied. Pulse ox 94 %

## 2018-10-22 NOTE — [facility] SECONDARY REVIEW
Physician Advisor External    Level of Care Issue    Approved Inpatient   Case review : pt is approved for inpatient /EHR.

## 2018-10-22 NOTE — SUBJECTIVE & OBJECTIVE
Past Medical History:   Diagnosis Date    AICD (automatic cardioverter/defibrillator) present     9/21/2018    Anemia of chronic renal failure, stage 5     BPH (benign prostatic hyperplasia)     CAD (coronary artery disease)     NSTEMI in 2012    CHF (congestive heart failure)     EF 30%    Chronic systolic heart failure 5/31/2018    Diastolic dysfunction 12/16/2016    Dilated cardiomyopathy 5/7/2018    ESRD (end stage renal disease) on dialysis     MWF    GERD (gastroesophageal reflux disease)     Hyperparathyroidism, secondary renal     Hypertension     Metabolic acidosis     MI (myocardial infarction) Feb 2012    Non-rheumatic mitral regurgitation 5/31/2018    Nonrheumatic aortic valve stenosis 5/31/2018    Pleural effusion on right     Pulmonary hypertension 12/16/2016    S/P TAVR (transcatheter aortic valve replacement)     9/20/18    Status post gastric bypass for obesity     2013    Stenosis of aortic and mitral valves     Aortic stenosis    TIA (transient ischemic attack)     Type 2 diabetes mellitus with diabetic nephropathy     history/ before wt loss    Valvular regurgitation     mitral regurgitation       Past Surgical History:   Procedure Laterality Date    ABDOMINAL SURGERY      PD catheter    AORTIC VALVE REPLACEMENT N/A 9/20/2018    Procedure: Replacement-valve-aortic;  Surgeon: Refugio Cooney MD;  Location: [facility] CATH LAB;  Service: Cardiology;  Laterality: N/A;    ASPIRATION-GROIN N/A 9/9/2016    Performed by [facility]    BASAL CELL CARCINOMA EXCISION Left Jan 2011    left forehead    BAV N/A 7/12/2018    Performed by Loyd Ulloa MD at [facility] CATH LAB    CARDIAC DEFIBRILLATOR PLACEMENT  09/21/2018    CARDIAC ELECTROPHYSIOLOGY STUDY N/A 9/21/2018    Procedure: CARDIAC ELECTROPHYSIOLOGY STUDY;  Surgeon: Theron Mcmanus MD;  Location: [facility] CATH LAB;  Service: Cardiology;  Laterality: N/A;  s/p TAVR, CM, EPS +/- BiV ICD, SJM, anes, GP     "CARDIAC ELECTROPHYSIOLOGY STUDY N/A 9/21/2018    Per[facility] at [facility] CATH LAB    CHOLECYSTECTOMY  July 2010    ELBOW SURGERY Left 3/18/14    anterior submuscular transposition, ulnar nerve    EYE SURGERY Bilateral     bilateral cataracts    GASTRECTOMY      GASTRIC BYPASS  May 2014    gastric sleeve  June 2013    Malfunctioned requiring bypass    HEART CATH-RIGHT Right 1/20/2017    Performed by Ron Bernstein Jr., MD at [facility] CATH LAB    HERNIA REPAIR      INSERTION-PERITONEAL DIALYSIS CATHETER-LAPAROSCOPIC N/A 3/28/2016    Performed by Owen Ríos MD at [facility] OR 2ND FLR    LAPAROSCOPY-DIAGNOSTIC N/A 3/28/2016    Performed by Owen Ríos MD at [facility] OR 2ND FLR    REMOVAL-CATHETER-DIALYSIS-PERITONEAL N/A 9/20/2017    Performed by Owen Ríos MD at [facility] OR 2ND FLR    REPAIR-HERNIA-LAPAROSCOPIC  3/28/2016    Performed by Owen Ríos MD at [facility] OR 2ND FLR    REPAIR-HERNIA-LAPAROSCOPIC-INGUINAL Bilateral 3/28/2016    Performed by Owen Ríos MD at [facility] OR 2ND FLR    Replacement-valve-aortic N/A 9/20/2018    Performed by Refugio Cooney MD at [facility] CATH LAB    REVISION PERITONEAL DIALYSIS CATHETER-LAPAROSCOPIC N/A 5/18/2016    Performed by Owen Ríos MD at [facility] OR 2ND FLR    ROTATOR CUFF REPAIR Left 2014    SHOULDER ARTHROSCOPY Left 11/18/14    RCR; glenoid labral repair; arch decompression       Review of patient's allergies indicates:   Allergen Reactions    Asa [aspirin] Other (See Comments)     Retinal bleeding, severe    Latex, natural rubber Rash and Blisters     Latex tape causes blisters    Adhesive Dermatitis and Blisters     Please use Paper Tape    Morphine Hives, Itching, Dermatitis and Rash     Body Rash, Phlebitis, "My veins showed through the skin."    Ace inhibitors Other (See Comments)     Other reaction(s): Cough       No current facility-administered medications on file prior to encounter.  "     Current Outpatient Medications on File Prior to Encounter   Medication Sig    calcitRIOL (ROCALTROL) 0.5 MCG Cap Take 0.5 mcg by mouth once daily.    carvedilol (COREG) 25 MG tablet Take 1 tablet (25 mg total) by mouth 2 (two) times daily with meals.    clopidogrel (PLAVIX) 75 mg tablet Take 1 tablet (75 mg total) by mouth once daily.    furosemide (LASIX) 80 MG tablet 80 mg every 12 (twelve) hours.     losartan (COZAAR) 100 MG tablet Take 0.5 tablets (50 mg total) by mouth 2 (two) times daily. (Patient taking differently: Take 50 mg by mouth once. )    pantoprazole (PROTONIX) 40 MG tablet Take 40 mg by mouth 2 (two) times daily as needed.     sevelamer carbonate (RENVELA) 800 mg Tab Take 1,600 mg by mouth 3 (three) times daily. Takes two 800 mg tablets (1600 mg) with meals    vitamin D 1000 units Tab Take 1,000 Units by mouth every 7 days.     cephALEXin (KEFLEX) 500 MG capsule Take 1 pill (500mg) by mouth once daily. Take pill after dialysis on dialysis days.    nitroGLYCERIN (NITROSTAT) 0.4 MG SL tablet Place 0.4 mg under the tongue every 5 (five) minutes as needed for Chest pain.     Family History     Problem Relation (Age of Onset)    Diabetes Mother, Father    Lung cancer Mother        Tobacco Use    Smoking status: Never Smoker    Smokeless tobacco: Never Used   Substance and Sexual Activity    Alcohol use: No    Drug use: No    Sexual activity: Yes     Partners: Female     Review of Systems   Constitution: Negative for chills, fever, weakness and weight gain.   HENT: Negative for congestion.    Eyes: Negative for visual disturbance.   Cardiovascular: Positive for chest pain. Negative for claudication, dyspnea on exertion, leg swelling, orthopnea, palpitations and syncope.   Respiratory: Positive for shortness of breath. Negative for cough and snoring.    Hematologic/Lymphatic: Does not bruise/bleed easily.   Skin: Negative for rash.   Musculoskeletal: Negative for muscle cramps and  myalgias.   Gastrointestinal: Negative for bloating, abdominal pain, constipation, diarrhea and melena.   Genitourinary: Negative for bladder incontinence.   Neurological: Negative for excessive daytime sleepiness and focal weakness.   Psychiatric/Behavioral: Negative for depression and suicidal ideas.     Objective:     Vital Signs (Most Recent):  Temp: 97.6 °F (36.4 °C) (10/22/18 0915)  Pulse: 70 (10/22/18 1129)  Resp: 18 (10/22/18 1030)  BP: (!) 184/80 (10/22/18 1030)  SpO2: 95 % (10/22/18 0759) Vital Signs (24h Range):  Temp:  [97.6 °F (36.4 °C)-98.7 °F (37.1 °C)] 97.6 °F (36.4 °C)  Pulse:  [60-70] 70  Resp:  [18-20] 18  SpO2:  [93 %-100 %] 95 %  BP: (130-195)/(61-88) 184/80       Weight: 84.2 kg (185 lb 10 oz)  Body mass index is 23.83 kg/m².    SpO2: 95 %  O2 Device (Oxygen Therapy): room air    Physical Exam   Constitutional: He is oriented to person, place, and time. He appears well-developed and well-nourished. No distress.   HENT:   Head: Normocephalic and atraumatic.   Mouth/Throat: Oropharynx is clear and moist.   Eyes: Conjunctivae and EOM are normal. Pupils are equal, round, and reactive to light. No scleral icterus.   Neck: Normal range of motion. Neck supple. JVD present.   Cardiovascular: Normal rate, regular rhythm and intact distal pulses. Exam reveals no gallop and no friction rub.   Murmur (apex and LLSB 2/6 pansystolic) heard.  Pulses:       Radial pulses are 2+ on the right side, and 2+ on the left side.   Pulmonary/Chest: Effort normal. No respiratory distress. He has no wheezes. He has rales. He exhibits no tenderness.   Symmetrical expansion   Abdominal: Soft. Bowel sounds are normal. He exhibits no distension. There is no hepatosplenomegaly. There is no tenderness.   Musculoskeletal: Normal range of motion. He exhibits edema. He exhibits no tenderness.   Neurological: He is alert and oriented to person, place, and time. No cranial nerve deficit.   Skin: Skin is warm and dry. No rash noted.  He is not diaphoretic. No erythema. No pallor.   Psychiatric: He has a normal mood and affect. Judgment and thought content normal.       Significant Labs:   EP:   Recent Labs   Lab 10/21/18  1923 10/21/18  2329 10/22/18  0639    135* 134*   K 6.0* 5.6* 5.8*    101 98   CO2 25 23 27   * 80 117*   BUN 41* 43* 46*   CREATININE 4.0* 4.1* 4.4*   CALCIUM 8.6* 8.6* 8.6*   PROT 6.0  --   --    ALBUMIN 2.7*  --  2.7*   BILITOT 0.9  --   --    ALKPHOS 143*  --   --    AST 38  --   --    ALT 17  --   --    ANIONGAP 11 11 9   ESTGFRAFRICA 16.9* 16.4* 15.1*   EGFRNONAA 14.6* 14.2* 13.0*   WBC 6.79  --   --    HGB 9.5*  --   --    HCT 31.8*  --   --      --   --        Significant Imaging:   EKG: HR 65 atrial and ventricular pacing is evident.

## 2018-10-22 NOTE — SUBJECTIVE & OBJECTIVE
Past Medical History:   Diagnosis Date    AICD (automatic cardioverter/defibrillator) present     9/21/2018    Anemia of chronic renal failure, stage 5     BPH (benign prostatic hyperplasia)     CAD (coronary artery disease)     NSTEMI in 2012    CHF (congestive heart failure)     EF 30%    Chronic systolic heart failure 5/31/2018    Diastolic dysfunction 12/16/2016    Dilated cardiomyopathy 5/7/2018    ESRD (end stage renal disease) on dialysis     MWF    GERD (gastroesophageal reflux disease)     Hyperparathyroidism, secondary renal     Hypertension     Metabolic acidosis     MI (myocardial infarction) Feb 2012    Non-rheumatic mitral regurgitation 5/31/2018    Nonrheumatic aortic valve stenosis 5/31/2018    Pleural effusion on right     Pulmonary hypertension 12/16/2016    S/P TAVR (transcatheter aortic valve replacement)     9/20/18    Status post gastric bypass for obesity     2013    Stenosis of aortic and mitral valves     Aortic stenosis    TIA (transient ischemic attack)     Type 2 diabetes mellitus with diabetic nephropathy     history/ before wt loss    Valvular regurgitation     mitral regurgitation       Past Surgical History:   Procedure Laterality Date    ABDOMINAL SURGERY      PD catheter    AORTIC VALVE REPLACEMENT N/A 9/20/2018    Procedure: Replacement-valve-aortic;  Surgeon: Refugio Cooney MD;  Location: [facility] CATH LAB;  Service: Cardiology;  Laterality: N/A;    ASPIRATION-GROIN N/A 9/9/2016    Performed by [facility]    BASAL CELL CARCINOMA EXCISION Left Jan 2011    left forehead    BAV N/A 7/12/2018    Performed by Loyd Ulloa MD at [facility] CATH LAB    CARDIAC DEFIBRILLATOR PLACEMENT  09/21/2018    CARDIAC ELECTROPHYSIOLOGY STUDY N/A 9/21/2018    Procedure: CARDIAC ELECTROPHYSIOLOGY STUDY;  Surgeon: Theron Mcmanus MD;  Location: [facility] CATH LAB;  Service: Cardiology;  Laterality: N/A;  s/p TAVR, CM, EPS +/- BiV ICD, SJM, anes, GP     "CARDIAC ELECTROPHYSIOLOGY STUDY N/A 9/21/2018    Performed by Theron [facility]manus MD at [facility] CATH LAB    CHOLECYSTECTOMY  July 2010    ELBOW SURGERY Left 3/18/14    anterior submuscular transposition, ulnar nerve    EYE SURGERY Bilateral     bilateral cataracts    GASTRECTOMY      GASTRIC BYPASS  May 2014    gastric sleeve  June 2013    Malfunctioned requiring bypass    HEART CATH-RIGHT Right 1/20/2017    Performed by Ron Bernstein Jr., MD at [facility] CATH LAB    HERNIA REPAIR      INSERTION-PERITONEAL DIALYSIS CATHETER-LAPAROSCOPIC N/A 3/28/2016    Performed by Owen Ríos MD at [facility] OR 2ND FLR    LAPAROSCOPY-DIAGNOSTIC N/A 3/28/2016    Performed by Owen Ríos MD at [facility] OR 2ND FLR    REMOVAL-CATHETER-DIALYSIS-PERITONEAL N/A 9/20/2017    Performed by Owen Ríos MD at [facility] OR 2ND FLR    REPAIR-HERNIA-LAPAROSCOPIC  3/28/2016    Performed by Owen Ríos MD at [facility] OR 2ND FLR    REPAIR-HERNIA-LAPAROSCOPIC-INGUINAL Bilateral 3/28/2016    Performed by Owen Ríos MD at [facility] OR 2ND FLR    Replacement-valve-aortic N/A 9/20/2018    Performed by Refugio Cooney MD at [facility] CATH LAB    REVISION PERITONEAL DIALYSIS CATHETER-LAPAROSCOPIC N/A 5/18/2016    Performed by Owen Ríos MD at [facility] OR 2ND FLR    ROTATOR CUFF REPAIR Left 2014    SHOULDER ARTHROSCOPY Left 11/18/14    RCR; glenoid labral repair; arch decompression       Review of patient's allergies indicates:   Allergen Reactions    Asa [aspirin] Other (See Comments)     Retinal bleeding, severe    Latex, natural rubber Rash and Blisters     Latex tape causes blisters    Adhesive Dermatitis and Blisters     Please use Paper Tape    Morphine Hives, Itching, Dermatitis and Rash     Body Rash, Phlebitis, "My veins showed through the skin."    Ace inhibitors Other (See Comments)     Other reaction(s): Cough       Family History     Problem Relation (Age of Onset)    Diabetes Mother, " Father    Lung cancer Mother        Tobacco Use    Smoking status: Never Smoker    Smokeless tobacco: Never Used   Substance and Sexual Activity    Alcohol use: No    Drug use: No    Sexual activity: Yes     Partners: Female         Review of Systems   Constitutional: Positive for activity change and fatigue. Negative for chills and fever.   HENT: Negative for sore throat and trouble swallowing.    Respiratory: Positive for cough, chest tightness and shortness of breath. Negative for wheezing.    Cardiovascular: Positive for chest pain and leg swelling. Negative for palpitations.   Gastrointestinal: Positive for abdominal distention and diarrhea (chronic). Negative for abdominal pain, nausea and vomiting.   Endocrine: Negative for polydipsia and polyuria.   Genitourinary: Positive for decreased urine volume and difficulty urinating. Negative for frequency and urgency.   Musculoskeletal: Negative for arthralgias and myalgias.   Neurological: Positive for weakness. Negative for syncope and light-headedness.   Psychiatric/Behavioral: Positive for confusion, decreased concentration and sleep disturbance.     Objective:     Vital Signs (Most Recent):  Temp: 97.6 °F (36.4 °C) (10/22/18 0915)  Pulse: 85 (10/22/18 1300)  Resp: 18 (10/22/18 1300)  BP: (!) 186/82 (10/22/18 1300)  SpO2: 95 % (10/22/18 0759) Vital Signs (24h Range):  Temp:  [97.6 °F (36.4 °C)-98.7 °F (37.1 °C)] 97.6 °F (36.4 °C)  Pulse:  [60-85] 85  Resp:  [18-20] 18  SpO2:  [93 %-100 %] 95 %  BP: (130-195)/(61-88) 186/82     Weight: 84.2 kg (185 lb 10 oz)  Body mass index is 23.83 kg/m².      Intake/Output Summary (Last 24 hours) at 10/22/2018 1318  Last data filed at 10/22/2018 0600  Gross per 24 hour   Intake 500 ml   Output 0 ml   Net 500 ml       Physical Exam   Constitutional: He appears well-developed and well-nourished. No distress.   HENT:   Head: Normocephalic and atraumatic.   Mouth/Throat: No oropharyngeal exudate.   Eyes: EOM are normal.  Scleral icterus is present.   Neck: Normal range of motion. Neck supple. JVD present.   Cardiovascular: Normal rate, regular rhythm and intact distal pulses.   Murmur heard.  Pulmonary/Chest: Effort normal. No respiratory distress. He has no wheezes. He has rales (LLL). He exhibits no tenderness.   On 2L NC  Decreased breath sounds in all right lung fields, worst in RLL  AICD in place on R chest wall   Abdominal: Soft. He exhibits no mass. There is no tenderness. There is no guarding.   Musculoskeletal: He exhibits edema (1+ pitting). He exhibits no tenderness.   Lymphadenopathy:     He has no cervical adenopathy.   Neurological: He is alert. He has normal strength. He displays no atrophy and no tremor. No sensory deficit. He displays no seizure activity. GCS eye subscore is 4. GCS verbal subscore is 5. GCS motor subscore is 6.   Skin: Skin is warm and dry. He is not diaphoretic.   Psychiatric: He has a normal mood and affect. His behavior is normal. Thought content normal. His speech is delayed and tangential. He exhibits abnormal recent memory and abnormal remote memory. He is attentive.   Nursing note and vitals reviewed.         Lines/Drains/Airways     Drain                 Hemodialysis AV Fistula Left upper arm -- days          Peripheral Intravenous Line                 Peripheral IV - Single Lumen 10/21/18 Right Forearm 1 day                Significant Labs:    CBC/Anemia Profile:  Recent Labs   Lab 10/21/18  1923   WBC 6.79   HGB 9.5*   HCT 31.8*      *   RDW 15.2*        Chemistries:  Recent Labs   Lab 10/21/18  1923 10/21/18  2329 10/22/18  0639    135* 134*   K 6.0* 5.6* 5.8*    101 98   CO2 25 23 27   BUN 41* 43* 46*   CREATININE 4.0* 4.1* 4.4*   CALCIUM 8.6* 8.6* 8.6*   ALBUMIN 2.7*  --  2.7*   PROT 6.0  --   --    BILITOT 0.9  --   --    ALKPHOS 143*  --   --    ALT 17  --   --    AST 38  --   --    PHOS  --   --  5.2*       Significant Imaging:   CXR: I have reviewed all  pertinent results/findings within the past 24 hours and my personal findings are:  as stated in HPI

## 2018-10-22 NOTE — MEDICAL/APP STUDENT
[facility]  Pulmonology  Consult Note    Patient Name: Tom Gage  MRN: [id number]  Admission Date: 10/21/2018  Hospital Length of Stay: 1 days  Code Status: Full Code  Attending Physician: Maninder Del Rio MD  Primary Care Provider: Asael Poole MD   Principal Problem: Acute on chronic combined systolic and diastolic congestive heart failure    Consults  Subjective:     HPI: Mr. Tom Gage is a 68 y.o. male with recurrent/ chronic R sided pleural effusion (attributed to ESRD/ hypoalbuminemia, with intermittent thoracenteses), ESRD on HD (MW), recent TAVR on 09/20/18, AICD 09/21/18, combined HF with 30%, CAD s/p NSTEMI in 2012, hx gastric bypass in 2013, who presented to [facility] on 10/21 with worsening SOB of breath over past several days. Patient also noted central chest heaviness/ chest tightness. Associated with SOB, pain did not radiate. He was also concerned about his AICD/ pacemaker firing as he had a different sensation in his chest. Was not shocked. No hypotension at home. Not on home O2. Had noted to me that he has had intermittent falls due to dysequilibrium (as in falling over while leaning over), BPs have not been low at home     Patient is known to this service. He was last seen in clinic by Dr. Sarmiento in 10/2017 for his persistent pleural effusion.  At that visit, he was not in heart failure and Dr. Sarmiento postulated that his symptoms were due to his ESRD and hypoalbuminemia.  She was also concerned that ongoing thoracentesis would further lower his albumin levels.    He has since established care for his pleural effusion at [facility], and has had 12 thoracenteses over the past year, with a period of 9 in 9 weeks recently.  His most recent thoracentesis was on 10/15/18 removing 2 L.      He is also in ESRD on HD. He states he had a fistulogram last week and therefore missed a session, but that he had extra session of HD on Tuesday  10/16. His usual schedule is MW.   "Continues to make urine and is compliant with lasix 80 PO BID, however, UOP has been diminishing.      Patient with hx of severe aortic stenosis and is now s/p EvolutR TAVR 9/20/18 with post-op course complicated by LBBB qrs 168 in the setting of EF of 30% leading to ST Juade CRT-D placement. Patient underwent LHC  during his BAV which showed all of his vessels had luminal irregularities, ATA flows of 3. 2D echo on 9/21/2018 showed EF of 35%, grade 2 diastolic dysfunction, Biatrial enlargement, S/P transcatheter AVR, Right pleural effusion.      In the ED, patient received nitro. Elevated BP at 190s/80s on presentation. EKG showed bi-v pacing.  Interrogation by cardiology in the ED showed "elevated A-lead capture threshold from prior at 1.875V, Capture thresholds for the v leads remain the same".  noted to have bilateral LE pitting edema, stable but significant R pleural effusion, trop is 0.66, BNP >4900. No fevers, chills, sick contacts recently.      Review of Systems   Constitutional: Negative for chills, fatigue, fever.   HENT: Negative for sore throat, trouble swallowing.    Eyes: Negative for photophobia, visual disturbance.   Respiratory: per HPI  Cardiovascular: per HPI  Gastrointestinal: Negative for abdominal pain, constipation, diarrhea, nausea, vomiting.   Endocrine: Negative for cold intolerance, heat intolerance.   Genitourinary: Negative for dysuria, frequency. continues to make urine  Musculoskeletal: Negative for arthralgias, myalgias.   Skin: Negative for rash, wound, erythema   Neurological: Negative for dizziness, syncope, weakness, light-headedness.   Psychiatric/Behavioral: Negative for confusion, hallucinations, anxiety  All other systems reviewed and are negative.       Past Medical History:   Diagnosis Date    AICD (automatic cardioverter/defibrillator) present     9/21/2018    Anemia of chronic renal failure, stage 5     BPH (benign prostatic hyperplasia)     CAD (coronary artery " disease)     NSTEMI in 2012    CHF (congestive heart failure)     EF 30%    Chronic systolic heart failure 5/31/2018    Diastolic dysfunction 12/16/2016    Dilated cardiomyopathy 5/7/2018    ESRD (end stage renal disease) on dialysis     MWF    GERD (gastroesophageal reflux disease)     Hyperparathyroidism, secondary renal     Hypertension     Metabolic acidosis     MI (myocardial infarction) Feb 2012    Non-rheumatic mitral regurgitation 5/31/2018    Nonrheumatic aortic valve stenosis 5/31/2018    Pleural effusion on right     Pulmonary hypertension 12/16/2016    S/P TAVR (transcatheter aortic valve replacement)     9/20/18    Status post gastric bypass for obesity     2013    Stenosis of aortic and mitral valves     Aortic stenosis    TIA (transient ischemic attack)     Type 2 diabetes mellitus with diabetic nephropathy     history/ before wt loss    Valvular regurgitation     mitral regurgitation       Past Surgical History:   Procedure Laterality Date    ABDOMINAL SURGERY      PD catheter    AORTIC VALVE REPLACEMENT N/A 9/20/2018    Procedure: Replacement-valve-aortic;  Surgeon: Refugio Cooney MD;  Location: [facility] CATH LAB;  Service: Cardiology;  Laterality: N/A;    ASPIRATION-GROIN N/A 9/9/2016    Performed by [facility] OR    BASAL CELL CARCINOMA EXCISION Left Jan 2011    left forehead    BAV N/A 7/12/2018    Performed by Loyd Ulloa MD at [facility] CATH LAB    CARDIAC DEFIBRILLATOR PLACEMENT  09/21/2018    CARDIAC ELECTROPHYSIOLOGY STUDY N/A 9/21/2018    Procedure: CARDIAC ELECTROPHYSIOLOGY STUDY;  Surgeon: Theron Mcmanus MD;  Location: [facility] CATH LAB;  Service: Cardiology;  Laterality: N/A;  s/p TAVR, CM, EPS +/- BiV ICD, SJM, anes, GP    CARDIAC ELECTROPHYSIOLOGY STUDY N/A 9/21/2018    Performed by Theron Mcmanus MD at [facility] CATH LAB    CHOLECYSTECTOMY  July 2010    ELBOW SURGERY Left 3/18/14    anterior submuscular transposition, ulnar nerve    EYE  "SURGERY Bilateral     bilateral cataracts    GASTRECTOMY      GASTRIC BYPASS  May 2014    gastric sleeve  June 2013    Malfunctioned requiring bypass    HEART CATH-RIGHT Right 1/20/2017    Performed by Ron Bernstein Jr., [facility] at [facility] CATH LAB    HERNIA REPAIR      INSERTION-PERITONEAL DIALYSIS CATHETER-LAPAROSCOPIC N/A 3/28/2016    Performed by Owen Ríos MD at [facility] OR 2ND FLR    LAPAROSCOPY-DIAGNOSTIC N/A 3/28/2016    Performed by [facility] at [facility] OR 2ND FLR    REMOVAL-CATHETER-DIALYSIS-PERITONEAL N/A 9/20/2017    Performed by [facility] at [facility] OR 2ND FLR    REPAIR-HERNIA-LAPAROSCOPIC  3/28/2016    Performed by Owen R[facility]os [facility] at [facility] OR 2ND FLR    REPAIR-HERNIA-LAPAROSCOPIC-INGUINAL Bilateral 3/28/2016    Performed by Owen R[facility]os MD at [facility] OR 2ND FLR    Replacement-valve-aortic N/A 9/20/2018    Performed by Refugio Cooney MD at [facility] CATH LAB    REVISION PERITONEAL DIALYSIS CATHETER-LAPAROSCOPIC N/A 5/18/2016    Performed by Owen R[facility]os MD at [facility] OR 2ND FLR    ROTATOR CUFF REPAIR Left 2014    SHOULDER ARTHROSCOPY Left 11/18/14    RCR; glenoid labral repair; arch decompression       Review of patient's allergies indicates:   Allergen Reactions    Asa [aspirin] Other (See Comments)     Retinal bleeding, severe    Latex, natural rubber Rash and Blisters     Latex tape causes blisters    Adhesive Dermatitis and Blisters     Please use Paper Tape    Morphine Hives, Itching, Dermatitis and Rash     Body Rash, Phlebitis, "My veins showed through the skin."    Ace inhibitors Other (See Comments)     Other reaction(s): Cough       Family History     Problem Relation (Age of Onset)    Diabetes Mother, Father    Lung cancer Mother        Tobacco Use    Smoking status: Never Smoker    Smokeless tobacco: Never Used   Substance and Sexual Activity    Alcohol use: No    Drug use: No    Sexual activity: Yes     Partners: " Female        Objective:     Vital Signs (Most Recent):  Temp: 97.6 °F (36.4 °C) (10/22/18 0915)  Pulse: 70 (10/22/18 1129)  Resp: 18 (10/22/18 1030)  BP: (!) 184/80 (10/22/18 1030)  SpO2: 95 % (10/22/18 0759) Vital Signs (24h Range):  Temp:  [97.6 °F (36.4 °C)-98.7 °F (37.1 °C)] 97.6 °F (36.4 °C)  Pulse:  [60-70] 70  Resp:  [18-20] 18  SpO2:  [93 %-100 %] 95 %  BP: (130-195)/(61-88) 184/80     Weight: 84.2 kg (185 lb 10 oz)  Body mass index is 23.83 kg/m².      Intake/Output Summary (Last 24 hours) at 10/22/2018 1227  Last data filed at 10/22/2018 0600  Gross per 24 hour   Intake 500 ml   Output 0 ml   Net 500 ml       Physical Exam   Constitutional: He is oriented to person, place, and time. He appears well-developed and well-nourished. No distress.   HENT:   Head: Normocephalic and atraumatic.   Eyes: EOM are normal. Pupils are equal, round, and reactive to light.   Neck: Normal range of motion. Neck supple.   Cardiovascular: Intact distal pulses.   Pulmonary/Chest: He has wheezes (all lung fields).   Abdominal: Soft. Bowel sounds are normal. He exhibits no distension.   Musculoskeletal: He exhibits edema (bilat LEs).   Neurological: He is alert and oriented to person, place, and time.   Skin: Skin is warm and dry. Capillary refill takes less than 2 seconds. He is not diaphoretic.   Nursing note and vitals reviewed.      Vents:       Lines/Drains/Airways     Drain                 Hemodialysis AV Fistula Left upper arm -- days          Peripheral Intravenous Line                 Peripheral IV - Single Lumen 10/21/18 Right Forearm 1 day                Significant Labs:    CBC/Anemia Profile:  Recent Labs   Lab 10/21/18  1923   WBC 6.79   HGB 9.5*   HCT 31.8*      *   RDW 15.2*        Chemistries:  Recent Labs   Lab 10/21/18  1923 10/21/18  2329 10/22/18  0639    135* 134*   K 6.0* 5.6* 5.8*    101 98   CO2 25 23 27   BUN 41* 43* 46*   CREATININE 4.0* 4.1* 4.4*   CALCIUM 8.6* 8.6* 8.6*    ALBUMIN 2.7*  --  2.7*   PROT 6.0  --   --    BILITOT 0.9  --   --    ALKPHOS 143*  --   --    ALT 17  --   --    AST 38  --   --    PHOS  --   --  5.2*       BMP:   Recent Labs   Lab 10/22/18  0639   *   *   K 5.8*   CL 98   CO2 27   BUN 46*   CREATININE 4.4*   CALCIUM 8.6*       Significant Imaging:   CXR: I have reviewed all pertinent results/findings within the past 24 hours and my personal findings are:  Ongoing R pleural effusion, cardiac silhouette enlarged  Echo: I have reviewed all pertinent results/findings within the past 24 hours and my personal findings are:  Pending, last echo pre-TAVR showed EF 25-30%  EKG: I have reviewed all pertinent results/findings within the past 24 hours and my personal findings are: Per HPI    Assessment/Plan:     Active Diagnoses:    Diagnosis Date Noted POA    PRINCIPAL PROBLEM:  Acute on chronic combined systolic and diastolic congestive heart failure [I50.43] 10/21/2018 Yes    S/P TAVR (transcatheter aortic valve replacement) [Z95.2] 10/22/2018 Not Applicable    Acute respiratory failure with hypoxia [J96.01] 10/22/2018 Yes    Elevated troponin I level [R74.8] 10/22/2018 Yes    Hypertensive urgency [I16.0] 10/21/2018 Yes    Disorder of implantable defibrillator [T82.9XXA] 10/21/2018 Yes    Nodular calcific aortic valve stenosis [I35.0] 09/20/2018 Yes    Moderate protein malnutrition [E44.0] 07/10/2018 Yes    ESRD (end stage renal disease) [N18.6] 09/20/2017 Yes    S/P gastric bypass [Z98.84] 05/09/2017 Not Applicable    Pleural effusion on right [J90]  Yes    Hypertensive heart and kidney disease with heart failure [I13.0] 01/20/2017 Yes    Anemia of chronic renal failure, stage 5 [N18.5, D63.1]  Yes     Chronic    Hyperkalemia [E87.5] 09/20/2015 Yes    Coronary artery disease involving native coronary artery of native heart without angina pectoris [I25.10] 09/20/2015 Yes      Problems Resolved During this Admission:        Chronic R Pleural  Effusion:  -In setting of heart failure (BNP > 4900), post-TAVR, and post-AICD implantation (which has possible loose lead) within the past year since he was last seen in [facility].   -In setting of ESRD on HD and hypoalbuminemia which has not changed in the past year.  -Ongoing SOB O/N and this morning, satting well on 2L NC.  -Both his HF and ESRD are likely contributing to his ongoing effusion, however in light of how long this condition has been present in relation to his HF and ESRD, he should be considered for either thoracentesis or PleurX drainage system.  -Discuss his case with cardiology and nephrology, as he potentially also has cardiorenal syndrome.  C/w cardiology's and nephrology's plans for now.  -Consider goals of care discussion given his deteriorating heart and overall picture.      Thank you for your consult. I will follow-up with patient. Please contact us if you have any additional questions.     Osvaldo Lopez  Pulmonology  [facility]

## 2018-10-22 NOTE — CONSULTS
[facility]  Cardiac Electrophysiology  Consult Note    Admission Date: 10/21/2018  Code Status: Full Code   Attending Provider: Maninder Del Rio MD  Consulting Provider: Henrry Curry MD  Principal Problem:Acute on chronic combined systolic and diastolic congestive heart failure    Inpatient consult to Electrophysiology  Consult performed by: Henrry Curry MD  Consult ordered by: Eugenia Galvez MD        Subjective:     Chief Complaint:  Chest pain     HPI:   We are consulted for abnormal lead parameters in this 67yo male with history of EvolutR TAVR 9/20/18 with post-op course complicated by LBBB qrs 168 in the setting of EF of 30% requiring ST Sukhjinder CRT-D placement by Dr. Mcmanus on 09/21/18. Additional medical history includes  ESRD on HD 3 x/week, CAD s/p NSTEMI in 2012, recurrent pleural effusions/ascites of renal etiology and retinal bleeding related to ASA.     He presented with chest pain, however workup showed pulmonary edema, fluid overload and elevated BNP. He was admitted for heart failure and Right pleural effusion. Given recent device placement, an interrogation was performed with findings of higher thresholds for A-lead capture 0.75V --> 1.875V.  Thresholds for the v leads remain the same.    This morning patient feels the same, though pain is better, and is going to dialysis.    Past Medical History:   Diagnosis Date    AICD (automatic cardioverter/defibrillator) present     9/21/2018    Anemia of chronic renal failure, stage 5     BPH (benign prostatic hyperplasia)     CAD (coronary artery disease)     NSTEMI in 2012    CHF (congestive heart failure)     EF 30%    Chronic systolic heart failure 5/31/2018    Diastolic dysfunction 12/16/2016    Dilated cardiomyopathy 5/7/2018    ESRD (end stage renal disease) on dialysis     MWF    GERD (gastroesophageal reflux disease)     Hyperparathyroidism, secondary renal     Hypertension     Metabolic acidosis      MI (myocardial infarction) Feb 2012    Non-rheumatic mitral regurgitation 5/31/2018    Nonrheumatic aortic valve stenosis 5/31/2018    Pleural effusion on right     Pulmonary hypertension 12/16/2016    S/P TAVR (transcatheter aortic valve replacement)     9/20/18    Status post gastric bypass for obesity     2013    Stenosis of aortic and mitral valves     Aortic stenosis    TIA (transient ischemic attack)     Type 2 diabetes mellitus with diabetic nephropathy     history/ before wt loss    Valvular regurgitation     mitral regurgitation       Past Surgical History:   Procedure Laterality Date    ABDOMINAL SURGERY      PD catheter    AORTIC VALVE REPLACEMENT N/A 9/20/2018    Procedure: Replacement-valve-aortic;  Surgeon: Refugio Cooney MD;  Location: [facility] CATH LAB;  Service: Cardiology;  Laterality: N/A;    ASPIRATION-GROIN N/A 9/9/2016    Performed by [facility] OR    BASAL CELL CARCINOMA EXCISION Left Jan 2011    left forehead    BAV N/A 7/12/2018    Performed by Loyd Ulloa MD at [facility] CATH LAB    CARDIAC DEFIBRILLATOR PLACEMENT  09/21/2018    CARDIAC ELECTROPHYSIOLOGY STUDY N/A 9/21/2018    Procedure: CARDIAC ELECTROPHYSIOLOGY STUDY;  Surgeon: Theron Mcmanus MD;  Location: [facility] CATH LAB;  Service: Cardiology;  Laterality: N/A;  s/p TAVR, CM, EPS +/- BiV ICD, SJM, anes, GP    CARDIAC ELECTROPHYSIOLOGY STUDY N/A 9/21/2018    Performed by Theron Mcmanus MD at [facility] CATH LAB    CHOLECYSTECTOMY  July 2010    ELBOW SURGERY Left 3/18/14    anterior submuscular transposition, ulnar nerve    EYE SURGERY Bilateral     bilateral cataracts    GASTRECTOMY      GASTRIC BYPASS  May 2014    gastric sleeve  June 2013    Malfunctioned requiring bypass    HEART CATH-RIGHT Right 1/20/2017    Performed by Ron Bernstein Jr., MD at [facility] CATH LAB    HERNIA REPAIR      INSERTION-PERITONEAL DIALYSIS CATHETER-LAPAROSCOPIC N/A 3/28/2016    Performed by Owen DOSS  "[facility] [place] [facility] OR 2ND FLR    LAPAROSCOPY-DIAGNOSTIC N/A 3/28/2016    Performed by [facility] OR 2ND FLR    REMOVAL-CATHETER-DIALYSIS-PERITONEAL N/A 9/20/2017    Performed by Owne R[facility]os [facility] at [facility] OR 2ND FLR    REPAIR-HERNIA-LAPAROSCOPIC  3/28/2016    Performed [facility] OR 2ND FLR    REPAIR-HERNIA-LAPAROSCOPIC-INGUINAL Bilateral 3/28/2016    Performed by [facility] OR 2ND FLR    Replacement-valve-aortic N/A 9/20/2018    Performed by Ref[facility] Cooney [facility] at [facility] CATH LAB    REVISION PERITONEAL DIALYSIS CATHETER-LAPAROSCOPIC N/A 5/18/2016    Performed by [facility] at [facility] OR 2ND FLR    ROTATOR CUFF REPAIR Left 2014    SHOULDER ARTHROSCOPY Left 11/18/14    RCR; glenoid labral repair; arch decompression       Review of patient's allergies indicates:   Allergen Reactions    Asa [aspirin] Other (See Comments)     Retinal bleeding, severe    Latex, natural rubber Rash and Blisters     Latex tape causes blisters    Adhesive Dermatitis and Blisters     Please use Paper Tape    Morphine Hives, Itching, Dermatitis and Rash     Body Rash, Phlebitis, "My veins showed through the skin."    Ace inhibitors Other (See Comments)     Other reaction(s): Cough       No current facility-administered medications on file prior to encounter.      Current Outpatient Medications on File Prior to Encounter   Medication Sig    calcitRIOL (ROCALTROL) 0.5 MCG Cap Take 0.5 mcg by mouth once daily.    carvedilol (COREG) 25 MG tablet Take 1 tablet (25 mg total) by mouth 2 (two) times daily with meals.    clopidogrel (PLAVIX) 75 mg tablet Take 1 tablet (75 mg total) by mouth once daily.    furosemide (LASIX) 80 MG tablet 80 mg every 12 (twelve) hours.     losartan (COZAAR) 100 MG tablet Take 0.5 tablets (50 mg total) by mouth 2 (two) times daily. (Patient taking differently: Take 50 mg by mouth once. )    pantoprazole " (PROTONIX) 40 MG tablet Take 40 mg by mouth 2 (two) times daily as needed.     sevelamer carbonate (RENVELA) 800 mg Tab Take 1,600 mg by mouth 3 (three) times daily. Takes two 800 mg tablets (1600 mg) with meals    vitamin D 1000 units Tab Take 1,000 Units by mouth every 7 days.     cephALEXin (KEFLEX) 500 MG capsule Take 1 pill (500mg) by mouth once daily. Take pill after dialysis on dialysis days.    nitroGLYCERIN (NITROSTAT) 0.4 MG SL tablet Place 0.4 mg under the tongue every 5 (five) minutes as needed for Chest pain.     Family History     Problem Relation (Age of Onset)    Diabetes Mother, Father    Lung cancer Mother        Tobacco Use    Smoking status: Never Smoker    Smokeless tobacco: Never Used   Substance and Sexual Activity    Alcohol use: No    Drug use: No    Sexual activity: Yes     Partners: Female     Review of Systems   Constitution: Negative for chills, fever, weakness and weight gain.   HENT: Negative for congestion.    Eyes: Negative for visual disturbance.   Cardiovascular: Positive for chest pain. Negative for claudication, dyspnea on exertion, leg swelling, orthopnea, palpitations and syncope.   Respiratory: Positive for shortness of breath. Negative for cough and snoring.    Hematologic/Lymphatic: Does not bruise/bleed easily.   Skin: Negative for rash.   Musculoskeletal: Negative for muscle cramps and myalgias.   Gastrointestinal: Negative for bloating, abdominal pain, constipation, diarrhea and melena.   Genitourinary: Negative for bladder incontinence.   Neurological: Negative for excessive daytime sleepiness and focal weakness.   Psychiatric/Behavioral: Negative for depression and suicidal ideas.     Objective:     Vital Signs (Most Recent):  Temp: 97.6 °F (36.4 °C) (10/22/18 0915)  Pulse: 70 (10/22/18 1129)  Resp: 18 (10/22/18 1030)  BP: (!) 184/80 (10/22/18 1030)  SpO2: 95 % (10/22/18 0759) Vital Signs (24h Range):  Temp:  [97.6 °F (36.4 °C)-98.7 °F (37.1 °C)] 97.6 °F (36.4  °C)  Pulse:  [60-70] 70  Resp:  [18-20] 18  SpO2:  [93 %-100 %] 95 %  BP: (130-195)/(61-88) 184/80       Weight: 84.2 kg (185 lb 10 oz)  Body mass index is 23.83 kg/m².    SpO2: 95 %  O2 Device (Oxygen Therapy): room air    Physical Exam   Constitutional: He is oriented to person, place, and time. He appears well-developed and well-nourished. No distress.   HENT:   Head: Normocephalic and atraumatic.   Mouth/Throat: Oropharynx is clear and moist.   Eyes: Conjunctivae and EOM are normal. Pupils are equal, round, and reactive to light. No scleral icterus.   Neck: Normal range of motion. Neck supple. JVD present.   Cardiovascular: Normal rate, regular rhythm and intact distal pulses. Exam reveals no gallop and no friction rub.   Murmur (apex and LLSB 2/6 pansystolic) heard.  Pulses:       Radial pulses are 2+ on the right side, and 2+ on the left side.   Pulmonary/Chest: Effort normal. No respiratory distress. He has no wheezes. He has rales. He exhibits no tenderness.   Symmetrical expansion   Abdominal: Soft. Bowel sounds are normal. He exhibits no distension. There is no hepatosplenomegaly. There is no tenderness.   Musculoskeletal: Normal range of motion. He exhibits edema. He exhibits no tenderness.   Neurological: He is alert and oriented to person, place, and time. No cranial nerve deficit.   Skin: Skin is warm and dry. No rash noted. He is not diaphoretic. No erythema. No pallor.   Psychiatric: He has a normal mood and affect. Judgment and thought content normal.       Significant Labs:   EP:   Recent Labs   Lab 10/21/18  1923 10/21/18  2329 10/22/18  0639    135* 134*   K 6.0* 5.6* 5.8*    101 98   CO2 25 23 27   * 80 117*   BUN 41* 43* 46*   CREATININE 4.0* 4.1* 4.4*   CALCIUM 8.6* 8.6* 8.6*   PROT 6.0  --   --    ALBUMIN 2.7*  --  2.7*   BILITOT 0.9  --   --    ALKPHOS 143*  --   --    AST 38  --   --    ALT 17  --   --    ANIONGAP 11 11 9   ESTGFRAFRICA 16.9* 16.4* 15.1*   EGFRNONAA 14.6*  14.2* 13.0*   WBC 6.79  --   --    HGB 9.5*  --   --    HCT 31.8*  --   --      --   --        Significant Imaging:   EKG: HR 65 atrial and ventricular pacing is evident.              Assessment and Plan:     Disorder of implantable defibrillator    Threshold to 1.875 from 0.75  A-sense 3.2mV from 3.0mV    Elevated threshold but with good pacing and sensing function. No interventions needed at this time.    - agree with echo to rule out perforation/pericardial effusion, if normal will sign off         Thank you for your consult. I will sign off. Please contact us if you have any additional questions.    Henrry Curry MD  Cardiac Electrophysiology  [facility]

## 2018-10-22 NOTE — NURSING TRANSFER
Nursing Transfer Note           Transfer to  from ED     Transfer via stretcher     Transfer with cardiac monitoring     Transported by transport     Medicines sent: none     Chart send with patient: Yes    Notified:Patient wife is at bedside.    Patient reassessed at: 10/21/2018 @ 2315    Upon arrival to floor: Patient admitted to CSU Patient alert and oriented to room. Cardiac monitoring maintained throughout transfer. Patient connected to telemetry, assessed, denies any pain, oriented to room, and instructed to call for assistance or any needs. Call bell in reach. Reviewed goals for today. Will continue to monitor

## 2018-10-22 NOTE — SUBJECTIVE & OBJECTIVE
Past Medical History:   Diagnosis Date    AICD (automatic cardioverter/defibrillator) present     9/21/2018    Anemia of chronic renal failure, stage 5     BPH (benign prostatic hyperplasia)     CAD (coronary artery disease)     NSTEMI in 2012    CHF (congestive heart failure)     EF 30%    Chronic systolic heart failure 5/31/2018    Diastolic dysfunction 12/16/2016    Dilated cardiomyopathy 5/7/2018    ESRD (end stage renal disease) on dialysis     MWF    GERD (gastroesophageal reflux disease)     Hyperparathyroidism, secondary renal     Hypertension     Metabolic acidosis     MI (myocardial infarction) Feb 2012    Non-rheumatic mitral regurgitation 5/31/2018    Nonrheumatic aortic valve stenosis 5/31/2018    Pleural effusion on right     Pulmonary hypertension 12/16/2016    S/P TAVR (transcatheter aortic valve replacement)     9/20/18    Status post gastric bypass for obesity     2013    Stenosis of aortic and mitral valves     Aortic stenosis    TIA (transient ischemic attack)     Type 2 diabetes mellitus with diabetic nephropathy     history/ before wt loss    Valvular regurgitation     mitral regurgitation       Past Surgical History:   Procedure Laterality Date    ABDOMINAL SURGERY      PD catheter    AORTIC VALVE REPLACEMENT N/A 9/20/2018    Procedure: Replacement-valve-aortic;  Surgeon: Refugio Cooney MD;  Location: [facility] CATH LAB;  Service: Cardiology;  Laterality: N/A;    ASPIRATION-GROIN N/A 9/9/2016    Performed by [facility] at [facility] OR    BASAL CELL CARCINOMA EXCISION Left Jan 2011    left forehead    BAV N/A 7/12/2018    Performed by Loyd Ulloa MD at [facility] CATH LAB    CARDIAC DEFIBRILLATOR PLACEMENT  09/21/2018    CARDIAC ELECTROPHYSIOLOGY STUDY N/A 9/21/2018    Procedure: CARDIAC ELECTROPHYSIOLOGY STUDY;  Surgeon: Theron Mcmanus MD;  Location: [facility] CATH LAB;  Service: Cardiology;  Laterality: N/A;  s/p TAVR, CM, EPS +/- BiV ICD, SJM, anes, GP     "CARDIAC ELECTROPHYSIOLOGY STUDY N/A 9/21/2018    Performed by Theron [facility]manus MD at [facility] CATH LAB    CHOLECYSTECTOMY  July 2010    ELBOW SURGERY Left 3/18/14    anterior submuscular transposition, ulnar nerve    EYE SURGERY Bilateral     bilateral cataracts    GASTRECTOMY      GASTRIC BYPASS  May 2014    gastric sleeve  June 2013    Malfunctioned requiring bypass    HEART CATH-RIGHT Right 1/20/2017    Performed by Ron Bernstein Jr., MD at [facility] CATH LAB    HERNIA REPAIR      INSERTION-PERITONEAL DIALYSIS CATHETER-LAPAROSCOPIC N/A 3/28/2016    Performed by Owen Ríos MD at [facility] OR 2ND FLR    LAPAROSCOPY-DIAGNOSTIC N/A 3/28/2016    Performed by Owen R[facility]os MD at [facility] OR 2ND FLR    REMOVAL-CATHETER-DIALYSIS-PERITONEAL N/A 9/20/2017    Performed by Owen R[facility]os MD at [facility] OR 2ND FLR    REPAIR-HERNIA-LAPAROSCOPIC  3/28/2016    Performed by Owen R[facility]os MD at [facility] OR 2ND FLR    REPAIR-HERNIA-LAPAROSCOPIC-INGUINAL Bilateral 3/28/2016    Performed by Owen Ríos MD at [facility] OR 2ND FLR    Replacement-valve-aortic N/A 9/20/2018    Performed by Refugio Cooney MD at [facility] CATH LAB    REVISION PERITONEAL DIALYSIS CATHETER-LAPAROSCOPIC N/A 5/18/2016    Performed by Owen Ríos MD at [facility] OR 2ND FLR    ROTATOR CUFF REPAIR Left 2014    SHOULDER ARTHROSCOPY Left 11/18/14    RCR; glenoid labral repair; arch decompression       Review of patient's allergies indicates:   Allergen Reactions    Asa [aspirin] Other (See Comments)     Retinal bleeding, severe    Latex, natural rubber Rash and Blisters     Latex tape causes blisters    Adhesive Dermatitis and Blisters     Please use Paper Tape    Morphine Hives, Itching, Dermatitis and Rash     Body Rash, Phlebitis, "My veins showed through the skin."    Ace inhibitors Other (See Comments)     Other reaction(s): Cough     Current Facility-Administered Medications   Medication Frequency    0.9%  " NaCl infusion PRN    acetaminophen tablet 650 mg Q6H PRN    calcitRIOL capsule 0.5 mcg Daily    calcium carbonate 200 mg calcium (500 mg) chewable tablet 500 mg Daily PRN    carvedilol tablet 25 mg BID WM    dextrose 50% injection 12.5 g PRN    dextrose 50% injection 25 g PRN    furosemide injection 80 mg BID    glucagon (human recombinant) injection 1 mg PRN    glucose chewable tablet 16 g PRN    glucose chewable tablet 24 g PRN    heparin (porcine) injection 5,000 Units Q8H    hydrALAZINE tablet 25 mg Q8H PRN    losartan tablet 50 mg BID    nitroGLYCERIN SL tablet 0.4 mg Q5 Min PRN    promethazine tablet 25 mg Q6H PRN    ramelteon tablet 8 mg Nightly PRN    sevelamer carbonate tablet 1,600 mg TID WM    sodium chloride 0.9% flush 5 mL PRN     Family History     Problem Relation (Age of Onset)    Diabetes Mother, Father    Lung cancer Mother        Tobacco Use    Smoking status: Never Smoker    Smokeless tobacco: Never Used   Substance and Sexual Activity    Alcohol use: No    Drug use: No    Sexual activity: Yes     Partners: Female     Review of Systems   HENT: Positive for ear discharge.      Objective:     Vital Signs (Most Recent):  Temp: 97.6 °F (36.4 °C) (10/22/18 0915)  Pulse: 70 (10/22/18 1129)  Resp: 18 (10/22/18 1030)  BP: (!) 184/80 (10/22/18 1030)  SpO2: 95 % (10/22/18 0759)  O2 Device (Oxygen Therapy): room air (10/22/18 0759) Vital Signs (24h Range):  Temp:  [97.6 °F (36.4 °C)-98.7 °F (37.1 °C)] 97.6 °F (36.4 °C)  Pulse:  [60-70] 70  Resp:  [18-20] 18  SpO2:  [93 %-100 %] 95 %  BP: (130-195)/(61-88) 184/80     Weight: 84.2 kg (185 lb 10 oz) (10/21/18 2340)  Body mass index is 23.83 kg/m².  Body surface area is 2.1 meters squared.    I/O last 3 completed shifts:  In: 500 [P.O.:400; IV Piggyback:100]  Out: 0     Physical Exam   Constitutional: He is oriented to person, place, and time. He appears well-developed and well-nourished. He appears distressed.   SOB at rest mild  discomfort sitting upright unable to lie back in bed supine   HENT:   Head: Normocephalic and atraumatic.   Mouth/Throat: Oropharynx is clear and moist. No oropharyngeal exudate.   Eyes: Conjunctivae and EOM are normal. Pupils are equal, round, and reactive to light. Right eye exhibits no discharge. Left eye exhibits no discharge. No scleral icterus.   Neck: Normal range of motion. Neck supple. No JVD present. No tracheal deviation present. No thyromegaly present.   Cardiovascular: Normal rate, regular rhythm and normal heart sounds. Exam reveals no gallop and no friction rub.   No murmur heard.  2/6 lindsey no rub, no gallop  NA AVF good thrill and bruit  + 4 bilateral LE edema   Pulmonary/Chest: Effort normal. No stridor. No respiratory distress. He has no wheezes. He has no rales. He exhibits no tenderness.   Decreased bs r base and occ crackle 2/3 up   basilar crackles L   Abdominal: Soft. Bowel sounds are normal. He exhibits no distension and no mass. There is no tenderness. There is no rebound and no guarding. No hernia.   Musculoskeletal: Normal range of motion. He exhibits no edema or tenderness.   Lymphadenopathy:     He has no cervical adenopathy.   Neurological: He is alert and oriented to person, place, and time. No cranial nerve deficit. He exhibits normal muscle tone. Coordination normal.   Skin: Skin is warm and dry. No rash noted. He is not diaphoretic. No erythema. No pallor.   Psychiatric: He has a normal mood and affect. His behavior is normal. Judgment and thought content normal.   Nursing note and vitals reviewed.      Significant Labs:  All labs within the past 24 hours have been reviewed.    Significant Imaging:  Labs: Reviewed  ECG: Reviewed  X-Ray: Reviewed  Echo: Reviewed  marcelo

## 2018-10-22 NOTE — CONSULTS
[facility]  Nephrology  Consult Note    Patient Name: Tom Gage  MRN: [id number]  Admission Date: 10/21/2018  Hospital Length of Stay: 1 days  Attending Provider: Maninder Del Rio MD   Primary Care Physician: Asael Poole MD  Principal Problem:Acute on chronic combined systolic and diastolic congestive heart failure    Consults  Subjective:     HPI: Mr. Tom Gage is a 68 y.o. male with recurrent/ chronic R sided pleural effusion (attributed to ESRD/ hypoalbuminemia, with intermittent thoracenteses), ESRD on HD (MWF), recent TAVR on 09/20/18, AICD 09/21/18, combined HF with 30%, CAD s/p NSTEMI in 2012, hx gastric bypass in 2013, who [facility] on 10/21 with worsening SOB of breath over past several days. Troponins  Peaked .73 and today .55   BNP > 4900, CXR w opacification of R hemithorax, L edema, K 5.8 CO2 27, albumin 2.7  He ahs had ongoing w/u pleural taps of effusion and  Appears tob e c/w volume overload and hypoalbuminemia  Today appearing SOB  Placed on NC 02 for dialysis c/o general chest heaviness    Last HD Friday 10/19  He states he diayzes 5-6 hrs on Mondays and 4 hrs wed, fri,  Jerrod Snyder, Dr. Prisca MONZON AVF    14 point review of systems, pertinent positive findings as in HPI           Past Medical History:   Diagnosis Date    AICD (automatic cardioverter/defibrillator) present     9/21/2018    Anemia of chronic renal failure, stage 5     BPH (benign prostatic hyperplasia)     CAD (coronary artery disease)     NSTEMI in 2012    CHF (congestive heart failure)     EF 30%    Chronic systolic heart failure 5/31/2018    Diastolic dysfunction 12/16/2016    Dilated cardiomyopathy 5/7/2018    ESRD (end stage renal disease) on dialysis     MWF    GERD (gastroesophageal reflux disease)     Hyperparathyroidism, secondary renal     Hypertension     Metabolic acidosis     MI (myocardial infarction) Feb 2012    Non-rheumatic mitral regurgitation 5/31/2018     Nonrheumatic aortic valve stenosis 5/31/2018    Pleural effusion on right     Pulmonary hypertension 12/16/2016    S/P TAVR (transcatheter aortic valve replacement)     9/20/18    Status post gastric bypass for obesity     2013    Stenosis of aortic and mitral valves     Aortic stenosis    TIA (transient ischemic attack)     Type 2 diabetes mellitus with diabetic nephropathy     history/ before wt loss    Valvular regurgitation     mitral regurgitation       Past Surgical History:   Procedure Laterality Date    ABDOMINAL SURGERY      PD catheter    AORTIC VALVE REPLACEMENT N/A 9/20/2018    Procedure: Replacement-valve-aortic;  Surgeon: Refugio Cooney MD;  Location: [facility] CATH LAB;  Service: Cardiology;  Laterality: N/A;    ASPIRATION-GROIN N/A 9/9/2016    Performed by [facility] Diagnostic Provider at [facility] OR    BASAL CELL CARCINOMA EXCISION Left Jan 2011    left forehead    BAV N/A 7/12/2018    Performed by Loyd Ulloa MD at [facility] CATH LAB    CARDIAC DEFIBRILLATOR PLACEMENT  09/21/2018    CARDIAC ELECTROPHYSIOLOGY STUDY N/A 9/21/2018    Procedure: CARDIAC ELECTROPHYSIOLOGY STUDY;  Surgeon: Theron Mcmanus MD;  Location: [facility] CATH LAB;  Service: Cardiology;  Laterality: N/A;  s/p TAVR, CM, EPS +/- BiV ICD, SJM, anes, GP    CARDIAC ELECTROPHYSIOLOGY STUDY N/A 9/21/2018    Performed by Theron Mcmanus MD at [facility] CATH LAB    CHOLECYSTECTOMY  July 2010    ELBOW SURGERY Left 3/18/14    anterior submuscular transposition, ulnar nerve    EYE SURGERY Bilateral     bilateral cataracts    GASTRECTOMY      GASTRIC BYPASS  May 2014    gastric sleeve  June 2013    Malfunctioned requiring bypass    HEART CATH-RIGHT Right 1/20/2017    Performed by Ron Bernstein Jr., MD at [facility] CATH LAB    HERNIA REPAIR      INSERTION-PERITONEAL DIALYSIS CATHETER-LAPAROSCOPIC N/A 3/28/2016    Performed by Owen Ríos MD at [facility] OR 2ND FLR    LAPAROSCOPY-DIAGNOSTIC N/A 3/28/2016    Performed by  "Owen R[facility]os MD at [facility] OR 2ND FLR    REMOVAL-CATHETER-DIALYSIS-PERITONEAL N/A 9/20/2017    Performed by Owen R[facility]os MD at [facility] OR 2ND FLR    REPAIR-HERNIA-LAPAROSCOPIC  3/28/2016    Performed by Owen Ríos MD at [facility] OR 2ND FLR    REPAIR-HERNIA-LAPAROSCOPIC-INGUINAL Bilateral 3/28/2016    Performed by Owen Ríos MD at [facility] OR 2ND FLR    Replacement-valve-aortic N/A 9/20/2018    Performed by Refugio Cooney MD at [facility] CATH LAB    REVISION PERITONEAL DIALYSIS CATHETER-LAPAROSCOPIC N/A 5/18/2016    Performed by Owen Ríos MD at [facility] OR 2ND FLR    ROTATOR CUFF REPAIR Left 2014    SHOULDER ARTHROSCOPY Left 11/18/14    RCR; glenoid labral repair; arch decompression       Review of patient's allergies indicates:   Allergen Reactions    Asa [aspirin] Other (See Comments)     Retinal bleeding, severe    Latex, natural rubber Rash and Blisters     Latex tape causes blisters    Adhesive Dermatitis and Blisters     Please use Paper Tape    Morphine Hives, Itching, Dermatitis and Rash     Body Rash, Phlebitis, "My veins showed through the skin."    Ace inhibitors Other (See Comments)     Other reaction(s): Cough     Current Facility-Administered Medications   Medication Frequency    0.9%  NaCl infusion PRN    acetaminophen tablet 650 mg Q6H PRN    calcitRIOL capsule 0.5 mcg Daily    calcium carbonate 200 mg calcium (500 mg) chewable tablet 500 mg Daily PRN    carvedilol tablet 25 mg BID WM    dextrose 50% injection 12.5 g PRN    dextrose 50% injection 25 g PRN    furosemide injection 80 mg BID    glucagon (human recombinant) injection 1 mg PRN    glucose chewable tablet 16 g PRN    glucose chewable tablet 24 g PRN    heparin (porcine) injection 5,000 Units Q8H    hydrALAZINE tablet 25 mg Q8H PRN    losartan tablet 50 mg BID    nitroGLYCERIN SL tablet 0.4 mg Q5 Min PRN    promethazine tablet 25 mg Q6H PRN    ramelteon tablet 8 mg Nightly PRN "    sevelamer carbonate tablet 1,600 mg TID WM    sodium chloride 0.9% flush 5 mL PRN     Family History     Problem Relation (Age of Onset)    Diabetes Mother, Father    Lung cancer Mother        Tobacco Use    Smoking status: Never Smoker    Smokeless tobacco: Never Used   Substance and Sexual Activity    Alcohol use: No    Drug use: No    Sexual activity: Yes     Partners: Female     Review of Systems   HENT: Positive for ear discharge.      Objective:     Vital Signs (Most Recent):  Temp: 97.6 °F (36.4 °C) (10/22/18 0915)  Pulse: 70 (10/22/18 1129)  Resp: 18 (10/22/18 1030)  BP: (!) 184/80 (10/22/18 1030)  SpO2: 95 % (10/22/18 0759)  O2 Device (Oxygen Therapy): room air (10/22/18 0759) Vital Signs (24h Range):  Temp:  [97.6 °F (36.4 °C)-98.7 °F (37.1 °C)] 97.6 °F (36.4 °C)  Pulse:  [60-70] 70  Resp:  [18-20] 18  SpO2:  [93 %-100 %] 95 %  BP: (130-195)/(61-88) 184/80     Weight: 84.2 kg (185 lb 10 oz) (10/21/18 2340)  Body mass index is 23.83 kg/m².  Body surface area is 2.1 meters squared.    I/O last 3 completed shifts:  In: 500 [P.O.:400; IV Piggyback:100]  Out: 0     Physical Exam   Constitutional: He is oriented to person, place, and time. He appears well-developed and well-nourished. He appears distressed.   SOB at rest mild discomfort sitting upright unable to lie back in bed supine   HENT:   Head: Normocephalic and atraumatic.   Mouth/Throat: Oropharynx is clear and moist. No oropharyngeal exudate.   Eyes: Conjunctivae and EOM are normal. Pupils are equal, round, and reactive to light. Right eye exhibits no discharge. Left eye exhibits no discharge. No scleral icterus.   Neck: Normal range of motion. Neck supple. No JVD present. No tracheal deviation present. No thyromegaly present.   Cardiovascular: Normal rate, regular rhythm and normal heart sounds. Exam reveals no gallop and no friction rub.   No murmur heard.  2/6 lindsey no rub, no gallop  NA AVF good thrill and bruit  + 4 bilateral LE edema    Pulmonary/Chest: Effort normal. No stridor. No respiratory distress. He has no wheezes. He has no rales. He exhibits no tenderness.   Decreased bs r base and occ crackle 2/3 up   basilar crackles L   Abdominal: Soft. Bowel sounds are normal. He exhibits no distension and no mass. There is no tenderness. There is no rebound and no guarding. No hernia.   Musculoskeletal: Normal range of motion. He exhibits no edema or tenderness.   Lymphadenopathy:     He has no cervical adenopathy.   Neurological: He is alert and oriented to person, place, and time. No cranial nerve deficit. He exhibits normal muscle tone. Coordination normal.   Skin: Skin is warm and dry. No rash noted. He is not diaphoretic. No erythema. No pallor.   Psychiatric: He has a normal mood and affect. His behavior is normal. Judgment and thought content normal.   Nursing note and vitals reviewed.      Significant Labs:  All labs within the past 24 hours have been reviewed.    Significant Imaging:  Labs: Reviewed  ECG: Reviewed  X-Ray: Reviewed  Echo: Reviewed  aa    Assessment/Plan:     ESRD (end stage renal disease)    Mr. Tom Gage is a 68 y.o. male with recurrent/ chronic R sided pleural effusion (attributed to ESRD/ hypoalbuminemia, with intermittent thoracenteses), ESRD on HD (MWF), recent TAVR on 09/20/18, AICD 09/21/18, combined HF with 30%, CAD s/p NSTEMI in 2012, hx gastric bypass in 2013, who presented [place] [facility] on 10/21 with worsening SOB of breath over past several days. Troponins  Peaked .73 and today .55   BNP > 4900, CXR w opacification of R hemithorax, L edema, K 5.8 CO2 27, albumin 2.7  Last HD Friday 10/19      He states he diayzes 5-6 hrs on Mondays and 4 hrs wed, fri,  Jerrod Snyder, Dr. Prisca MONZON AVF    ESRD:  HD today for metabolic clearance and volume management    Volume overload:  Will attempt to  Decrease DW and  Remove 3 L as tolerated, Will bring back again tomorrow for extra treatment.  Would ask vascular to measure  access flow  inhouse and consider the possibility of high output failure from AVF contributing to worsenig volume overload if unable to improve volume status with aggressive ultrafiltration.    Hyperkalemia: as above    Anemia: hbg 9.5 not at esrd goal of 10-11, may  Check iron profile fi  Here longer than 2-3 days and will begin ESTELA  Wt based at HD once bp goals met    HTN: likely a large volume component UF as above  If feasible would hold BP meds prior to HD so we may increase UF goal    Hyperphosphatemia: continue outpatient sevealmer    secondary HPTH; continue outpatient rocaltrol    Diet: renal 2 gm sodium 2 gm potassium 1000 mg phos 1 L po fluid restriction               Thank you for your consult. I will follow-up with patient. Please contact us if you have any additional questions.    Alisha Orozco MD  Nephrology  [facility]

## 2018-10-22 NOTE — HPI
We are consulted for abnormal lead parameters in this 67yo male with history of EvolutR TAVR 9/20/18 with post-op course complicated by LBBB qrs 168 in the setting of EF of 30% requiring ST Sukhjinder CRT-D placement by Dr. Mcmanus on 09/21/18. Additional medical history includes  ESRD on HD 3 x/week, CAD s/p NSTEMI in 2012, recurrent pleural effusions/ascites of renal etiology and retinal bleeding related to ASA.     He presented with chest pain, however workup showed pulmonary edema, fluid overload and elevated BNP. He was admitted for heart failure and Right pleural effusion. Given recent device placement, an interrogation was performed with findings of higher thresholds for A-lead capture 0.75V --> 1.875V.  Thresholds for the v leads remain the same.    This morning patient feels the same, though pain is better, and is going to dialysis.

## 2018-10-22 NOTE — ASSESSMENT & PLAN NOTE
Needs dialysis urgently given ADHF, hyperkalemia, and symptoms likely related to elevated LVEDP  Continue coreg 25mg po bid,   Losartan 50mg po AM and 25mg po PM  Lasix 80mg po BID  Sl nitro and hydralazine 10mg iv x 1 now -- would use this combination as needed to treat BP until he is adequately dialyzed and is able to absorb his bp meds   I/o/dw/2gm na  1500cc fluid intake

## 2018-10-22 NOTE — PLAN OF CARE
Problem: Patient Care Overview  Goal: Plan of Care Review  Outcome: Ongoing (interventions implemented as appropriate)  Patient remains free from falls and injuries through out shift. Patient AAO and VSS. Patient denies chest pain and SOB. Patient's wife is at bedside. Patient K in ED was 6.0 and patient was given 5 units of Novolin. Upon recheck of patient CBG while in ED 59. Glucose tablet 16g tablet was given @ 2217. Last CBG checked when patient arrived to the floor was 84. Patient has orders for blood glucose checks for 2 times daily. BNP >4,900; BUN/SCr 43/4.1. Patient receives dialysis on MWF via left upper arm fistula. Fitstula is present for a bruit and thrill. Patient attended dialysis on last Friday(10/19) and is scheduled for dialysis this morning. Troponin levels are trending upward (0.661;0.713) and MD on call notified. 2D echo to be completed this morning. EP, Cards and Nephrology are consulted. Plan of care reviewed with patient. Patient verbalizes understanding of plan.  Will continue to monitor.

## 2018-10-22 NOTE — SUBJECTIVE & OBJECTIVE
Interval History:  Doing better today post dialysis, Bp still mildly elevated. SOB significantly improved, No chest pain.    Review of Systems   Constitution: Negative for chills, decreased appetite and diaphoresis.   HENT: Negative for congestion and ear discharge.    Eyes: Negative for blurred vision and discharge.   Cardiovascular: Negative for chest pain, dyspnea on exertion, irregular heartbeat, leg swelling and paroxysmal nocturnal dyspnea.   Respiratory: Negative for cough, hemoptysis and shortness of breath.    Gastrointestinal: Negative for abdominal pain.        Objective:     Vital Signs (Most Recent):  Temp: 97 °F (36.1 °C) (10/22/18 1619)  Pulse: 66 (10/22/18 1619)  Resp: 18 (10/22/18 1619)  BP: (!) 178/80 (10/22/18 1619)  SpO2: 95 % (10/22/18 1646) Vital Signs (24h Range):  Temp:  [97 °F (36.1 °C)-98.7 °F (37.1 °C)] 97 °F (36.1 °C)  Pulse:  [60-87] 66  Resp:  [18-20] 18  SpO2:  [93 %-100 %] 95 %  BP: (130-195)/(61-88) 178/80     Weight: 84.2 kg (185 lb 10 oz)  Body mass index is 23.83 kg/m².     SpO2: 95 %  O2 Device (Oxygen Therapy): room air      Intake/Output Summary (Last 24 hours) at 10/22/2018 1855  Last data filed at 10/22/2018 1330  Gross per 24 hour   Intake 1200 ml   Output 3700 ml   Net -2500 ml       Lines/Drains/Airways     Drain                 Hemodialysis AV Fistula Left upper arm -- days          Peripheral Intravenous Line                 Peripheral IV - Single Lumen 10/21/18 Right Forearm 1 day                Physical Exam   Constitutional: He is oriented to person, place, and time. He appears well-developed and well-nourished. No distress.   Eyes: Conjunctivae are normal. Pupils are equal, round, and reactive to light.   Neck: No tracheal deviation present. No thyromegaly present.   Cardiovascular: Normal rate, regular rhythm, normal heart sounds and intact distal pulses. Exam reveals no gallop and no friction rub.   No murmur heard.  Pulmonary/Chest: Effort normal. No respiratory  distress. He has decreased breath sounds in the right middle field and the right lower field. He has no wheezes. He has rales.   Abdominal: Soft. Bowel sounds are normal. He exhibits no distension. There is no tenderness.   Musculoskeletal: He exhibits no edema or deformity.   Neurological: He is alert and oriented to person, place, and time. No cranial nerve deficit. Coordination normal.   Skin: Skin is warm and dry. He is not diaphoretic.   Psychiatric: He has a normal mood and affect. His behavior is normal.       Significant Labs:   BMP:   Recent Labs   Lab 10/21/18  1923 10/21/18  2329 10/22/18  0639   * 80 117*    135* 134*   K 6.0* 5.6* 5.8*    101 98   CO2 25 23 27   BUN 41* 43* 46*   CREATININE 4.0* 4.1* 4.4*   CALCIUM 8.6* 8.6* 8.6*   , CMP   Recent Labs   Lab 10/21/18  1923 10/21/18  2329 10/22/18  0639    135* 134*   K 6.0* 5.6* 5.8*    101 98   CO2 25 23 27   * 80 117*   BUN 41* 43* 46*   CREATININE 4.0* 4.1* 4.4*   CALCIUM 8.6* 8.6* 8.6*   PROT 6.0  --   --    ALBUMIN 2.7*  --  2.7*   BILITOT 0.9  --   --    ALKPHOS 143*  --   --    AST 38  --   --    ALT 17  --   --    ANIONGAP 11 11 9   ESTGFRAFRICA 16.9* 16.4* 15.1*   EGFRNONAA 14.6* 14.2* 13.0*   , CBC   Recent Labs   Lab 10/21/18  1923   WBC 6.79   HGB 9.5*   HCT 31.8*      , INR No results for input(s): INR, PROTIME in the last 48 hours., Lipid Panel No results for input(s): CHOL, HDL, LDLCALC, TRIG, CHOLHDL in the last 48 hours. and Troponin   Recent Labs   Lab 10/21/18  1923 10/21/18  2329 10/22/18  0639   TROPONINI 0.661* 0.713* 0.550*       Significant Imaging: Echocardiogram:   2D echo with color flow doppler:   Results for orders placed or performed during the hospital encounter of 10/21/18   2D echo with color flow doppler   Result Value Ref Range    Calculated EF 30 (A) 55 - 65    Mitral Valve Regurgitation MILD     Est. PA Systolic Pressure 55.89 (A)     Mitral Valve Mobility NORMAL      Tricuspid Valve Regurgitation MODERATE TO SEVERE (A)

## 2018-10-22 NOTE — ASSESSMENT & PLAN NOTE
Mr. Tom Gage is a 68 y.o. male with recurrent/ chronic R sided pleural effusion (attributed to ESRD/ hypoalbuminemia, with intermittent thoracenteses), ESRD on HD (MWF), recent TAVR on 09/20/18, AICD 09/21/18, combined HF with 30%, CAD s/p NSTEMI in 2012, hx gastric bypass in 2013, who presented to [facility] on 10/21 with worsening SOB of breath over past several days. Troponins  Peaked .73 and today .55   BNP > 4900, CXR w opacification of R hemithorax, L edema, K 5.8 CO2 27, albumin 2.7  Last HD Friday 10/19      He states he diayzes 5-6 hrs on Mondays and 4 hrs wed, fri,  Jerrod Snyder, Dr. Prisca MONZON AVF    ESRD:  HD today for metabolic clearance and volume management    Volume overload:  Will attempt to  Decrease DW and  Remove 3 L as tolerated, Will bring back again tomorrow for extra treatment.  Would ask vascular to measure access flow  inhouse and consider the possibility of high output failure from AVF contributing to worsenig volume overload if unable to improve volume status with aggressive ultrafiltration.    Hyperkalemia: as above    Anemia: hbg 9.5 not at esrd goal of 10-11, may  Check iron profile fi  Here longer than 2-3 days and will begin ESTELA  Wt based at HD once bp goals met    HTN: likely a large volume component UF as above  If feasible would hold BP meds prior to HD so we may increase UF goal    Hyperphosphatemia: continue outpatient sevealmer    secondary HPTH; continue outpatient rocaltrol    Diet: renal 2 gm sodium 2 gm potassium 1000 mg phos 1 L po fluid restriction

## 2018-10-22 NOTE — CONSULTS
[facility]  Cardiology  Consult Note    Patient Name: Tom Gage  MRN: [id number]  Admission Date: 10/21/2018  Hospital Length of Stay: 0 days  Code Status: Prior   Attending Provider: Mihai Puri MD   Consulting Provider: Michael Meeks MD  Primary Care Physician: Asael Poole MD  Principal Problem:Acute on chronic systolic CHF (congestive heart failure)    Patient information was obtained from patient and past medical records.     Inpatient consult to Cardiology  Consult performed by: Michael Meeks MD  Consult ordered by: Missy Schroeder PA-C        Subjective:     Chief Complaint:  Chest Pressure     HPI:   68 y.o. 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  Additionally, he had severe aortic stenosis and is now s/p EvolutR TAVR 9/20/18 with post-op course complicated by LBBB qrs 168 in the setting of EF of 30% leading to ST Juade CRT-D placement.    He presents today with chest pressure and heaviness rated 10/10.  He was asleep, woke up, and got on his bedside commode to have a BM.  The chest pressure came on then, receded a bit after his BM only to return 10-20 min later and did not go away thereafter.  Currently he has mild chest tightness.  His pain does not radiate, he has never had this before, and he just had a cath last month during his BAV which showed all of his vessels had luminal irregularities. His ekg shows bi-v pacing.  Interrogation at bedside shows elevated A-lead capture threshold from prior at 1.875V.  Capture thresholds for the v leads remain the same.      Of note, he did receive a thoracentesis for recurrent  pleural effusion by IR on 10/15 at which time the MD told him he was full of fluid.  Today he has JVD to his ears sitting up at 60 degrees, bilateral LE pitting edema, and is experiencing a bit of chest tightness with a SBP of 194, k is 6, trop is 0.66, BNP >4900.      Lower limit rate is 60 bpm  Upper tracking rate is 120 bpm    Paced AV delay/Sensed AV delay: 275/250 ms    TACHY ZONES:  VT1     Rate: 171 - 214 bpm       Monitor therapy  .  VF     Rate: > 214 bpm       Available therapies: ATP and Shock.    Past Medical History:   Diagnosis Date    Anemia of chronic renal failure, stage 5     BPH (benign prostatic hyperplasia)     CAD (coronary artery disease)     CHF (congestive heart failure)     Chronic systolic heart failure 5/31/2018    CKD (chronic kidney disease) stage 5, GFR less than 15 ml/min     Diastolic dysfunction 12/16/2016    Dilated cardiomyopathy 5/7/2018    GERD (gastroesophageal reflux disease)     Hyperparathyroidism, secondary renal     Hypertension     Metabolic acidosis     MI (myocardial infarction) Feb 2012    Non-rheumatic mitral regurgitation 5/31/2018    Nonrheumatic aortic valve stenosis 5/31/2018    Pulmonary hypertension 12/16/2016    Stenosis of aortic and mitral valves     Aortic stenosis    TIA (transient ischemic attack)     Type 2 diabetes mellitus with diabetic nephropathy     history/ before wt loss    Valvular regurgitation     mitral regurgitation       Past Surgical History:   Procedure Laterality Date    ABDOMINAL SURGERY      PD catheter    AORTIC VALVE REPLACEMENT N/A 9/20/2018    Procedure: Replacement-valve-aortic;  Surgeon: Refugio Cooney MD;  Location: [facility] CATH LAB;  Service: Cardiology;  Laterality: N/A;    ASPIRATION-GROIN N/A 9/9/2016    Performed by [facility] OR    BASAL CELL CARCINOMA EXCISION Left Jan 2011    left forehead    BAV N/A 7/12/2018    Performed by Loyd Ulloa MD at [facility] CATH LAB    CARDIAC  ELECTROPHYSIOLOGY STUDY N/A 9/21/2018    Procedure: CARDIAC ELECTROPHYSIOLOGY STUDY;  Surgeon: Theron Mcmanus MD;  Location: [facility] CATH LAB;  Service: Cardiology;  Laterality: N/A;  s/p TAVR, CM, EPS +/- BiV ICD, SJM, anes, GP    CARDIAC ELECTROPHYSIOLOGY STUDY N/A 9/21/2018    Performed by Theron Mcmanus MD at [facility] CATH LAB    CHOLECYSTECTOMY  July 2010    ELBOW SURGERY Left 3/18/14    anterior submuscular transposition, ulnar nerve    EYE SURGERY Bilateral     bilateral cataracts    GASTRECTOMY      GASTRIC BYPASS  May 2014    gastric sleeve  June 2013    Malfunctioned requiring bypass    HEART CATH-RIGHT Right 1/20/2017    Performed by Ron Bernstein Jr., MD at [facility] CATH LAB    HERNIA REPAIR      INSERTION-PERITONEAL DIALYSIS CATHETER-LAPAROSCOPIC N/A 3/28/2016    Performed by Owen R[facility]os MD at [facility] OR 2ND FLR    LAPAROSCOPY-DIAGNOSTIC N/A 3/28/2016    Performed by Owen R[facility]os MD at [facility] OR 2ND FLR    REMOVAL-CATHETER-DIALYSIS-PERITONEAL N/A 9/20/2017    Performed by Owen R[facility]os MD at [facility] OR 2ND FLR    REPAIR-HERNIA-LAPAROSCOPIC  3/28/2016    Performed by Owen R[facility]os MD at [facility] OR 2ND FLR    REPAIR-HERNIA-LAPAROSCOPIC-INGUINAL Bilateral 3/28/2016    Performed by Owen Ríos MD at [facility] OR 2ND FLR    Replacement-valve-aortic N/A 9/20/2018    Performed by Refugio Cooney MD at [facility] CATH LAB    REVISION PERITONEAL DIALYSIS CATHETER-LAPAROSCOPIC N/A 5/18/2016    Performed by Owen R[facility]os MD at [facility] OR 2ND FLR    ROTATOR CUFF REPAIR Left 2014    SHOULDER ARTHROSCOPY Left 11/18/14    RCR; glenoid labral repair; arch decompression       Review of patient's allergies indicates:   Allergen Reactions    Asa [aspirin] Other (See Comments)     Retinal bleeding, severe    Latex, natural rubber Rash and Blisters     Latex tape causes blisters    Ace inhibitors Other (See Comments)     Other reaction(s): Cough    Adhesive Dermatitis  "and Blisters     Please use Paper Tape    Morphine Hives, Itching, Dermatitis and Rash     Body Rash, Phlebitis, "My veins showed through the skin."       No current facility-administered medications on file prior to encounter.      Current Outpatient Medications on File Prior to Encounter   Medication Sig    calcitRIOL (ROCALTROL) 0.5 MCG Cap Take 0.5 mcg by mouth once daily.    carvedilol (COREG) 25 MG tablet Take 1 tablet (25 mg total) by mouth 2 (two) times daily with meals.    clopidogrel (PLAVIX) 75 mg tablet Take 1 tablet (75 mg total) by mouth once daily.    furosemide (LASIX) 80 MG tablet 80 mg every 12 (twelve) hours.     losartan (COZAAR) 100 MG tablet Take 0.5 tablets (50 mg total) by mouth 2 (two) times daily. (Patient taking differently: Take 50 mg by mouth once. )    pantoprazole (PROTONIX) 40 MG tablet Take 40 mg by mouth 2 (two) times daily as needed.     sevelamer carbonate (RENVELA) 800 mg Tab Take 1,600 mg by mouth 3 (three) times daily. Takes two 800 mg tablets (1600 mg) with meals    vitamin D 1000 units Tab Take 1,000 Units by mouth every 7 days.     cephALEXin (KEFLEX) 500 MG capsule Take 1 pill (500mg) by mouth once daily. Take pill after dialysis on dialysis days.    nitroGLYCERIN (NITROSTAT) 0.4 MG SL tablet Place 0.4 mg under the tongue every 5 (five) minutes as needed for Chest pain.     Family History     Problem Relation (Age of Onset)    Diabetes Mother, Father    Lung cancer Mother        Tobacco Use    Smoking status: Never Smoker    Smokeless tobacco: Never Used   Substance and Sexual Activity    Alcohol use: No    Drug use: No    Sexual activity: Yes     Partners: Female     Review of Systems   Constitution: Negative.   HENT: Negative.    Eyes: Negative.    Cardiovascular: Positive for chest pain.   Respiratory: Negative.    Endocrine: Negative.    Skin: Negative.    Musculoskeletal: Negative.    Gastrointestinal: Negative.    Genitourinary: Negative.  "   Neurological: Negative.      Objective:     Vital Signs (Most Recent):  Temp: 98.7 °F (37.1 °C) (10/21/18 1911)  Pulse: 66 (10/21/18 2029)  Resp: 20 (10/21/18 1911)  BP: (!) 194/87 (10/21/18 2029)  SpO2: 98 % (10/21/18 2029) Vital Signs (24h Range):  Temp:  [98.7 °F (37.1 °C)] 98.7 °F (37.1 °C)  Pulse:  [63-66] 66  Resp:  [20] 20  SpO2:  [96 %-98 %] 98 %  BP: (174-195)/(80-88) 194/87     Weight: 77.1 kg (170 lb)  Body mass index is 21.83 kg/m².    SpO2: 98 %  O2 Device (Oxygen Therapy): room air      Intake/Output Summary (Last 24 hours) at 10/21/2018 2042  Last data filed at 10/21/2018 2041  Gross per 24 hour   Intake 100 ml   Output --   Net 100 ml       Lines/Drains/Airways     Drain                 Hemodialysis AV Fistula Left upper arm -- days          Peripheral Intravenous Line                 Peripheral IV - Single Lumen 10/21/18 Right Forearm less than 1 day                Physical Exam   Constitutional: He is oriented to person, place, and time. He appears well-developed and well-nourished.   HENT:   Head: Normocephalic and atraumatic.   Eyes: Conjunctivae and EOM are normal. Pupils are equal, round, and reactive to light.   Neck: Normal range of motion. Neck supple. JVD present.   Cardiovascular: Normal rate and regular rhythm. Exam reveals no gallop and no friction rub.   Murmur (apex and LLSB 2/6 pansystolic) heard.  Pulmonary/Chest: Effort normal. No respiratory distress. He has no wheezes. He has rales. He exhibits no tenderness.   Abdominal: Soft. Bowel sounds are normal. He exhibits no distension. There is no tenderness.   Musculoskeletal: Normal range of motion. He exhibits edema. He exhibits no tenderness.   Neurological: He is alert and oriented to person, place, and time.   Skin: Skin is warm and dry. No erythema. No pallor.       Significant Labs:   Recent Lab Results       10/21/18  1923        Immature Granulocytes 0.3     Immature Grans (Abs) 0.02  Comment:  Mild elevation in immature  granulocytes is non specific and   can be seen in a variety of conditions including stress response,   acute inflammation, trauma and pregnancy. Correlation with other   laboratory and clinical findings is essential.       Albumin 2.7     Alkaline Phosphatase 143     ALT 17     Anion Gap 11     AST 38     Baso # 0.07     Basophil% 1.0     Total Bilirubin 0.9  Comment:  For infants and newborns, interpretation of results should be based  on gestational age, weight and in agreement with clinical  observations.  Premature Infant recommended reference ranges:  Up to 24 hours.............<8.0 mg/dL  Up to 48 hours............<12.0 mg/dL  3-5 days..................<15.0 mg/dL  6-29 days.................<15.0 mg/dL       BNP >4,900  Comment:  Values of less than 100 pg/ml are consistent with non-CHF populations.     BUN, Bld 41     Calcium 8.6     Chloride 101     CO2 25     Creatinine 4.0     Differential Method Automated     eGFR if African American 16.9     eGFR if non  14.6  Comment:  Calculation used to obtain the estimated glomerular filtration  rate (eGFR) is the CKD-EPI equation.        Eos # 0.1     Eosinophil% 1.8     Glucose 124     Gran # (ANC) 5.5     Gran% 80.2     Hematocrit 31.8     Hemoglobin 9.5     Lymph # 0.5     Lymph% 7.4     MCH 33.8     MCHC 29.9          Mono # 0.6     Mono% 9.3     MPV 10.8     nRBC 0     Platelets 154     Potassium 6.0  Comment:  *No Visible Hemolysis     Total Protein 6.0     RBC 2.81     RDW 15.2     Sodium 137     Troponin I 0.661  Comment:  The reference interval for Troponin I represents the 99th percentile   cutoff   for our facility and is consistent with 3rd generation assay   performance.       WBC 6.79           Significant Imaging: Echocardiogram:   2D echo with color flow doppler:   Results for orders placed or performed during the hospital encounter of 09/18/18   2D echo with color flow doppler   Result Value Ref Range    Calculated EF 25 (A)  55 - 65    Mitral Valve Regurgitation MILD TO MODERATE     Aortic Valve Regurgitation TRIVIAL     Aortic Valve Stenosis MODERATE TO SEVERE (A)     Est. PA Systolic Pressure 61.29 (A)     Mitral Valve Mobility NORMAL     Tricuspid Valve Regurgitation MODERATE TO SEVERE (A)     and EKG: as per hpi    Assessment and Plan:     * Acute on chronic systolic CHF (congestive heart failure)    Needs dialysis urgently given ADHF, hyperkalemia, and symptoms likely related to elevated LVEDP  Continue coreg 25mg po bid,   Losartan 50mg po AM and 25mg po PM  Lasix 80mg po BID  Sl nitro and hydralazine 10mg iv x 1 now -- would use this combination as needed to treat BP until he is adequately dialyzed and is able to absorb his bp meds   I/o/dw/2gm na  1500cc fluid intake     Disorder of implantable defibrillator    Atrial lead capture threshold increased from prior 0.75 V at 0.50 ms to now 1.875V at 0.5 ms  This may indicate a dislodgement of the atrial lead  Would consult EP in AM  ICD programming completed  V leads functioning apprpriately  No events noted on interrogation     Hypertensive urgency    He has had 3 falls in the past 2 months and only 1 was positional/while bending over.  His BP regimen has been altered repeatedly over the last few months ultimately resulting in losartan 50 mg po daily and not to be taken in the case the the patient has a BP < 120 SBP  He does not fall when his BP is 140/80s  Losartan 50mg po AM, 25mg po PM  Coreg 25mg po BID  Hydralazine/nitro as above x 1 now     ESRD (end stage renal disease)    Continue HD MWF  HD asap given the above     Hyperkalemia    HD asap  Given insulin 5 u iv in ED with 1 amp d50  k of 6 in ESRD patient is acceptable without ekg changes         VTE Risk Mitigation (From admission, onward)    None          Michael Meeks MD  Cardiology   [facility]

## 2018-10-22 NOTE — PROGRESS NOTES
10/22/18 0601   Vital Signs   BP (!) 180/77   MAP (mmHg) 111   BP Location Right arm   BP Method Automatic   Patient Position Lying     Patient BP as above. Notified Erlinda HERNANDES. MD gave orders to give Coreg 25mg PO that is scheduled to be given at 0745 now and PRN PO Hydralazine 25mg now. Will give medication and cont to monitor patient.

## 2018-10-22 NOTE — HPI
Mr. Tom Gage is a 68 y.o. male with recurrent/ chronic R sided pleural effusion (attributed to ESRD/ hypoalbuminemia, with intermittent thoracenteses), ESRD on HD (MWF), recent TAVR on 09/20/18, AICD 09/21/18, combined HF with 30%, CAD s/p NSTEMI in 2012, hx gastric bypass in 2013, who [place] [facility] [place] on [place]21 with worsening SOB of breath over past several days. Troponins  Peaked .73 and today .55   BNP > 4900, CXR w opacification of R hemithorax, L edema, K 5.8 CO2 27, albumin 2.7  He ahs had ongoing w/u pleural taps of effusion and  Appears tob e c/w volume overload and hypoalbuminemia  Today appearing SOB  Placed on NC 02 for dialysis c/o general chest heaviness    Last HD Friday 10/19  He states he diayzes 5-6 hrs on Mondays and 4 hrs wed, fri,  Dr. Prisca Sands AVF    14 point review of systems, pertinent positive findings as in HPI

## 2018-10-22 NOTE — HPI
68 y.o. 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  Additionally, he had severe aortic stenosis and is now s/p EvolutR TAVR 9/20/18 with post-op course complicated by LBBB qrs 168 in the setting of EF of 30% leading to ST Juade CRT-D placement.    He presents today with chest pressure and heaviness rated 10/10.  He was asleep, woke up, and got on his bedside commode to have a BM.  The chest pressure came on then, receded a bit after his BM only to return 10-20 min later and did not go away thereafter.  Currently he has mild chest tightness.  His pain does not radiate, he has never had this before, and he just had a cath last month during his BAV which showed all of his vessels had luminal irregularities. His ekg shows bi-v pacing.  Interrogation at bedside shows elevated A-lead capture threshold from prior at 1.875V.  Capture thresholds for the v leads remain the same.      Of note, he did receive a thoracentesis for recurrent pleural effusion by IR on 10/15 at which time the MD told him he was full of fluid.  Today he has JVD to his ears sitting up at 60 degrees, bilateral LE pitting edema, and is experiencing a bit of chest tightness with a SBP of 194, k is 6, trop is 0.66, BNP >4900.

## 2018-10-23 LAB
ALBUMIN SERPL BCP-MCNC: 2.8 G/DL
ANION GAP SERPL CALC-SCNC: 9 MMOL/L
BASOPHILS # BLD AUTO: 0.06 K/UL
BASOPHILS NFR BLD: 0.9 %
BUN SERPL-MCNC: 30 MG/DL
CALCIUM SERPL-MCNC: 8.7 MG/DL
CHLORIDE SERPL-SCNC: 103 MMOL/L
CO2 SERPL-SCNC: 25 MMOL/L
CREAT SERPL-MCNC: 3.4 MG/DL
DIFFERENTIAL METHOD: ABNORMAL
EOSINOPHIL # BLD AUTO: 0.2 K/UL
EOSINOPHIL NFR BLD: 2.3 %
ERYTHROCYTE [DISTWIDTH] IN BLOOD BY AUTOMATED COUNT: 15.3 %
EST. GFR  (AFRICAN AMERICAN): 20.6 ML/MIN/1.73 M^2
EST. GFR  (NON AFRICAN AMERICAN): 17.8 ML/MIN/1.73 M^2
FERRITIN SERPL-MCNC: 769 NG/ML
GLUCOSE SERPL-MCNC: 87 MG/DL
HCT VFR BLD AUTO: 32.7 %
HGB BLD-MCNC: 9.8 G/DL
IMM GRANULOCYTES # BLD AUTO: 0.02 K/UL
IMM GRANULOCYTES NFR BLD AUTO: 0.3 %
IRON SERPL-MCNC: 36 UG/DL
LYMPHOCYTES # BLD AUTO: 0.5 K/UL
LYMPHOCYTES NFR BLD: 6.7 %
MCH RBC QN AUTO: 33.4 PG
MCHC RBC AUTO-ENTMCNC: 30 G/DL
MCV RBC AUTO: 112 FL
MONOCYTES # BLD AUTO: 0.7 K/UL
MONOCYTES NFR BLD: 10.2 %
NEUTROPHILS # BLD AUTO: 5.4 K/UL
NEUTROPHILS NFR BLD: 79.6 %
NRBC BLD-RTO: 0 /100 WBC
PHOSPHATE SERPL-MCNC: 4.4 MG/DL
PLATELET # BLD AUTO: 159 K/UL
PMV BLD AUTO: 11 FL
POCT GLUCOSE: 124 MG/DL (ref 70–110)
POTASSIUM SERPL-SCNC: 5.3 MMOL/L
RBC # BLD AUTO: 2.93 M/UL
SATURATED IRON: 14 %
SODIUM SERPL-SCNC: 137 MMOL/L
TOTAL IRON BINDING CAPACITY: 252 UG/DL
TRANSFERRIN SERPL-MCNC: 170 MG/DL
WBC # BLD AUTO: 6.83 K/UL

## 2018-10-23 PROCEDURE — 90935 HEMODIALYSIS ONE EVALUATION: CPT | Mod: NTX,,, | Performed by: NURSE PRACTITIONER

## 2018-10-23 PROCEDURE — 80069 RENAL FUNCTION PANEL: CPT | Mod: NTX

## 2018-10-23 PROCEDURE — A4216 STERILE WATER/SALINE, 10 ML: HCPCS | Mod: NTX | Performed by: HOSPITALIST

## 2018-10-23 PROCEDURE — 99233 SBSQ HOSP IP/OBS HIGH 50: CPT | Mod: GC,NTX,, | Performed by: INTERNAL MEDICINE

## 2018-10-23 PROCEDURE — 99233 SBSQ HOSP IP/OBS HIGH 50: CPT | Mod: NTX,,, | Performed by: HOSPITALIST

## 2018-10-23 PROCEDURE — 36415 COLL VENOUS BLD VENIPUNCTURE: CPT | Mod: NTX

## 2018-10-23 PROCEDURE — 83540 ASSAY OF IRON: CPT | Mod: NTX

## 2018-10-23 PROCEDURE — 90935 HEMODIALYSIS ONE EVALUATION: CPT | Mod: NTX

## 2018-10-23 PROCEDURE — 25000003 PHARM REV CODE 250: Mod: NTX | Performed by: HOSPITALIST

## 2018-10-23 PROCEDURE — 20600001 HC STEP DOWN PRIVATE ROOM: Mod: NTX

## 2018-10-23 PROCEDURE — 85025 COMPLETE CBC W/AUTO DIFF WBC: CPT | Mod: NTX

## 2018-10-23 PROCEDURE — 25000003 PHARM REV CODE 250: Mod: NTX | Performed by: INTERNAL MEDICINE

## 2018-10-23 PROCEDURE — 82728 ASSAY OF FERRITIN: CPT | Mod: NTX

## 2018-10-23 RX ORDER — SODIUM CHLORIDE 9 MG/ML
INJECTION, SOLUTION INTRAVENOUS
Status: DISCONTINUED | OUTPATIENT
Start: 2018-10-24 | End: 2018-10-26 | Stop reason: HOSPADM

## 2018-10-23 RX ORDER — AMLODIPINE BESYLATE 5 MG/1
5 TABLET ORAL DAILY
Status: DISCONTINUED | OUTPATIENT
Start: 2018-10-23 | End: 2018-10-23

## 2018-10-23 RX ORDER — SODIUM CHLORIDE 9 MG/ML
INJECTION, SOLUTION INTRAVENOUS ONCE
Status: COMPLETED | OUTPATIENT
Start: 2018-10-24 | End: 2018-10-24

## 2018-10-23 RX ADMIN — SODIUM CHLORIDE 300 ML: 0.9 INJECTION, SOLUTION INTRAVENOUS at 09:10

## 2018-10-23 RX ADMIN — HYDRALAZINE HYDROCHLORIDE AND ISOSORBIDE DINITRATE 1 TABLET: 37.5; 2 TABLET, FILM COATED ORAL at 08:10

## 2018-10-23 RX ADMIN — LOSARTAN POTASSIUM 50 MG: 50 TABLET, FILM COATED ORAL at 08:10

## 2018-10-23 RX ADMIN — HYDRALAZINE HYDROCHLORIDE AND ISOSORBIDE DINITRATE 1 TABLET: 37.5; 2 TABLET, FILM COATED ORAL at 04:10

## 2018-10-23 RX ADMIN — SEVELAMER CARBONATE 1600 MG: 800 TABLET, FILM COATED ORAL at 12:10

## 2018-10-23 RX ADMIN — CARVEDILOL 25 MG: 12.5 TABLET, FILM COATED ORAL at 11:10

## 2018-10-23 RX ADMIN — CARVEDILOL 25 MG: 12.5 TABLET, FILM COATED ORAL at 04:10

## 2018-10-23 RX ADMIN — CALCITRIOL 0.5 MCG: 0.25 CAPSULE ORAL at 01:10

## 2018-10-23 RX ADMIN — HYDRALAZINE HYDROCHLORIDE AND ISOSORBIDE DINITRATE 1 TABLET: 37.5; 2 TABLET, FILM COATED ORAL at 11:10

## 2018-10-23 RX ADMIN — Medication 5 ML: at 08:10

## 2018-10-23 RX ADMIN — LOSARTAN POTASSIUM 50 MG: 50 TABLET, FILM COATED ORAL at 11:10

## 2018-10-23 RX ADMIN — SEVELAMER CARBONATE 1600 MG: 800 TABLET, FILM COATED ORAL at 05:10

## 2018-10-23 NOTE — SUBJECTIVE & OBJECTIVE
Past Medical History:   Diagnosis Date    AICD (automatic cardioverter/defibrillator) present     9/21/2018    Anemia of chronic renal failure, stage 5     BPH (benign prostatic hyperplasia)     CAD (coronary artery disease)     NSTEMI in 2012    CHF (congestive heart failure)     EF 30%    Chronic systolic heart failure 5/31/2018    Diastolic dysfunction 12/16/2016    Dilated cardiomyopathy 5/7/2018    ESRD (end stage renal disease) on dialysis     MWF    GERD (gastroesophageal reflux disease)     Hyperparathyroidism, secondary renal     Hypertension     Metabolic acidosis     MI (myocardial infarction) Feb 2012    Non-rheumatic mitral regurgitation 5/31/2018    Nonrheumatic aortic valve stenosis 5/31/2018    Pleural effusion on right     Pulmonary hypertension 12/16/2016    S/P TAVR (transcatheter aortic valve replacement)     9/20/18    Status post gastric bypass for obesity     2013    Stenosis of aortic and mitral valves     Aortic stenosis    TIA (transient ischemic attack)     Type 2 diabetes mellitus with diabetic nephropathy     history/ before wt loss    Valvular regurgitation     mitral regurgitation       Past Surgical History:   Procedure Laterality Date    ABDOMINAL SURGERY      PD catheter    AORTIC VALVE REPLACEMENT N/A 9/20/2018    Procedure: Replacement-valve-aortic;  Surgeon: Refugio Cooney MD;  Location: [facility] CATH LAB;  Service: Cardiology;  Laterality: N/A;    ASPIRATION-GROIN N/A 9/9/2016    Performed by [facility] at [facility] OR    BASAL CELL CARCINOMA EXCISION Left Jan 2011    left forehead    BAV N/A 7/12/2018    Performed by Loyd Ulloa MD at [facility] CATH LAB    CARDIAC DEFIBRILLATOR PLACEMENT  09/21/2018    CARDIAC ELECTROPHYSIOLOGY STUDY N/A 9/21/2018    Procedure: CARDIAC ELECTROPHYSIOLOGY STUDY;  Surgeon: Theron Mcmanus MD;  Location: [facility] CATH LAB;  Service: Cardiology;  Laterality: N/A;  s/p TAVR, CM, EPS +/- BiV ICD, SJM, anes, GP     "CARDIAC ELECTROPHYSIOLOGY STUDY N/A 9/21/2018    Performed by Theron Mcmanus MD at [facility] CATH LAB    CHOLECYSTECTOMY  July 2010    ELBOW SURGERY Left 3/18/14    anterior submuscular transposition, ulnar nerve    EYE SURGERY Bilateral     bilateral cataracts    GASTRECTOMY      GASTRIC BYPASS  May 2014    gastric sleeve  June 2013    Malfunctioned requiring bypass    HEART CATH-RIGHT Right 1/20/2017    Performed by Ron Bernstein Jr., MD at [facility] CATH LAB    HERNIA REPAIR      INSERTION-PERITONEAL DIALYSIS CATHETER-LAPAROSCOPIC N/A 3/28/2016    Performed by Owen Ríos MD at [facility] OR 2ND FLR    LAPAROSCOPY-DIAGNOSTIC N/A 3/28/2016    Performed by Owen Ríos MD at [facility] OR 2ND FLR    REMOVAL-CATHETER-DIALYSIS-PERITONEAL N/A 9/20/2017    Performed by Owen R[facility]os MD at [facility] OR 2ND FLR    REPAIR-HERNIA-LAPAROSCOPIC  3/28/2016    Performed by Owen Ríos MD at [facility] OR 2ND FLR    REPAIR-HERNIA-LAPAROSCOPIC-INGUINAL Bilateral 3/28/2016    Performed by Owen Ríos MD at [facility] OR 2ND FLR    Replacement-valve-aortic N/A 9/20/2018    Performed by Refugio Cooney MD at [facility] CATH LAB    REVISION PERITONEAL DIALYSIS CATHETER-LAPAROSCOPIC N/A 5/18/2016    Performed by Owen Ríos MD at [facility] OR 2ND FLR    ROTATOR CUFF REPAIR Left 2014    SHOULDER ARTHROSCOPY Left 11/18/14    RCR; glenoid labral repair; arch decompression       Review of patient's allergies indicates:   Allergen Reactions    Asa [aspirin] Other (See Comments)     Retinal bleeding, severe    Latex, natural rubber Rash and Blisters     Latex tape causes blisters    Adhesive Dermatitis and Blisters     Please use Paper Tape    Morphine Hives, Itching, Dermatitis and Rash     Body Rash, Phlebitis, "My veins showed through the skin."    Ace inhibitors Other (See Comments)     Other reaction(s): Cough       Family History     Problem Relation (Age of Onset)    Diabetes Mother, " Father    Lung cancer Mother        Tobacco Use    Smoking status: Never Smoker    Smokeless tobacco: Never Used   Substance and Sexual Activity    Alcohol use: No    Drug use: No    Sexual activity: Yes     Partners: Female         Review of Systems   Constitutional: Negative for appetite change, chills, fatigue and fever.   HENT: Negative for sore throat and trouble swallowing.    Respiratory: Positive for shortness of breath (improved). Negative for cough, chest tightness and wheezing.    Cardiovascular: Negative for chest pain, palpitations and leg swelling.   Gastrointestinal: Negative for abdominal pain.   Neurological: Negative for dizziness and weakness.   Psychiatric/Behavioral: Negative for confusion, decreased concentration and sleep disturbance.     Objective:     Vital Signs (Most Recent):  Temp: 97.8 °F (36.6 °C) (10/23/18 0747)  Pulse: 66 (10/23/18 0747)  Resp: 18 (10/23/18 0747)  BP: (!) 162/78 (10/23/18 0747)  SpO2: 96 % (10/23/18 0747) Vital Signs (24h Range):  Temp:  [97 °F (36.1 °C)-98.2 °F (36.8 °C)] 97.8 °F (36.6 °C)  Pulse:  [60-87] 66  Resp:  [14-18] 18  SpO2:  [93 %-96 %] 96 %  BP: (141-193)/(64-83) 162/78     Weight: 83 kg (182 lb 15.7 oz)  Body mass index is 23.49 kg/m².      Intake/Output Summary (Last 24 hours) at 10/23/2018 0911  Last data filed at 10/23/2018 0600  Gross per 24 hour   Intake 1180 ml   Output 3700 ml   Net -2520 ml       Physical Exam   Constitutional: He is oriented to person, place, and time. He appears well-developed and well-nourished. No distress.   HENT:   Mouth/Throat: Oropharynx is clear and moist. No oropharyngeal exudate.   Eyes: EOM are normal. Right eye exhibits no discharge. Left eye exhibits no discharge.   Neck: Normal range of motion. Neck supple. JVD present.   Cardiovascular: Normal rate and intact distal pulses.   Murmur heard.  Pulmonary/Chest: Effort normal. No accessory muscle usage. No tachypnea and no bradypnea. No respiratory distress (on  room air). He has decreased breath sounds in the right middle field and the right lower field. He has rales in the right middle field, the right lower field and the left lower field.   Abdominal: Soft. Bowel sounds are normal. He exhibits no distension. There is no tenderness. There is no guarding.   Musculoskeletal: Normal range of motion. He exhibits edema (1+ pitting in RLE, improved from prior exam). He exhibits no tenderness or deformity.   Neurological: He is alert and oriented to person, place, and time.   Skin: Skin is warm and dry.   Psychiatric: He has a normal mood and affect. His speech is normal and behavior is normal. Judgment and thought content normal. Cognition and memory are normal. He is attentive.   Nursing note and vitals reviewed.         Lines/Drains/Airways     Drain                 Hemodialysis AV Fistula Left upper arm -- days          Peripheral Intravenous Line                 Peripheral IV - Single Lumen 10/21/18 Right Forearm 2 days                Significant Labs:    CBC/Anemia Profile:  Recent Labs   Lab 10/21/18  1923 10/23/18  0611   WBC 6.79 6.83   HGB 9.5* 9.8*   HCT 31.8* 32.7*    159   * 112*   RDW 15.2* 15.3*        Chemistries:  Recent Labs   Lab 10/21/18  1923  10/22/18  0639 10/22/18  1710 10/23/18  0610      < > 134* 137 137   K 6.0*   < > 5.8* 5.1 5.3*      < > 98 102 103   CO2 25   < > 27 27 25   BUN 41*   < > 46* 26* 30*   CREATININE 4.0*   < > 4.4* 3.0* 3.4*   CALCIUM 8.6*   < > 8.6* 8.7 8.7   ALBUMIN 2.7*  --  2.7* 2.9* 2.8*   PROT 6.0  --   --  6.4  --    BILITOT 0.9  --   --  1.1*  --    ALKPHOS 143*  --   --  153*  --    ALT 17  --   --  13  --    AST 38  --   --  26  --    PHOS  --   --  5.2*  --  4.4    < > = values in this interval not displayed.       Significant Imaging:   I have reviewed all pertinent imaging results/findings within the past 24 hours.   X-Ray Chest AP 10/22/18: Continued significant opacification of the right  hemithorax.  The left lung is clear.    2d echo with color flow doppler 10/22/18:    1 - Severe left atrial enlargement.     2 - Eccentric hypertrophy.     3 - Moderately depressed left ventricular systolic function (EF 30-35%).     4 - Right ventricular enlargement with normal systolic function.     5 - Mild mitral regurgitation.     6 - Moderate to severe tricuspid regurgitation.     7 - Pulmonary hypertension. The estimated PA systolic pressure is 56 mmHg.

## 2018-10-23 NOTE — PROGRESS NOTES
[facility] Hospital Medicine  Progress Note    Primary Team: [facility] HOSP MED C  Admit Date: 10/21/2018    Subjective:      HPI  Mr. Tom Gage is a 68 y.o. male with recent TAVR on 09/20/18, AICD 09/21/18, combined HF with 30% , recurrent/ chronic R sided pleural effusion (attributed to ESRD/ hypoalbuminemia, with intermittent thoracenteses), ESRD on HD (MWF), CAD s/p NSTEMI in 2012, hx gastric bypass in 2013, presented to  The ED on 10/21 with worsening SOB of breath over past several days. Patient also noted central chest heaviness/ chest tightness. Associated with SOB, pain did not radiate. He was also concerned about his AICD/ pacemaker firing as he had a different sensation in his chest. Was not shocked. No hypotension at home. Not on home O2. Had noted to me that he has had intermittent falls due to dysequilibrium (as in falling over while leaning over), BPs have not been low at home     Patient underwent R sided thoracentesis on 10/15/18 removing 2 L at [facility]; he has had to undergo intermittent thoracenteses for his chronic/ recurrent R sided pleural effusion that has not improved with HD or diuresis. Had extra session of HD on Tuesday  10/16. His usual schedule is [facility]. Underwent fistulogram with [facility] for some stenosis. Continues to make urine and is compliant with lasix 80 PO BID, however, UOP has been diminishing.      Patient with hx of severe aortic stenosis and is now s/p EvolutR TAVR 9/20/18 with post-op course complicated by LBBB qrs 168 in the setting of EF of 30% leading to ST Juade CRT-D placement. Patient underwent LHC  during his BAV which showed all of his vessels had luminal irregularities, ATA flows of 3. 2D echo on 9/21/2018 showed EF of 35%, grade 2 diastolic dysfunction, Biatrial enlargement, S/P transcatheter AVR, Right pleural effusion.      In the ED, patient received nitro. Elevated BP at 190s/80s on presentation. EKG showed bi-v pacing.  Interrogation by  "cardiology in the ED showed "elevated A-lead capture threshold from prior at 1.875V, Capture thresholds for the v leads remain the same".  noted to have bilateral LE pitting edema, stable but significant R pleural effusion, trop is 0.66, BNP >4900. No fevers, chills, sick contacts recently.     Interval History:  S/p HD today and yesterday for total 8 L removed. Reports shortness of breath has resolved. Is concerned about getting more volume removed tomorrow because he did not tolerate volume removal today, states that he feels extremely weak. Wife at bedside.     ROS:  General: no fever, no chills, no weight loss, fatigue  Cardiovascular: no chest pain, no orthopnea, no dyspnea  Respiratory: no cough, no wheezes, no SOB  All other systems reviewed & are negative.     Objective:   Last 24 Hour Vital Signs:  BP  Min: 109/54  Max: 193/78  Temp  Av.9 °F (36.6 °C)  Min: 97.5 °F (36.4 °C)  Max: 98.2 °F (36.8 °C)  Pulse  Av.4  Min: 60  Max: 86  Resp  Av.4  Min: 14  Max: 20  SpO2  Av.3 %  Min: 90 %  Max: 97 %  Weight  Av kg (182 lb 15.7 oz)  Min: 83 kg (182 lb 15.7 oz)  Max: 83 kg (182 lb 15.7 oz)  I/O last 3 completed shifts:  In: 1680 [P.O.:880; Other:700; IV Piggyback:100]  Out: 3700 [Other:3700]    Physical Examination:  GEN: AAOx3, NAD  HEENT: NCAT, MMM, PERRL, EOMI, oropharynx clear  CV: RRR, no m/r, no S3/S4  RESP: CTAB, no wheezes/crackles, no increased WOB  ABD: soft, NTND, normoactive BS, no organomegaly  EXTR: no c/c/e, intact distal pulses x 4  NEURO: PERRL, EOMI, moving all four extremities, intact sensation to light touch, no focal deficits  SKIN: no rashes, lesions, or color changes  PSYCH: normal affect    Laboratory:  I have reviewed all pertinent lab results/findings within the past 24 hours.    Radiology:  I have reviewed all pertinent imaging results/findings within the past 24 hours.    Current Medications:     Infusions:       Scheduled:   [START ON 10/24/2018] sodium " chloride 0.9%   Intravenous Once    calcitRIOL  0.5 mcg Oral Daily    carvedilol  25 mg Oral BID WM    heparin (porcine)  5,000 Units Subcutaneous Q8H    isosorbide-hydrALAZINE 20-37.5 mg  1 tablet Oral TID    losartan  50 mg Oral BID    sevelamer carbonate  1,600 mg Oral TID WM        PRN:  [START ON 10/24/2018] sodium chloride 0.9%, acetaminophen, calcium carbonate, dextrose 50%, dextrose 50%, glucagon (human recombinant), glucose, glucose, hydrALAZINE, nitroGLYCERIN, promethazine, ramelteon, sodium chloride 0.9%      Assessment/Plan:     Tom Gage is a 68 y.o.male with    Acute on chronic combined systolic and diastolic congestive heart failure  S/P TAVR (transcatheter aortic valve replacement)  Nodular calcific aortic valve stenosis   - volume overload, + JVD, BNP of >4900, s/p tAVR on 9/20/18  - low NA diet and 1200 cc fluid restriction . Strict I/Os  - discontinue lasix IV 80 BID  - nephrology consulted for HD - total 8 L removed in 48 hours - patient wary about further volume removal tomorrow  - control BP as below  - repeat 2D echo with EF similar to prior, intermediate RA pressure, aortic valve intact     Acute respiratory failure with hypoxia   Pleural effusion on right  - chronic and recurrent, thought to be 2/2 HF and ESRD as well as malnutrition  - on room air  - discontinue lasix as pt is not urinating  - Pulmonary Medicine consulted for thoracentesis. Took plavix on 10/20. Considering thoracentesis if pt still inpatient on Thurs; however believe this is cardiorenal in etiology and recommend maximizing volume removal via HD  - holding plavix in case  - repeat CXR this evening     Hypertensive urgency   Hypertensive heart and kidney disease with heart failure  - BP of 190s/80s on presentation. Much improved with volume removal  - cont home coreg 25 BID, losartan 50 BID (K shifted in the ED, however, may need to adjust ARB therapy if Hyper-K recurs)  - may consider long acting nitrate if BP  "continues to be elevated  - hydralazine prn right now       ESRD (end stage renal disease)   Hyperkalemia   - ESRD HD MWF  - cont home calcitriol and sevelamer   - nephrology consulted for HD  - Hyper-K+ of 6.0 on admit, shifted with insulin in the ED - now improved with HD     Disorder of implantable defibrillator   - seen by cardiology in the ED. Noted "Atrial lead capture threshold increased from prior 0.75 V at 0.50 ms to now 1.875V at 0.5 ms"  - EP consulted in the AM  - cardiac monitoring     Moderate protein malnutrition  S/P gastric bypass  - boost TID     Anemia of chronic renal failure, stage 5  - hbg stable  - monitor    Coronary artery disease involving native coronary artery of native heart without angina pectoris   - not on statin as lipid panel is WNL (much below the goal)  - not on ASA as it has lead to retinal hemorrhages  - on plavix at home, last NSTEMI in 2012, LHC in 9/2018 showed diffuse luminal abnormalities   - holding plavix for thoracentesis at this time  - pRN nitro     Diet:  Low Na with 1200cc fl restriction   GI PPx:  n/a  DVT PPx:  Heparin TID - pt usually declines     PPX:   VTE Risk Mitigation (From admission, onward)        Ordered     heparin (porcine) injection 5,000 Units  Every 8 hours      10/21/18 2226     Place sequential compression device  Until discontinued      10/21/18 2129     IP VTE HIGH RISK PATIENT  Once      10/21/18 2142            Laine Bradley MD  Hospital Medicine Staff  [facility]      "

## 2018-10-23 NOTE — ASSESSMENT & PLAN NOTE
- Repeat TTE reviewed EF the same at 30-35%  - Continue Lasix IV and dialysis, volume improving -3L post dialysis

## 2018-10-23 NOTE — PLAN OF CARE
Problem: Patient Care Overview  Goal: Plan of Care Review  Pt AAOX4 spouse at bedside. Pt had HD today tolerated well. Paced on the monitor. No complaints of pain.ambulates with assistance. Oliguric . Bed low call light in reach.

## 2018-10-23 NOTE — PROGRESS NOTES
Pt arrived to unit via stretcher. Pt alert & stable. Maintenance pt. Accessed LT AVF X2 15G needles without difficulty. Duration 3.5hrs,Qb 400ml/min, Qd 800ml/min, planned UFG 3.0L, orders reviewed.

## 2018-10-23 NOTE — PROGRESS NOTES
[facility]  Nephrology  Progress Note    Patient Name: Tom Gage  MRN: [id number]  Admission Date: 10/21/2018  Hospital Length of Stay: 2 days  Attending Provider: Laine Bradley MD   Primary Care Physician: Asael Poole MD  Principal Problem:Acute on chronic combined systolic and diastolic congestive heart failure    Subjective:     HPI: Mr. Tom Gage is a 68 y.o. male with recurrent/ chronic R sided pleural effusion (attributed to ESRD/ hypoalbuminemia, with intermittent thoracenteses), ESRD on HD (MWF), recent TAVR on 09/20/18, AICD 09/21/18, combined HF with 30%, CAD s/p NSTEMI in 2012, hx gastric bypass in 2013, who presented to [facility] on 10/21 with worsening SOB of breath over past several days. Troponins  Peaked .73 and today .55   BNP > 4900, CXR w opacification of R hemithorax, L edema, K 5.8 CO2 27, albumin 2.7  He ahs had ongoing w/u pleural taps of effusion and  Appears tob e c/w volume overload and hypoalbuminemia  Today appearing SOB  Placed on NC 02 for dialysis c/o general chest heaviness    Last HD Friday 10/19  He states he diayzes 5-6 hrs on Mondays and 4 hrs wed, fri,  Jerrod Snyder, Dr. Prisca MONZON AVF    14 point review of systems, pertinent positive findings as in HPI           Interval History: Patient evaluated while undergoing hemodialysis indicated for ESRD. Tolerating session with current UFR well, no complications. Patient dialyze again today for continual management of ESRD and volume removal. Cardiology consulted, patient with no substantive change on most recent echo per note. Patient due for iHD tomorrow per OP schedule. No complaints of headaches, SOB, chest pain, abdominal pain, or muscle cramps. Will provide dialysis today for metabolic clearance and volume management. Target UF ~ 3 L today.     Review of patient's allergies indicates:   Allergen Reactions    Asa [aspirin] Other (See Comments)     Retinal bleeding, severe    Latex, natural rubber  "Rash and Blisters     Latex tape causes blisters    Adhesive Dermatitis and Blisters     Please use Paper Tape    Morphine Hives, Itching, Dermatitis and Rash     Body Rash, Phlebitis, "My veins showed through the skin."    Ace inhibitors Other (See Comments)     Other reaction(s): Cough     Current Facility-Administered Medications   Medication Frequency    0.9%  NaCl infusion PRN    0.9%  NaCl infusion PRN    0.9%  NaCl infusion Once    acetaminophen tablet 650 mg Q6H PRN    calcitRIOL capsule 0.5 mcg Daily    calcium carbonate 200 mg calcium (500 mg) chewable tablet 500 mg Daily PRN    carvedilol tablet 25 mg BID WM    dextrose 50% injection 12.5 g PRN    dextrose 50% injection 25 g PRN    furosemide injection 80 mg BID    glucagon (human recombinant) injection 1 mg PRN    glucose chewable tablet 16 g PRN    glucose chewable tablet 24 g PRN    heparin (porcine) injection 5,000 Units Q8H    hydrALAZINE tablet 25 mg Q8H PRN    isosorbide-hydrALAZINE 20-37.5 mg per tablet 1 tablet TID    losartan tablet 50 mg BID    nitroGLYCERIN SL tablet 0.4 mg Q5 Min PRN    promethazine tablet 25 mg Q6H PRN    ramelteon tablet 8 mg Nightly PRN    sevelamer carbonate tablet 1,600 mg TID WM    sodium chloride 0.9% flush 5 mL PRN       Objective:     Vital Signs (Most Recent):  Temp: 97.8 °F (36.6 °C) (10/23/18 0747)  Pulse: 86 (10/23/18 1150)  Resp: 16 (10/23/18 0915)  BP: (!) 137/55 (10/23/18 1150)  SpO2: 96 % (10/23/18 0747)  O2 Device (Oxygen Therapy): room air (10/23/18 0557) Vital Signs (24h Range):  Temp:  [97 °F (36.1 °C)-98.2 °F (36.8 °C)] 97.8 °F (36.6 °C)  Pulse:  [60-87] 86  Resp:  [14-18] 16  SpO2:  [93 %-96 %] 96 %  BP: (137-193)/(55-84) 137/55     Weight: 83 kg (182 lb 15.7 oz) (10/23/18 0700)  Body mass index is 23.49 kg/m².  Body surface area is 2.08 meters squared.    I/O last 3 completed shifts:  In: 1680 [P.O.:880; Other:700; IV Piggyback:100]  Out: 3700 [Other:3700]    Physical Exam "   Constitutional: He is oriented to person, place, and time. He appears well-developed and well-nourished. No distress.   Denies SOB, patient resting comfortably.    HENT:   Head: Normocephalic and atraumatic.   Mouth/Throat: Oropharynx is clear and moist.   Eyes: Right eye exhibits no discharge. Left eye exhibits no discharge. No scleral icterus.   Neck: Normal range of motion. Neck supple. No JVD present. No thyromegaly present.   Cardiovascular: Normal rate, regular rhythm and normal heart sounds.   No murmur heard.  NA AVF good thrill and bruit     Pulmonary/Chest: Effort normal. No respiratory distress. He has no wheezes. He has no rales.   Abdominal: Soft. Bowel sounds are normal. He exhibits no distension and no mass. There is no tenderness.   Musculoskeletal: Normal range of motion. He exhibits edema. He exhibits no tenderness.   BLE pitting edema (+2)   Neurological: He is alert and oriented to person, place, and time.   Skin: Skin is warm and dry. Rash noted. He is not diaphoretic. No erythema. No pallor.   Psychiatric: He has a normal mood and affect. His behavior is normal. Judgment and thought content normal.   Nursing note and vitals reviewed.      Significant Labs:  CBC:   Recent Labs   Lab 10/23/18  0611   WBC 6.83   RBC 2.93*   HGB 9.8*   HCT 32.7*      *   MCH 33.4*   MCHC 30.0*     CMP:   Recent Labs   Lab 10/22/18  1710 10/23/18  0610    87   CALCIUM 8.7 8.7   ALBUMIN 2.9* 2.8*   PROT 6.4  --     137   K 5.1 5.3*   CO2 27 25    103   BUN 26* 30*   CREATININE 3.0* 3.4*   ALKPHOS 153*  --    ALT 13  --    AST 26  --    BILITOT 1.1*  --             Assessment/Plan:     ESRD (end stage renal disease)    Mr. Tom Gage is a 68 y.o. male with recurrent/ chronic R sided pleural effusion (attributed to ESRD/ hypoalbuminemia, with intermittent thoracenteses), ESRD on HD (MWF), recent TAVR on 09/20/18, AICD 09/21/18, combined HF with 30%, CAD s/p NSTEMI in 2012, hx  gastric bypass in 2013, who presented to [facility]ED on 10/21 with worsening SOB of breath over past several days. Troponins  Peaked .73 and today .55, BNP > 4900, CXR w opacification of R hemithorax, L edema, K 5.8 CO2 27, albumin 2.7    Nephrology History  iHD Schedule: MWF  Unit/MD: Kiya Good/ Dr. Cope  Duration: 5-6 hrs  EDW: ?81 kg   Access: NA AVF  Resodial Renal Function: Yes   Last iHD: Friday, 10/19    Will provide dialysis for metabolic clearance and volume management today.   Seen on HD. No complaints.   Target UF ~ 4 L today.   Dialysate adjusted to current labs   Will obtain OP dialysis records  Avoid NSAIDs, contrast, nephrotoxins   Monitor strict I/Os, daily weights  Renally dose medications to current GFR  Will continue to monitor    HTN  Controlled. Continue home BP regimen.   If feasible would hold BP meds prior to HD so we may increase UF goal    Anemia of Chronic Kidney Disease   Hgb 9.8.Goal in ESRD is Hgb of 10-11.    If here longer than 2-3 days and will begin ESTELA    Will review chronic care plan from outpatient dialysis center for ESTELA dosing.       Mineral Bone Disease in CKD   Continue renal diet with 1200 cc fluid restriction. Phos 4.4  Novasource with meals  Continue home phos binder and calcitriol   Daily renal panel so that phos and albumin is monitored daily.            Case discussed with staff further recs with attestation.     I will follow-up with patient. Please contact us if you have any additional questions.    MALISSA Echols DNP, APRN, FNP-C  Department of Nephrology  [facility]  Pager: [phone]

## 2018-10-23 NOTE — PLAN OF CARE
Problem: Patient Care Overview  Goal: Plan of Care Review  Outcome: Ongoing (interventions implemented as appropriate)  Patient remains free from falls and injuries through out shift. Patient AAO and VSS. Patient denies chest pain and SOB. Patient's wife is at bedside. Patient had an elevated SBP of 193 at the beginning of shift and MD was notified. Patient BP resolved with scheduled night time medications. Patient receives dialysis on MWF via left upper arm fistula. Patient attended dialysis on 10/22 and 3L was removed. Patient is scheduled for another HD treatment on this morning (10/23). Last Troponin level 0.550 on 10/22. Bedside ECHO completed on 10/22 and EF 30-35%. Patient may possibly have thoracentesis on Thursday, if still in patient. POC is to cont to remove excess fluid.  EP, Cards and Nephrology are consulted. Plan of care reviewed with patient. Patient verbalizes understanding of plan.  Will continue to monitor.

## 2018-10-23 NOTE — PT/OT/SLP PROGRESS
Physical Therapy      Patient Name:  Tom Gage   MRN:  [id number]    Patient not seen today. On AM attempt, pt out of room for HD. PT unable to return on this date. Will follow-up when appropriate    Kaylie Bach PT, DPT  10/23/2018  [phone]

## 2018-10-23 NOTE — PROGRESS NOTES
[facility]  Cardiology  Progress Note    Patient Name: Tom Gage  MRN: [id number]  Admission Date: 10/21/2018  Hospital Length of Stay: 1 days  Code Status: Full Code   Attending Physician: Maninder Del Rio MD   Primary Care Physician: Asael Poole MD  Expected Discharge Date: 10/26/2018  Principal Problem:Acute on chronic combined systolic and diastolic congestive heart failure    Subjective:     Hospital Course:   10/22: Doing better today post dialysis, Bp still mildly elevated. SOB significantly improved, No chest pain.    Interval History:  Doing better today post dialysis, Bp still mildly elevated. SOB significantly improved, No chest pain.    Review of Systems   Constitution: Negative for chills, decreased appetite and diaphoresis.   HENT: Negative for congestion and ear discharge.    Eyes: Negative for blurred vision and discharge.   Cardiovascular: Negative for chest pain, dyspnea on exertion, irregular heartbeat, leg swelling and paroxysmal nocturnal dyspnea.   Respiratory: Negative for cough, hemoptysis and shortness of breath.    Gastrointestinal: Negative for abdominal pain.        Objective:     Vital Signs (Most Recent):  Temp: 97 °F (36.1 °C) (10/22/18 1619)  Pulse: 66 (10/22/18 1619)  Resp: 18 (10/22/18 1619)  BP: (!) 178/80 (10/22/18 1619)  SpO2: 95 % (10/22/18 1646) Vital Signs (24h Range):  Temp:  [97 °F (36.1 °C)-98.7 °F (37.1 °C)] 97 °F (36.1 °C)  Pulse:  [60-87] 66  Resp:  [18-20] 18  SpO2:  [93 %-100 %] 95 %  BP: (130-195)/(61-88) 178/80     Weight: 84.2 kg (185 lb 10 oz)  Body mass index is 23.83 kg/m².     SpO2: 95 %  O2 Device (Oxygen Therapy): room air      Intake/Output Summary (Last 24 hours) at 10/22/2018 1855  Last data filed at 10/22/2018 1330  Gross per 24 hour   Intake 1200 ml   Output 3700 ml   Net -2500 ml       Lines/Drains/Airways     Drain                 Hemodialysis AV Fistula Left upper arm -- days          Peripheral Intravenous Line                  Peripheral IV - Single Lumen 10/21/18 Right Forearm 1 day                Physical Exam   Constitutional: He is oriented to person, place, and time. He appears well-developed and well-nourished. No distress.   Eyes: Conjunctivae are normal. Pupils are equal, round, and reactive to light.   Neck: No tracheal deviation present. No thyromegaly present.   Cardiovascular: Normal rate, regular rhythm, normal heart sounds and intact distal pulses. Exam reveals no gallop and no friction rub.   No murmur heard.  Pulmonary/Chest: Effort normal. No respiratory distress. He has decreased breath sounds in the right middle field and the right lower field. He has no wheezes. He has rales.   Abdominal: Soft. Bowel sounds are normal. He exhibits no distension. There is no tenderness.   Musculoskeletal: He exhibits no edema or deformity.   Neurological: He is alert and oriented to person, place, and time. No cranial nerve deficit. Coordination normal.   Skin: Skin is warm and dry. He is not diaphoretic.   Psychiatric: He has a normal mood and affect. His behavior is normal.       Significant Labs:   BMP:   Recent Labs   Lab 10/21/18  1923 10/21/18  2329 10/22/18  0639   * 80 117*    135* 134*   K 6.0* 5.6* 5.8*    101 98   CO2 25 23 27   BUN 41* 43* 46*   CREATININE 4.0* 4.1* 4.4*   CALCIUM 8.6* 8.6* 8.6*   , CMP   Recent Labs   Lab 10/21/18  1923 10/21/18  2329 10/22/18  0639    135* 134*   K 6.0* 5.6* 5.8*    101 98   CO2 25 23 27   * 80 117*   BUN 41* 43* 46*   CREATININE 4.0* 4.1* 4.4*   CALCIUM 8.6* 8.6* 8.6*   PROT 6.0  --   --    ALBUMIN 2.7*  --  2.7*   BILITOT 0.9  --   --    ALKPHOS 143*  --   --    AST 38  --   --    ALT 17  --   --    ANIONGAP 11 11 9   ESTGFRAFRICA 16.9* 16.4* 15.1*   EGFRNONAA 14.6* 14.2* 13.0*   , CBC   Recent Labs   Lab 10/21/18  1923   WBC 6.79   HGB 9.5*   HCT 31.8*      , INR No results for input(s): INR, PROTIME in the last 48 hours., Lipid Panel No  results for input(s): CHOL, HDL, LDLCALC, TRIG, CHOLHDL in the last 48 hours. and Troponin   Recent Labs   Lab 10/21/18  1923 10/21/18  2329 10/22/18  0639   TROPONINI 0.661* 0.713* 0.550*       Significant Imaging: Echocardiogram:   2D echo with color flow doppler:   Results for orders placed or performed during the hospital encounter of 10/21/18   2D echo with color flow doppler   Result Value Ref Range    Calculated EF 30 (A) 55 - 65    Mitral Valve Regurgitation MILD     Est. PA Systolic Pressure 55.89 (A)     Mitral Valve Mobility NORMAL     Tricuspid Valve Regurgitation MODERATE TO SEVERE (A)      Assessment and Plan:     Brief HPI: 69 yo M ESRD on HD, HfRef, as s/p TAVR, Recurrent pleural effusions p/w ADHF.    * Acute on chronic combined systolic and diastolic congestive heart failure    - Repeat TTE reviewed EF the same at 30-35%  - Continue Lasix IV and dialysis, volume improving -3L post dialysis     Disorder of implantable defibrillator    Atrial lead capture threshold increased from prior 0.75 V at 0.50 ms to now 1.875V at 0.5 ms  No pericardial effusion on echo or lead movement on CXR  No concern from EP  ICD programming completed  V leads functioning apprpriately  No events noted on interrogation     Hypertensive urgency    He has had 3 falls in the past 2 months and only 1 was positional/while bending over.  His BP regimen has been altered repeatedly over the last few months ultimately resulting in losartan 50 mg po daily and not to be taken in the case the the patient has a BP < 120 SBP  He does not fall when his BP is 140/80s  Losartan 50mg po AM, 25mg po PM  Coreg 25mg po BID              ESRD (end stage renal disease)    Continue HD MWF  HD asap given the above     Hypertensive heart and kidney disease with heart failure    - Likely worsened by vol overload, Would recommend watching BP and adding calcium channel blucker e.g Nifedipine, Amlodipine     Hyperkalemia    Repeat labs         VTE Risk  Mitigation (From admission, onward)        Ordered     heparin (porcine) injection 5,000 Units  Every 8 hours      10/21/18 2226     Place sequential compression device  Until discontinued      10/21/18 2129     IP VTE HIGH RISK PATIENT  Once      10/21/18 2142          Noé Ram MD  Cardiology  [facility]

## 2018-10-23 NOTE — SUBJECTIVE & OBJECTIVE
"Interval History: Patient evaluated while undergoing hemodialysis indicated for ESRD. Tolerating session with current UFR well, no complications. Patient dialyze again today for continual management of ESRD and volume removal. Cardiology consulted, patient with no substantive change on most recent echo per note. Patient due for iHD tomorrow per OP schedule. No complaints of headaches, SOB, chest pain, abdominal pain, or muscle cramps. Will provide dialysis today for metabolic clearance and volume management. Target UF ~ 3 L today.     Review of patient's allergies indicates:   Allergen Reactions    Asa [aspirin] Other (See Comments)     Retinal bleeding, severe    Latex, natural rubber Rash and Blisters     Latex tape causes blisters    Adhesive Dermatitis and Blisters     Please use Paper Tape    Morphine Hives, Itching, Dermatitis and Rash     Body Rash, Phlebitis, "My veins showed through the skin."    Ace inhibitors Other (See Comments)     Other reaction(s): Cough     Current Facility-Administered Medications   Medication Frequency    0.9%  NaCl infusion PRN    0.9%  NaCl infusion PRN    0.9%  NaCl infusion Once    acetaminophen tablet 650 mg Q6H PRN    calcitRIOL capsule 0.5 mcg Daily    calcium carbonate 200 mg calcium (500 mg) chewable tablet 500 mg Daily PRN    carvedilol tablet 25 mg BID WM    dextrose 50% injection 12.5 g PRN    dextrose 50% injection 25 g PRN    furosemide injection 80 mg BID    glucagon (human recombinant) injection 1 mg PRN    glucose chewable tablet 16 g PRN    glucose chewable tablet 24 g PRN    heparin (porcine) injection 5,000 Units Q8H    hydrALAZINE tablet 25 mg Q8H PRN    isosorbide-hydrALAZINE 20-37.5 mg per tablet 1 tablet TID    losartan tablet 50 mg BID    nitroGLYCERIN SL tablet 0.4 mg Q5 Min PRN    promethazine tablet 25 mg Q6H PRN    ramelteon tablet 8 mg Nightly PRN    sevelamer carbonate tablet 1,600 mg TID WM    sodium chloride 0.9% flush 5 " mL PRN       Objective:     Vital Signs (Most Recent):  Temp: 97.8 °F (36.6 °C) (10/23/18 0747)  Pulse: 86 (10/23/18 1150)  Resp: 16 (10/23/18 0915)  BP: (!) 137/55 (10/23/18 1150)  SpO2: 96 % (10/23/18 0747)  O2 Device (Oxygen Therapy): room air (10/23/18 0557) Vital Signs (24h Range):  Temp:  [97 °F (36.1 °C)-98.2 °F (36.8 °C)] 97.8 °F (36.6 °C)  Pulse:  [60-87] 86  Resp:  [14-18] 16  SpO2:  [93 %-96 %] 96 %  BP: (137-193)/(55-84) 137/55     Weight: 83 kg (182 lb 15.7 oz) (10/23/18 0700)  Body mass index is 23.49 kg/m².  Body surface area is 2.08 meters squared.    I/O last 3 completed shifts:  In: 1680 [P.O.:880; Other:700; IV Piggyback:100]  Out: 3700 [Other:3700]    Physical Exam   Constitutional: He is oriented to person, place, and time. He appears well-developed and well-nourished. No distress.   Denies SOB, patient resting comfortably.    HENT:   Head: Normocephalic and atraumatic.   Mouth/Throat: Oropharynx is clear and moist.   Eyes: Right eye exhibits no discharge. Left eye exhibits no discharge. No scleral icterus.   Neck: Normal range of motion. Neck supple. No JVD present. No thyromegaly present.   Cardiovascular: Normal rate, regular rhythm and normal heart sounds.   No murmur heard.  NA AVF good thrill and bruit     Pulmonary/Chest: Effort normal. No respiratory distress. He has no wheezes. He has no rales.   Abdominal: Soft. Bowel sounds are normal. He exhibits no distension and no mass. There is no tenderness.   Musculoskeletal: Normal range of motion. He exhibits edema. He exhibits no tenderness.   BLE pitting edema (+2)   Neurological: He is alert and oriented to person, place, and time.   Skin: Skin is warm and dry. Rash noted. He is not diaphoretic. No erythema. No pallor.   Psychiatric: He has a normal mood and affect. His behavior is normal. Judgment and thought content normal.   Nursing note and vitals reviewed.      Significant Labs:  CBC:   Recent Labs   Lab 10/23/18  0611   WBC 6.83    RBC 2.93*   HGB 9.8*   HCT 32.7*      *   MCH 33.4*   MCHC 30.0*     CMP:   Recent Labs   Lab 10/22/18  1710 10/23/18  0610    87   CALCIUM 8.7 8.7   ALBUMIN 2.9* 2.8*   PROT 6.4  --     137   K 5.1 5.3*   CO2 27 25    103   BUN 26* 30*   CREATININE 3.0* 3.4*   ALKPHOS 153*  --    ALT 13  --    AST 26  --    BILITOT 1.1*  --

## 2018-10-23 NOTE — PROGRESS NOTES
IHD completed, blood returned. Pt alert & stable. Hemostasis 10 minutes, dry sterile dressing with bandaids applied to sites. Thrill/bruit noted. Duration 4.0hrs, Qb 350ml/min, Qd 800ml/min, UFG 4.0L. Report called. Pt transported to [facility] via wheelchair with tele.

## 2018-10-23 NOTE — PT/OT/SLP PROGRESS
Occupational Therapy      Patient Name:  Tom Gage   MRN:  [id number]    OT consult acknowledged and attempted this AM. Pt not seen 2/2 off floor for dialysis. OT unable to return in PM. Will follow-up when appropriate for initial OT eval.    Nancy Dukes OT  10/23/2018

## 2018-10-23 NOTE — ASSESSMENT & PLAN NOTE
"- Patient with history of recurrent right sided pleural effusions s/p drainage most recently 10/15 of 2L, most likely secondary to hypoalbuminemia in setting of protein calorie malnutrition and ESRD now with relatively recently developed CHF, with peripheral edema on exam.    - He is currently taking plavix at home following his TAVR performed last month but is not on aspirin due to an adverse reaction noted as "retinal bleeding". Most recently took plavix on night of 10/20 per patient's wife.  - Following HD yesterday and today he reports that his breathing is now much more comfortable, satting well on room air.  - Continued to stress to patient that we recommend he allow for as much ultrafiltrate removal as he can tolerate during HD sessions and be compliant with dietary fluid restrictions.  - No urgent need for thoracentesis at this time. If he remains inpatient through Thursday (~5 days following last dose of Plavix) then we will perform at thoracentesis but this should not delay his discharge if he is otherwise medically stable.    "

## 2018-10-23 NOTE — ASSESSMENT & PLAN NOTE
Mr. Tom Gage is a 68 y.o. male with recurrent/ chronic R sided pleural effusion (attributed to ESRD/ hypoalbuminemia, with intermittent thoracenteses), ESRD on HD (MWF), recent TAVR on 09/20/18, AICD 09/21/18, combined HF with 30%, CAD s/p NSTEMI in 2012, hx gastric bypass in 2013, who presented to [facility] on 10/21 with worsening SOB of breath over past several days. Troponins  Peaked .73 and today .55, BNP > 4900, CXR w opacification of R hemithorax, L edema, K 5.8 CO2 27, albumin 2.7    Nephrology History  iHD Schedule: MWF  Unit/MD: Kiya Good/ Dr. Cope  Duration: 5-6 hrs  EDW: ?81 kg   Access: NA AVF  Resodial Renal Function: Yes   Last iHD: Friday, 10/19    Will provide dialysis for metabolic clearance and volume management today.   Seen on HD. No complaints.   Target UF ~ 4 L today.   Dialysate adjusted to current labs   Will obtain OP dialysis records  Avoid NSAIDs, contrast, nephrotoxins   Monitor strict I/Os, daily weights  Renally dose medications to current GFR  Will continue to monitor    HTN  Controlled. Continue home BP regimen.   If feasible would hold BP meds prior to HD so we may increase UF goal    Anemia of Chronic Kidney Disease   Hgb 9.8.Goal in ESRD is Hgb of 10-11.    If here longer than 2-3 days and will begin ESTELA    Will review chronic care plan from outpatient dialysis center for ESTELA dosing.       Mineral Bone Disease in CKD   Continue renal diet with 1200 cc fluid restriction. Phos 4.4  Novasource with meals  Continue home phos binder and calcitriol   Daily renal panel so that phos and albumin is monitored daily.

## 2018-10-23 NOTE — PROGRESS NOTES
10/22/18 2007   Vital Signs   BP (!) 193/78   MAP (mmHg) 112   BP Location Right arm   BP Method Automatic   Patient Position Lying     Patient BP noted above. Notified MD Leonor. MD gave orders to gave patient scheduled night time BP medications Losartan 50mg PO and BiDil 20-37.5mg PO and reassess BP in a hour. MD stated that if BP was still elevated to gave PRN 25mg of Hydralazine PO. Upon reassessment of BP @ 2206 patient BP decreased to 159/72. Will cont to monitor patient.

## 2018-10-23 NOTE — PROGRESS NOTES
[facility]  Pulmonology  Progress Note    Patient Name: Tom Gage  MRN: [id number]  Admission Date: 10/21/2018  Hospital Length of Stay: 2 days  Code Status: Full Code  Attending Provider: Laine Bradley MD  Primary Care Provider: Asael Poole MD   Principal Problem: Acute on chronic combined systolic and diastolic congestive heart failure    Subjective:   Interval History: Repeat TTE yesterday demonstrated no significant change in EF (30%). Patient tolerated full HD session yesterday with 3.7L removed. Seen in HD for repeat session again this morning and had 4 more liters UF. Hypertensive overnight to 190s systolic, but asymptomatic. He reports that he is overall feeling much better today as compared to yesterday with increased energy and concentration. No reported events of pain near site of AICD.    Review of Systems   Constitutional: Negative for appetite change, chills, fatigue and fever.   HENT: Negative for sore throat and trouble swallowing.    Respiratory: Negative for shortness of breath (improved). Negative for cough, chest tightness and wheezing.    Cardiovascular: Negative for chest pain, palpitations and leg swelling.   Gastrointestinal: Negative for abdominal pain.   Neurological: Negative for dizziness and weakness.   Psychiatric/Behavioral: Negative for confusion, decreased concentration and sleep disturbance.     Objective:     Vital Signs (Most Recent):  Temp: 97.8 °F (36.6 °C) (10/23/18 0747)  Pulse: 66 (10/23/18 0747)  Resp: 18 (10/23/18 0747)  BP: (!) 162/78 (10/23/18 0747)  SpO2: 96 % (10/23/18 0747) Vital Signs (24h Range):  Temp:  [97 °F (36.1 °C)-98.2 °F (36.8 °C)] 97.8 °F (36.6 °C)  Pulse:  [60-87] 66  Resp:  [14-18] 18  SpO2:  [93 %-96 %] 96 %  BP: (141-193)/(64-83) 162/78     Weight: 83 kg (182 lb 15.7 oz)  Body mass index is 23.49 kg/m².      Intake/Output Summary (Last 24 hours) at 10/23/2018 0911  Last data filed at 10/23/2018 0600  Gross per 24 hour   Intake  1180 ml   Output 3700 ml   Net -2520 ml       Physical Exam   Constitutional: He is oriented to person, place, and time. He appears well-developed and well-nourished. No distress.   HENT:   Mouth/Throat: Oropharynx is clear and moist. No oropharyngeal exudate.   Eyes: EOM are normal. Right eye exhibits no discharge. Left eye exhibits no discharge.   Neck: Normal range of motion. Neck supple. JVD present.   Cardiovascular: Normal rate and intact distal pulses.   Murmur heard.  Pulmonary/Chest: Effort normal. No accessory muscle usage. No tachypnea and no bradypnea. No respiratory distress (on room air). He has decreased breath sounds in the right middle field and the right lower field. He has rales in the right middle field, the right lower field and the left lower field.   Abdominal: Soft. Bowel sounds are normal. He exhibits no distension. There is no tenderness. There is no guarding.   Musculoskeletal: Normal range of motion. He exhibits edema (1+ pitting in RLE, improved from prior exam). He exhibits no tenderness or deformity.   Neurological: He is alert and oriented to person, place, and time.   Skin: Skin is warm and dry.   Psychiatric: He has a normal mood and affect. His speech is normal and behavior is normal. Judgment and thought content normal. Cognition and memory are normal. He is attentive.   Nursing note and vitals reviewed.         Lines/Drains/Airways     Drain                 Hemodialysis AV Fistula Left upper arm -- days          Peripheral Intravenous Line                 Peripheral IV - Single Lumen 10/21/18 Right Forearm 2 days                Significant Labs:    CBC/Anemia Profile:  Recent Labs   Lab 10/21/18  1923 10/23/18  0611   WBC 6.79 6.83   HGB 9.5* 9.8*   HCT 31.8* 32.7*    159   * 112*   RDW 15.2* 15.3*        Chemistries:  Recent Labs   Lab 10/21/18  1923  10/22/18  0639 10/22/18  1710 10/23/18  0610      < > 134* 137 137   K 6.0*   < > 5.8* 5.1 5.3*      <  "> 98 102 103   CO2 25   < > 27 27 25   BUN 41*   < > 46* 26* 30*   CREATININE 4.0*   < > 4.4* 3.0* 3.4*   CALCIUM 8.6*   < > 8.6* 8.7 8.7   ALBUMIN 2.7*  --  2.7* 2.9* 2.8*   PROT 6.0  --   --  6.4  --    BILITOT 0.9  --   --  1.1*  --    ALKPHOS 143*  --   --  153*  --    ALT 17  --   --  13  --    AST 38  --   --  26  --    PHOS  --   --  5.2*  --  4.4    < > = values in this interval not displayed.       Significant Imaging:   I have reviewed all pertinent imaging results/findings within the past 24 hours.   X-Ray Chest AP 10/22/18: Continued significant opacification of the right hemithorax.  The left lung is clear.    2d echo with color flow doppler 10/22/18:    1 - Severe left atrial enlargement.     2 - Eccentric hypertrophy.     3 - Moderately depressed left ventricular systolic function (EF 30-35%).     4 - Right ventricular enlargement with normal systolic function.     5 - Mild mitral regurgitation.     6 - Moderate to severe tricuspid regurgitation.     7 - Pulmonary hypertension. The estimated PA systolic pressure is 56 mmHg.     Assessment/Plan:     Pleural effusion on right    - Patient with history of recurrent right sided pleural effusions s/p drainage most recently 10/15 of 2L, most likely secondary to hypoalbuminemia in setting of protein calorie malnutrition and ESRD now with relatively recently developed CHF, with peripheral edema on exam.    - He is currently taking plavix at home following his TAVR performed last month but is not on aspirin due to an adverse reaction noted as "retinal bleeding". Most recently took plavix on night of 10/20 per patient's wife.  - Following HD yesterday and today he reports that his breathing is now much more comfortable, satting well on room air.  - Continued to stress to patient that we recommend he allow for as much ultrafiltrate removal as he can tolerate during HD sessions and be compliant with dietary fluid restrictions.  - No urgent need for thoracentesis " at this time. If he remains inpatient through Thursday (~5 days following last dose of Plavix) then we will perform at thoracentesis but this should not delay his discharge if he is otherwise medically stable.              French Lpoez MD PGY-1  Pulmonology  [facility]

## 2018-10-23 NOTE — ASSESSMENT & PLAN NOTE
- Likely worsened by vol overload, Would recommend watching BP and adding calcium channel blucker e.g Nifedipine, Amlodipine

## 2018-10-24 LAB
ALBUMIN SERPL BCP-MCNC: 2.5 G/DL
ANION GAP SERPL CALC-SCNC: 9 MMOL/L
BUN SERPL-MCNC: 27 MG/DL
CALCIUM SERPL-MCNC: 8.3 MG/DL
CHLORIDE SERPL-SCNC: 105 MMOL/L
CO2 SERPL-SCNC: 22 MMOL/L
CREAT SERPL-MCNC: 3.2 MG/DL
EST. GFR  (AFRICAN AMERICAN): 22.1 ML/MIN/1.73 M^2
EST. GFR  (NON AFRICAN AMERICAN): 19.1 ML/MIN/1.73 M^2
GLUCOSE SERPL-MCNC: 63 MG/DL
PHOSPHATE SERPL-MCNC: 3.7 MG/DL
POTASSIUM SERPL-SCNC: 4.9 MMOL/L
SODIUM SERPL-SCNC: 136 MMOL/L

## 2018-10-24 PROCEDURE — G8978 MOBILITY CURRENT STATUS: HCPCS | Mod: CK,NTX

## 2018-10-24 PROCEDURE — 94761 N-INVAS EAR/PLS OXIMETRY MLT: CPT | Mod: NTX

## 2018-10-24 PROCEDURE — 36415 COLL VENOUS BLD VENIPUNCTURE: CPT | Mod: NTX

## 2018-10-24 PROCEDURE — 25000003 PHARM REV CODE 250: Mod: NTX | Performed by: NURSE PRACTITIONER

## 2018-10-24 PROCEDURE — 97165 OT EVAL LOW COMPLEX 30 MIN: CPT | Mod: NTX

## 2018-10-24 PROCEDURE — 80069 RENAL FUNCTION PANEL: CPT | Mod: NTX

## 2018-10-24 PROCEDURE — 90935 HEMODIALYSIS ONE EVALUATION: CPT | Mod: NTX

## 2018-10-24 PROCEDURE — 25000003 PHARM REV CODE 250: Mod: NTX | Performed by: HOSPITALIST

## 2018-10-24 PROCEDURE — 99232 SBSQ HOSP IP/OBS MODERATE 35: CPT | Mod: NTX,,, | Performed by: HOSPITALIST

## 2018-10-24 PROCEDURE — G8980 MOBILITY D/C STATUS: HCPCS | Mod: CK,NTX

## 2018-10-24 PROCEDURE — G8979 MOBILITY GOAL STATUS: HCPCS | Mod: CJ,NTX

## 2018-10-24 PROCEDURE — 20600001 HC STEP DOWN PRIVATE ROOM: Mod: NTX

## 2018-10-24 PROCEDURE — 97161 PT EVAL LOW COMPLEX 20 MIN: CPT | Mod: NTX

## 2018-10-24 PROCEDURE — 97116 GAIT TRAINING THERAPY: CPT | Mod: NTX

## 2018-10-24 PROCEDURE — 90935 HEMODIALYSIS ONE EVALUATION: CPT | Mod: NTX,,, | Performed by: INTERNAL MEDICINE

## 2018-10-24 RX ORDER — AMOXICILLIN 500 MG/1
500 CAPSULE ORAL EVERY 8 HOURS
Status: DISCONTINUED | OUTPATIENT
Start: 2018-10-25 | End: 2018-10-24

## 2018-10-24 RX ORDER — AMOXICILLIN 500 MG/1
500 CAPSULE ORAL DAILY
Status: DISCONTINUED | OUTPATIENT
Start: 2018-10-25 | End: 2018-10-25

## 2018-10-24 RX ORDER — TRAMADOL HYDROCHLORIDE 50 MG/1
50 TABLET ORAL ONCE
Status: COMPLETED | OUTPATIENT
Start: 2018-10-24 | End: 2018-10-24

## 2018-10-24 RX ORDER — AMOXICILLIN 500 MG/1
1000 CAPSULE ORAL ONCE
Status: COMPLETED | OUTPATIENT
Start: 2018-10-24 | End: 2018-10-24

## 2018-10-24 RX ADMIN — LOSARTAN POTASSIUM 50 MG: 50 TABLET, FILM COATED ORAL at 12:10

## 2018-10-24 RX ADMIN — CALCITRIOL 0.5 MCG: 0.25 CAPSULE ORAL at 12:10

## 2018-10-24 RX ADMIN — LOSARTAN POTASSIUM 50 MG: 50 TABLET, FILM COATED ORAL at 08:10

## 2018-10-24 RX ADMIN — PROMETHAZINE HYDROCHLORIDE 25 MG: 12.5 TABLET ORAL at 07:10

## 2018-10-24 RX ADMIN — SEVELAMER CARBONATE 1600 MG: 800 TABLET, FILM COATED ORAL at 06:10

## 2018-10-24 RX ADMIN — HYDRALAZINE HYDROCHLORIDE AND ISOSORBIDE DINITRATE 1 TABLET: 37.5; 2 TABLET, FILM COATED ORAL at 12:10

## 2018-10-24 RX ADMIN — TRAMADOL HYDROCHLORIDE 50 MG: 50 TABLET, FILM COATED ORAL at 11:10

## 2018-10-24 RX ADMIN — AMOXICILLIN 1000 MG: 500 CAPSULE ORAL at 12:10

## 2018-10-24 RX ADMIN — CARVEDILOL 25 MG: 12.5 TABLET, FILM COATED ORAL at 06:10

## 2018-10-24 RX ADMIN — SODIUM CHLORIDE: 0.9 INJECTION, SOLUTION INTRAVENOUS at 09:10

## 2018-10-24 RX ADMIN — SEVELAMER CARBONATE 1600 MG: 800 TABLET, FILM COATED ORAL at 12:10

## 2018-10-24 RX ADMIN — HYDRALAZINE HYDROCHLORIDE 25 MG: 25 TABLET ORAL at 12:10

## 2018-10-24 RX ADMIN — HYDRALAZINE HYDROCHLORIDE AND ISOSORBIDE DINITRATE 1 TABLET: 37.5; 2 TABLET, FILM COATED ORAL at 08:10

## 2018-10-24 NOTE — PT/OT/SLP EVAL
Physical Therapy Evaluation    Patient Name:  Tom Gage   MRN:  [id number]    Recommendations:     Discharge Recommendations:  home with home health(pt would benefit from cardiac rehab )   Discharge Equipment Recommendations: none   Barriers to discharge: None    Assessment:     Tom Gage is a 68 y.o. male admitted with a medical diagnosis of Acute on chronic combined systolic and diastolic congestive heart failure.  He presents with the following impairments/functional limitations:  weakness, impaired self care skills, impaired functional mobilty, gait instability, impaired balance, impaired cardiopulmonary response to activity. Pt tolerated activity with increase in fatigue and SOB. Pt mobility limited by increased weakness, decreased balance with ambulation. Pt with recent history of frequent falls, requires increased assistance with mobility. Pt would continue to benefit from acute skilled therapy intervention to address deficits and progress toward prior level of function.       Rehab Prognosis:  good; patient would benefit from acute skilled PT services to address these deficits and reach maximum level of function.      Recent Surgery: * No surgery found *      Plan:     During this hospitalization, patient to be seen 3 x/week to address the above listed problems via gait training, therapeutic activities, therapeutic exercises, neuromuscular re-education  · Plan of Care Expires:  11/24/18   Plan of Care Reviewed with: patient, spouse    Subjective     Communicated with RN prior to session.  Patient found sitting in chair upon PT entry to room, agreeable to evaluation.      Chief Complaint: decreased balance, increased weakness with mobility   Patient comments/goals: to get better and return home   Pain/Comfort:  · Pain Rating 1: 0/10  · Pain Rating Post-Intervention 1: 0/10    Patients cultural, spiritual, Nondenominational conflicts given the current situation: none stated    Living Environment:  Pt lives  with wife in [facility] with no ELVI, tub shower and walk in shower   Prior to admission, patients level of function was modified independent with use of rollator within the home, uses wheelchair at community level. Wife explains that pt has been experiencing progressing weakness with multiple hospital admissions. Pt has experienced 3 falls in the past 2 months, all associated with LOB.  Patient has the following equipment: grab bar, bedside commode, rollator, bath bench, wheelchair.  DME owned (not currently used): none.  Upon discharge, patient will have assistance from wife.    Objective:     Patient found with: telemetry     General Precautions: Standard, fall   Orthopedic Precautions:N/A   Braces: N/A     Exams:  · Cognitive Exam:  Patient oriented to person, required cuing to orient to location and month/year   · Gross Motor Coordination:  WFL  · RLE ROM: WFL  · RLE Strength: WFL  · LLE ROM: WFL  · LLE Strength: WFL    Functional Mobility:  · Bed Mobility:  NT 2/2 pt UIC upon arrival   · Transfers:     · Sit to Stand:  Stand from chair: CGA; stand from toilet: min A   · Gait: Pt ambulated ~100 feet with rollator with CGA, no LOB, no rest breaks. Pt reported increase in fatigue and SOB. Pt demo'd flexed posture, decreased alexys, decreased step size, decreased foot clearance, increased lateral sway.     AM-PAC 6 CLICK MOBILITY  Total Score:18       Therapeutic Activities and Exercises:   Pt educated on role of PT/POC, safety with mobility.       Patient left up in chair with all lines intact and call button in reach.    GOALS:   Multidisciplinary Problems     Physical Therapy Goals        Problem: Physical Therapy Goal    Goal Priority Disciplines Outcome Goal Variances Interventions   Physical Therapy Goal     PT, PT/OT Ongoing (interventions implemented as appropriate)     Description:  Goals to be met by: 2018     Patient will increase functional independence with mobility by performin. Supine to sit  with Modified [place]  2. Sit to stand transfer with Supervision  3. Gait  x 200 feet with Supervision using rollator.   4. Lower extremity exercise program x15 reps per handout, with assistance as needed                      History:     Past Medical History:   Diagnosis Date    AICD (automatic cardioverter/defibrillator) present     9/21/2018    Anemia of chronic renal failure, stage 5     BPH (benign prostatic hyperplasia)     CAD (coronary artery disease)     NSTEMI in 2012    CHF (congestive heart failure)     EF 30%    Chronic systolic heart failure 5/31/2018    Diastolic dysfunction 12/16/2016    Dilated cardiomyopathy 5/7/2018    ESRD (end stage renal disease) on dialysis     MWF    GERD (gastroesophageal reflux disease)     Hyperparathyroidism, secondary renal     Hypertension     Metabolic acidosis     MI (myocardial infarction) Feb 2012    Non-rheumatic mitral regurgitation 5/31/2018    Nonrheumatic aortic valve stenosis 5/31/2018    Pleural effusion on right     Pulmonary hypertension 12/16/2016    S/P TAVR (transcatheter aortic valve replacement)     9/20/18    Status post gastric bypass for obesity     2013    Stenosis of aortic and mitral valves     Aortic stenosis    TIA (transient ischemic attack)     Type 2 diabetes mellitus with diabetic nephropathy     history/ before wt loss    Valvular regurgitation     mitral regurgitation       Past Surgical History:   Procedure Laterality Date    ABDOMINAL SURGERY      PD catheter    AORTIC VALVE REPLACEMENT N/A 9/20/2018    Procedure: Replacement-valve-aortic;  Surgeon: Refugio Cooney MD;  Location: [facility] CATH LAB;  Service: Cardiology;  Laterality: N/A;    ASPIRATION-GROIN N/A 9/9/2016    Per[facility]    BASAL CELL CARCINOMA EXCISION Left Jan 2011    left forehead    BAV N/A 7/12/2018    Performed by Loyd Ulloa MD at [facility] CATH LAB    CARDIAC DEFIBRILLATOR PLACEMENT   09/21/2018    CARDIAC ELECTROPHYSIOLOGY STUDY N/A 9/21/2018    Procedure: CARDIAC ELECTROPHYSIOLOGY STUDY;  Surgeon: Theron Mcmanus MD;  Location: [facility] CATH LAB;  Service: Cardiology;  Laterality: N/A;  s/p TAVR, CM, EPS +/- BiV ICD, SJM, anes, GP    CARDIAC ELECTROPHYSIOLOGY STUDY N/A 9/21/2018    Performed by Theron Mcmanus MD at [facility] CATH LAB    CHOLECYSTECTOMY  July 2010    ELBOW SURGERY Left 3/18/14    anterior submuscular transposition, ulnar nerve    EYE SURGERY Bilateral     bilateral cataracts    GASTRECTOMY      GASTRIC BYPASS  May 2014    gastric sleeve  June 2013    Malfunctioned requiring bypass    HEART CATH-RIGHT Right 1/20/2017    Performed by Ron Bernstein Jr., MD at [facility] CATH LAB    HERNIA REPAIR      INSERTION-PERITONEAL DIALYSIS CATHETER-LAPAROSCOPIC N/A 3/28/2016    Performed by Owen R[facility]os [facility] at [facility] OR 2ND FLR    LAPAROSCOPY-DIAGNOSTIC N/A 3/28/2016    Performed by Owen R[facility]os [facility] at [facility] OR 2ND FLR    REMOVAL-CATHETER-DIALYSIS-PERITONEAL N/A 9/20/2017    Performed by Owen R[facility] at [facility] OR 2ND FLR    REPAIR-HERNIA-LAPAROSCOPIC  3/28/2016    Performed by Owen R[facility] at [facility] OR 2ND FLR    REPAIR-HERNIA-LAPAROSCOPIC-INGUINAL Bilateral 3/28/2016    Performed by Owen [facility] at [facility] OR 2ND FLR    Replacement-valve-aortic N/A 9/20/2018    Performed by Refugio Cooney MD at [facility] CATH LAB    REVISION PERITONEAL DIALYSIS CATHETER-LAPAROSCOPIC N/A 5/18/2016    Performed by Owen R[facility]os [facility] at [facility] OR 2ND FLR    ROTATOR CUFF REPAIR Left 2014    SHOULDER ARTHROSCOPY Left 11/18/14    RCR; glenoid labral repair; arch decompression       Clinical Decision Making:     History  Co-morbidities and personal factors that may impact the plan of care Examination  Body Structures and Functions, activity limitations and participation restrictions that may impact the plan of care Clinical Presentation   Decision  Making/ Complexity Score   Co-morbidities:   [] Time since onset of injury / illness / exacerbation  [] Status of current condition  []Patient's cognitive status and safety concerns    [] Multiple Medical Problems (see med hx)  Personal Factors:   [] Patient's age  [] Prior Level of function   [] Patient's home situation (environment and family support)  [] Patient's level of motivation  [] Expected progression of patient      HISTORY:(criteria)    [] 26678 - no personal factors/history    [] 92683 - has 1-2 personal factor/comorbidity     [] 81945 - has >3 personal factor/comorbidity     Body Regions:  [] Objective examination findings  [] Head     []  Neck  [] Trunk   [] Upper Extremity  [] Lower Extremity    Body Systems:  [] For communication ability, affect, cognition, language, and learning style: the assessment of the ability to make needs known, consciousness, orientation (person, place, and time), expected emotional /behavioral responses, and learning preferences (eg, learning barriers, education  needs)  [] For the neuromuscular system: a general assessment of gross coordinated movement (eg, balance, gait, locomotion, transfers, and transitions) and motor function  (motor control and motor learning)  [] For the musculoskeletal system: the assessment of gross symmetry, gross range of motion, gross strength, height, and weight  [] For the integumentary system: the assessment of pliability(texture), presence of scar formation, skin color, and skin integrity  [] For cardiovascular/pulmonary system: the assessment of heart rate, respiratory rate, blood pressure, and edema     Activity limitations:    [] Patient's cognitive status and saf ety concerns          [] Status of current condition      [] Weight bearing restriction  [] Cardiopulmunary Restriction    Participation Restrictions:   [] Goals and goal agreement with the patient     [] Rehab potential (prognosis) and probable outcome      Examination of Body  System: (criteria)    [] 58283 - addressing 1-2 elements    [] 55712 - addressing a total of 3 or more elements     [] 28685 -  Addressing a total of 4 or more elements         Clinical Presentation: (criteria)  Choose one     On examination of body system using standardized tests and measures patient presents with 1-2 elements from any of the following: body structures and functions, activity limitations, and/or participation restrictions.  Leading to a clinical presentation that is considered stable and/or uncomplicated                              Clinical Decision Making  (Eval Complexity):  Low- 65627     Time Tracking:     PT Received On: 10/24/18  PT Start Time: 1342     PT Stop Time: 1404  PT Total Time (min): 22 min     Billable Minutes: Evaluation 12 mins  and Gait Training 10 mins       Roxie Chavez, PT  10/24/2018

## 2018-10-24 NOTE — PT/OT/SLP EVAL
Occupational Therapy   Evaluation    Name: Tom Gage  MRN: [id number]  Admitting Diagnosis:  Acute on chronic combined systolic and diastolic congestive heart failure      Recommendations:     Discharge Recommendations: home with home health(pt would benefit from cardiac rehab)  Discharge Equipment Recommendations:  none  Barriers to discharge:  None    History:     Occupational Profile:  Living Environment: Pt reports he lives with his wife in a H with no ELVI. Pt has both a tub/shower combo and walk-in shower with TTB. Pt reports he has had ~3 falls within the last 3 months due to imbalance and gait instability. Pt was receiving  PT prior to current hospital admission. Pt's wife is able to provide transportation.   Previous level of function: PTA, pt was (I) with ADLs occasionally requiring assistance from wife as needed. PTA, pt was mod (I) for functional mobility using rollator within the home environment and w/c for community mobility.   Roles and Routines: , retired   Equipment Used at Home:  grab bar, bedside commode, rollator, raised toilet, bath bench, wheelchair  Assistance upon Discharge: Pt reports his wife is able to assist pt upon d/c but unable to provide a lot of physical assistance.     Past Medical History:   Diagnosis Date    AICD (automatic cardioverter/defibrillator) present     9/21/2018    Anemia of chronic renal failure, stage 5     BPH (benign prostatic hyperplasia)     CAD (coronary artery disease)     NSTEMI in 2012    CHF (congestive heart failure)     EF 30%    Chronic systolic heart failure 5/31/2018    Diastolic dysfunction 12/16/2016    Dilated cardiomyopathy 5/7/2018    ESRD (end stage renal disease) on dialysis     MWF    GERD (gastroesophageal reflux disease)     Hyperparathyroidism, secondary renal     Hypertension     Metabolic acidosis     MI (myocardial infarction) Feb 2012    Non-rheumatic mitral regurgitation 5/31/2018    Nonrheumatic aortic valve  stenosis 5/31/2018    Pleural effusion on right     Pulmonary hypertension 12/16/2016    S/P TAVR (transcatheter aortic valve replacement)     9/20/18    Status post gastric bypass for obesity     2013    Stenosis of aortic and mitral valves     Aortic stenosis    TIA (transient ischemic attack)     Type 2 diabetes mellitus with diabetic nephropathy     history/ before wt loss    Valvular regurgitation     mitral regurgitation       Past Surgical History:   Procedure Laterality Date    ABDOMINAL SURGERY      PD catheter    AORTIC VALVE REPLACEMENT N/A 9/20/2018    Procedure: Replacement-valve-aortic;  Surgeon: Refugio Cooney MD;  Location: [facility] CATH LAB;  Service: Cardiology;  Laterality: N/A;    ASPIRATION-GROIN N/A 9/9/2016    Performed by [facility] OR    BASAL CELL CARCINOMA EXCISION Left Jan 2011    left forehead    BAV N/A 7/12/2018    Performed by Loyd Ulloa MD at [facility] CATH LAB    CARDIAC DEFIBRILLATOR PLACEMENT  09/21/2018    CARDIAC ELECTROPHYSIOLOGY STUDY N/A 9/21/2018    Procedure: CARDIAC ELECTROPHYSIOLOGY STUDY;  Surgeon: Theron Mcmanus MD;  Location: [facility] CATH LAB;  Service: Cardiology;  Laterality: N/A;  s/p TAVR, CM, EPS +/- BiV ICD, SJM, anes, GP    CARDIAC ELECTROPHYSIOLOGY STUDY N/A 9/21/2018    Performed by Theron Mcmanus MD at [facility] CATH LAB    CHOLECYSTECTOMY  July 2010    ELBOW SURGERY Left 3/18/14    anterior submuscular transposition, ulnar nerve    EYE SURGERY Bilateral     bilateral cataracts    GASTRECTOMY      GASTRIC BYPASS  May 2014    gastric sleeve  June 2013    Malfunctioned requiring bypass    HEART CATH-RIGHT Right 1/20/2017    Performed by Ron Bernstein Jr., MD at [facility] CATH LAB    HERNIA REPAIR      INSERTION-PERITONEAL DIALYSIS CATHETER-LAPAROSCOPIC N/A 3/28/2016    Performed by Owen Ríos MD at [facility] OR 2ND FLR    LAPAROSCOPY-DIAGNOSTIC N/A 3/28/2016    Performed by Owen Ríos MD at [facility]  OR 2ND FLR    REMOVAL-CATHETER-DIALYSIS-PERITONEAL N/A 9/20/2017    Performed by [facility] at [facility] OR 2ND FLR    REPAIR-HERNIA-LAPAROSCOPIC  3/28/2016    Performed by [facility] at [facility] OR 2ND FLR    REPAIR-HERNIA-LAPAROSCOPIC-INGUINAL Bilateral 3/28/2016    Performed by [facility] at [facility] OR 2ND FLR    Replacement-valve-aortic N/A 9/20/2018    Performed by Ref[facility]io Cooney MD at [facility] CATH LAB    REVISION PERITONEAL DIALYSIS CATHETER-LAPAROSCOPIC N/A 5/18/2016    Performed by Owen [facility] MD at [facility] OR 2ND FLR    ROTATOR CUFF REPAIR Left 2014    SHOULDER ARTHROSCOPY Left 11/18/14    RCR; glenoid labral repair; arch decompression       Subjective     Chief Complaint: gait instability, weakness  Patient/Family Comments/goals: to get better and return home    Pain/Comfort:  · Pain Rating 1: 0/10  · Pain Rating Post-Intervention 1: 0/10    Patients cultural, spiritual, Taoism conflicts given the current situation: none stated     Objective:     Communicated with: RN prior to session.  Patient found sitting UIC eating lunch with wife present  and telemetry upon OT entry to room. Pt agreeable to therapy session. Co-eval with PT.     General Precautions: Standard, fall   Orthopedic Precautions:N/A   Braces: N/A     Occupational Performance:    Bed Mobility:    · Not performed this date as pt found seated UIC.     Functional Mobility/Transfers:  · Patient completed Sit <> Stand Transfer from  with minimum assistance  with  4 wheeled walker   · Patient completed Toilet Transfer functional ambulation with step trransfer technique with minimum assistance  with  4 wheeled walker and grab bars  Functional Mobility: Pt engaged in functional mobility simulating household/community mobility with CGA using rollator.   · Pt completed ~150ft within hallway  · No significance LOB noted.  · Pt c/o fatigue and weakness during functional mobility     Activities of Daily  "Living:  · Upper Body Dressing: pt found dressed in personal robe with assistance from spouse     · Lower Body Dressing: pt found with tennis shoes donned upon OT entry. Pt able to bring LEs into figure 4 position to pull up socks with (S).     · Toileting: minimum assistance simulated     Cognitive/Visual Perceptual:  Cognitive/Psychosocial Skills:     -       Oriented to: Person, Time, Situation and pt required cueing for place, pt originally stated "[facility]"    -       Follows Commands/attention:Follows multistep  commands  -       Communication: clear/fluent  -       Memory: mild deficits noted with STM   -       Safety awareness/insight to disability: impaired   -       Mood/Affect/Coping skills/emotional control: Appropriate to situation  Visual/Perceptual:      -not formally assesed       Physical Exam:  Balance:    - Static sit: (S)  -  Dynamic sit: (S)  - Static standing: SBA  - Dynamic Standing: SBA <>CGA    Postural examination/scapula alignment:    -       Rounded shoulders  Skin integrity: Visible skin intact and Dry  Edema:  None noted  Sensation:    -       Intact for BUEs  Dominant hand:    -       right  Upper Extremity Range of Motion:     -       Right Upper Extremity: WFL of pacemaker parameters   -       Left Upper Extremity: WFL      Upper Extremity Strength:    -       Right Upper Extremity: WFL of pacemaker parameters   -       Left Upper Extremity: WFL     Strength:    -       Right Upper Extremity: WFL    -       Left Upper Extremity: WFL      AMPAC 6 Click ADL:  AMPAC Total Score: 21    Treatment & Education:  Pt educated by therapist on:   - Pt educated on role of OT, POC, and goals for therapy.    - Patient and family aware of patient's deficits and therapy progression.   - Educated pt on being appropriate to transfer with nsg and PCT x 1 person assist with min A  - Time provided for therapeutic counseling and discussion of health disposition.   -Pt and spouse " "educated on home modifications in order to improve pt's functional (I)ce with sit <>stand transfer (i.e. elevating sitting surfaces)   - Pt owns all necessary DME in place.   - Importance of OOB ax's with staff member assistance and sitting OOB majority of day.   - MD Bradley notified of d/c recommendations and potential initiation of cardiac rehab post acute care setting.   - Pt completed ADLs and functional mobility for treatment session as noted above   - Pt verbalized understanding. Pt expressed no further concerns/questions.  - whiteboard updated   Education:    Patient left up in chair with all lines intact, call button in reach, RN notified and spouse  present    Assessment:     Tom Gage is a 68 y.o. 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  He presents with the following performance deficits affecting function: weakness, gait instability, impaired endurance, impaired self care skills, impaired functional mobilty, impaired balance, impaired cardiopulmonary response to activity, decreased lower extremity function.  Pt presents with decreased endurance/strength which inhibits pt's overall safety and (I) to successfully perform self-care tasks and functional mobility. Pt will continue to benefit from skilled OT in order to address the previously stated deficits and improve overall return to PLOF.     Rehab Prognosis: Good; patient would benefit from acute skilled OT services to address these deficits and reach maximum level of function.         Clinical Decision Makin.  OT Low:  "Pt evaluation falls under low complexity for evaluation coding due to performance deficits noted in 1-3 areas as stated above and 0 co-morbities affecting current functional status. Data obtained from problem focused assessments. No modifications or assistance was required for completion of evaluation. Only brief occupational profile and history review " "completed."     Plan:     Patient to be seen 3 x/week to address the above listed problems via self-care/home management, therapeutic activities, therapeutic exercises  · Plan of Care Expires: 11/22/18  · Plan of Care Reviewed with: patient, spouse    This Plan of care has been discussed with the patient who was involved in its development and understands and is in agreement with the identified goals and treatment plan    GOALS:   Multidisciplinary Problems     Occupational Therapy Goals     Not on file                Time Tracking:     OT Date of Treatment: 10/24/18  OT Start Time: 1342  OT Stop Time: 1405  OT Total Time (min): 23 min    Billable Minutes:Evaluation 23    Mercedez Cosme, OT  10/24/2018    "

## 2018-10-24 NOTE — PROGRESS NOTES
Pharmacist Renal Dose Adjustment Note    Tom Gage is a 68 y.o. male being treated with amoxicillin for cracked tooth    Patient Data:    Vital Signs (Most Recent):  Temp: 97.6 °F (36.4 °C) (10/24/18 1225)  Pulse: 78 (10/24/18 1400)  Resp: 18 (10/24/18 1225)  BP: 138/66 (10/24/18 1334)  SpO2: 97 % (10/24/18 1225) Vital Signs (72h Range):  Temp:  [96.7 °F (35.9 °C)-98.7 °F (37.1 °C)]   Pulse:  [60-87]   Resp:  [14-20]   BP: ()/(52-88)   SpO2:  [90 %-100 %]      Recent Labs   Lab 10/22/18  1710 10/23/18  0610 10/24/18  0440   CREATININE 3.0* 3.4* 3.2*     Serum creatinine: 3.2 mg/dL (H) 10/24/18 0440  Estimated creatinine clearance: 26.3 mL/min (A)      Pt receiving HD- will dose adjust to 500 mg daily.      Mary Elizabeth, PharmD, BCPS  Clinical Pharmacy Specialist - Cardiology  Spectra link: [phone]

## 2018-10-24 NOTE — PHYSICIAN QUERY
PT Name: Tom Gage  MR #: [id number]    Physician Query Form - Respiratory Condition Clarification      CDS/: Elli Valle               Contact information:  [email or web address]     This form is a permanent document in the medical record.    Query Date: October 24, 2018    By submitting this query, we are merely seeking further clarification of documentation. Please utilize your independent clinical judgment when addressing the question(s) below.    The Medical record contains the following   Indicators   Supporting Clinical Findings Location in Medical Record   x   SOB, DUFFY, Wheezing, Productive Cough, Use of Accessory Muscles, etc. Worsening SOB of breath over past several days,   H&P   x   Acute/Chronic Illness Acute on chronic combined systolic and diastolic congestive heart failue H&P      Radiology Findings     x   Respiratory Distress or Failure Acute respiratory failure with hypoxia     Severe Respiratory Distress      H&P      Hypoxia or Hypercapnia     x   RR         ABGs         O2 sat RR=14 to 20  O2 sat=90 to 95% on room air Vs record 10-21 to 10-24      BiPAP/Intubation        Supplemental O2        Home O2, Oxygen Dependence        Treatment     x   Other Acute respiratory failure with hypoxia   Pleural effusion on right   - chronic and recurrent, thought to be 2/2 HF and ESRD as well as malnutrition   - on 2L O2, satting 100%  - diuresis with 80IV lasix BID    H&P     Respiratory failure can be acute, chronic or both. It is generally further specificed as hypoxic, hypercapnic or both. Lastly, it is important to identify an etiology, if known or suspected.   References:: https://www.acphospitalist.org/archives/[email or web address]/10/coding; htm; http://[employer]/acute-respiratory-failure-know    The clinical guidelines noted below are only system guidelines, and do not replace the providers clinical judgment.    Provider, please specify diagnosis or diagnoses associated with  above clinical findings.     [  x  ] Acute Respiratory Failure with Hypoxia - ABG pO2 < 60 mmHg or O2 sat of 88% on RA and respiratory symptoms documented    [    ] Acute Respiratory Insufficiency - Generally describes less severe respiratory symptoms and measurements (pO2, SpO2, pH, and pCO2) NOT meeting criteria for respiratory failure      [    ] Other Respiratory Diagnosis (please specify): _________________________________  [   ] Clinically Undetermined    Please document in your progress notes daily for the duration of treatment until resolved and include in your discharge summary.

## 2018-10-24 NOTE — PLAN OF CARE
Problem: Patient Care Overview  Goal: Plan of Care Review  Outcome: Revised  Plan of care discussed with patient. Patient is free of fall/trauma/injury. Denies CP, SOB, or pain/discomfort. Pt received HD this shift. No fluid removed.   All questions addressed. Will continue to monitor

## 2018-10-24 NOTE — PROGRESS NOTES
[facility] HEMODIALYSIS NOTE     Patient currently on hemodialysis for removal of uremic toxins and volume.  He had severe cramping a,lst evening after UF of 3.5 l at Hd yesterday to dry wt and is still c/o bilateral LE calf cramping at HD today  He still has + 1-2 LE bilateral peripheral edema, lungs are clear  He also cracked a tooth alst night and is severe pain, L lower 2 nd molar. No bleeding no exudate noted  Patient seen and evaluated on hemodialysis, tolerating treatment, see HD flowsheet for vitals and assessments.      Ultrafiltration goal is net even     Labs have been reviewed and the dialysate bath has been adjusted.     Assessment/Plan:     HD today for removal of uremic toxins no volume removal today  Strict fluid restricition 1 l daily      Suggest eval for PVD with arterial doppler studies  As outpatient     Suggest urgent dental eval and oral antibiotics  with av fistula

## 2018-10-24 NOTE — PROGRESS NOTES
[facility] Hospital Medicine  Progress Note    Primary Team: [facility]  Admit Date: 10/21/2018    Subjective:      HPI  Mr. Tom Gage is a 68 y.o. male with recent TAVR on 09/20/18, AICD 09/21/18, combined HF with 30% , recurrent/ chronic R sided pleural effusion (attributed to ESRD/ hypoalbuminemia, with intermittent thoracenteses), ESRD on HD (MWF), CAD s/p NSTEMI in 2012, hx gastric bypass in 2013, presented to  The ED on 10/21 with worsening SOB of breath over past several days. Patient also noted central chest heaviness/ chest tightness. Associated with SOB, pain did not radiate. He was also concerned about his AICD/ pacemaker firing as he had a different sensation in his chest. Was not shocked. No hypotension at home. Not on home O2. Had noted to me that he has had intermittent falls due to dysequilibrium (as in falling over while leaning over), BPs have not been low at home     Patient underwent R sided thoracentesis on 10/15/18 removing 2 L at [facility]; he has had to undergo intermittent thoracenteses for his chronic/ recurrent R sided pleural effusion that has not improved with HD or diuresis. Had extra session of HD on Tuesday  10/16. His usual schedule is [facility]. Underwent fistulogram with [facility] for some stenosis. Continues to make urine and is compliant with lasix 80 PO BID, however, UOP has been diminishing.      Patient with hx of severe aortic stenosis and is now s/p EvolutR TAVR 9/20/18 with post-op course complicated by LBBB qrs 168 in the setting of EF of 30% leading to ST Juade CRT-D placement. Patient underwent LHC  during his BAV which showed all of his vessels had luminal irregularities, ATA flows of 3. 2D echo on 9/21/2018 showed EF of 35%, grade 2 diastolic dysfunction, Biatrial enlargement, S/P transcatheter AVR, Right pleural effusion.      In the ED, patient received nitro. Elevated BP at 190s/80s on presentation. EKG showed bi-v pacing.  Interrogation by  "cardiology in the ED showed "elevated A-lead capture threshold from prior at 1.875V, Capture thresholds for the v leads remain the same".  noted to have bilateral LE pitting edema, stable but significant R pleural effusion, trop is 0.66, BNP >4900. No fevers, chills, sick contacts recently.     Interval History:  S/p HD today for UF, no volume removed. Denies shortness of breath. Cramping improved. Cracked his tooth overnight, has pain, controlled w/ tramadol. Wife at bedside, does not want to be discharged until thoracentesis completed.     ROS:  General: no fever, no chills, no weight loss, fatigue  Cardiovascular: no chest pain, no orthopnea, no dyspnea  Respiratory: no cough, no wheezes, no SOB  All other systems reviewed & are negative.     Objective:   Last 24 Hour Vital Signs:  BP  Min: 93/59  Max: 202/86  Temp  Av.7 °F (36.5 °C)  Min: 96.7 °F (35.9 °C)  Max: 98.7 °F (37.1 °C)  Pulse  Av.4  Min: 60  Max: 80  Resp  Av.4  Min: 16  Max: 18  SpO2  Av.9 %  Min: 90 %  Max: 97 %  Weight  Av.8 kg (180 lb 5.4 oz)  Min: 81.8 kg (180 lb 5.4 oz)  Max: 81.8 kg (180 lb 5.4 oz)  I/O last 3 completed shifts:  In: 1420 [P.O.:720; Other:700]  Out: 4251 [Other:4250; Stool:1]    Physical Examination:  GEN: AAOx3, NAD  HEENT: NCAT, MMM, PERRL, EOMI, oropharynx clear  CV: RRR, no m/r, no S3/S4  RESP: CTAB, no wheezes/crackles, no increased WOB  ABD: soft, NTND, normoactive BS, no organomegaly  EXTR: no c/c/e, intact distal pulses x 4  NEURO: PERRL, EOMI, moving all four extremities, intact sensation to light touch, no focal deficits  SKIN: no rashes, lesions, or color changes  PSYCH: normal affect    Laboratory:  I have reviewed all pertinent lab results/findings within the past 24 hours.    Radiology:  I have reviewed all pertinent imaging results/findings within the past 24 hours.    Current Medications:     Infusions:       Scheduled:   [START ON 10/25/2018] amoxicillin  500 mg Oral Q8H    calcitRIOL  " 0.5 mcg Oral Daily    carvedilol  25 mg Oral BID WM    heparin (porcine)  5,000 Units Subcutaneous Q8H    isosorbide-hydrALAZINE 20-37.5 mg  1 tablet Oral TID    losartan  50 mg Oral BID    sevelamer carbonate  1,600 mg Oral TID WM        PRN:  sodium chloride 0.9%, acetaminophen, calcium carbonate, dextrose 50%, dextrose 50%, glucagon (human recombinant), glucose, glucose, hydrALAZINE, nitroGLYCERIN, promethazine, ramelteon, sodium chloride 0.9%      Assessment/Plan:     Tom Gage is a 68 y.o.male with    Acute on chronic combined systolic and diastolic congestive heart failure  S/P TAVR (transcatheter aortic valve replacement)  Nodular calcific aortic valve stenosis   - volume overload, + JVD, BNP of >4900, s/p tAVR on 9/20/18  - low NA diet and 1200 cc fluid restriction . Strict I/Os  - discontinue lasix IV 80 BID  - nephrology consulted for HD - total 8 L removed  - control BP as below  - repeat 2D echo with EF similar to prior, intermediate RA pressure, aortic valve intact  - still above his dry weight (170-175 lb) but patient refusing to have more volume removed at this time due to severe cramping     Acute respiratory failure with hypoxia   Pleural effusion on right  - chronic and recurrent, thought to be 2/2 HF and ESRD as well as malnutrition  - on room air  - discontinue lasix as pt is not urinating  - Pulmonary Medicine consulted for thoracentesis. Took plavix on 10/20. Thoracentesis planned tomorrow; however believe this is cardiorenal in etiology and recommend maximizing volume removal via HD  - holding plavix  - repeat CXR yesterday unchanged    Hypertensive urgency   Hypertensive heart and kidney disease with heart failure  - BP of 190s/80s on presentation. Much improved with volume removal  - cont home coreg 25 BID, losartan 50 BID (K shifted in the ED, however, may need to adjust ARB therapy if Hyper-K recurs)  - started on bidil       ESRD (end stage renal disease)   Hyperkalemia   -  "ESRD HD MWF  - cont home calcitriol and sevelamer   - nephrology consulted for HD  - Hyper-K+ of 6.0 on admit, shifted with insulin in the ED - now improved with HD     Disorder of implantable defibrillator   - seen by cardiology in the ED. Noted "Atrial lead capture threshold increased from prior 0.75 V at 0.50 ms to now 1.875V at 0.5 ms"  - EP consulted:    Threshold to 1.875 from 0.75  A-sense 3.2mV from 3.0mV     Elevated threshold but with good pacing and sensing function. No interventions needed at this time.     - cardiac monitoring     Moderate protein malnutrition  S/P gastric bypass  - boost TID     Anemia of chronic renal failure, stage 5  - hbg stable  - monitor    Coronary artery disease involving native coronary artery of native heart without angina pectoris   - not on statin as lipid panel is WNL (much below the goal)  - not on ASA as it has lead to retinal hemorrhages  - on plavix at home, last NSTEMI in 2012, LHC in 9/2018 showed diffuse luminal abnormalities   - holding plavix for thoracentesis at this time  - pRN nitro    Muscle cramping  - secondary to volume removal  - check u/s w/ FABY to rule out PAD    Abnormal tooth  - cracked tooth - give prophylactic amoxicillin as patient has AVF     Diet:  Low Na with 1200cc fl restriction   GI PPx:  n/a  DVT PPx:  Heparin TID - pt usually declines     PPX:   VTE Risk Mitigation (From admission, onward)        Ordered     heparin (porcine) injection 5,000 Units  Every 8 hours      10/21/18 2226     Place sequential compression device  Until discontinued      10/21/18 2129     IP VTE HIGH RISK PATIENT  Once      10/21/18 2142        Dispo - d/c tomorrow with [facility] after thoracentesis    Laine Bradley MD  Hospital Medicine Staff  [facility]      "

## 2018-10-24 NOTE — PLAN OF CARE
Problem: Patient Care Overview  Goal: Plan of Care Review  Outcome: Ongoing (interventions implemented as appropriate)  Dialysis complete.  Blood rinsed back.  [place] pulled from NA fistula.  Pressure held x 5 minutes.  Hemostasis achieved.  Covered with gauze and paper tape.  +thrill +bruit.  Net  mls.  C/O leg cramping mid-treatment.  Dr. Orozco at the bedside and UF off for remainder of tx.  No further c/o cramping.  Transported from dialysis unit to room 310 A via w/c by transporter.

## 2018-10-24 NOTE — PLAN OF CARE
Problem: Patient Care Overview  Goal: Plan of Care Review  Outcome: Ongoing (interventions implemented as appropriate)  Patient remains free of falls or injury. Patient denies pain. Plan for HD in AM. Pulmonary c/s for possible thoracentesis this admit if patient still here on Thursday d/t last dose of plavix on 10/20. Plan of care reviewed with patient and wife.

## 2018-10-24 NOTE — PROGRESS NOTES
"Subjective:    Patient ID:  Inge Wood is a 89 y.o. female who presents for follow-up of TAVR     Referring: Dr. Marietta BUI   Mr. Esparza was scheduled to see me in clinic on 10/23 for his 1 mon f/u s/p 29mm Evolut TAVR via TF access. He presented to the ED with concerns his AICD had fired (it had not). He was admitted with decompensated heart failure and hypertensive urgency. He is being followed by Nephrology for his HD and Pulmonology for his chronic pleural effusions.     NYHA Class III, CCS Class 0     Review of Systems   Constitution: Negative for chills, diaphoresis, fever, weakness, malaise/fatigue, weight gain and weight loss.   HENT: Negative for sore throat.    Eyes: Negative for blurred vision, vision loss in left eye, vision loss in right eye and visual disturbance.   Cardiovascular: Negative for chest pain, claudication, dyspnea on exertion, leg swelling, near-syncope, orthopnea, palpitations, paroxysmal nocturnal dyspnea and syncope.   Respiratory: Negative for cough, hemoptysis, shortness of breath, sputum production and wheezing.    Endocrine: Negative for cold intolerance and heat intolerance.   Hematologic/Lymphatic: Negative for adenopathy. Does not bruise/bleed easily.   Skin: Negative for rash.   Musculoskeletal: Negative for falls, muscle weakness and myalgias.   Gastrointestinal: Negative for abdominal pain, change in bowel habit, constipation, diarrhea, melena and nausea.   Genitourinary: Negative for bladder incontinence.   Neurological: Negative for dizziness, focal weakness, headaches, light-headedness, numbness and tremors.   Psychiatric/Behavioral: Negative for altered mental status.          Vitals        Vitals:     10/23/18 1133 10/23/18 1138   BP: (!) 169/76 (!) 174/76   BP Location: Right arm Left arm   Patient Position: Sitting Sitting   BP Method: Large (Automatic) Large (Automatic)   Pulse: 82 82   SpO2: 100%     Weight: 59.1 kg (130 lb 4.7 oz)     Height: 5' 3.5" " (1.613 m)        Body mass index is 22.72 kg/m².     Objective:    Physical Exam   Constitutional: He is oriented to person, place, and time. He appears well-developed and well-nourished. No distress.   HENT:   Head: Normocephalic and atraumatic.   Mouth/Throat: Oropharynx is clear and moist.   Eyes: Conjunctivae and EOM are normal. Pupils are equal, round, and reactive to light. No scleral icterus.   Neck: Neck supple. No JVD present. No tracheal deviation present.   Cardiovascular: Regular rate and rhythm. Exam reveals no gallop and no friction rub.   No murmur heard.  Pulmonary/Chest: Effort normal and breath sounds normal. No respiratory distress. He has no wheezes. He has no rales. He exhibits no tenderness.   Abdominal: Soft. Bowel sounds are normal. He exhibits no distension. There is no hepatosplenomegaly. There is no tenderness.   Musculoskeletal: He exhibits no edema or tenderness.   Neurological: He is alert and oriented to person, place, and time.   Skin: Skin is warm and dry. No rash noted. No erythema.   Psychiatric: He has a normal mood and affect. His behavior is normal.      Echo (10/22/18):    1 - Severe left atrial enlargement.     2 - Eccentric hypertrophy.     3 - Moderately depressed left ventricular systolic function (EF 30-35%).     4 - Right ventricular enlargement with normal systolic function.     5 - Mild mitral regurgitation.     6 - Moderate to severe tricuspid regurgitation.     7 - Pulmonary hypertension. The estimated PA systolic pressure is 56 mmHg.       Assessment:       Acute on chronic combined systolic (congestive) and diastolic (congestive) heart failure     Volume management with HD per Nephrology.       Nodular calcific aortic valve stenosis     S/p successful right transfemoral aortic valve replacement with 29 mm EvolutR valve.  Post procedure echo: No perivalvular leak, mean aortic gradient 6 mmHg, Vmax 1.2m/s.  On Plavix.           Moderate protein malnutrition      Albumin 3.0.  Encourage increased protein intake.           Dilated cardiomyopathy     EF= 40%             ESRD (end stage renal disease)     On HD M/W/F.  Nephrology following.           Type 2 diabetes mellitus with diabetic nephropathy     BS stable.                       Plan:       ASA indefinitely  SBEP for life  F/U in 1 year (Sept 2019) with echo and labs.

## 2018-10-24 NOTE — PLAN OF CARE
Problem: Physical Therapy Goal  Goal: Physical Therapy Goal  Goals to be met by: 2018     Patient will increase functional independence with mobility by performin. Supine to sit with Modified [place]  2. Sit to stand transfer with Supervision  3. Gait  x 200 feet with Supervision using rollator.   4. Lower extremity exercise program x15 reps per handout, with assistance as needed    Outcome: Ongoing (interventions implemented as appropriate)  Pt evaluated and appropriate goals established.

## 2018-10-25 PROBLEM — F05 DELIRIUM OF MIXED ORIGIN: Status: ACTIVE | Noted: 2018-10-25

## 2018-10-25 LAB
ALBUMIN SERPL BCP-MCNC: 2.8 G/DL
ANION GAP SERPL CALC-SCNC: 11 MMOL/L
BASOPHILS # BLD AUTO: 0.04 K/UL
BASOPHILS NFR BLD: 0.7 %
BUN SERPL-MCNC: 19 MG/DL
CALCIUM SERPL-MCNC: 8.6 MG/DL
CHLORIDE SERPL-SCNC: 106 MMOL/L
CO2 SERPL-SCNC: 19 MMOL/L
CREAT SERPL-MCNC: 2.8 MG/DL
DIFFERENTIAL METHOD: ABNORMAL
EOSINOPHIL # BLD AUTO: 0.1 K/UL
EOSINOPHIL NFR BLD: 2 %
ERYTHROCYTE [DISTWIDTH] IN BLOOD BY AUTOMATED COUNT: 15.2 %
EST. GFR  (AFRICAN AMERICAN): 26 ML/MIN/1.73 M^2
EST. GFR  (NON AFRICAN AMERICAN): 22.5 ML/MIN/1.73 M^2
GLUCOSE SERPL-MCNC: 59 MG/DL
HCT VFR BLD AUTO: 31.8 %
HGB BLD-MCNC: 9.2 G/DL
IMM GRANULOCYTES # BLD AUTO: 0.15 K/UL
IMM GRANULOCYTES NFR BLD AUTO: 2.5 %
LYMPHOCYTES # BLD AUTO: 0.6 K/UL
LYMPHOCYTES NFR BLD: 10.1 %
MCH RBC QN AUTO: 33.5 PG
MCHC RBC AUTO-ENTMCNC: 28.9 G/DL
MCV RBC AUTO: 116 FL
MONOCYTES # BLD AUTO: 0.6 K/UL
MONOCYTES NFR BLD: 10.6 %
NEUTROPHILS # BLD AUTO: 4.5 K/UL
NEUTROPHILS NFR BLD: 74.1 %
NRBC BLD-RTO: 0 /100 WBC
PHOSPHATE SERPL-MCNC: 3.1 MG/DL
PLATELET # BLD AUTO: 120 K/UL
PMV BLD AUTO: 10 FL
POCT GLUCOSE: 70 MG/DL (ref 70–110)
POCT GLUCOSE: 78 MG/DL (ref 70–110)
POTASSIUM SERPL-SCNC: 5.2 MMOL/L
RBC # BLD AUTO: 2.75 M/UL
SODIUM SERPL-SCNC: 136 MMOL/L
WBC # BLD AUTO: 6.06 K/UL

## 2018-10-25 PROCEDURE — 25000003 PHARM REV CODE 250: Mod: NTX | Performed by: HOSPITALIST

## 2018-10-25 PROCEDURE — 93010 ELECTROCARDIOGRAM REPORT: CPT | Mod: NTX,,, | Performed by: INTERNAL MEDICINE

## 2018-10-25 PROCEDURE — 36415 COLL VENOUS BLD VENIPUNCTURE: CPT | Mod: NTX

## 2018-10-25 PROCEDURE — 99233 SBSQ HOSP IP/OBS HIGH 50: CPT | Mod: NTX,,, | Performed by: HOSPITALIST

## 2018-10-25 PROCEDURE — 93005 ELECTROCARDIOGRAM TRACING: CPT | Mod: NTX

## 2018-10-25 PROCEDURE — 99232 SBSQ HOSP IP/OBS MODERATE 35: CPT | Mod: GC,NTX,, | Performed by: INTERNAL MEDICINE

## 2018-10-25 PROCEDURE — 63600175 PHARM REV CODE 636 W HCPCS: Mod: NTX | Performed by: HOSPITALIST

## 2018-10-25 PROCEDURE — 80069 RENAL FUNCTION PANEL: CPT | Mod: NTX

## 2018-10-25 PROCEDURE — 20600001 HC STEP DOWN PRIVATE ROOM: Mod: NTX

## 2018-10-25 PROCEDURE — 85025 COMPLETE CBC W/AUTO DIFF WBC: CPT | Mod: NTX

## 2018-10-25 RX ORDER — AMOXICILLIN 500 MG/1
500 CAPSULE ORAL EVERY 8 HOURS
Status: DISCONTINUED | OUTPATIENT
Start: 2018-10-25 | End: 2018-10-26 | Stop reason: HOSPADM

## 2018-10-25 RX ORDER — DIVALPROEX SODIUM 250 MG/1
500 TABLET, DELAYED RELEASE ORAL NIGHTLY
Status: DISCONTINUED | OUTPATIENT
Start: 2018-10-25 | End: 2018-10-26 | Stop reason: HOSPADM

## 2018-10-25 RX ORDER — ZOLPIDEM TARTRATE 5 MG/1
5 TABLET ORAL NIGHTLY PRN
Status: DISCONTINUED | OUTPATIENT
Start: 2018-10-25 | End: 2018-10-25

## 2018-10-25 RX ORDER — HYDRALAZINE HYDROCHLORIDE 20 MG/ML
5 INJECTION INTRAMUSCULAR; INTRAVENOUS EVERY 6 HOURS PRN
Status: DISCONTINUED | OUTPATIENT
Start: 2018-10-25 | End: 2018-10-26 | Stop reason: HOSPADM

## 2018-10-25 RX ORDER — DEXTROSE MONOHYDRATE 25 G/50ML
INJECTION, SOLUTION INTRAVENOUS
Status: DISCONTINUED
Start: 2018-10-25 | End: 2018-10-25 | Stop reason: WASHOUT

## 2018-10-25 RX ORDER — METOPROLOL TARTRATE 1 MG/ML
5 INJECTION, SOLUTION INTRAVENOUS ONCE
Status: COMPLETED | OUTPATIENT
Start: 2018-10-25 | End: 2018-10-25

## 2018-10-25 RX ADMIN — AMOXICILLIN 500 MG: 500 CAPSULE ORAL at 03:10

## 2018-10-25 RX ADMIN — CARVEDILOL 25 MG: 12.5 TABLET, FILM COATED ORAL at 06:10

## 2018-10-25 RX ADMIN — AMOXICILLIN 500 MG: 500 CAPSULE ORAL at 10:10

## 2018-10-25 RX ADMIN — ZOLPIDEM TARTRATE 5 MG: 5 TABLET ORAL at 02:10

## 2018-10-25 RX ADMIN — DIVALPROEX SODIUM 500 MG: 250 TABLET, DELAYED RELEASE ORAL at 10:10

## 2018-10-25 RX ADMIN — HYDRALAZINE HYDROCHLORIDE AND ISOSORBIDE DINITRATE 1 TABLET: 37.5; 2 TABLET, FILM COATED ORAL at 03:10

## 2018-10-25 RX ADMIN — RAMELTEON 8 MG: 8 TABLET, FILM COATED ORAL at 12:10

## 2018-10-25 RX ADMIN — HYDRALAZINE HYDROCHLORIDE AND ISOSORBIDE DINITRATE 1 TABLET: 37.5; 2 TABLET, FILM COATED ORAL at 10:10

## 2018-10-25 RX ADMIN — SEVELAMER CARBONATE 1600 MG: 800 TABLET, FILM COATED ORAL at 10:10

## 2018-10-25 RX ADMIN — HYDRALAZINE HYDROCHLORIDE 5 MG: 20 INJECTION INTRAMUSCULAR; INTRAVENOUS at 01:10

## 2018-10-25 RX ADMIN — LOSARTAN POTASSIUM 50 MG: 50 TABLET, FILM COATED ORAL at 10:10

## 2018-10-25 RX ADMIN — METOPROLOL TARTRATE 5 MG: 1 INJECTION, SOLUTION INTRAVENOUS at 11:10

## 2018-10-25 NOTE — PROGRESS NOTES
10/25/18 0845   Vital Signs   BP (!) 200/81   MAP (mmHg) 117   BP Location Right arm   BP Method Automatic   Patient Position Lying     Notified Dr. Bradley of pt BP. Orders to give pt some time to cool off and recheck BP when pt is less combative. Will continue to monitor.

## 2018-10-25 NOTE — PROGRESS NOTES
10/25/18 1100   Vital Signs   BP (!) 189/86   MAP (mmHg) 1244     Notified Dr. Bradley. Orders for IV lopressor given. Will continue to monitor.

## 2018-10-25 NOTE — PLAN OF CARE
Problem: Patient Care Overview  Goal: Plan of Care Review  Outcome: Revised  Plan of care discussed with patient. Patient is free of fall/trauma/injury. Denies CP, SOB, or pain/discomfort. Pt received HD on 10/24 no fluid removed. Pt was NPO per MD orders. Pt was was restless throughout the night zolpidem was ordered per MD Areli.  All questions addressed. Will continue to monitor

## 2018-10-25 NOTE — SUBJECTIVE & OBJECTIVE
Interval History: Patient had 100cc UF removed from HD yesterday. Complained of cramping during HD. Overnight patient's wife reports he was very restless, disoriented. Received phenergan for one episode of emesis with dinnertime meal, no other episodes following. Also received ramelteon and Ambien around midnight and 0200 respectively last night for insomnia. Yesterday had a tooth crack, which his wife says he was supposed to have operated on back in April/May of this year but has been forced to be delayed due to his frequent hospitalizations. Started on amoxicillin. Very disoriented this morning and trying to get out of bed, scratching at skin of bilateral forearms thoughout night, only about 2 hours total sleep.    Objective:     Vital Signs (Most Recent):  Temp: 97.4 °F (36.3 °C) (10/25/18 0816)  Pulse: 72 (10/25/18 0816)  Resp: 18 (10/25/18 0816)  BP: (!) 169/78 (10/25/18 0816)  SpO2: (!) 92 % (10/25/18 0816) Vital Signs (24h Range):  Temp:  [97.4 °F (36.3 °C)-98.1 °F (36.7 °C)] 97.4 °F (36.3 °C)  Pulse:  [64-78] 72  Resp:  [15-18] 18  SpO2:  [92 %-97 %] 92 %  BP: ()/(52-86) 169/78     Weight: 81.2 kg (179 lb)  Body mass index is 22.98 kg/m².      Intake/Output Summary (Last 24 hours) at 10/25/2018 0818  Last data filed at 10/24/2018 1218  Gross per 24 hour   Intake 720 ml   Output 717 ml   Net 3 ml       Physical Exam   Constitutional: He appears well-developed and well-nourished.   HENT:   Mouth/Throat: Oropharynx is clear and moist. No oropharyngeal exudate.   Eyes: EOM are normal. Right eye exhibits no discharge. Left eye exhibits no discharge.   Neck: Normal range of motion. Neck supple. JVD present.   Cardiovascular: Normal rate and intact distal pulses.   Murmur heard.  Pulmonary/Chest: Effort normal. No accessory muscle usage. No tachypnea and no bradypnea. No respiratory distress (on room air). He has decreased breath sounds in the right middle field and the right lower field. He has rales in the  right middle field, the right lower field and the left lower field.   Abdominal: Soft. Bowel sounds are normal. He exhibits no distension. There is no tenderness. There is no guarding.   Musculoskeletal: Normal range of motion. He exhibits edema (2+ pitting to knee bilaterally ). He exhibits no tenderness or deformity.   Neurological: He has normal strength. He is disoriented (not oriented to place or date). GCS eye subscore is 4. GCS verbal subscore is 5. GCS motor subscore is 6.   Skin: Skin is warm and dry.   Psychiatric: His speech is normal. His mood appears anxious. He is agitated. Cognition and memory are impaired. He expresses impulsivity. He is attentive.   Nursing note and vitals reviewed.         Lines/Drains/Airways     Drain                 Hemodialysis AV Fistula Left upper arm -- days          Peripheral Intravenous Line                 Peripheral IV - Single Lumen 10/21/18 Right Forearm 4 days                Significant Labs:    CBC/Anemia Profile:  Recent Labs   Lab 10/25/18  0435   WBC 6.06   HGB 9.2*   HCT 31.8*   *   *   RDW 15.2*        Chemistries:  Recent Labs   Lab 10/24/18  0440 10/25/18  0435    136   K 4.9 5.2*    106   CO2 22* 19*   BUN 27* 19   CREATININE 3.2* 2.8*   CALCIUM 8.3* 8.6*   ALBUMIN 2.5* 2.8*   PHOS 3.7 3.1       Significant Imaging:  I have reviewed all pertinent imaging results/findings within the past 24 hours.   US Lower Extrem Arteries Bilat with FABY 10/24/18:  Ankle-brachial index of 2.9 on the right and 2.3 on the left, suggestive of arterial atherosclerotic calcification.    Biphasic to monophasic arterial waveforms with sluggish flow throughout the lower extremity arteries, findings are suggestive of upstream stenosis in the aorta or iliac arteries, potentially from aorta-occlusive disease.

## 2018-10-25 NOTE — PROGRESS NOTES
[facility] Hospital Medicine  Progress Note    Primary Team: [facility] MED C  Admit Date: 10/21/2018    Subjective:      HPI  Mr. Tom Gage is a 68 y.o. male with recent TAVR on 09/20/18, AICD 09/21/18, combined HF with 30% , recurrent/ chronic R sided pleural effusion (attributed to ESRD/ hypoalbuminemia, with intermittent thoracenteses), ESRD on HD (MWF), CAD s/p NSTEMI in 2012, hx gastric bypass in 2013, presented to  The ED on 10/21 with worsening SOB of breath over past several days. Patient also noted central chest heaviness/ chest tightness. Associated with SOB, pain did not radiate. He was also concerned about his AICD/ pacemaker firing as he had a different sensation in his chest. Was not shocked. No hypotension at home. Not on home O2. Had noted to me that he has had intermittent falls due to dysequilibrium (as in falling over while leaning over), BPs have not been low at home     Patient underwent R sided thoracentesis on 10/15/18 removing 2 L at [facility]; he has had to undergo intermittent thoracenteses for his chronic/ recurrent R sided pleural effusion that has not improved with HD or diuresis. Had extra session of HD on Tuesday  10/16. His usual schedule is [facility]. Underwent fistulogram with [facility] for some stenosis. Continues to make urine and is compliant with lasix 80 PO BID, however, UOP has been diminishing.      Patient with hx of severe aortic stenosis and is now s/p EvolutR TAVR 9/20/18 with post-op course complicated by LBBB qrs 168 in the setting of EF of 30% leading to ST Juade CRT-D placement. Patient underwent LHC  during his BAV which showed all of his vessels had luminal irregularities, ATA flows of 3. 2D echo on 9/21/2018 showed EF of 35%, grade 2 diastolic dysfunction, Biatrial enlargement, S/P transcatheter AVR, Right pleural effusion.      In the ED, patient received nitro. Elevated BP at 190s/80s on presentation. EKG showed bi-v pacing.  Interrogation by  "cardiology in the ED showed "elevated A-lead capture threshold from prior at 1.875V, Capture thresholds for the v leads remain the same".  noted to have bilateral LE pitting edema, stable but significant R pleural effusion, trop is 0.66, BNP >4900. No fevers, chills, sick contacts recently.     Interval History:  Seems to have developed acute delirium overnight. Received Ambien as well as Phenergan yesterday evening. Upon my arrival to the bedside patient had non-violent restraints on and was volitionally not cooperating. During attempt to check accucheck this AM, patient clenched his fists to prevent measurement and when RN asked pt if we could check his glucose, he said "No." Refused to cooperate with verbal interview otherwise.      ROS:  Unable to obtain due to mental status change.     Objective:   Last 24 Hour Vital Signs:  BP  Min: 151/66  Max: 193/82  Temp  Av.6 °F (36.4 °C)  Min: 97.3 °F (36.3 °C)  Max: 97.9 °F (36.6 °C)  Pulse  Av.9  Min: 64  Max: 76  Resp  Av.2  Min: 15  Max: 18  SpO2  Av.4 %  Min: 92 %  Max: 99 %  Weight  Av.2 kg (179 lb)  Min: 81.2 kg (179 lb)  Max: 81.2 kg (179 lb)  I/O last 3 completed shifts:  In: 720 [P.O.:120; Other:600]  Out: 717 [Other:717]    Physical Examination:  Did not cooperate with exam today. See interval history above.    Laboratory:  I have reviewed all pertinent lab results/findings within the past 24 hours.    Radiology:  I have reviewed all pertinent imaging results/findings within the past 24 hours.    Current Medications:     Infusions:       Scheduled:   amoxicillin  500 mg Oral Q8H    calcitRIOL  0.5 mcg Oral Daily    carvedilol  25 mg Oral BID WM    heparin (porcine)  5,000 Units Subcutaneous Q8H    isosorbide-hydrALAZINE 20-37.5 mg  1 tablet Oral TID    losartan  50 mg Oral BID    sevelamer carbonate  1,600 mg Oral TID WM        PRN:  sodium chloride 0.9%, acetaminophen, calcium carbonate, dextrose 50%, dextrose 50%, glucagon " (human recombinant), glucose, glucose, hydrALAZINE, hydrALAZINE, nitroGLYCERIN, sodium chloride 0.9%      Assessment/Plan:     Tom Gage is a 68 y.o.male with    Delirium  - may be secondary to administration of ambien and phenergan yesterday evening; these medications have been discontinued.  - please avoid all sedating medications  - wife is requesting Psychiatry consult - appreciate assistance  - delirium precautions     Acute on chronic combined systolic and diastolic congestive heart failure  S/P TAVR (transcatheter aortic valve replacement)  Nodular calcific aortic valve stenosis   - volume overload, + JVD, BNP of >4900, s/p tAVR on 9/20/18  - low NA diet and 1200 cc fluid restriction . Strict I/Os  - discontinue lasix IV 80 BID  - nephrology consulted for HD - total 8 L removed  - control BP as below  - repeat 2D echo with EF similar to prior, intermediate RA pressure, aortic valve intact  - still above his dry weight (170-175 lb) but patient refusing to have more volume removed at this time due to severe cramping when volume removed    Acute respiratory failure with hypoxia   Pleural effusion on right  - chronic and recurrent, thought to be 2/2 HF and ESRD as well as malnutrition  - on room air  - discontinue lasix as pt is not urinating  - Pulmonary Medicine consulted for thoracentesis. Took plavix on 10/20. Thoracentesis planned pending improving in delirium; however believe this is cardiorenal in etiology and recommend maximizing volume removal via HD  - holding plavix  - repeat CXR unchanged despite removal of 8 liters of fluid via dialysis.    Hypertensive urgency   Hypertensive heart and kidney disease with heart failure  - BP of 190s/80s on presentation. Much improved with volume removal  - cont home coreg 25 BID, losartan 50 BID (K shifted in the ED, however, may need to adjust ARB therapy if Hyper-K recurs)  - started on bidil  - prn IV hydralazine ordered as pt is refusing to take BP meds at  "this time       ESRD (end stage renal disease)   Hyperkalemia   - ESRD HD MWF  - cont home calcitriol and sevelamer   - nephrology consulted for HD  - Hyper-K+ of 6.0 on admit, shifted with insulin in the ED - now improved with HD     Disorder of implantable defibrillator   - seen by cardiology in the ED. Noted "Atrial lead capture threshold increased from prior 0.75 V at 0.50 ms to now 1.875V at 0.5 ms"  - EP consulted:    Threshold to 1.875 from 0.75  A-sense 3.2mV from 3.0mV     Elevated threshold but with good pacing and sensing function. No interventions needed at this time.     - cardiac monitoring     Moderate protein malnutrition  S/P gastric bypass  - boost TID     Anemia of chronic renal failure, stage 5  - hbg stable  - monitor    Coronary artery disease involving native coronary artery of native heart without angina pectoris   - not on statin as lipid panel is WNL (much below the goal)  - not on ASA as it has lead to retinal hemorrhages  - on plavix at home, last NSTEMI in 2012, C in 9/2018 showed diffuse luminal abnormalities   - holding plavix for thoracentesis at this time  - pRN nitro    Muscle cramping  - secondary to volume removal  - arterial u/s: sluggish flow throughout the lower extremity arteries, findings are suggestive of upstream stenosis in the aorta or iliac arteries, potentially from aorta-occlusive disease.  - cont asa, statin, plavix    Abnormal tooth  - cracked tooth - started prophylactic amoxicillin as patient has AVF     Diet:  Low Na with 1200cc fl restriction   GI PPx:  n/a  DVT PPx:  Heparin TID - pt usually declines     PPX:   VTE Risk Mitigation (From admission, onward)        Ordered     heparin (porcine) injection 5,000 Units  Every 8 hours      10/21/18 2226     Place sequential compression device  Until discontinued      10/21/18 2129     IP VTE HIGH RISK PATIENT  Once      10/21/18 2142        Dispo - d/c tomorrow with HHPTOT after thoracentesis    Laine Bradley, " MD  [facility] Medicine Staff  [facility]

## 2018-10-25 NOTE — PROGRESS NOTES
10/25/18 1356   Vital Signs   BP (!) 188/84   MAP (mmHg) 120   BP Location Right arm   BP Method Automatic   Patient Position Sitting     Notified Dr. Bradley. Orders put in for IV hydralazine. Will continue to monitor.

## 2018-10-25 NOTE — PLAN OF CARE
Problem: Patient Care Overview  Goal: Plan of Care Review  Outcome: Ongoing (interventions implemented as appropriate)  Plan of care reviewed with pt. Pt remained free of falls, trauma, and injury. Pt Will  continue to monitor.

## 2018-10-25 NOTE — ASSESSMENT & PLAN NOTE
"- Patient with history of recurrent right sided pleural effusions s/p drainage most recently 10/15 of 2L, most likely secondary to hypoalbuminemia in setting of protein calorie malnutrition and ESRD now with relatively recently developed CHF, with peripheral edema on exam.    - He is currently taking plavix at home following his TAVR performed last month but is not on aspirin due to an adverse reaction noted as "retinal bleeding". Most recently took plavix on night of 10/20 per patient's wife.  - Patient underwent HD yesterday but with only 100cc UF removed, due to recurrent complaint of cramps in lower extremities.   - He was very agitated and disoriented this morning which would put him at higher risk during a thoracentesis, to the point currently where he is almost necessitating physical restraints.  - Since there is no urgent need for thoracentesis at this time we will defer this morning while delirium persists.   - We have discussed placing a PleurX drain but do not feel there is a good chance his lung will fully pleurodese   - Ultimately recurrent thoracentesis are not the solution to the problem of his recurrent pleural effusions and it is unlikely to change if he continues to be intolerant to full HD sessions.    "
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
## 2018-10-25 NOTE — PROGRESS NOTES
[facility]  Pulmonology  Progress Note    Patient Name: Tom Gage  MRN: [id number]  Admission Date: 10/21/2018  Hospital Length of Stay: 4 days  Code Status: Full Code  Attending Provider: Laine Bradley MD  Primary Care Provider: Asael Poole MD   Principal Problem: Acute on chronic combined systolic and diastolic congestive heart failure    Subjective:     Interval History: Patient had 100cc UF removed from HD yesterday. Complained of cramping during HD. Overnight patient's wife reports he was very restless, disoriented. Received phenergan for one episode of emesis with dinnertime meal, no other episodes following. Also received ramelteon and Ambien around midnight and 0200 respectively last night for insomnia. Yesterday had a tooth crack, which his wife says he was supposed to have operated on back in April/May of this year but has been forced to be delayed due to his frequent hospitalizations. Started on amoxicillin. Very disoriented this morning and trying to get out of bed, scratching at skin of bilateral forearms thoughout night, only about 2 hours total sleep.    Objective:     Vital Signs (Most Recent):  Temp: 97.4 °F (36.3 °C) (10/25/18 0816)  Pulse: 72 (10/25/18 0816)  Resp: 18 (10/25/18 0816)  BP: (!) 169/78 (10/25/18 0816)  SpO2: (!) 92 % (10/25/18 0816) Vital Signs (24h Range):  Temp:  [97.4 °F (36.3 °C)-98.1 °F (36.7 °C)] 97.4 °F (36.3 °C)  Pulse:  [64-78] 72  Resp:  [15-18] 18  SpO2:  [92 %-97 %] 92 %  BP: ()/(52-86) 169/78     Weight: 81.2 kg (179 lb)  Body mass index is 22.98 kg/m².      Intake/Output Summary (Last 24 hours) at 10/25/2018 0818  Last data filed at 10/24/2018 1218  Gross per 24 hour   Intake 720 ml   Output 717 ml   Net 3 ml       Physical Exam   Constitutional: He appears well-developed and well-nourished.   HENT:   Mouth/Throat: Oropharynx is clear and moist. No oropharyngeal exudate.   Eyes: EOM are normal. Right eye exhibits no discharge. Left eye  exhibits no discharge.   Neck: Normal range of motion. Neck supple. JVD present.   Cardiovascular: Normal rate and intact distal pulses.   Murmur heard.  Pulmonary/Chest: Effort normal. No accessory muscle usage. No tachypnea and no bradypnea. No respiratory distress (on room air). He has decreased breath sounds in the right middle field and the right lower field. He has rales in the right middle field, the right lower field and the left lower field.   Abdominal: Soft. Bowel sounds are normal. He exhibits no distension. There is no tenderness. There is no guarding.   Musculoskeletal: Normal range of motion. He exhibits edema (2+ pitting to knee bilaterally ). He exhibits no tenderness or deformity.   Neurological: He has normal strength. He is disoriented (not oriented to place or date). GCS eye subscore is 4. GCS verbal subscore is 5. GCS motor subscore is 6.   Skin: Skin is warm and dry.   Psychiatric: His speech is normal. His mood appears anxious. He is agitated. Cognition and memory are impaired. He expresses impulsivity. He is attentive.   Nursing note and vitals reviewed.         Lines/Drains/Airways     Drain                 Hemodialysis AV Fistula Left upper arm -- days          Peripheral Intravenous Line                 Peripheral IV - Single Lumen 10/21/18 Right Forearm 4 days                Significant Labs:    CBC/Anemia Profile:  Recent Labs   Lab 10/25/18  0435   WBC 6.06   HGB 9.2*   HCT 31.8*   *   *   RDW 15.2*        Chemistries:  Recent Labs   Lab 10/24/18  0440 10/25/18  0435    136   K 4.9 5.2*    106   CO2 22* 19*   BUN 27* 19   CREATININE 3.2* 2.8*   CALCIUM 8.3* 8.6*   ALBUMIN 2.5* 2.8*   PHOS 3.7 3.1       Significant Imaging:  I have reviewed all pertinent imaging results/findings within the past 24 hours.   US Lower Extrem Arteries Bilat with FABY 10/24/18:  Ankle-brachial index of 2.9 on the right and 2.3 on the left, suggestive of arterial atherosclerotic  "calcification.    Biphasic to monophasic arterial waveforms with sluggish flow throughout the lower extremity arteries, findings are suggestive of upstream stenosis in the aorta or iliac arteries, potentially from aorta-occlusive disease.    Assessment/Plan:     Pleural effusion on right    - Patient with history of recurrent right sided pleural effusions s/p drainage most recently 10/15 of 2L, most likely secondary to hypoalbuminemia in setting of protein calorie malnutrition and ESRD now with relatively recently developed CHF, with peripheral edema on exam.    - He is currently taking plavix at home following his TAVR performed last month but is not on aspirin due to an adverse reaction noted as "retinal bleeding". Most recently took plavix on night of 10/20 per patient's wife.  - Patient underwent HD yesterday but with only 100cc UF removed, due to recurrent complaint of cramps in lower extremities.   - He was very agitated and disoriented this morning which would put him at higher risk during a thoracentesis, to the point currently where he is almost necessitating physical restraints.  - Since there is no urgent need for thoracentesis at this time we will defer this morning while delirium persists.   - We have discussed placing a PleurX drain but do not feel there is a good chance his lung will fully pleurodese   - Ultimately recurrent thoracentesis are not the solution to the problem of his recurrent pleural effusions and it is unlikely to change if he continues to be intolerant to full HD sessions.         Thank you for your consult. I will follow up with patient. Please call with questions.     French Lopez MD PGY-1  Pulmonology  [facility]    "

## 2018-10-25 NOTE — NURSING
Pt refused to take PO meds. Notified Dr. Bradley. Orders for IV meds given. Will continue to monitor.

## 2018-10-26 VITALS
HEIGHT: 74 IN | SYSTOLIC BLOOD PRESSURE: 144 MMHG | BODY MASS INDEX: 22.97 KG/M2 | DIASTOLIC BLOOD PRESSURE: 65 MMHG | TEMPERATURE: 98 F | OXYGEN SATURATION: 93 % | WEIGHT: 179 LBS | RESPIRATION RATE: 20 BRPM | HEART RATE: 74 BPM

## 2018-10-26 LAB
ALBUMIN SERPL BCP-MCNC: 2.8 G/DL
ANION GAP SERPL CALC-SCNC: 10 MMOL/L
BUN SERPL-MCNC: 29 MG/DL
CALCIUM SERPL-MCNC: 8.6 MG/DL
CHLORIDE SERPL-SCNC: 105 MMOL/L
CO2 SERPL-SCNC: 24 MMOL/L
CREAT SERPL-MCNC: 3.6 MG/DL
EST. GFR  (AFRICAN AMERICAN): 19.2 ML/MIN/1.73 M^2
EST. GFR  (NON AFRICAN AMERICAN): 16.6 ML/MIN/1.73 M^2
GLUCOSE SERPL-MCNC: 73 MG/DL
PHOSPHATE SERPL-MCNC: 3.8 MG/DL
POCT GLUCOSE: 68 MG/DL (ref 70–110)
POCT GLUCOSE: 78 MG/DL (ref 70–110)
POTASSIUM SERPL-SCNC: 4.6 MMOL/L
SODIUM SERPL-SCNC: 139 MMOL/L

## 2018-10-26 PROCEDURE — 90935 HEMODIALYSIS ONE EVALUATION: CPT | Mod: NTX,,, | Performed by: INTERNAL MEDICINE

## 2018-10-26 PROCEDURE — 36415 COLL VENOUS BLD VENIPUNCTURE: CPT | Mod: NTX

## 2018-10-26 PROCEDURE — 93010 ELECTROCARDIOGRAM REPORT: CPT | Mod: NTX,,, | Performed by: INTERNAL MEDICINE

## 2018-10-26 PROCEDURE — 99238 HOSP IP/OBS DSCHRG MGMT 30/<: CPT | Mod: NTX,,, | Performed by: HOSPITALIST

## 2018-10-26 PROCEDURE — 90792 PSYCH DIAG EVAL W/MED SRVCS: CPT | Mod: GC,NTX,, | Performed by: PSYCHIATRY & NEUROLOGY

## 2018-10-26 PROCEDURE — 80069 RENAL FUNCTION PANEL: CPT | Mod: NTX

## 2018-10-26 PROCEDURE — 93005 ELECTROCARDIOGRAM TRACING: CPT | Mod: NTX

## 2018-10-26 PROCEDURE — 25000003 PHARM REV CODE 250: Mod: NTX | Performed by: HOSPITALIST

## 2018-10-26 PROCEDURE — 25000003 PHARM REV CODE 250: Mod: NTX | Performed by: INTERNAL MEDICINE

## 2018-10-26 PROCEDURE — 90935 HEMODIALYSIS ONE EVALUATION: CPT | Mod: NTX

## 2018-10-26 RX ORDER — SODIUM CHLORIDE 9 MG/ML
INJECTION, SOLUTION INTRAVENOUS
Status: DISCONTINUED | OUTPATIENT
Start: 2018-10-26 | End: 2018-10-26 | Stop reason: HOSPADM

## 2018-10-26 RX ORDER — AMOXICILLIN 500 MG/1
500 CAPSULE ORAL EVERY 8 HOURS
Qty: 21 CAPSULE | Refills: 0 | Status: SHIPPED | OUTPATIENT
Start: 2018-10-26 | End: 2018-11-02

## 2018-10-26 RX ORDER — SODIUM CHLORIDE 9 MG/ML
INJECTION, SOLUTION INTRAVENOUS ONCE
Status: COMPLETED | OUTPATIENT
Start: 2018-10-26 | End: 2018-10-26

## 2018-10-26 RX ORDER — LIDOCAINE HYDROCHLORIDE 10 MG/ML
10 INJECTION INFILTRATION; PERINEURAL ONCE
Status: DISCONTINUED | OUTPATIENT
Start: 2018-10-26 | End: 2018-10-26 | Stop reason: HOSPADM

## 2018-10-26 RX ORDER — NITROGLYCERIN 0.4 MG/1
0.4 TABLET SUBLINGUAL EVERY 5 MIN PRN
Qty: 10 TABLET | Refills: 1 | Status: SHIPPED | OUTPATIENT
Start: 2018-10-26

## 2018-10-26 RX ORDER — ISOSORBIDE DINITRATE AND HYDRALAZINE HYDROCHLORIDE 37.5; 2 MG/1; MG/1
1 TABLET ORAL 3 TIMES DAILY
Qty: 90 TABLET | Refills: 11 | Status: SHIPPED | OUTPATIENT
Start: 2018-10-26 | End: 2019-11-05

## 2018-10-26 RX ADMIN — SODIUM CHLORIDE: 0.9 INJECTION, SOLUTION INTRAVENOUS at 01:10

## 2018-10-26 RX ADMIN — AMOXICILLIN 500 MG: 500 CAPSULE ORAL at 05:10

## 2018-10-26 RX ADMIN — SEVELAMER CARBONATE 1600 MG: 800 TABLET, FILM COATED ORAL at 11:10

## 2018-10-26 RX ADMIN — HYDRALAZINE HYDROCHLORIDE AND ISOSORBIDE DINITRATE 1 TABLET: 37.5; 2 TABLET, FILM COATED ORAL at 09:10

## 2018-10-26 RX ADMIN — CARVEDILOL 25 MG: 12.5 TABLET, FILM COATED ORAL at 09:10

## 2018-10-26 RX ADMIN — CARVEDILOL 25 MG: 12.5 TABLET, FILM COATED ORAL at 06:10

## 2018-10-26 RX ADMIN — AMOXICILLIN 500 MG: 500 CAPSULE ORAL at 06:10

## 2018-10-26 RX ADMIN — HYDRALAZINE HYDROCHLORIDE AND ISOSORBIDE DINITRATE 1 TABLET: 37.5; 2 TABLET, FILM COATED ORAL at 06:10

## 2018-10-26 RX ADMIN — SEVELAMER CARBONATE 800 MG: 800 TABLET, FILM COATED ORAL at 09:10

## 2018-10-26 RX ADMIN — SEVELAMER CARBONATE 1600 MG: 800 TABLET, FILM COATED ORAL at 06:10

## 2018-10-26 RX ADMIN — CALCITRIOL 0.5 MCG: 0.25 CAPSULE ORAL at 09:10

## 2018-10-26 RX ADMIN — LOSARTAN POTASSIUM 50 MG: 50 TABLET, FILM COATED ORAL at 09:10

## 2018-10-26 NOTE — PROGRESS NOTES
[facility] HEMODIALYSIS NOTE     Patient currently on hemodialysis for removal of uremic toxins and volume.    Patient seen and evaluated on hemodialysis, tolerating treatment, see HD flowsheet for vitals and assessments.      Ultrafiltration goal is 2-3L     Labs have been reviewed and the dialysate bath has been adjusted.     Assessment/Plan: ESRD, HD today for removal of uremic toxins and volume.  Continue MWF while in-house.

## 2018-10-26 NOTE — PLAN OF CARE
Problem: Patient Care Overview  Goal: Plan of Care Review  Outcome: Revised  Pt was seen by Psych and was started on divalproex nightly. Pt remains free from injury/falls for shift. Educated Pt on meds no questions at this time. VSS.  No complaints of CP, SOB, pain/discomfort  Bed locked in lowest position, call bell within reach. All questions addressed.  Will continue to monitor.

## 2018-10-26 NOTE — PLAN OF CARE
10/26/18 0802   Discharge Reassessment   Assessment Type Discharge Planning Reassessment   Do you have any problems affording any of your prescribed medications? No   Discharge Plan A Home with family;Home Health

## 2018-10-26 NOTE — SUBJECTIVE & OBJECTIVE
Patient History           Medical as of 10/25/2018     Past Medical History     Diagnosis Date Comments Source    AICD (automatic cardioverter/defibrillator) present -- 9/21/2018 Provider    Anemia of chronic renal failure, stage 5 -- -- Provider    BPH (benign prostatic hyperplasia) -- -- Provider    CAD (coronary artery disease) -- NSTEMI in 2012 Provider    CHF (congestive heart failure) -- EF 30% Provider    Chronic systolic heart failure 5/31/2018 -- Provider    Diastolic dysfunction 12/16/2016 -- Provider    Dilated cardiomyopathy 5/7/2018 -- Provider    ESRD (end stage renal disease) on dialysis -- MW Provider    GERD (gastroesophageal reflux disease) -- -- Provider    Hyperparathyroidism, secondary renal -- -- Provider    Hypertension -- -- Provider    Metabolic acidosis -- -- Provider    MI (myocardial infarction) Feb 2012 -- Provider    Non-rheumatic mitral regurgitation 5/31/2018 -- Provider    Nonrheumatic aortic valve stenosis 5/31/2018 -- Provider    Pleural effusion on right -- -- Provider    Pulmonary hypertension 12/16/2016 -- Provider    S/P TAVR (transcatheter aortic valve replacement) -- 9/20/18 Provider    Status post gastric bypass for obesity -- 2013 Provider    Stenosis of aortic and mitral valves -- Aortic stenosis Provider    TIA (transient ischemic attack) -- -- Provider    Type 2 diabetes mellitus with diabetic nephropathy -- history/ before wt loss Provider    Valvular regurgitation -- mitral regurgitation Provider          Pertinent Negatives     Diagnosis Date Noted Comments Source    Asthma 09/20/2015 -- Provider    Cancer 09/20/2015 -- Provider    COPD (chronic obstructive pulmonary disease) 09/20/2015 -- Provider    Encounter for blood transfusion 09/20/2015 -- Provider    Seizures 09/20/2015 -- Provider    Stroke 09/20/2015 -- Provider                  Surgical as of 10/25/2018     Past Surgical History     Procedure Laterality Date Comments Source    CHOLECYSTECTOMY -- July  2010 -- Provider    EYE SURGERY Bilateral -- bilateral cataracts Provider    GASTRIC BYPASS -- May 2014 -- Provider    gastric sleeve [Other] -- June 2013 Malfunctioned requiring bypass Provider    SHOULDER ARTHROSCOPY Left 11/18/14 RCR; glenoid labral repair; arch decompression Provider    ELBOW SURGERY Left 3/18/14 anterior submuscular transposition, ulnar nerve Provider    BASAL CELL CARCINOMA EXCISION Left Jan 2011 left forehead Provider    ROTATOR CUFF REPAIR Left 2014 -- Provider    HERNIA REPAIR -- -- -- Provider    ABDOMINAL SURGERY -- -- PD catheter Provider    GASTRECTOMY -- -- -- Provider    AORTIC VALVE REPLACEMENT N/A 9/20/2018 Procedure: Replacement-valve-aortic;  Surgeon: Refugio Cooney MD;  Location: [facility] CATH LAB;  Service: Cardiology;  Laterality: N/A; Provider    CARDIAC ELECTROPHYSIOLOGY STUDY N/A 9/21/2018 Procedure: CARDIAC ELECTROPHYSIOLOGY STUDY;  Surgeon: Theron Mcmanus MD;  Location: [facility] CATH LAB;  Service: Cardiology;  Laterality: N/A;  s/p TAVR, CM, EPS +/- BiV ICD, SJM, anes, GP Provider    CARDIAC DEFIBRILLATOR PLACEMENT -- 09/21/2018 -- Provider                  Family as of 10/25/2018     Problem Relation Name Age of Onset Comments Source    Lung cancer Mother -- -- smoker Provider    Diabetes Mother -- -- -- Provider    Diabetes Father -- -- -- Provider    Kidney disease Neg Hx -- -- -- Provider    Hypertension Neg Hx -- -- -- Provider    Coronary artery disease Neg Hx -- -- -- Provider            Tobacco Use as of 10/25/2018     Smoking Status Smoking Start Date Smoking Quit Date Packs/Day Years Used    Never Smoker -- -- -- --    Types Comments Smokeless Tobacco Status Smokeless Tobacco Quit Date Source    -- -- Never Used -- Provider            Alcohol Use as of 10/25/2018     Alcohol Use Drinks/Week Alcohol/Week Comments Source    No -- -- -- Provider    Frequency Standard Drinks Binge Drinking Source      -- -- -- Provider             Drug Use as of 10/25/2018     Drug Use  Types Frequency Comments Source    No -- -- -- Provider            Sexual Activity as of 10/25/2018     Sexually Active Birth Control Partners Comments Source    Yes -- Female -- Provider            Activities of Daily Living as of 10/25/2018    None           Social Documentation as of 10/25/2018    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   Flew in  [employer] for 20 years  Lives with wife   Source: Provider           Occupational as of 10/25/2018    None           Socioeconomic as of 10/25/2018     Marital Status Spouse Name Number of Children Years Education Education Level Preferred Language Ethnicity Race Source     -- -- -- -- English /White White Provider    Financial Resource Strain Food Insecurity: Worry Food Insecurity: Inability Transportation Needs: Medical Transportation Needs: Non-medical       -- -- -- -- --             Pertinent History     Question Response Comments    Lives with -- --    Place in Birth Order -- --    Lives in -- --    Number of Siblings -- --    Raised by -- --    Legal Involvement -- --    Childhood Trauma -- --    Criminal History of -- --    Financial Status -- --    Highest Level of Education -- --    Does patient have access to a firearm? -- --     Service -- --    Primary Leisure Activity -- --    Spirituality -- --        Past Medical History:   Diagnosis Date    AICD (automatic cardioverter/defibrillator) present     9/21/2018    Anemia of chronic renal failure, stage 5     BPH (benign prostatic hyperplasia)     CAD (coronary artery disease)     NSTEMI in 2012    CHF (congestive heart failure)     EF 30%    Chronic systolic heart failure 5/31/2018    Diastolic dysfunction 12/16/2016    Dilated cardiomyopathy 5/7/2018    ESRD (end stage renal disease) on dialysis     MWF    GERD (gastroesophageal reflux disease)     Hyperparathyroidism, secondary renal     Hypertension     Metabolic acidosis     MI (myocardial  infarction) Feb 2012    Non-rheumatic mitral regurgitation 5/31/2018    Nonrheumatic aortic valve stenosis 5/31/2018    Pleural effusion on right     Pulmonary hypertension 12/16/2016    S/P TAVR (transcatheter aortic valve replacement)     9/20/18    Status post gastric bypass for obesity     2013    Stenosis of aortic and mitral valves     Aortic stenosis    TIA (transient ischemic attack)     Type 2 diabetes mellitus with diabetic nephropathy     history/ before wt loss    Valvular regurgitation     mitral regurgitation     Past Surgical History:   Procedure Laterality Date    ABDOMINAL SURGERY      PD catheter    AORTIC VALVE REPLACEMENT N/A 9/20/2018    Procedure: Replacement-valve-aortic;  Surgeon: Refugio Cooney MD;  Location: [facility] CATH LAB;  Service: Cardiology;  Laterality: N/A;    ASPIRATION-GROIN N/A 9/9/2016    Performed [facility]    BASAL CELL CARCINOMA EXCISION Left Jan 2011    left forehead    BAV N/A 7/12/2018    Performed by Lyod Ulloa MD at [facility] CATH LAB    CARDIAC DEFIBRILLATOR PLACEMENT  09/21/2018    CARDIAC ELECTROPHYSIOLOGY STUDY N/A 9/21/2018    Procedure: CARDIAC ELECTROPHYSIOLOGY STUDY;  Surgeon: Theron Mcmanus MD;  Location: [facility] CATH LAB;  Service: Cardiology;  Laterality: N/A;  s/p TAVR, CM, EPS +/- BiV ICD, SJM, anes, GP    CARDIAC ELECTROPHYSIOLOGY STUDY N/A 9/21/2018    Performed by Theron Mcmanus MD at [facility] CATH LAB    CHOLECYSTECTOMY  July 2010    ELBOW SURGERY Left 3/18/14    anterior submuscular transposition, ulnar nerve    EYE SURGERY Bilateral     bilateral cataracts    GASTRECTOMY      GASTRIC BYPASS  May 2014    gastric sleeve  June 2013    Malfunctioned requiring bypass    HEART CATH-RIGHT Right 1/20/2017    Performed by Ron Bernstein Jr., MD at [facility] CATH LAB    HERNIA REPAIR      INSERTION-PERITONEAL DIALYSIS CATHETER-LAPAROSCOPIC N/A 3/28/2016    Performed by Owen Ríos MD at [facility] OR  "2ND FLR    LAPAROSCOPY-DIAGNOSTIC N/A 3/28/2016    Performed by [facility] at [facility] OR 2ND FLR    REMOVAL-CATHETER-DIALYSIS-PERITONEAL N/A 9/20/2017    Performed by Owen R[facility]os MD at [facility] OR 2ND FLR    REPAIR-HERNIA-LAPAROSCOPIC  3/28/2016    Performed by Owen R[facility]os MD at [facility] OR 2ND FLR    REPAIR-HERNIA-LAPAROSCOPIC-INGUINAL Bilateral 3/28/2016    Performed by Owen Ríos MD at [facility] OR 2ND FLR    Replacement-valve-aortic N/A 9/20/2018    Performed by Refugio Cooney MD at [facility] CATH LAB    REVISION PERITONEAL DIALYSIS CATHETER-LAPAROSCOPIC N/A 5/18/2016    Performed by Owen Ríos MD at [facility] OR 2ND FLR    ROTATOR CUFF REPAIR Left 2014    SHOULDER ARTHROSCOPY Left 11/18/14    RCR; glenoid labral repair; arch decompression     Family History     Problem Relation (Age of Onset)    Diabetes Mother, Father    Lung cancer Mother        Tobacco Use    Smoking status: Never Smoker    Smokeless tobacco: Never Used   Substance and Sexual Activity    Alcohol use: No    Drug use: No    Sexual activity: Yes     Partners: Female     Review of patient's allergies indicates:   Allergen Reactions    Asa [aspirin] Other (See Comments)     Retinal bleeding, severe    Latex, natural rubber Rash and Blisters     Latex tape causes blisters    Adhesive Dermatitis and Blisters     Please use Paper Tape    Morphine Hives, Itching, Dermatitis and Rash     Body Rash, Phlebitis, "My veins showed through the skin."    Ace inhibitors Other (See Comments)     Other reaction(s): Cough       No current facility-administered medications on file prior to encounter.      Current Outpatient Medications on File Prior to Encounter   Medication Sig    calcitRIOL (ROCALTROL) 0.5 MCG Cap Take 0.5 mcg by mouth once daily.    carvedilol (COREG) 25 MG tablet Take 1 tablet (25 mg total) by mouth 2 (two) times daily with meals.    clopidogrel (PLAVIX) 75 mg tablet Take 1 tablet (75 " "mg total) by mouth once daily.    furosemide (LASIX) 80 MG tablet 80 mg every 12 (twelve) hours.     losartan (COZAAR) 100 MG tablet Take 0.5 tablets (50 mg total) by mouth 2 (two) times daily. (Patient taking differently: Take 50 mg by mouth once. )    pantoprazole (PROTONIX) 40 MG tablet Take 40 mg by mouth 2 (two) times daily as needed.     sevelamer carbonate (RENVELA) 800 mg Tab Take 1,600 mg by mouth 3 (three) times daily. Takes two 800 mg tablets (1600 mg) with meals    vitamin D 1000 units Tab Take 1,000 Units by mouth every 7 days.     nitroGLYCERIN (NITROSTAT) 0.4 MG SL tablet Place 0.4 mg under the tongue every 5 (five) minutes as needed for Chest pain.     Psychotherapeutics (From admission, onward)    None        Review of Systems  Strengths and Liabilities: Strength: Patient accepts guidance/feedback, Strength: Patient is intelligent., Strength: Patient has reasonable judgment.    Objective:     Vital Signs (Most Recent):  Temp: 97.5 °F (36.4 °C) (10/25/18 1527)  Pulse: 73 (10/25/18 1857)  Resp: 18 (10/25/18 1527)  BP: (!) 152/74 (10/25/18 1840)  SpO2: (!) 92 % (10/25/18 1527) Vital Signs (24h Range):  Temp:  [97.3 °F (36.3 °C)-98.2 °F (36.8 °C)] 97.5 °F (36.4 °C)  Pulse:  [64-83] 73  Resp:  [15-19] 18  SpO2:  [92 %-99 %] 92 %  BP: (143-200)/(66-87) 152/74     Height: 6' 2" (188 cm)  Weight: 81.2 kg (179 lb)  Body mass index is 22.98 kg/m².      Intake/Output Summary (Last 24 hours) at 10/25/2018 2118  Last data filed at 10/25/2018 1500  Gross per 24 hour   Intake 60 ml   Output 0 ml   Net 60 ml       Physical Exam   Psychiatric:   Mental Status Exam:  Appearance: unremarkable, age appropriate, normal weight  Behavior: normal, cooperative  Speech/Language: normal tone, normal rate, normal pitch, normal volume  Mood: euthymic  Affect: mood congruent  Thought Process:  normal and logical  Thought Content: normal, no suicidality, no homicidality, delusions, or paranoia  Orientation: grossly " intact  Cognition: grossly intact  Insight: good  Judgment: good          Significant Labs:   Last 24 Hours:   Recent Lab Results       10/25/18  1401   10/25/18  0903   10/25/18  0435        Immature Granulocytes     2.5     Immature Grans (Abs)     0.15  Comment:  Mild elevation in immature granulocytes is non specific and   can be seen in a variety of conditions including stress response,   acute inflammation, trauma and pregnancy. Correlation with other   laboratory and clinical findings is essential.       Albumin     2.8     Anion Gap     11     Baso #     0.04     Basophil%     0.7     BUN, Bld     19     Calcium     8.6     Chloride     106     CO2     19     Creatinine     2.8     Differential Method     Automated     eGFR if      26.0     eGFR if non      22.5  Comment:  Calculation used to obtain the estimated glomerular filtration  rate (eGFR) is the CKD-EPI equation.        Eos #     0.1     Eosinophil%     2.0     Glucose     59     Gran # (ANC)     4.5     Gran%     74.1     Hematocrit     31.8     Hemoglobin     9.2     Lymph #     0.6     Lymph%     10.1     MCH     33.5     MCHC     28.9     MCV     116     Mono #     0.6     Mono%     10.6     MPV     10.0     nRBC     0     Phosphorus     3.1     Platelets     120     POCT Glucose 70 78       Potassium     5.2     RBC     2.75     RDW     15.2     Sodium     136     WBC     6.06           Significant Imaging: None

## 2018-10-26 NOTE — ASSESSMENT & PLAN NOTE
IMPRESSION     Tom Gage is a 68 y.o. male with no past psychiatric history who presented to the [facility] due to SOB.  Pt passed the CAM-ICU but has a history of delirium, especially overnight.  Pt has a half year history of sundowning and becoming delirious.  Pt is already on remelteon 8 mg qhs for sleep.  Ambien last night precipitated violent behavior and physical restraints.    Pt also has a history of CHF and his most recent QTc is 537.  In this setting, the most appropriate medication is VPA.      RECOMMENDATION(S)      1. Scheduled Medication(s):  - do not give phenergan or ambien  - Start Depakote 500 mg PO qhs, hopefully pt has a better night tonight and is able to get his paracentesis tomorrow.    2. PRN Recommendation(s):  none    3.  Monitor:  Please obtain daily EKG to monitor QTc  CBCs to monitor platelet count and LFTs    4. Legal Status/Precaution(s):  n/a    5. Capacity:  Pt currently does not exhibit the symptoms of delirium and does have capacity.    6. Other:  CAM ICU- negative  DELIRIUM BEHAVIOR MANAGEMENT  PLEASE utilize CHEMICAL restraints with PRN meds first for agitation. Minimize use of PHYSICAL restraints  Keep window shades open and room lit during day and room dim at night in order to promote normal sleep-wake cycles  Encourage family at bedside. Dagmar patient often to situation, location, date.  Continue to Limit or Discontinue use of Narcotics, Benzos and Anti-cholinergic medications as they may worsen delirium.  Continue medical workup for causative etiology of Delirium.     Thank you for this consult.  Will continue to follow.

## 2018-10-26 NOTE — PLAN OF CARE
Rae      10/26/18 1508   Post-Acute Status   Post-Acute Authorization Home Health/Hospice   Home Health/Hospice Status Referrals Sent

## 2018-10-26 NOTE — PLAN OF CARE
Problem: Patient Care Overview  Goal: Plan of Care Review  Outcome: Ongoing (interventions implemented as appropriate)  Plan of care reviewed with pt. Pt remained free of falls, trauma, and injury. VSS. Pt went to HD today. Thoracentesis is still pending. Will continue to monitor.

## 2018-10-26 NOTE — PLAN OF CARE
[facility]    HOME HEALTH ORDERS  FACE TO FACE ENCOUNTER    Patient Name: Tom Gage  YOB: 1950    PCP: Asael Poole MD   [address] [place] [address]  PCP Phone Number: [phone]  PCP Fax: [phone]    Encounter Date: 10/26/2018    Admit to Home Health    Diagnoses:  Active Hospital Problems    Diagnosis  POA    *Acute on chronic combined systolic and diastolic congestive heart failure [I50.43]  Yes    Delirium of mixed origin [F05]  Unknown    S/P TAVR (transcatheter aortic valve replacement) [Z95.2]  Not Applicable     9/20/18      Acute respiratory failure with hypoxia [J96.01]  Yes    Elevated troponin I level [R74.8]  Yes    Hypertensive urgency [I16.0]  Yes    Disorder of implantable defibrillator [T82.9XXA]  Yes    Nodular calcific aortic valve stenosis [I35.0]  Yes    Moderate protein malnutrition [E44.0]  Yes    ESRD (end stage renal disease) [N18.6]  Yes    S/P gastric bypass [Z98.84]  Not Applicable    Pleural effusion on right [J90]  Yes    Hypertensive heart and kidney disease with heart failure [I13.0]  Yes    Anemia of chronic renal failure, stage 5 [N18.5, D63.1]  Yes     Chronic    Hyperkalemia [E87.5]  Yes    Coronary artery disease involving native coronary artery of native heart without angina pectoris [I25.10]  Yes      Resolved Hospital Problems   No resolved problems to display.       Future Appointments   Date Time Provider Department Center   11/6/2018 10:00 AM Theron Mcmanus MD [facility]   1/8/2019 10:30 AM EKG, APPT [facility]   1/8/2019 11:00 AM COORDINATED DEVICE CHECK [facility]     Follow-up Information     Schedule an appointment as soon as possible for a visit with Asael Poole MD.    Specialty:  Family Medicine  Contact information:  [address] [place] [address]  [phone]             Schedule an appointment as soon as possible for a visit with Your  "Pulmonologist.                   I have seen and examined this patient face to face today. My clinical findings that support the need for the home health skilled services and home bound status are the following:  Weakness/numbness causing balance and gait disturbance due to Heart Failure, Coronary Heart Disease and Weakness/Debility making it taxing to leave home.    Allergies:  Review of patient's allergies indicates:   Allergen Reactions    Asa [aspirin] Other (See Comments)     Retinal bleeding, severe    Latex, natural rubber Rash and Blisters     Latex tape causes blisters    Adhesive Dermatitis and Blisters     Please use Paper Tape    Morphine Hives, Itching, Dermatitis and Rash     Body Rash, Phlebitis, "My veins showed through the skin."    Ace inhibitors Other (See Comments)     Other reaction(s): Cough       Diet: cardiac diet, renal diet and fluid restriction: 1.5 liters    Activities: activity as tolerated    Nursing:   SN to complete comprehensive assessment including routine vital signs. Instruct on disease process and s/s of complications to report to MD. Review/verify medication list sent home with the patient at time of discharge  and instruct patient/caregiver as needed. Frequency may be adjusted depending on start of care date.    Notify MD if SBP > 160 or < 90; DBP > 90 or < 50; HR > 120 or < 50; Temp > 101; Other:         CONSULTS:    Physical Therapy to evaluate and treat. Evaluate for home safety and equipment needs; Establish/upgrade home exercise program. Perform / instruct on therapeutic exercises, gait training, transfer training, and Range of Motion.  Occupational Therapy to evaluate and treat. Evaluate home environment for safety and equipment needs. Perform/Instruct on transfers, ADL training, ROM, and therapeutic exercises.    MISCELLANEOUS CARE:  Heart Failure:      SN to instruct on the following:    Instruct on the definition of CHF.   Instruct on the signs/sympoms of CHF to " be reported.   Instruct on and monitor daily weights.   Instruct on factors that cause exacerbation.   Instruct on action, dose, schedule, and side effects of medications.   Instruct on diet as prescribed.   Instruct on activity allowed.   Instruct on life-style modifications for life long management of CHF   SN to assess compliance with daily weights, diet, medications, fluid retention,    safety precautions, activities permitted and life-style modifications.   Additional 1-2 SN visits per week as needed for signs and symptoms     of CHF exacerbation.          Medications: Review discharge medications with patient and family and provide education.      Current Discharge Medication List      START taking these medications    Details   amoxicillin (AMOXIL) 500 MG capsule Take 1 capsule (500 mg total) by mouth every 8 (eight) hours. for 7 days  Qty: 21 capsule, Refills: 0      isosorbide-hydrALAZINE 20-37.5 mg (BIDIL) 20-37.5 mg Tab Take 1 tablet by mouth 3 (three) times daily.  Qty: 90 tablet, Refills: 11         CONTINUE these medications which have CHANGED    Details   nitroGLYCERIN (NITROSTAT) 0.4 MG SL tablet Place 1 tablet (0.4 mg total) under the tongue every 5 (five) minutes as needed for Chest pain.  Qty: 10 tablet, Refills: 1         CONTINUE these medications which have NOT CHANGED    Details   calcitRIOL (ROCALTROL) 0.5 MCG Cap Take 0.5 mcg by mouth once daily.      carvedilol (COREG) 25 MG tablet Take 1 tablet (25 mg total) by mouth 2 (two) times daily with meals.  Qty: 180 tablet, Refills: 3    Associated Diagnoses: Dilated cardiomyopathy; Chronic systolic heart failure      clopidogrel (PLAVIX) 75 mg tablet Take 1 tablet (75 mg total) by mouth once daily.  Qty: 30 tablet, Refills: 11      furosemide (LASIX) 80 MG tablet 80 mg every 12 (twelve) hours.       losartan (COZAAR) 100 MG tablet Take 0.5 tablets (50 mg total) by mouth 2 (two) times daily.  Qty: 90 tablet, Refills: 3    Associated Diagnoses:  Dilated cardiomyopathy; Chronic systolic heart failure      pantoprazole (PROTONIX) 40 MG tablet Take 40 mg by mouth 2 (two) times daily as needed.       sevelamer carbonate (RENVELA) 800 mg Tab Take 1,600 mg by mouth 3 (three) times daily. Takes two 800 mg tablets (1600 mg) with meals      vitamin D 1000 units Tab Take 1,000 Units by mouth every 7 days.          STOP taking these medications       cephALEXin (KEFLEX) 500 MG capsule Comments:   Reason for Stopping:               I certify that this patient is confined to his home and needs intermittent skilled nursing care, physical therapy and occupational therapy.

## 2018-10-26 NOTE — NURSING TRANSFER
Nursing Transfer Note      10/26/2018     Transfer To: Dialysis     Transfer via stretcher    Transfer with cardiac monitoring    Transported by transport    Medicines sent: none    Chart send with patient: Yes    Notified: spouse

## 2018-10-26 NOTE — DISCHARGE INSTRUCTIONS
Please schedule an appointment with primary care physician for general hospital discharge follow up as well as advance care planning/code status.    Please schedule an appointment with your vascular surgeon and have him order a CTA abdomen/pelvis with runoff (including legs) to evaluate these ultrasound findings: Ankle-brachial index of 2.9 on the right and 2.3 on the left, suggestive of arterial atherosclerotic calcification. Biphasic to monophasic arterial waveforms with sluggish flow throughout the lower extremity arteries, findings are suggestive of upstream stenosis in the aorta or iliac arteries, potentially from aorta-occlusive disease.

## 2018-10-26 NOTE — PLAN OF CARE
completed screening with pt and wife today.  Pt is an alert gentleman who lives with his wife and plans to return.  Pt is currently being followed by Rae isaacs.  [facility]  Wife is very involved in pts care.  sw will resume services.

## 2018-10-26 NOTE — CONSULTS
[facility]  Psychiatry  Consult Note    Patient Name: Tom Gage  MRN: [id number]   Code Status: Full Code  Admission Date: 10/21/2018  Hospital Length of Stay: 4 days  Attending Physician: Laine Bradley MD  Primary Care Provider: Asael Poole MD    Current Legal Status: N/A    Patient information was obtained from patient, spouse/SO and ER records.   Inpatient consult to Psychiatry  Consult performed by: Antonio Black MD  Consult ordered by: Laine Bradley MD        Subjective:     Principal Problem:Acute on chronic combined systolic and diastolic congestive heart failure    Chief Complaint:  Delirium     HPI:   Consultation-Liaison Psychiatry Consult Note    Chief Complaint / Reason for Consult:     Delirium    Subjective:     History of Present Illness: with Dr. Black   Tom Gage is a 68 y.o. male with a history ofrecent TAVR on 09/20/18, AICD 09/21/18, combined HF with 30% , recurrent/ chronic R sided pleural effusion (attributed to ESRD/ hypoalbuminemia, with intermittent thoracenteses), ESRD on HD (MWF), CAD s/p NSTEMI in 2012, hx gastric bypass in 2013  who presented to [facility] due to SOB. Psychiatry was consulted to address the patient's symptoms of delirium.      Pt was sitting upright in his chair watching TV when he entered the room.  He had no alteration or fluctuation in his mental status, no inattention and no disorganized thinking.  He easily passed the CAM-ICU and no delirium was present.    Pt's wife entered the room and provided a more substantial history.  The pt has had fluctuating delirium over the past few months.  Pt was his normal self while admitted until yesterday, pt had a reaction to phenergan where he became agitated at bedtime.  Pt was given ambien and when he was awakened this am he became violent and aggressive and necessitated physical restraints.  The pt was not allowed to have a thoracentesis due to his aggressive behavior.  The pt's wife is  "concerned that the pt will have another night of delirium and aggression which will cause him to miss his procedure again tomorrow.      On repeat exam with writer:   Patient is CAM-ICU negative. Disoriented to year. He is calm and reports that he slept well overnight, feels "clear" and denies memory of events during period of agitation requiring restraint. Wife at bedside reports he is much improved from initial agitation reported. States that he has had word finding difficulty for some time now, though has no formal diagnosis of dementia. Patient reports he feels well. In past has been overly sedated when tx with a trial of risperdal in out-patient setting for anxiety, per wife patient couldn't get out of bed.     Patient and wife report he is very sensitive to medications, and typically doesn't take any medication for insomnia.       Collateral:   Wife at bedside    Medical Review Of Systems:  Pertinent items are noted in HPI.    Psychiatric Review Of Systems - Is patient experiencing or having changes in:  sleep: yes  appetite: no  weight: no  energy/anergy: no  interest/pleasure/anhedonia: no  somatic symptoms: no  libido: no  anxiety/panic: no  guilty/hopelessness: no  concentration: yes  S.I.B.s/risky behavior: no  any drugs: no  alcohol: no     Allergies:  Asa [aspirin]; Latex, natural rubber; Adhesive; Morphine; and Ace inhibitors    Past Medical/Surgical History  Past Medical History:   Diagnosis Date    AICD (automatic cardioverter/defibrillator) present     9/21/2018    Anemia of chronic renal failure, stage 5     BPH (benign prostatic hyperplasia)     CAD (coronary artery disease)     NSTEMI in 2012    CHF (congestive heart failure)     EF 30%    Chronic systolic heart failure 5/31/2018    Diastolic dysfunction 12/16/2016    Dilated cardiomyopathy 5/7/2018    ESRD (end stage renal disease) on dialysis     MWF    GERD (gastroesophageal reflux disease)     Hyperparathyroidism, secondary renal  "    Hypertension     Metabolic acidosis     MI (myocardial infarction) Feb 2012    Non-rheumatic mitral regurgitation 5/31/2018    Nonrheumatic aortic valve stenosis 5/31/2018    Pleural effusion on right     Pulmonary hypertension 12/16/2016    S/P TAVR (transcatheter aortic valve replacement)     9/20/18    Status post gastric bypass for obesity     2013    Stenosis of aortic and mitral valves     Aortic stenosis    TIA (transient ischemic attack)     Type 2 diabetes mellitus with diabetic nephropathy     history/ before wt loss    Valvular regurgitation     mitral regurgitation      has a past medical history of AICD (automatic cardioverter/defibrillator) present, Anemia of chronic renal failure, stage 5, BPH (benign prostatic hyperplasia), CAD (coronary artery disease), CHF (congestive heart failure), Chronic systolic heart failure (5/31/2018), Diastolic dysfunction (12/16/2016), Dilated cardiomyopathy (5/7/2018), ESRD (end stage renal disease) on dialysis, GERD (gastroesophageal reflux disease), Hyperparathyroidism, secondary renal, Hypertension, Metabolic acidosis, MI (myocardial infarction) (Feb 2012), Non-rheumatic mitral regurgitation (5/31/2018), Nonrheumatic aortic valve stenosis (5/31/2018), Pleural effusion on right, Pulmonary hypertension (12/16/2016), S/P TAVR (transcatheter aortic valve replacement), Status post gastric bypass for obesity, Stenosis of aortic and mitral valves, TIA (transient ischemic attack), Type 2 diabetes mellitus with diabetic nephropathy, and Valvular regurgitation.  Past Surgical History:   Procedure Laterality Date    ABDOMINAL SURGERY      PD catheter    AORTIC VALVE REPLACEMENT N/A 9/20/2018    Procedure: Replacement-valve-aortic;  Surgeon: Refugio Cooney MD;  Location: [facility] C[facility] LAB;  Service: Cardiology;  Laterality: N/A;    ASPIRATION-GROIN N/A 9/9/2016    Performed by [facility] Provider [facility] OR    BASAL CELL CARCINOMA EXCISION Left Jan 2011     left forehead    BAV N/A 7/12/2018    Performed by Loyd Ulloa MD at [facility] CATH LAB    CARDIAC DEFIBRILLATOR PLACEMENT  09/21/2018    CARDIAC ELECTROPHYSIOLOGY STUDY N/A 9/21/2018    Procedure: CARDIAC ELECTROPHYSIOLOGY STUDY;  Surgeon: Theron Mcmanus MD;  Location: [facility] CATH LAB;  Service: Cardiology;  Laterality: N/A;  s/p TAVR, CM, EPS +/- BiV ICD, SJM, anes, GP    CARDIAC ELECTROPHYSIOLOGY STUDY N/A 9/21/2018    Performed by Theron Mcmanus MD at [facility] CATH LAB    CHOLECYSTECTOMY  July 2010    ELBOW SURGERY Left 3/18/14    anterior submuscular transposition, ulnar nerve    EYE SURGERY Bilateral     bilateral cataracts    GASTRECTOMY      GASTRIC BYPASS  May 2014    gastric sleeve  June 2013    Malfunctioned requiring bypass    HEART CATH-RIGHT Right 1/20/2017    Performed by Ron Bernstein Jr., MD at [facility] CATH LAB    HERNIA REPAIR      INSERTION-PERITONEAL DIALYSIS CATHETER-LAPAROSCOPIC N/A 3/28/2016    Performed by Owen R[facility]os [facility] at [facility] OR 2ND FLR    LAPAROSCOPY-DIAGNOSTIC N/A 3/28/2016    Performed by Owen R[facility] at [facility] OR 2ND FLR    REMOVAL-CATHETER-DIALYSIS-PERITONEAL N/A 9/20/2017    Performed by Owen [facility] at [facility] OR 2ND FLR    REPAIR-HERNIA-LAPAROSCOPIC  3/28/2016    Performed by Owen [facility] at [facility] OR 2ND FLR    REPAIR-HERNIA-LAPAROSCOPIC-INGUINAL Bilateral 3/28/2016    Performed by [facility] at [facility] OR 2ND FLR    Replacement-valve-aortic N/A 9/20/2018    Performed by Refugio Cooney MD at [facility] CATH LAB    REVISION PERITONEAL DIALYSIS CATHETER-LAPAROSCOPIC N/A 5/18/2016    Performed by Owen R[facility]os MD at [facility] OR 2ND FLR    ROTATOR CUFF REPAIR Left 2014    SHOULDER ARTHROSCOPY Left 11/18/14    RCR; glenoid labral repair; arch decompression       Past Psychiatric History:  Previous Medication Trials: agitation due to ambien, risperdal   Previous Psychiatric Hospitalizations: no   Previous  Suicide Attempts: unknown   History of Violence: no  Outpatient Psychiatrist: no    Social History:  Marital Status:   Children: 1   Employment Status/Info: retired  Education: post college graduate work or degree  Special Ed: no  Housing Status: with wife  History of phys/sexual abuse: no  Access to gun: yes    Substance Abuse History:  Recreational Drugs: none  Use of Alcohol: denied  Rehab History:no   Tobacco Use:no  Use of Caffeine: denies use  Use of OTC: none  Legal consequences of chemical use: no    Legal History:  Past Charges/Incarcerations:no,     Pending charges:no     Family Psychiatric History:   denies      Objective:     Current Medications:  Infusions:    Scheduled:   amoxicillin  500 mg Oral Q8H    calcitRIOL  0.5 mcg Oral Daily    carvedilol  25 mg Oral BID WM    heparin (porcine)  5,000 Units Subcutaneous Q8H    isosorbide-hydrALAZINE 20-37.5 mg  1 tablet Oral TID    losartan  50 mg Oral BID    sevelamer carbonate  1,600 mg Oral TID WM     PRN:  sodium chloride 0.9%, acetaminophen, calcium carbonate, dextrose 50%, dextrose 50%, glucagon (human recombinant), glucose, glucose, hydrALAZINE, hydrALAZINE, nitroGLYCERIN, sodium chloride 0.9%    Home Medications:  Prior to Admission medications    Medication Sig Start Date End Date Taking? Authorizing Provider   calcitRIOL (ROCALTROL) 0.5 MCG Cap Take 0.5 mcg by mouth once daily.   Yes Historical Provider, MD   carvedilol (COREG) 25 MG tablet Take 1 tablet (25 mg total) by mouth 2 (two) times daily with meals. 5/31/18 5/31/19 Yes Prudence Mcmanus MD   clopidogrel (PLAVIX) 75 mg tablet Take 1 tablet (75 mg total) by mouth once daily. 9/22/18 9/22/19 Yes Jessenia Artis PA-C   furosemide (LASIX) 80 MG tablet 80 mg every 12 (twelve) hours.  10/16/17  Yes Historical Provider, MD   losartan (COZAAR) 100 MG tablet Take 0.5 tablets (50 mg total) by mouth 2 (two) times daily.  Patient taking differently: Take 50 mg by mouth once.  9/21/18  9/21/19 Yes Jessenia Artis PA-C   pantoprazole (PROTONIX) 40 MG tablet Take 40 mg by mouth 2 (two) times daily as needed.    Yes Historical Provider, MD   sevelamer carbonate (RENVELA) 800 mg Tab Take 1,600 mg by mouth 3 (three) times daily. Takes two 800 mg tablets (1600 mg) with meals   Yes Historical Provider, MD   vitamin D 1000 units Tab Take 1,000 Units by mouth every 7 days.    Yes Historical Provider, MD   nitroGLYCERIN (NITROSTAT) 0.4 MG SL tablet Place 0.4 mg under the tongue every 5 (five) minutes as needed for Chest pain.    Historical Provider, MD     Vital Signs:  Temp:  [97.3 °F (36.3 °C)-98.2 °F (36.8 °C)]   Pulse:  [64-83]   Resp:  [15-19]   BP: (143-200)/(66-87)   SpO2:  [92 %-99 %]     Physical Exam: On exam with Dr. Black   Gen: Alert, calm, cooperative, NAD   Head: NCAT, PERRL, EOMI, MMM   Lungs: CTAB, respirations unlabored   Chest wall: No tenderness or deformity   Heart: RRR, S1/S2 normal, no M/R/G   Abdomen: S/NT/ND, +BS, no HSM, no masses   Extremities: Extremities normal, multiple bruises on dorsal forearms   Pulses: 2+ and symmetric all extremities   Skin: Skin color, texture, turgor normal; no rashes or lesions   Neurologic: CN II-XII grossly intact, normal strength, sensations and reflexes throughout           Hospital Course: No notes on file         Patient History           Medical as of 10/25/2018     Past Medical History     Diagnosis Date Comments Source    AICD (automatic cardioverter/defibrillator) present -- 9/21/2018 Provider    Anemia of chronic renal failure, stage 5 -- -- Provider    BPH (benign prostatic hyperplasia) -- -- Provider    CAD (coronary artery disease) -- NSTEMI in 2012 Provider    CHF (congestive heart failure) -- EF 30% Provider    Chronic systolic heart failure 5/31/2018 -- Provider    Diastolic dysfunction 12/16/2016 -- Provider    Dilated cardiomyopathy 5/7/2018 -- Provider    ESRD (end stage renal disease) on dialysis -- MWF Provider    GERD  (gastroesophageal reflux disease) -- -- Provider    Hyperparathyroidism, secondary renal -- -- Provider    Hypertension -- -- Provider    Metabolic acidosis -- -- Provider    MI (myocardial infarction) Feb 2012 -- Provider    Non-rheumatic mitral regurgitation 5/31/2018 -- Provider    Nonrheumatic aortic valve stenosis 5/31/2018 -- Provider    Pleural effusion on right -- -- Provider    Pulmonary hypertension 12/16/2016 -- Provider    S/P TAVR (transcatheter aortic valve replacement) -- 9/20/18 Provider    Status post gastric bypass for obesity -- 2013 Provider    Stenosis of aortic and mitral valves -- Aortic stenosis Provider    TIA (transient ischemic attack) -- -- Provider    Type 2 diabetes mellitus with diabetic nephropathy -- history/ before wt loss Provider    Valvular regurgitation -- mitral regurgitation Provider          Pertinent Negatives     Diagnosis Date Noted Comments Source    Asthma 09/20/2015 -- Provider    Cancer 09/20/2015 -- Provider    COPD (chronic obstructive pulmonary disease) 09/20/2015 -- Provider    Encounter for blood transfusion 09/20/2015 -- Provider    Seizures 09/20/2015 -- Provider    Stroke 09/20/2015 -- Provider                  Surgical as of 10/25/2018     Past Surgical History     Procedure Laterality Date Comments Source    CHOLECYSTECTOMY -- July 2010 -- Provider    EYE SURGERY Bilateral -- bilateral cataracts Provider    GASTRIC BYPASS -- May 2014 -- Provider    gastric sleeve [Other] -- June 2013 Malfunctioned requiring bypass Provider    SHOULDER ARTHROSCOPY Left 11/18/14 RCR; glenoid labral repair; arch decompression Provider    ELBOW SURGERY Left 3/18/14 anterior submuscular transposition, ulnar nerve Provider    BASAL CELL CARCINOMA EXCISION Left Jan 2011 left forehead Provider    ROTATOR CUFF REPAIR Left 2014 -- Provider    HERNIA REPAIR -- -- -- Provider    ABDOMINAL SURGERY -- -- PD catheter Provider    GASTRECTOMY -- -- -- Provider    AORTIC VALVE REPLACEMENT N/A  9/20/2018 Procedure: Replacement-valve-aortic;  Surgeon: Refugio Cooney MD;  Location: [facility] CATH LAB;  Service: Cardiology;  Laterality: N/A; Provider    CARDIAC ELECTROPHYSIOLOGY STUDY N/A 9/21/2018 Procedure: CARDIAC ELECTROPHYSIOLOGY STUDY;  Surgeon: Theron Mcmanus MD;  Location: [facility] CATH LAB;  Service: Cardiology;  Laterality: N/A;  s/p TAVR, CM, EPS +/- BiV ICD, SJM, anes, GP Provider    CARDIAC DEFIBRILLATOR PLACEMENT -- 09/21/2018 -- Provider                  Family as of 10/25/2018     Problem Relation Name Age of Onset Comments Source    Lung cancer Mother -- -- smoker Provider    Diabetes Mother -- -- -- Provider    Diabetes Father -- -- -- Provider    Kidney disease Neg Hx -- -- -- Provider    Hypertension Neg Hx -- -- -- Provider    Coronary artery disease Neg Hx -- -- -- Provider            Tobacco Use as of 10/25/2018     Smoking Status Smoking Start Date Smoking Quit Date Packs/Day Years Used    Never Smoker -- -- -- --    Types Comments Smokeless Tobacco Status Smokeless Tobacco Quit Date Source    -- -- Never Used -- Provider            Alcohol Use as of 10/25/2018     Alcohol Use Drinks/Week Alcohol/Week Comments Source    No -- -- -- Provider    Frequency Standard Drinks Binge Drinking Source      -- -- -- Provider             Drug Use as of 10/25/2018     Drug Use Types Frequency Comments Source    No -- -- -- Provider            Sexual Activity as of 10/25/2018     Sexually Active Birth Control Partners Comments Source    Yes -- Female -- Provider            Activities of Daily Living as of 10/25/2018    None           Social Documentation as of 10/25/2018    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   Flew in [employer] for 20 years  Lives with wife   Source: Provider           Occupational as of 10/25/2018    None           Socioeconomic as of 10/25/2018     Marital Status Spouse Name Number of Children Years Education Education Level Preferred Language Ethnicity Race  Source     -- -- -- -- English /White White Provider    Financial Resource Strain Food Insecurity: Worry Food Insecurity: Inability Transportation Needs: Medical Transportation Needs: Non-medical       -- -- -- -- --             Pertinent History     Question Response Comments    Lives with -- --    Place in Birth Order -- --    Lives in -- --    Number of Siblings -- --    Raised by -- --    Legal Involvement -- --    Childhood Trauma -- --    Criminal History of -- --    Financial Status -- --    Highest Level of Education -- --    Does patient have access to a firearm? -- --     Service -- --    Primary Leisure Activity -- --    Spirituality -- --        Past Medical History:   Diagnosis Date    AICD (automatic cardioverter/defibrillator) present     9/21/2018    Anemia of chronic renal failure, stage 5     BPH (benign prostatic hyperplasia)     CAD (coronary artery disease)     NSTEMI in 2012    CHF (congestive heart failure)     EF 30%    Chronic systolic heart failure 5/31/2018    Diastolic dysfunction 12/16/2016    Dilated cardiomyopathy 5/7/2018    ESRD (end stage renal disease) on dialysis     MWF    GERD (gastroesophageal reflux disease)     Hyperparathyroidism, secondary renal     Hypertension     Metabolic acidosis     MI (myocardial infarction) Feb 2012    Non-rheumatic mitral regurgitation 5/31/2018    Nonrheumatic aortic valve stenosis 5/31/2018    Pleural effusion on right     Pulmonary hypertension 12/16/2016    S/P TAVR (transcatheter aortic valve replacement)     9/20/18    Status post gastric bypass for obesity     2013    Stenosis of aortic and mitral valves     Aortic stenosis    TIA (transient ischemic attack)     Type 2 diabetes mellitus with diabetic nephropathy     history/ before wt loss    Valvular regurgitation     mitral regurgitation     Past Surgical History:   Procedure Laterality Date    ABDOMINAL SURGERY      PD catheter    AORTIC  VALVE REPLACEMENT N/A 9/20/2018    Procedure: Replacement-valve-aortic;  Surgeon: Refugio Cooney MD;  Location: [facility] CATH LAB;  Service: Cardiology;  Laterality: N/A;    ASPIRATION-GROIN N/A 9/9/2016    Performed by [facility] Provider [facility] OR    BASAL CELL CARCINOMA EXCISION Left Jan 2011    left forehead    BAV N/A 7/12/2018    Performed by Loyd Ulloa MD at [facility] CATH LAB    CARDIAC DEFIBRILLATOR PLACEMENT  09/21/2018    CARDIAC ELECTROPHYSIOLOGY STUDY N/A 9/21/2018    Procedure: CARDIAC ELECTROPHYSIOLOGY STUDY;  Surgeon: Theron Mcmanus MD;  Location: [facility] CATH LAB;  Service: Cardiology;  Laterality: N/A;  s/p TAVR, CM, EPS +/- BiV ICD, SJM, anes, GP    CARDIAC ELECTROPHYSIOLOGY STUDY N/A 9/21/2018    Performed by Theron Mcmanus MD at [facility] CATH LAB    CHOLECYSTECTOMY  July 2010    ELBOW SURGERY Left 3/18/14    anterior submuscular transposition, ulnar nerve    EYE SURGERY Bilateral     bilateral cataracts    GASTRECTOMY      GASTRIC BYPASS  May 2014    gastric sleeve  June 2013    Malfunctioned requiring bypass    HEART CATH-RIGHT Right 1/20/2017    Performed by Ron Bernstein Jr., MD at [facility] CATH LAB    HERNIA REPAIR      INSERTION-PERITONEAL DIALYSIS CATHETER-LAPAROSCOPIC N/A 3/28/2016    Performed by Owen Ríos MD at [facility] OR 2ND FLR    LAPAROSCOPY-DIAGNOSTIC N/A 3/28/2016    Performed by Owen R[facility]os MD at [facility] OR 2ND [facility]LR    REMOVAL-CATHETER-DIALYSIS-PERITONEAL N/A 9/20/2017    Performed by Owen R[facility]os MD at [facility] OR 2ND [facility]LR    REPAIR-HERNIA-LAPAROSCOPIC  3/28/2016    Performed by Owen R[facility]os MD at [facility] OR 2ND [facility]LR    REPAIR-HERNIA-LAPAROSCOPIC-INGUINAL Bilateral 3/28/2016    Performed by Owen R[facility]os MD at [facility] OR 2ND [facility]LR    Replacement-valve-aortic N/A 9/20/2018    Performed by Refugio Cooney MD at [facility] CATH LAB    REVISION PERITONEAL DIALYSIS CATHETER-LAPAROSCOPIC N/A 5/18/2016    Performed by Owen DOSS  "[facility] [place] [facility] OR 2ND [facility]    ROTATOR CUFF REPAIR Left 2014    SHOULDER ARTHROSCOPY Left 11/18/14    RCR; glenoid labral repair; arch decompression     Family History     Problem Relation (Age of Onset)    Diabetes Mother, Father    Lung cancer Mother        Tobacco Use    Smoking status: Never Smoker    Smokeless tobacco: Never Used   Substance and Sexual Activity    Alcohol use: No    Drug use: No    Sexual activity: Yes     Partners: Female     Review of patient's allergies indicates:   Allergen Reactions    Asa [aspirin] Other (See Comments)     Retinal bleeding, severe    Latex, natural rubber Rash and Blisters     Latex tape causes blisters    Adhesive Dermatitis and Blisters     Please use Paper Tape    Morphine Hives, Itching, Dermatitis and Rash     Body Rash, Phlebitis, "My veins showed through the skin."    Ace inhibitors Other (See Comments)     Other reaction(s): Cough       No current facility-administered medications on file prior to encounter.      Current Outpatient Medications on File Prior to Encounter   Medication Sig    calcitRIOL (ROCALTROL) 0.5 MCG Cap Take 0.5 mcg by mouth once daily.    carvedilol (COREG) 25 MG tablet Take 1 tablet (25 mg total) by mouth 2 (two) times daily with meals.    clopidogrel (PLAVIX) 75 mg tablet Take 1 tablet (75 mg total) by mouth once daily.    furosemide (LASIX) 80 MG tablet 80 mg every 12 (twelve) hours.     losartan (COZAAR) 100 MG tablet Take 0.5 tablets (50 mg total) by mouth 2 (two) times daily. (Patient taking differently: Take 50 mg by mouth once. )    pantoprazole (PROTONIX) 40 MG tablet Take 40 mg by mouth 2 (two) times daily as needed.     sevelamer carbonate (RENVELA) 800 mg Tab Take 1,600 mg by mouth 3 (three) times daily. Takes two 800 mg tablets (1600 mg) with meals    vitamin D 1000 units Tab Take 1,000 Units by mouth every 7 days.     nitroGLYCERIN (NITROSTAT) 0.4 MG SL tablet Place 0.4 mg under the tongue every 5 " "(five) minutes as needed for Chest pain.     Psychotherapeutics (From admission, onward)    None        Review of Systems  Strengths and Liabilities: Strength: Patient accepts guidance/feedback, Strength: Patient is intelligent., Strength: Patient has reasonable judgment.    Objective:     Vital Signs (Most Recent):  Temp: 97.5 °F (36.4 °C) (10/25/18 1527)  Pulse: 73 (10/25/18 1857)  Resp: 18 (10/25/18 1527)  BP: (!) 152/74 (10/25/18 1840)  SpO2: (!) 92 % (10/25/18 1527) Vital Signs (24h Range):  Temp:  [97.3 °F (36.3 °C)-98.2 °F (36.8 °C)] 97.5 °F (36.4 °C)  Pulse:  [64-83] 73  Resp:  [15-19] 18  SpO2:  [92 %-99 %] 92 %  BP: (143-200)/(66-87) 152/74     Height: 6' 2" (188 cm)  Weight: 81.2 kg (179 lb)  Body mass index is 22.98 kg/m².      Intake/Output Summary (Last 24 hours) at 10/25/2018 2118  Last data filed at 10/25/2018 1500  Gross per 24 hour   Intake 60 ml   Output 0 ml   Net 60 ml       Physical Exam   Psychiatric: on exam on 10/26 with Dr. Swartz   Mental Status Exam:  Appearance: unremarkable, age appropriate, normal weight  Behavior: normal, cooperative  Speech/Language: normal tone, normal rate, normal pitch, normal volume  Mood: euthymic  Affect: mood congruent  Thought Process:  normal and logical  Thought Content: normal, no suicidality, no homicidality, delusions, or paranoia  Orientation: grossly intact  Cognition: grossly intact  Insight: good  Judgment: good          Significant Labs:   Last 24 Hours:   Recent Lab Results       10/25/18  1401   10/25/18  0903   10/25/18  0435        Immature Granulocytes     2.5     Immature Grans (Abs)     0.15  Comment:  Mild elevation in immature granulocytes is non specific and   can be seen in a variety of conditions including stress response,   acute inflammation, trauma and pregnancy. Correlation with other   laboratory and clinical findings is essential.       Albumin     2.8     Anion Gap     11     Baso #     0.04     Basophil%     0.7     BUN, Bld     " 19     Calcium     8.6     Chloride     106     CO2     19     Creatinine     2.8     Differential Method     Automated     eGFR if      26.0     eGFR if non      22.5  Comment:  Calculation used to obtain the estimated glomerular filtration  rate (eGFR) is the CKD-EPI equation.        Eos #     0.1     Eosinophil%     2.0     Glucose     59     Gran # (ANC)     4.5     Gran%     74.1     Hematocrit     31.8     Hemoglobin     9.2     Lymph #     0.6     Lymph%     10.1     MCH     33.5     MCHC     28.9     MCV     116     Mono #     0.6     Mono%     10.6     MPV     10.0     nRBC     0     Phosphorus     3.1     Platelets     120     POCT Glucose 70 78       Potassium     5.2     RBC     2.75     RDW     15.2     Sodium     136     WBC     6.06           Significant Imaging: None    Assessment/Plan:     Delirium of mixed origin    IMPRESSION     Tom Gage is a 68 y.o. male with no past psychiatric history who [place] [facility] [place]  Pt passed the CAM-ICU but has a history of delirium, especially overnight.  Pt has a half year history of sundowning and becoming delirious. Ambien precipitated violent behavior and physical restraints.    Pt also has a history of CHF and his most recent QTc is 537.  In this setting, the most appropriate medication is VPA.  Patient has some word finding difficulty on exam, will continue to assess on serial exams.     RECOMMENDATION(S)      1. Scheduled Medication(s):  - Would avoid anticholinergic medications and benzodiazepines, would avoid phenergan, remeron, and ambien  - Depakote 500 mg ER PO qhs, can offer depacon IV in lieu of po if patient unable to tolerate po   -Would check depakote level on 10/28    2. PRN Recommendation(s):  -Would offer additional depakote 250mg po/depacon 250mg IV q6H prn acute agitation as first line given qtc prolongation and CKD  -If patient does not respond to depakote prn, would offer judicious  treatment with zyprexa 2.5mg po/IM if patient is exhibits non-redirectable agitation requiring restraints; minimize use of this agent as patient may be sensitive to anticholinergic effects of zyprexa and may worsen qtc prolongation     3.  Monitor:  Please obtain daily EKG to monitor QTc  CBCs to monitor platelet count and LFTs    4. Legal Status/Precaution(s):  n/a    5. Capacity:  Pt currently does not exhibit the symptoms of delirium and does have capacity.    6. Other:  CAM ICU- negative  DELIRIUM BEHAVIOR MANAGEMENT  PLEASE utilize CHEMICAL restraints with PRN meds first for agitation. Minimize use of PHYSICAL restraints  Keep window shades open and room lit during day and room dim at night in order to promote normal sleep-wake cycles  Encourage family at bedside. [place] patient often to situation, location, date.  Continue to Limit or Discontinue use of Narcotics, Benzos and Anti-cholinergic medications as they may worsen delirium.  Continue medical workup for causative etiology of Delirium.     Thank you for this consult.  Will continue to follow.            Total Time:  60 minutes      Sheron Swartz MD  PGY II Psychiatry

## 2018-10-26 NOTE — ASSESSMENT & PLAN NOTE
Tom Gage is a 68 y.o. male with no past psychiatric history who presented to the [facility] due to SOB.  Pt passed the CAM-ICU but has a history of delirium, especially overnight.  Pt has a half year history of sundowning and becoming delirious. Ambien precipitated violent behavior and physical restraints.    Pt also has a history of CHF and his most recent QTc is 537.  In this setting, the most appropriate medication is VPA.  Patient has some word finding difficulty on exam, will continue to assess on serial exams.     RECOMMENDATION(S)      1. Scheduled Medication(s):  - Would avoid anticholinergic medications and benzodiazepines, would avoid phenergan, remeron, and ambien  - Depakote 500 mg ER PO qhs, can offer depacon IV in lieu of po if patient unable to tolerate po     2. PRN Recommendation(s):  -Would offer additional depakote 250mg po/depacon 250mg IV q6H prn acute agitation as first line given qtc prolongation and CKD  -If patient does not respond to depakote prn, would offer judicious treatment with zyprexa 2.5mg po/IM if patient is exhibits non-redirectable agitation requiring restraints; minimize use of this agent as patient may be sensitive to anticholinergic effects of zyprexa and may worsen qtc prolongation     3.  Monitor:  Please obtain daily EKG to monitor QTc  CBCs to monitor platelet count and LFTs    4. Legal Status/Precaution(s):  n/a    5. Capacity:  Pt currently does not exhibit the symptoms of delirium and does have capacity.    6. Other:  CAM ICU- negative  DELIRIUM BEHAVIOR MANAGEMENT  PLEASE utilize CHEMICAL restraints with PRN meds first for agitation. Minimize use of PHYSICAL restraints  Keep window shades open and room lit during day and room dim at night in order to promote normal sleep-wake cycles  Encourage family at bedside. [place] patient often to situation, location, date.  Continue to Limit or Discontinue use of Narcotics, Benzos and Anti-cholinergic medications as they may  worsen delirium.  Continue medical workup for causative etiology of Delirium.     Thank you for this consult.  Will continue to follow.

## 2018-10-26 NOTE — PLAN OF CARE
Per my fellow, Dr. Osman Pastor, Mr Esparza declined a thoracentesis today. States that he understands that repetitive thoracentesis are not the solution to this chronic pleural effusion secondary to cardiorenal syndrome with ESRD and cardiomyopathy

## 2018-10-26 NOTE — HPI
"Consultation-Liaison Psychiatry Consult Note    Chief Complaint / Reason for Consult:     Delirium    Subjective:     History of Present Illness:   Tom Gage is a 68 y.o. male with a history ofrecent TAVR on 09/20/18, AICD 09/21/18, combined HF with 30% , recurrent/ chronic R sided pleural effusion (attributed to ESRD/ hypoalbuminemia, with intermittent thoracenteses), ESRD on HD (MWF), CAD s/p NSTEMI in 2012, hx gastric bypass in 2013  who presented to [facility] due to SOB. Psychiatry was consulted to address the patient's symptoms of delirium.      On initial exam with Dr. Antonio Black:   Pt was sitting upright in his chair watching TV when he entered the room.  He had no alteration or fluctuation in his mental status, no inattention and no disorganized thinking.  He easily passed the CAM-ICU and no delirium was present.    Pt's wife entered the room and provided a more substantial history.  The pt has had fluctuating delirium over the past few months.  Pt was his normal self while admitted until yesterday, pt had a reaction to phenergan where he became agitated at bedtime.  Pt was given ambien and when he was awakened this am he became violent and aggressive and necessitated physical restraints.  The pt was not allowed to have a thoracentesis due to his aggressive behavior.  The pt's wife is concerned that the pt will have another night of delirium and aggression which will cause him to miss his procedure again tomorrow.      On repeat exam with writer:   Patient is CAM-ICU negative. Disoriented to year. He is calm and reports that he slept well overnight, feels "clear" and denies memory of events during period of agitation requiring restraint. Wife at bedside reports he is much improved from initial agitation reported. States that he has had word finding difficulty for some time now, though has no formal diagnosis of dementia. Patient reports he feels well. In past has been overly sedated when tx with a trial " of risperdal in out-patient setting for anxiety, per wife patient couldn't get out of bed.     Patient and wife report he is very sensitive to medications, and typically doesn't take any medication for insomnia.     Collateral:   Wife at bedside    Medical Review Of Systems:  Pertinent items are noted in HPI.    Psychiatric Review Of Systems - Is patient experiencing or having changes in:  sleep: yes  appetite: no  weight: no  energy/anergy: no  interest/pleasure/anhedonia: no  somatic symptoms: no  libido: no  anxiety/panic: no  guilty/hopelessness: no  concentration: yes  S.I.B.s/risky behavior: no  any drugs: no  alcohol: no     Allergies:  Asa [aspirin]; Latex, natural rubber; Adhesive; Morphine; and Ace inhibitors    Past Medical/Surgical History  Past Medical History:   Diagnosis Date    AICD (automatic cardioverter/defibrillator) present     9/21/2018    Anemia of chronic renal failure, stage 5     BPH (benign prostatic hyperplasia)     CAD (coronary artery disease)     NSTEMI in 2012    CHF (congestive heart failure)     EF 30%    Chronic systolic heart failure 5/31/2018    Diastolic dysfunction 12/16/2016    Dilated cardiomyopathy 5/7/2018    ESRD (end stage renal disease) on dialysis     MWF    GERD (gastroesophageal reflux disease)     Hyperparathyroidism, secondary renal     Hypertension     Metabolic acidosis     MI (myocardial infarction) Feb 2012    Non-rheumatic mitral regurgitation 5/31/2018    Nonrheumatic aortic valve stenosis 5/31/2018    Pleural effusion on right     Pulmonary hypertension 12/16/2016    S/P TAVR (transcatheter aortic valve replacement)     9/20/18    Status post gastric bypass for obesity     2013    Stenosis of aortic and mitral valves     Aortic stenosis    TIA (transient ischemic attack)     Type 2 diabetes mellitus with diabetic nephropathy     history/ before wt loss    Valvular regurgitation     mitral regurgitation      has a past medical history  of AICD (automatic cardioverter/defibrillator) present, Anemia of chronic renal failure, stage 5, BPH (benign prostatic hyperplasia), CAD (coronary artery disease), CHF (congestive heart failure), Chronic systolic heart failure (5/31/2018), Diastolic dysfunction (12/16/2016), Dilated cardiomyopathy (5/7/2018), ESRD (end stage renal disease) on dialysis, GERD (gastroesophageal reflux disease), Hyperparathyroidism, secondary renal, Hypertension, Metabolic acidosis, MI (myocardial infarction) (Feb 2012), Non-rheumatic mitral regurgitation (5/31/2018), Nonrheumatic aortic valve stenosis (5/31/2018), Pleural effusion on right, Pulmonary hypertension (12/16/2016), S/P TAVR (transcatheter aortic valve replacement), Status post gastric bypass for obesity, Stenosis of aortic and mitral valves, TIA (transient ischemic attack), Type 2 diabetes mellitus with diabetic nephropathy, and Valvular regurgitation.  Past Surgical History:   Procedure Laterality Date    ABDOMINAL SURGERY      PD catheter    AORTIC VALVE REPLACEMENT N/A 9/20/2018    Procedure: Replacement-valve-aortic;  Surgeon: Refugio Cooney MD;  Location: [facility] CATH LAB;  Service: Cardiology;  Laterality: N/A;    ASPIRATION-GROIN N/A 9/9/2016    Performed by [facility] at [facility] OR    BASAL CELL CARCINOMA EXCISION Left Jan 2011    left forehead    BAV N/A 7/12/2018    Performed by Loyd Ulloa MD at [facility] CATH LAB    CARDIAC DEFIBRILLATOR PLACEMENT  09/21/2018    CARDIAC ELECTROPHYSIOLOGY STUDY N/A 9/21/2018    Procedure: CARDIAC ELECTROPHYSIOLOGY STUDY;  Surgeon: Theron Mcmanus MD;  Location: [facility] CATH LAB;  Service: Cardiology;  Laterality: N/A;  s/p TAVR, CM, EPS +/- BiV ICD, SJM, anes, GP    CARDIAC ELECTROPHYSIOLOGY STUDY N/A 9/21/2018    Performed by Theron Mcmanus MD at [facility] CATH LAB    CHOLECYSTECTOMY  July 2010    ELBOW SURGERY Left 3/18/14    anterior submuscular transposition, ulnar nerve    EYE SURGERY Bilateral      bilateral cataracts    GASTRECTOMY      GASTRIC BYPASS  May 2014    gastric sleeve  June 2013    Malfunctioned requiring bypass    HEART CATH-RIGHT Right 1/20/2017    Performed by Ron Bernstein Jr., MD at [facility] CATH LAB    HERNIA REPAIR      INSERTION-PERITONEAL DIALYSIS CATHETER-LAPAROSCOPIC N/A 3/28/2016    Performed by Owen Ríos MD at [facility] OR 2ND FLR    LAPAROSCOPY-DIAGNOSTIC N/A 3/28/2016    Performed by Owen Ríos MD at [facility] OR 2ND FLR    REMOVAL-CATHETER-DIALYSIS-PERITONEAL N/A 9/20/2017    Performed by Owen Ríos MD at [facility] OR 2ND FLR    REPAIR-HERNIA-LAPAROSCOPIC  3/28/2016    Performed by Owen Ríos MD at [facility] OR 2ND FLR    REPAIR-HERNIA-LAPAROSCOPIC-INGUINAL Bilateral 3/28/2016    Performed by Owen Ríos MD at [facility] OR 2ND FLR    Replacement-valve-aortic N/A 9/20/2018    Performed by Refugio Cooney MD at [facility] CATH LAB    REVISION PERITONEAL DIALYSIS CATHETER-LAPAROSCOPIC N/A 5/18/2016    Performed by Owen Ríos MD at [facility] OR 2ND FLR    ROTATOR CUFF REPAIR Left 2014    SHOULDER ARTHROSCOPY Left 11/18/14    RCR; glenoid labral repair; arch decompression       Past Psychiatric History:  Previous Medication Trials: agitation due to ambien, sedation with trial of risperdal for anxiety? In out-patient setting   Previous Psychiatric Hospitalizations: no   Previous Suicide Attempts: unknown   History of Violence: no  Outpatient Psychiatrist: no    Social History:  Marital Status:   Children: 1   Employment Status/Info: retired  Education: post college graduate work or degree  Special Ed: no  Housing Status: with wife  History of phys/sexual abuse: no  Access to gun: yes    Substance Abuse History:  Recreational Drugs: none  Use of Alcohol: denied  Rehab History:no   Tobacco Use:no  Use of Caffeine: denies use  Use of OTC: none  Legal consequences of chemical use: no    Legal History:  Past  Charges/Incarcerations:no,     Pending charges:no     Family Psychiatric History:   denies      Objective:     Current Medications:  Infusions:    Scheduled:   amoxicillin  500 mg Oral Q8H    calcitRIOL  0.5 mcg Oral Daily    carvedilol  25 mg Oral BID WM    heparin (porcine)  5,000 Units Subcutaneous Q8H    isosorbide-hydrALAZINE 20-37.5 mg  1 tablet Oral TID    losartan  50 mg Oral BID    sevelamer carbonate  1,600 mg Oral TID WM     PRN:  sodium chloride 0.9%, acetaminophen, calcium carbonate, dextrose 50%, dextrose 50%, glucagon (human recombinant), glucose, glucose, hydrALAZINE, hydrALAZINE, nitroGLYCERIN, sodium chloride 0.9%    Home Medications:  Prior to Admission medications    Medication Sig Start Date End Date Taking? Authorizing Provider   calcitRIOL (ROCALTROL) 0.5 MCG Cap Take 0.5 mcg by mouth once daily.   Yes Historical Provider, MD   carvedilol (COREG) 25 MG tablet Take 1 tablet (25 mg total) by mouth 2 (two) times daily with meals. 5/31/18 5/31/19 Yes Prudence Mcmanus MD   clopidogrel (PLAVIX) 75 mg tablet Take 1 tablet (75 mg total) by mouth once daily. 9/22/18 9/22/19 Yes Jessenia Artis PA-C   furosemide (LASIX) 80 MG tablet 80 mg every 12 (twelve) hours.  10/16/17  Yes Historical Provider, MD   losartan (COZAAR) 100 MG tablet Take 0.5 tablets (50 mg total) by mouth 2 (two) times daily.  Patient taking differently: Take 50 mg by mouth once.  9/21/18 9/21/19 Yes Jessenia Artis PA-C   pantoprazole (PROTONIX) 40 MG tablet Take 40 mg by mouth 2 (two) times daily as needed.    Yes Historical Provider, MD   sevelamer carbonate (RENVELA) 800 mg Tab Take 1,600 mg by mouth 3 (three) times daily. Takes two 800 mg tablets (1600 mg) with meals   Yes Historical Provider, MD   vitamin D 1000 units Tab Take 1,000 Units by mouth every 7 days.    Yes Historical Provider, MD   nitroGLYCERIN (NITROSTAT) 0.4 MG SL tablet Place 0.4 mg under the tongue every 5 (five) minutes as needed for Chest  pain.    Historical Provider, MD     Vital Signs:  Temp:  [97.3 °F (36.3 °C)-98.2 °F (36.8 °C)]   Pulse:  [64-83]   Resp:  [15-19]   BP: (143-200)/(66-87)   SpO2:  [92 %-99 %]     Physical Exam: with Dr. Black  Gen: Alert, calm, cooperative, NAD   Head: NCAT, PERRL, EOMI, MMM   Lungs: CTAB, respirations unlabored   Chest wall: No tenderness or deformity   Heart: RRR, S1/S2 normal, no M/R/G   Abdomen: S/NT/ND, +BS, no HSM, no masses   Extremities: Extremities normal, multiple bruises on dorsal forearms   Pulses: 2+ and symmetric all extremities   Skin: Skin color, texture, turgor normal; no rashes or lesions   Neurologic: CN II-XII grossly intact, normal strength, sensations and reflexes throughout

## 2018-10-27 NOTE — PLAN OF CARE
10/27/18 9:31am - Referral sent to [facility] ([phone]) per wife's (Padmaja) request. Wife and daughter are interested in Palliative Care following patient at home.

## 2018-10-27 NOTE — NURSING
Pt wife refused to go home with out pt having a thoracentesis. Notified Dr. Bradley. Will continue to monitor.

## 2018-10-28 NOTE — PLAN OF CARE
10/28/18 1606   Final Note   Assessment Type Final Discharge Note   Anticipated Discharge Disposition [facility] Follow Up  Appt(s) scheduled? Yes

## 2018-10-30 NOTE — PT/OT/SLP DISCHARGE
Physical Therapy Discharge Summary    Name: Tom Gage  MRN: [id number]   Principal Problem: Acute on chronic combined systolic and diastolic congestive heart failure     Patient Discharged from acute Physical Therapy on 10/26/2018.  Please refer to prior PT noted date on 10/24/2018 for functional status.     Assessment:     Patient was discharged unexpectedly.  Information required to complete an accurate discharge summary is unknown.  Refer to therapy initial evaluation and last progress note for initial and most recent functional status and goal achievement.  Recommendations made may be found in medical record.    Objective:     GOALS:   Multidisciplinary Problems     Physical Therapy Goals     Not on file          Multidisciplinary Problems (Resolved)        Problem: Physical Therapy Goal    Goal Priority Disciplines Outcome Goal Variances Interventions   Physical Therapy Goal   (Resolved)     PT, PT/OT Outcome(s) achieved     Description:  Goals to be met by: 2018     Patient will increase functional independence with mobility by performin. Supine to sit with Modified [place]  2. Sit to stand transfer with Supervision  3. Gait  x 200 feet with Supervision using rollator.   4. Lower extremity exercise program x15 reps per handout, with assistance as needed                      Reasons for Discontinuation of Therapy Services  Transfer to alternate level of care.      Plan:     Patient Discharged to: Palliative Care/Hospice.    Roixe Chavez, PT  10/30/2018

## 2018-11-02 NOTE — HOSPITAL COURSE
Delirium  - may be secondary to administration of ambien and phenergan overnight; these medications have been discontinued.  - Psychiatry consulted. Sedating meds discontinued  - delirium completely resolved     Acute on chronic combined systolic and diastolic congestive heart failure  S/P TAVR (transcatheter aortic valve replacement)  Nodular calcific aortic valve stenosis   - volume overload, + JVD, BNP of >4900, s/p tAVR on 9/20/18  - low NA diet and 1200 cc fluid restriction  - nephrology consulted for HD/volume removal  - control BP as below  - repeat 2D echo with EF similar to prior, intermediate RA pressure, aortic valve intact  - patient's dyspnea was resolved by the time of discharge       Acute respiratory failure with hypoxia   Pleural effusion on right  - chronic and recurrent, thought to be 2/2 HF and ESRD as well as malnutrition  - on room air  - resume home lasix, per pt he still urinates a small amount  - Pulmonary Medicine consulted for thoracentesis. However believe this is cardiorenal in etiology and recommend maximizing volume removal via HD. Plan for thora delayed due to patient taking Plavix prior to admit - after lengthy discussion with Pulm team, ultimately the patient and wife opted to proceed without thoracentesis, understood that repeated taps are not solution to his chronic effusion  - received volume removal via HD     Hypertensive urgency   Hypertensive heart and kidney disease with heart failure  - BP of 190s/80s on presentation. Much improved with volume removal  - cont home coreg 25 BID, losartan 50 BID (K shifted in the ED, however, may need to adjust ARB therapy if Hyper-K recurs)  - started on bidil          ESRD (end stage renal disease)   Hyperkalemia   - ESRD HD MWF  - cont home calcitriol and sevelamer   - nephrology consulted for HD  - Hyper-K+ of 6.0 on admit, shifted with insulin in the ED - now resolved with HD     Disorder of implantable defibrillator   - seen by  "cardiology in the ED. Noted "Atrial lead capture threshold increased from prior 0.75 V at 0.50 ms to now 1.875V at 0.5 ms"  - EP consulted:     Threshold to 1.875 from 0.75  A-sense 3.2mV from 3.0mV     Elevated threshold but with good pacing and sensing function. No interventions needed at this time.         Moderate protein malnutrition  S/P gastric bypass  - boost TID     Anemia of chronic renal failure, stage 5  - hbg stable  - monitor     Coronary artery disease involving native coronary artery of native heart without angina pectoris   - not on statin as lipid panel is WNL (much below the goal)  - not on ASA as it has lead to retinal hemorrhages  - on plavix at home, last NSTEMI in 2012, LHC in 9/2018 showed diffuse luminal abnormalities        Muscle cramping  Vascular disease  - secondary to volume removal  - arterial u/s: sluggish flow throughout the lower extremity arteries, findings are suggestive of upstream stenosis in the aorta or iliac arteries, potentially from aorta-occlusive disease.  - cont statin, plavix  - d/w vascular surgery, recommend outpatient CTA abd/pelvis. D/w patient and family - they have opted to undergo workup with pt's vascular surgeon on the [facility]     Abnormal tooth  - cracked tooth - started prophylactic amoxicillin as patient has AVF. Encouraged to see dentist/oral surgeon after discharge    "

## 2018-11-02 NOTE — DISCHARGE SUMMARY
[facility] Hospital Medicine  Discharge Summary      Patient Name: Tom Gage  MRN: [id number]  Admission Date: 10/21/2018  Hospital Length of Stay: 5 days  Discharge Date and Time: 10/26/2018  8:53 PM  Attending Physician: No att. providers found   Discharging Provider: Laine Bradley MD  Primary Care Provider: Asael Poole MD  [facility] Medicine Team: [facility]  Laine Bradley MD    HPI:   Mr. Tom Gage is a 68 y.o. male with recent TAVR on 09/20/18, AICD 09/21/18, combined HF with 30% , recurrent/ chronic R sided pleural effusion (attributed to ESRD/ hypoalbuminemia, with intermittent thoracenteses), ESRD on HD (MWF), CAD s/p NSTEMI in 2012, hx gastric bypass in 2013, presented to  The ED on 10/21 with worsening SOB of breath over past several days. Patient also noted central chest heaviness/ chest tightness. Associated with SOB, pain did not radiate. He was also concerned about his AICD/ pacemaker firing as he had a different sensation in his chest. Was not shocked. No hypotension at home. Not on home O2. Had noted to me that he has had intermittent falls due to dysequilibrium (as in falling over while leaning over), BPs have not been low at home     Patient underwent R sided thoracentesis on 10/15/18 removing 2 L at [facility]; he has had to undergo intermittent thoracenteses for his chronic/ recurrent R sided pleural effusion that has not improved with HD or diuresis. Had extra session of HD on Tuesday  10/16. His usual schedule is [facility]. Underwent fistulogram with [facility] for some stenosis. Continues to make urine and is compliant with lasix 80 PO BID, however, UOP has been diminishing.      Patient with hx of severe aortic stenosis and is now s/p EvolutR TAVR 9/20/18 with post-op course complicated by LBBB qrs 168 in the setting of EF of 30% leading to ST Juade CRT-D placement. Patient underwent LHC  during his BAV which showed all of his vessels had luminal irregularities,  "ATA flows of 3. 2D echo on 9/21/2018 showed EF of 35%, grade 2 diastolic dysfunction, Biatrial enlargement, S/P transcatheter AVR, Right pleural effusion.      In the ED, patient received nitro. Elevated BP at 190s/80s on presentation. EKG showed bi-v pacing.  Interrogation by cardiology in the ED showed "elevated A-lead capture threshold from prior at 1.875V, Capture thresholds for the v leads remain the same".  noted to have bilateral LE pitting edema, stable but significant R pleural effusion, trop is 0.66, BNP >4900. No fevers, chills, sick contacts recently.         * No surgery found *      Hospital Course:   Delirium  - may be secondary to administration of ambien and phenergan overnight; these medications have been discontinued.  - Psychiatry consulted. Sedating meds discontinued  - delirium completely resolved     Acute on chronic combined systolic and diastolic congestive heart failure  S/P TAVR (transcatheter aortic valve replacement)  Nodular calcific aortic valve stenosis   - volume overload, + JVD, BNP of >4900, s/p tAVR on 9/20/18  - low NA diet and 1200 cc fluid restriction  - nephrology consulted for HD/volume removal  - control BP as below  - repeat 2D echo with EF similar to prior, intermediate RA pressure, aortic valve intact  - patient's dyspnea was resolved by the time of discharge       Acute respiratory failure with hypoxia   Pleural effusion on right  - chronic and recurrent, thought to be 2/2 HF and ESRD as well as malnutrition  - on room air  - resume home lasix, per pt he still urinates a small amount  - Pulmonary Medicine consulted for thoracentesis. However believe this is cardiorenal in etiology and recommend maximizing volume removal via HD. Plan for thora delayed due to patient taking Plavix prior to admit - after lengthy discussion with Pulm team, ultimately the patient and wife opted to proceed without thoracentesis, understood that repeated taps are not solution to his chronic " "effusion  - received volume removal via HD     Hypertensive urgency   Hypertensive heart and kidney disease with heart failure  - BP of 190s/80s on presentation. Much improved with volume removal  - cont home coreg 25 BID, losartan 50 BID (K shifted in the ED, however, may need to adjust ARB therapy if Hyper-K recurs)  - started on bidil          ESRD (end stage renal disease)   Hyperkalemia   - ESRD HD MWF  - cont home calcitriol and sevelamer   - nephrology consulted for HD  - Hyper-K+ of 6.0 on admit, shifted with insulin in the ED - now resolved with HD     Disorder of implantable defibrillator   - seen by cardiology in the ED. Noted "Atrial lead capture threshold increased from prior 0.75 V at 0.50 ms to now 1.875V at 0.5 ms"  - EP consulted:     Threshold to 1.875 from 0.75  A-sense 3.2mV from 3.0mV     Elevated threshold but with good pacing and sensing function. No interventions needed at this time.         Moderate protein malnutrition  S/P gastric bypass  - boost TID     Anemia of chronic renal failure, stage 5  - hbg stable  - monitor     Coronary artery disease involving native coronary artery of native heart without angina pectoris   - not on statin as lipid panel is WNL (much below the goal)  - not on ASA as it has lead to retinal hemorrhages  - on plavix at home, last NSTEMI in 2012, C in 9/2018 showed diffuse luminal abnormalities        Muscle cramping  Vascular disease  - secondary to volume removal  - arterial u/s: sluggish flow throughout the lower extremity arteries, findings are suggestive of upstream stenosis in the aorta or iliac arteries, potentially from aorta-occlusive disease.  - cont statin, plavix  - d/w vascular surgery, recommend outpatient CTA abd/pelvis. D/w patient and family - they have opted to [facility]     Abnormal tooth  - cracked tooth - started prophylactic amoxicillin as patient has AVF. Encouraged to see dentist/oral " surgeon after discharge       Consults:   Consults (From admission, onward)        Status Ordering Provider     Inpatient consult to Cardiac Rehab  Once     Provider:  (Not yet assigned)    Completed MICHELLE BAKER     Inpatient consult to Cardiology  Once     Provider:  (Not yet assigned)    Completed ARASELI MCCLELLAND     Inpatient consult to Electrophysiology  Once     Provider:  (Not yet assigned)    Completed BRENDAN MARIE     Inpatient consult to Nephrology  Once     Provider:  (Not yet assigned)    Completed BRENDAN MARIE     Inpatient consult to Psychiatry  Once     Provider:  (Not yet assigned)    Completed MICHELLE BAKER     Inpatient consult to Pulmonology  Once     Provider:  (Not yet assigned)    Completed BRENDAN MARIE          No new Assessment & Plan notes have been filed under this hospital service since the last note was generated.  Service: Hospital Medicine    Final Active Diagnoses:    Diagnosis Date Noted POA    PRINCIPAL PROBLEM:  Acute on chronic combined systolic and diastolic congestive heart failure [I50.43] 10/21/2018 Yes    Delirium of mixed origin [F05] 10/25/2018 Unknown    S/P TAVR (transcatheter aortic valve replacement) [Z95.2] 10/22/2018 Not Applicable    Acute respiratory failure with hypoxia [J96.01] 10/22/2018 Yes    Elevated troponin I level [R74.8] 10/22/2018 Yes    Hypertensive urgency [I16.0] 10/21/2018 Yes    Disorder of implantable defibrillator [T82.9XXA] 10/21/2018 Yes    Nodular calcific aortic valve stenosis [I35.0] 09/20/2018 Yes    Moderate protein malnutrition [E44.0] 07/10/2018 Yes    ESRD (end stage renal disease) [N18.6] 09/20/2017 Yes    S/P gastric bypass [Z98.84] 05/09/2017 Not Applicable    Pleural effusion on right [J90]  Yes    Hypertensive heart and kidney disease with heart failure [I13.0] 01/20/2017 Yes    Anemia of chronic renal failure, stage 5 [N18.5, D63.1]  Yes     Chronic    Hyperkalemia [E87.5] 09/20/2015 Yes     Coronary artery disease involving native coronary artery of native heart without angina pectoris [I25.10] 09/20/2015 Yes      Problems Resolved During this Admission:       Discharged Condition: stable    Disposition: Home or Self Care    Follow Up:  Follow-up Information     Schedule an appointment as soon as possible for a visit with Asael Poole MD.    Specialty:  Family Medicine  Contact information:  [address] [facility] [address]  [phone]             Schedule an appointment as soon as possible for a visit with Your Pulmonologist.           Your Vascular Surgeon Dr Mtz.               Patient Instructions:      Diet Cardiac     Diet renal     Notify your health care provider if you experience any of the following:  severe uncontrolled pain     Notify your health care provider if you experience any of the following:  difficulty breathing or increased cough     Notify your health care provider if you experience any of the following:  increased confusion or weakness     Notify your health care provider if you experience any of the following:  persistent dizziness, light-headedness, or visual disturbances     Notify your health care provider if you experience any of the following:  severe persistent headache     Activity as tolerated       Significant Diagnostic Studies: Labs: CMP No results for input(s): NA, K, CL, CO2, GLU, BUN, CREATININE, CALCIUM, PROT, ALBUMIN, BILITOT, ALKPHOS, AST, ALT, ANIONGAP, ESTGFRAFRICA, EGFRNONAA in the last 48 hours. and CBC No results for input(s): WBC, HGB, HCT, PLT in the last 48 hours.    Pending Diagnostic Studies:     None         Medications:  Reconciled Home Medications:      Medication List      START taking these medications    amoxicillin 500 MG capsule  Commonly known as:  AMOXIL  Take 1 capsule (500 mg total) by mouth every 8 (eight) hours. for 7 days     isosorbide-hydrALAZINE 20-37.5 mg 20-37.5 mg Tab  Commonly known as:  BIDIL  Take 1 tablet by mouth  3 (three) times daily.        CHANGE how you take these medications    losartan 100 MG tablet  Commonly known as:  COZAAR  Take 0.5 tablets (50 mg total) by mouth 2 (two) times daily.  What changed:  when to take this        CONTINUE taking these medications    calcitRIOL 0.5 MCG Cap  Commonly known as:  ROCALTROL  Take 0.5 mcg by mouth once daily.     carvedilol 25 MG tablet  Commonly known as:  COREG  Take 1 tablet (25 mg total) by mouth 2 (two) times daily with meals.     clopidogrel 75 mg tablet  Commonly known as:  PLAVIX  Take 1 tablet (75 mg total) by mouth once daily.     furosemide 80 MG tablet  Commonly known as:  LASIX  80 mg every 12 (twelve) hours.     nitroGLYCERIN 0.4 MG SL tablet  Commonly known as:  NITROSTAT  Place 1 tablet (0.4 mg total) under the tongue every 5 (five) minutes as needed for Chest pain.     pantoprazole 40 MG tablet  Commonly known as:  PROTONIX  Take 40 mg by mouth 2 (two) times daily as needed.     RENVELA 800 mg Tab  Generic drug:  sevelamer carbonate  Take 1,600 mg by mouth 3 (three) times daily. Takes two 800 mg tablets (1600 mg) with meals     vitamin D 1000 units Tab  Commonly known as:  VITAMIN D3  Take 1,000 Units by mouth every 7 days.        STOP taking these medications    cephALEXin 500 MG capsule  Commonly known as:  KEFLEX            Indwelling Lines/Drains at time of discharge:   Lines/Drains/Airways     Drain                 Hemodialysis AV Fistula Left upper arm -- days                Time spent on the discharge of patient: 29 minutes  Patient was seen and examined on the date of discharge and determined to be suitable for discharge.         Laine Bradley MD  Department of Hospital Medicine  [facility]

## 2018-11-02 NOTE — HPI
"Mr. Tom Gage is a 68 y.o. male with recent TAVR on 09/20/18, AICD 09/21/18, combined HF with 30% , recurrent/ chronic R sided pleural effusion (attributed to ESRD/ hypoalbuminemia, with intermittent thoracenteses), ESRD on HD (MWF), CAD s/p NSTEMI in 2012, hx gastric bypass in 2013, presented to  The ED on 10/21 with worsening SOB of breath over past several days. Patient also noted central chest heaviness/ chest tightness. Associated with SOB, pain did not radiate. He was also concerned about his AICD/ pacemaker firing as he had a different sensation in his chest. Was not shocked. No hypotension at home. Not on home O2. Had noted to me that he has had intermittent falls due to dysequilibrium (as in falling over while leaning over), BPs have not been low at home     Patient underwent R sided thoracentesis on 10/15/18 removing 2 L at [facility]; he has had to undergo intermittent thoracenteses for his chronic/ recurrent R sided pleural effusion that has not improved with HD or diuresis. Had extra session of HD on Tuesday  10/16. His usual schedule is [facility]. Underwent fistulogram with [facility] for some stenosis. Continues to make urine and is compliant with lasix 80 PO BID, however, UOP has been diminishing.      Patient with hx of severe aortic stenosis and is now s/p EvolutR TAVR 9/20/18 with post-op course complicated by LBBB qrs 168 in the setting of EF of 30% leading to ST Juade CRT-D placement. Patient underwent LHC  during his BAV which showed all of his vessels had luminal irregularities, ATA flows of 3. 2D echo on 9/21/2018 showed EF of 35%, grade 2 diastolic dysfunction, Biatrial enlargement, S/P transcatheter AVR, Right pleural effusion.      In the ED, patient received nitro. Elevated BP at 190s/80s on presentation. EKG showed bi-v pacing.  Interrogation by cardiology in the ED showed "elevated A-lead capture threshold from prior at 1.875V, Capture thresholds for the v leads remain the same".  noted " to have bilateral LE pitting edema, stable but significant R pleural effusion, trop is 0.66, BNP >4900. No fevers, chills, sick contacts recently.

## 2018-11-05 NOTE — PHYSICIAN QUERY
PT Name: Tom Gage  MR #: [id number]    Physician Query Form - CardioPulmonary Clarification      CDS/: Elli Valle               Contact information:  [email or web address]     This form is a permanent document in the medical record.    Query Date: November 5, 2018    By submitting this query, we are merely seeking further clarification of documentation. Please utilize your independent clinical judgment when addressing the question(s) below.    The Medical record contains the following:   Indicators   Supporting Clinical Findings Location in Medical Record   x Pulmonary Hypertension documented Past pulmonary hypertension Cardiology cn 10-21   x Acute/Chronic Illness Acute on chronic systolic CHF (congestive heart failure)  Cardiology cn 10-21   x Echo and/or Heart Cath Findings CONCLUSIONS :     1 - Severe left atrial enlargement.   2 - Eccentric hypertrophy.   3 - Moderately depressed left ventricular systolic function (EF 30-35%).   4 - Right ventricular enlargement with normal systolic function.   5 - Mild mitral regurgitation.   6 - Moderate to severe tricuspid regurgitation.   7 - Pulmonary hypertension. The estimated PA systolic pressure is 56 mmHg.    2D Echo 10-22    BiPAP/Intubation/Supplemental O2      SOB, DUFFY, Fatigue, Dizziness, LE Edema, Cyanosis, Chest Pain, Respiratory Distress, Hypoxia, etc.     x Treatment         Medication Continue coreg 25mg po bid,   Losartan 50mg po AM and 25mg po PM   Lasix 80mg po BID  Cardiology cn 10-21    Other     Provider, please specify the type of pulmonary hypertension:    [ x ]  Group 2:  Pulmonary Hypertension due to Left Heart Disease, including left heart failure and/or left heart valve disease    [   ]  Other Cardiopulmonary Condition (please specify):  ____________________________________    [   ]  Clinically Undetermined    Please document in your progress notes daily for the duration of treatment, until resolved, and include in  your discharge summary.

## 2018-11-06 ENCOUNTER — OFFICE VISIT (OUTPATIENT)
Dept: ELECTROPHYSIOLOGY | Facility: CLINIC | Age: 68
End: 2018-11-06
Payer: MEDICARE

## 2018-11-06 VITALS
DIASTOLIC BLOOD PRESSURE: 58 MMHG | WEIGHT: 180.75 LBS | HEIGHT: 74 IN | BODY MASS INDEX: 23.2 KG/M2 | SYSTOLIC BLOOD PRESSURE: 132 MMHG | HEART RATE: 62 BPM

## 2018-11-06 DIAGNOSIS — Z99.2 CKD (CHRONIC KIDNEY DISEASE) STAGE V REQUIRING CHRONIC DIALYSIS: ICD-10-CM

## 2018-11-06 DIAGNOSIS — I44.7 LBBB (LEFT BUNDLE BRANCH BLOCK): ICD-10-CM

## 2018-11-06 DIAGNOSIS — I42.0 DILATED CARDIOMYOPATHY: ICD-10-CM

## 2018-11-06 DIAGNOSIS — Z95.2 S/P TAVR (TRANSCATHETER AORTIC VALVE REPLACEMENT): ICD-10-CM

## 2018-11-06 DIAGNOSIS — Z95.810 CARDIAC RESYNCHRONIZATION THERAPY DEFIBRILLATOR (CRT-D) IN PLACE: ICD-10-CM

## 2018-11-06 DIAGNOSIS — E11.21 TYPE 2 DIABETES MELLITUS WITH DIABETIC NEPHROPATHY, UNSPECIFIED WHETHER LONG TERM INSULIN USE: Chronic | ICD-10-CM

## 2018-11-06 DIAGNOSIS — I25.10 CORONARY ARTERY DISEASE INVOLVING NATIVE CORONARY ARTERY OF NATIVE HEART WITHOUT ANGINA PECTORIS: ICD-10-CM

## 2018-11-06 DIAGNOSIS — I50.20 HEART FAILURE WITH REDUCED EJECTION FRACTION, NYHA CLASS III: Primary | Chronic | ICD-10-CM

## 2018-11-06 DIAGNOSIS — I27.20 PULMONARY HYPERTENSION: ICD-10-CM

## 2018-11-06 DIAGNOSIS — D63.1 ANEMIA OF CHRONIC RENAL FAILURE, STAGE 5: Chronic | ICD-10-CM

## 2018-11-06 DIAGNOSIS — I15.0 RENOVASCULAR HYPERTENSION: ICD-10-CM

## 2018-11-06 DIAGNOSIS — N18.5 ANEMIA OF CHRONIC RENAL FAILURE, STAGE 5: Chronic | ICD-10-CM

## 2018-11-06 DIAGNOSIS — N18.6 CKD (CHRONIC KIDNEY DISEASE) STAGE V REQUIRING CHRONIC DIALYSIS: ICD-10-CM

## 2018-11-06 PROBLEM — T82.9XXA DISORDER OF IMPLANTABLE DEFIBRILLATOR: Status: RESOLVED | Noted: 2018-10-21 | Resolved: 2018-11-06

## 2018-11-06 PROCEDURE — 99999 PR PBB SHADOW E&M-EST. PATIENT-LVL III: CPT | Mod: PBBFAC,TXP,, | Performed by: INTERNAL MEDICINE

## 2018-11-06 PROCEDURE — 99024 POSTOP FOLLOW-UP VISIT: CPT | Mod: S$PBB,NTX,, | Performed by: INTERNAL MEDICINE

## 2018-11-06 PROCEDURE — 99213 OFFICE O/P EST LOW 20 MIN: CPT | Mod: PBBFAC,NTX | Performed by: INTERNAL MEDICINE

## 2018-11-06 NOTE — PROGRESS NOTES
Subjective:      HPI    I had the pleasure of seeing Tom Gage in follow-up for his history of ischemic cardiomyopathy and recent CRT-D implantation. He is a 68 year old male with a history of combined systolic/diastolic HF, ESRD on HD 3 x/week, CAD s/p NSTEMI in 2012, recurrent pleural effusions (attributed to ESRD and hypoalbuminemia) and ascites, hx gastric bypass in 2013 with suspected moderate-protein calorie malnutrition and macrocytic anemia from B12 deficiency, retinal bleeding on ASA (since discontinued), and severe aortic stenosis, who is s/p EvolutR TAVR in 9/2018. Post-procedure, a new LBB was seen. A [employer] CRT-D was implanted prior to discharge.    Since discharge Mr. Esparza has been readmitted with chest pains, with repeat device interrogation showing elevated but stable RA pacing threshold but no effusion or evidence of perforation. Additionally, Mr. Esparza has required thoracentesis since discharge.    A remote device check performed on 11/3/2018 showed stable device and lead function, and no arrhythmias.    Review of Systems   Constitution: Positive for malaise/fatigue. Negative for decreased appetite, weight gain and weight loss.   HENT: Negative for sore throat.    Eyes: Negative for blurred vision.   Cardiovascular: Positive for dyspnea on exertion. Negative for chest pain, irregular heartbeat, leg swelling, near-syncope, orthopnea, palpitations, paroxysmal nocturnal dyspnea and syncope.   Respiratory: Positive for shortness of breath.    Skin: Negative for rash.   Musculoskeletal: Negative for arthritis.   Gastrointestinal: Negative for abdominal pain.   Neurological: Negative for focal weakness.   Psychiatric/Behavioral: Negative for altered mental status.        Objective:    Physical Exam   Constitutional: He is oriented to person, place, and time. He appears well-developed and well-nourished. No distress.   HENT:   Head: Normocephalic and atraumatic.   Mouth/Throat: Oropharynx  is clear and moist.   Eyes: Pupils are equal, round, and reactive to light. No scleral icterus.   Neck: Neck supple. No thyromegaly present.   Cardiovascular: Regular rhythm, normal heart sounds and normal pulses. Exam reveals no gallop and no friction rub.   No murmur heard.  Pulmonary/Chest: Effort normal and breath sounds normal. He has no rales.   Abdominal: Soft. Bowel sounds are normal. He exhibits no distension. There is no tenderness.   Musculoskeletal: He exhibits no edema.   Neurological: He is alert and oriented to person, place, and time.   Skin: Skin is warm and dry. No rash noted.   Psychiatric: He has a normal mood and affect. His behavior is normal.   Vitals reviewed.        Assessment:       1. Heart failure with reduced ejection fraction, NYHA class III    2. S/P TAVR (transcatheter aortic valve replacement)    3. LBBB (left bundle branch block)    4. Cardiac resynchronization therapy defibrillator (CRT-D) in place    5. Pulmonary hypertension group 2    6. Renovascular hypertension    7. Coronary artery disease involving native coronary artery of native heart without angina pectoris    8. Dilated cardiomyopathy    9. CKD (chronic kidney disease) stage V requiring chronic dialysis    10. Anemia of chronic renal failure, stage 5    11. Type 2 diabetes mellitus with diabetic nephropathy, unspecified whether long term insulin use         Plan:       In summary, Tom Gage is a 68 year old male with ESRD on HD, mixed cardiomyopathy, and recent TAVR who required CRT-D implantation post-procedure due to new LBBB and prolonged HV interval. Recent device check with stable device and lead parameters.    Plan is for regular device checks and to see me again in 1 year.    Thank you for allowing me to participate in the care of this patient. Please do not hesitate to call me with any questions or concerns.

## 2018-11-14 ENCOUNTER — TELEPHONE (OUTPATIENT)
Dept: CARDIOLOGY | Facility: CLINIC | Age: 68
End: 2018-11-14

## 2018-11-14 NOTE — TELEPHONE ENCOUNTER
Spoke with the pt's wife - recalled med is losartan/HCTZ 100-25 mg tabs - pt is not on combination med.

## 2018-11-14 NOTE — TELEPHONE ENCOUNTER
----- Message from Donna Lopez sent at 11/14/2018  2:04 PM CST -----  Contact: patient's wife  The Pt's wife is calling to see if he needs to stop or change his medication losartan (COZAAR) 100 MG tablet because of the recall. Please call her back @ [phone].Thanks, Donna  7/24/2018 Dr. Mcmanus

## 2018-11-27 ENCOUNTER — TELEPHONE (OUTPATIENT)
Dept: ELECTROPHYSIOLOGY | Facility: CLINIC | Age: 68
End: 2018-11-27

## 2018-11-27 NOTE — TELEPHONE ENCOUNTER
Referred Julissa to Dr Elli Mcmanus and Dr Refugio Cooney for prognosis.         ----- Message from Carmita Vera MA sent at 11/27/2018 12:35 PM CST -----  Contact: Julissa [phone] with [facility]      ----- Message -----  From: Ginger Caballero  Sent: 11/27/2018  12:18 PM  To: Marietta Crump Staff    Julissa would like to know if the pt has a 6 month or less prognosis with dialysis?     Thanks

## 2019-01-08 ENCOUNTER — HOSPITAL ENCOUNTER (OUTPATIENT)
Dept: CARDIOLOGY | Facility: CLINIC | Age: 69
Discharge: HOME | End: 2019-01-08
Payer: MEDICARE

## 2019-01-08 ENCOUNTER — CLINICAL SUPPORT (OUTPATIENT)
Dept: CARDIOLOGY | Facility: HOSPITAL | Age: 69
End: 2019-01-08
Payer: MEDICARE

## 2019-01-08 DIAGNOSIS — I42.0 DILATED CARDIOMYOPATHY: Primary | ICD-10-CM

## 2019-01-08 DIAGNOSIS — I42.0 DILATED CARDIOMYOPATHY: ICD-10-CM

## 2019-01-08 DIAGNOSIS — I50.43 ACUTE ON CHRONIC COMBINED SYSTOLIC AND DIASTOLIC CONGESTIVE HEART FAILURE: ICD-10-CM

## 2019-01-08 PROCEDURE — 93284 PRGRMG EVAL IMPLANTABLE DFB: CPT | Mod: TXP

## 2019-01-08 PROCEDURE — 93010 RHYTHM STRIP: ICD-10-PCS | Mod: S$PBB,NTX,, | Performed by: INTERNAL MEDICINE

## 2019-01-08 PROCEDURE — 93005 ELECTROCARDIOGRAM TRACING: CPT | Mod: PBBFAC,NTX | Performed by: INTERNAL MEDICINE

## 2019-01-08 PROCEDURE — 93010 ELECTROCARDIOGRAM REPORT: CPT | Mod: S$PBB,NTX,, | Performed by: INTERNAL MEDICINE

## 2019-02-08 ENCOUNTER — PATIENT MESSAGE (OUTPATIENT)
Dept: CARDIOLOGY | Facility: CLINIC | Age: 69
End: 2019-02-08

## 2019-02-19 DIAGNOSIS — Z76.82 AWAITING ORGAN TRANSPLANT STATUS: Primary | ICD-10-CM

## 2019-02-19 NOTE — PT/OT/SLP DISCHARGE
Occupational Therapy Discharge Summary    Tom Gage  MRN: [id number]   Principal Problem: Acute on chronic combined systolic and diastolic congestive heart failure      Patient Discharged from acute Occupational Therapy on 10/26/18.  Please refer to prior OT note dated 10/24/18 for functional status.    Assessment:      Patient was discharged unexpectedly.  Information required to complete an accurate discharge summary is unknown.  Refer to therapy initial evaluation and last progress note for initial and most recent functional status and goal achievement.  Recommendations made may be found in medical record.    Objective:     GOALS:   Multidisciplinary Problems     Occupational Therapy Goals     Not on file                Reasons for Discontinuation of Therapy Services  Transfer to alternate level of care.      Plan:     Patient Discharged to: Palliative Care/Hospice    Mercedez Cosme, OT  2/19/2019

## 2019-04-08 ENCOUNTER — TELEPHONE (OUTPATIENT)
Dept: SURGERY | Facility: CLINIC | Age: 69
End: 2019-04-08

## 2019-04-08 ENCOUNTER — TELEPHONE (OUTPATIENT)
Dept: CARDIOLOGY | Facility: HOSPITAL | Age: 69
End: 2019-04-08

## 2019-04-08 DIAGNOSIS — Z95.810 ICD (IMPLANTABLE CARDIOVERTER-DEFIBRILLATOR), BIVENTRICULAR, IN SITU: Primary | ICD-10-CM

## 2019-04-08 NOTE — TELEPHONE ENCOUNTER
----- Message from Donna Lopez sent at 4/8/2019  9:08 AM CDT -----  Contact: Patient's wife  The Pt's wife is calling to see what he needs to do for his home device check. He is at rehab until 10 am. Please call her back @ [phone]. Thanks, Donna

## 2019-04-08 NOTE — TELEPHONE ENCOUNTER
----- Message from Angelica Washington sent at 4/8/2019  8:31 AM CDT -----  Contact: Padmaja Gage (Spouse)  Patient Requesting Sooner Appointment.     Reason for sooner appt.:Bulging pain of right lower side  When is the first available appointment?4/16/19  Communication Preference:[phone]  Additional Information:

## 2019-04-08 NOTE — TELEPHONE ENCOUNTER
Returned the call to patient's wife on this am.  Informed the wife that the patient wife that the transmission is automatically downloaded in the middle of the night generally between 2 and 4 in the morning.  Patient's wife verbalized understanding and appreciated the return call.

## 2019-04-09 ENCOUNTER — TELEPHONE (OUTPATIENT)
Dept: CARDIOLOGY | Facility: HOSPITAL | Age: 69
End: 2019-04-09

## 2019-04-09 ENCOUNTER — OFFICE VISIT (OUTPATIENT)
Dept: SURGERY | Facility: CLINIC | Age: 69
End: 2019-04-09
Payer: MEDICARE

## 2019-04-09 VITALS
WEIGHT: 181 LBS | DIASTOLIC BLOOD PRESSURE: 70 MMHG | HEART RATE: 60 BPM | HEIGHT: 74 IN | SYSTOLIC BLOOD PRESSURE: 160 MMHG | BODY MASS INDEX: 23.23 KG/M2

## 2019-04-09 DIAGNOSIS — R10.31 CHRONIC RLQ PAIN: ICD-10-CM

## 2019-04-09 DIAGNOSIS — G89.29 CHRONIC RLQ PAIN: ICD-10-CM

## 2019-04-09 PROCEDURE — 99213 OFFICE O/P EST LOW 20 MIN: CPT | Mod: PBBFAC,TXP | Performed by: SURGERY

## 2019-04-09 PROCEDURE — 99999 PR PBB SHADOW E&M-EST. PATIENT-LVL III: CPT | Mod: PBBFAC,TXP,, | Performed by: SURGERY

## 2019-04-09 PROCEDURE — 99214 OFFICE O/P EST MOD 30 MIN: CPT | Mod: S$PBB,NTX,, | Performed by: SURGERY

## 2019-04-09 PROCEDURE — 99999 PR PBB SHADOW E&M-EST. PATIENT-LVL III: ICD-10-PCS | Mod: PBBFAC,TXP,, | Performed by: SURGERY

## 2019-04-09 PROCEDURE — 99214 PR OFFICE/OUTPT VISIT, EST, LEVL IV, 30-39 MIN: ICD-10-PCS | Mod: S$PBB,NTX,, | Performed by: SURGERY

## 2019-04-09 NOTE — PROGRESS NOTES
Subjective:       Patient ID: Tom Gage is a 68 y.o. male.    Chief Complaint: Hernia (RIH)    HPI He has been having rlq abdominal pain for a few months and swelling in the rlq.  The pain is worse with movement and better at rest.  He also has weakness in the right leg.  He is s/p bilateral inguinal hernia repairs.  Review of Systems   Constitutional: Positive for unexpected weight change. Negative for fever.        Slight loss of weight with a uri   Respiratory: Negative for chest tightness and shortness of breath.         Recent uri   Cardiovascular: Negative for chest pain and palpitations.   Gastrointestinal: Positive for diarrhea. Negative for constipation, nausea and vomiting.        Had some diarrhea yesterday only   Genitourinary: Negative for difficulty urinating and dysuria.        Urinates little   Skin: Negative for rash and wound.   Hematological:        Has easy bruising but no easy bleeding, on plavix       Objective:      Physical Exam   Constitutional: He is oriented to person, place, and time. He appears well-developed and well-nourished.   Abdominal: Soft. Bowel sounds are normal. There is no tenderness. No hernia.   Neurological: He is alert and oriented to person, place, and time.   Skin: Skin is warm and dry.   Psychiatric: He has a normal mood and affect. His behavior is normal. Judgment and thought content normal.   Vitals reviewed.      Assessment:       1. Chronic RLQ pain        Plan:      Obtain ct.

## 2019-04-09 NOTE — TELEPHONE ENCOUNTER
Spoke with patient and informed her that no one from this office has called her or . She stated understanding.   ----- Message from Donna Lopez sent at 4/9/2019  9:28 AM CDT -----  Contact: Patient's wife  The Pt's wife is returning a call. Please call her back @ [phone]. Thanks, Donna

## 2019-04-09 NOTE — LETTER
[facility] - General Surgery  [address]  [facility] [address]  Phone: [phone] April 9, 2019      Asael Poole MD  [address] [facility] [address]    Patient: Tom Gage   MR Number: [id number]   YOB: 1950   Date of Visit: 4/9/2019     Dear Dr. Poole:    Thank you for referring Tom Gage to me for evaluation. Attached you will find relevant portions of my assessment and plan of care.    Mr. Esparza presents with chronic RLQ pain.    We will obtain CT.    If you have questions, please do not hesitate to call me. I look forward to following Tom Gage along with you.    Sincerely,      Owen Ríos MD  Professor, [employer]  Section Head, General Surgery  [facility]    WSR/mikow

## 2019-04-10 ENCOUNTER — TELEPHONE (OUTPATIENT)
Dept: SURGERY | Facility: CLINIC | Age: 69
End: 2019-04-10

## 2019-04-11 ENCOUNTER — DOCUMENTATION ONLY (OUTPATIENT)
Dept: BARIATRICS | Facility: CLINIC | Age: 69
End: 2019-04-11

## 2019-04-11 ENCOUNTER — HOSPITAL ENCOUNTER (OUTPATIENT)
Dept: RADIOLOGY | Facility: HOSPITAL | Age: 69
Discharge: HOME | End: 2019-04-11
Attending: SURGERY
Payer: MEDICARE

## 2019-04-11 DIAGNOSIS — G89.29 CHRONIC RLQ PAIN: ICD-10-CM

## 2019-04-11 DIAGNOSIS — R10.31 CHRONIC RLQ PAIN: ICD-10-CM

## 2019-04-11 PROCEDURE — 25500020 PHARM REV CODE 255: Mod: NTX | Performed by: SURGERY

## 2019-04-11 PROCEDURE — 74176 CT ABDOMEN PELVIS WITHOUT CONTRAST: ICD-10-PCS | Mod: 26,NTX,, | Performed by: RADIOLOGY

## 2019-04-11 PROCEDURE — 74176 CT ABD & PELVIS W/O CONTRAST: CPT | Mod: 26,NTX,, | Performed by: RADIOLOGY

## 2019-04-11 PROCEDURE — 74176 CT ABD & PELVIS W/O CONTRAST: CPT | Mod: TC,NTX

## 2019-04-11 RX ADMIN — IOHEXOL 15 ML: 300 INJECTION, SOLUTION INTRAVENOUS at 03:04

## 2019-04-11 NOTE — PROGRESS NOTES
CT shows ascites which is likely causing his abdominal pain.  He can follow up with his pcp and cardiology.

## 2019-04-12 ENCOUNTER — TELEPHONE (OUTPATIENT)
Dept: CARDIOLOGY | Facility: HOSPITAL | Age: 69
End: 2019-04-12

## 2019-04-15 ENCOUNTER — DOCUMENTATION ONLY (OUTPATIENT)
Dept: BARIATRICS | Facility: CLINIC | Age: 69
End: 2019-04-15

## 2019-06-26 DIAGNOSIS — I42.0 DILATED CARDIOMYOPATHY: ICD-10-CM

## 2019-06-26 DIAGNOSIS — I50.22 CHRONIC SYSTOLIC HEART FAILURE: ICD-10-CM

## 2019-06-26 RX ORDER — LOSARTAN POTASSIUM 100 MG/1
TABLET ORAL
Qty: 90 TABLET | Refills: 0 | Status: SHIPPED | OUTPATIENT
Start: 2019-06-26

## 2019-07-11 ENCOUNTER — PATIENT MESSAGE (OUTPATIENT)
Dept: CARDIOLOGY | Facility: CLINIC | Age: 69
End: 2019-07-11

## 2019-07-11 ENCOUNTER — TELEPHONE (OUTPATIENT)
Dept: CARDIOLOGY | Facility: CLINIC | Age: 69
End: 2019-07-11

## 2019-07-11 NOTE — TELEPHONE ENCOUNTER
----- Message from Ainsley Zhou MA sent at 7/10/2019  2:25 PM CDT -----  Contact: Spouse      ----- Message -----  From: Teodora Zimmerman  Sent: 7/10/2019  12:19 PM  To: [facility] Staff    .Needs Advice    Reason for call: Pt is having a tooth extraction & needs a cardiac clearance b/c on Plavix. Thanks        Communication Preference: [phone]    Additional Information:

## 2019-07-29 DIAGNOSIS — I50.22 CHRONIC SYSTOLIC HEART FAILURE: ICD-10-CM

## 2019-07-29 DIAGNOSIS — I42.0 DILATED CARDIOMYOPATHY: ICD-10-CM

## 2019-07-30 RX ORDER — CARVEDILOL 25 MG/1
TABLET ORAL
Qty: 180 TABLET | Refills: 3 | Status: SHIPPED | OUTPATIENT
Start: 2019-07-30

## 2019-08-14 ENCOUNTER — PATIENT MESSAGE (OUTPATIENT)
Dept: ELECTROPHYSIOLOGY | Facility: CLINIC | Age: 69
End: 2019-08-14

## 2019-08-14 ENCOUNTER — TELEPHONE (OUTPATIENT)
Dept: ELECTROPHYSIOLOGY | Facility: CLINIC | Age: 69
End: 2019-08-14

## 2019-08-14 NOTE — TELEPHONE ENCOUNTER
----- Message from RT Tuyet sent at 8/14/2019 11:51 AM CDT -----  Contact: Self      ----- Message -----  From: Teodora Zimmerman  Sent: 8/14/2019  11:20 AM  To: [facility] Staff    .Needs Advice    Reason for call: Pt called to schedule his 1 yr FU, Pt is on dialysis -M, W or F. Pt is schedule for remote check on 11/5 if can schedule then.  Thanks        Communication Preference: [phone]    Additional Information:

## 2019-08-14 NOTE — TELEPHONE ENCOUNTER
"Called to adv I scheduled pt with Renetta on Tuesday, 11/5/2019, since pt has dialysis on Mons, Wed, & Fridays.  The wife said "no, raciel made an exception for us last year so I expect for yall to do the same for us this year".  Wife also said they are not seeing Renetta or anyone except Dr. Mcmanus.  I told pts wife I will speak with Dr. Mcmanus & I will get back with her about apts.  "

## 2019-08-14 NOTE — TELEPHONE ENCOUNTER
Called to adv pt is scheduled on 11/5 for 9:45AM EKG, 10:20AM device check, & 11:00AM Dr. Mcmanus apt.  Dr. Mcmanus confirmed seeing pt on Tuesday since pt has dialysis on Mondays.

## 2019-08-16 ENCOUNTER — COMMITTEE REVIEW (OUTPATIENT)
Dept: TRANSPLANT | Facility: CLINIC | Age: 69
End: 2019-08-16

## 2019-08-16 NOTE — COMMITTEE REVIEW
Native Organ Dx: Diabetes Mellitus - Type Other / Unknown        The above patient case previously discussed with Dr. Jasmine and formally presented to Selection Committee today.  Proceed with removing patient from the kidney transplant waiting list due to multiple cardiac issues (EF of < 40% for more than a year and pulmonary hypertension).         Note written by:  Addy Gant Jr.     ===============================================    I was present at the meeting and attest to the decision of the committee.    Felix Campbell  08/16/2019

## 2019-08-20 NOTE — PROGRESS NOTES
Spoke with MrsNano Today regarding from removal from the list.  She verbalized understanding.  Stated he is now having issues with vascular dementia.  Stated they are scheduled with Dr. Cope to have a 'family discussion' regarding Mr. Gage's long term health prospects.  Mrs., though, says is in denial regarding his health issues.  Says she is not sure if it is related to his dementia.  Advised MrsNano I can speak to him regarding removal from list and reasons why, but not his long term health prospects.  She verbalized understanding.  Advised a removal letter would be coming in mail shortly.

## 2019-09-07 ENCOUNTER — CLINICAL SUPPORT (OUTPATIENT)
Dept: CARDIOLOGY | Facility: HOSPITAL | Age: 69
End: 2019-09-07
Payer: MEDICARE

## 2019-09-07 PROCEDURE — 93296 REM INTERROG EVL PM/IDS: CPT | Performed by: INTERNAL MEDICINE

## 2019-09-19 ENCOUNTER — TELEPHONE (OUTPATIENT)
Dept: CARDIOLOGY | Facility: CLINIC | Age: 69
End: 2019-09-19

## 2019-09-19 NOTE — TELEPHONE ENCOUNTER
----- Message from Ginger Caballero sent at 9/19/2019 11:02 AM CDT -----  Contact: Alpa [phone] pt daughter  Alpa says pt dialysis center wants to know if he can take his Plavix 75 mg on Tues, Thurs, and Saturday's because the Plavix is causing bleeding on his dialysis days.  LOV 7/24/18 Dr. Jones    Thanks

## 2019-09-19 NOTE — TELEPHONE ENCOUNTER
Daughter reports post dialysis, takes patient a prolonged time to get bleeding to stop from puncture site. Bleeding controlled at this time. Reviewed with Dr. Mcmanus. Per Dr. Mcmanus patient not on any other anticoagulation or antiplatelet, has ASA allergy. Patient hx TAVR. May hold on dialysis days but most likely not beneficial overall to take QOD or stop on dialysis days as typically has to be held 5-7 day window for pre procedure for bleeding. Daughter advised and states will follow up with nephrologist.     Daughter also would like noted patient wife is going to have upcoming hip surgery and patient will be staying in rehab [place] 9/24 for care.

## 2019-11-05 ENCOUNTER — HOSPITAL ENCOUNTER (OUTPATIENT)
Dept: CARDIOLOGY | Facility: CLINIC | Age: 69
Discharge: HOME | End: 2019-11-05
Payer: MEDICARE

## 2019-11-05 ENCOUNTER — OFFICE VISIT (OUTPATIENT)
Dept: ELECTROPHYSIOLOGY | Facility: CLINIC | Age: 69
End: 2019-11-05
Payer: MEDICARE

## 2019-11-05 ENCOUNTER — CLINICAL SUPPORT (OUTPATIENT)
Dept: CARDIOLOGY | Facility: HOSPITAL | Age: 69
End: 2019-11-05
Attending: INTERNAL MEDICINE
Payer: MEDICARE

## 2019-11-05 VITALS
DIASTOLIC BLOOD PRESSURE: 60 MMHG | HEART RATE: 60 BPM | BODY MASS INDEX: 22.46 KG/M2 | WEIGHT: 175 LBS | HEIGHT: 74 IN | SYSTOLIC BLOOD PRESSURE: 134 MMHG

## 2019-11-05 DIAGNOSIS — I50.22 CHRONIC SYSTOLIC HEART FAILURE: ICD-10-CM

## 2019-11-05 DIAGNOSIS — Z95.2 S/P TAVR (TRANSCATHETER AORTIC VALVE REPLACEMENT): ICD-10-CM

## 2019-11-05 DIAGNOSIS — I42.0 DILATED CARDIOMYOPATHY: ICD-10-CM

## 2019-11-05 DIAGNOSIS — I50.20 HEART FAILURE WITH REDUCED EJECTION FRACTION, NYHA CLASS III: Chronic | ICD-10-CM

## 2019-11-05 DIAGNOSIS — I13.0 HYPERTENSIVE HEART AND KIDNEY DISEASE WITH HEART FAILURE: ICD-10-CM

## 2019-11-05 DIAGNOSIS — I44.7 LBBB (LEFT BUNDLE BRANCH BLOCK): ICD-10-CM

## 2019-11-05 DIAGNOSIS — I42.0 DILATED CARDIOMYOPATHY: Primary | ICD-10-CM

## 2019-11-05 DIAGNOSIS — E11.21 TYPE 2 DIABETES MELLITUS WITH DIABETIC NEPHROPATHY, UNSPECIFIED WHETHER LONG TERM INSULIN USE: Chronic | ICD-10-CM

## 2019-11-05 DIAGNOSIS — I50.43 ACUTE ON CHRONIC COMBINED SYSTOLIC AND DIASTOLIC CONGESTIVE HEART FAILURE: ICD-10-CM

## 2019-11-05 DIAGNOSIS — Z95.810 CARDIAC RESYNCHRONIZATION THERAPY DEFIBRILLATOR (CRT-D) IN PLACE: ICD-10-CM

## 2019-11-05 DIAGNOSIS — I35.0 NODULAR CALCIFIC AORTIC VALVE STENOSIS: ICD-10-CM

## 2019-11-05 DIAGNOSIS — N18.6 ESRD (END STAGE RENAL DISEASE): ICD-10-CM

## 2019-11-05 DIAGNOSIS — I15.0 RENOVASCULAR HYPERTENSION: ICD-10-CM

## 2019-11-05 DIAGNOSIS — Z99.2 CKD (CHRONIC KIDNEY DISEASE) STAGE V REQUIRING CHRONIC DIALYSIS: ICD-10-CM

## 2019-11-05 DIAGNOSIS — N18.6 CKD (CHRONIC KIDNEY DISEASE) STAGE V REQUIRING CHRONIC DIALYSIS: ICD-10-CM

## 2019-11-05 PROCEDURE — 99213 OFFICE O/P EST LOW 20 MIN: CPT | Mod: PBBFAC,25 | Performed by: INTERNAL MEDICINE

## 2019-11-05 PROCEDURE — 99999 PR PBB SHADOW E&M-EST. PATIENT-LVL III: ICD-10-PCS | Mod: PBBFAC,,, | Performed by: INTERNAL MEDICINE

## 2019-11-05 PROCEDURE — 99999 PR PBB SHADOW E&M-EST. PATIENT-LVL III: CPT | Mod: PBBFAC,,, | Performed by: INTERNAL MEDICINE

## 2019-11-05 PROCEDURE — 99214 PR OFFICE/OUTPT VISIT, EST, LEVL IV, 30-39 MIN: ICD-10-PCS | Mod: S$PBB,,, | Performed by: INTERNAL MEDICINE

## 2019-11-05 PROCEDURE — 93284 PRGRMG EVAL IMPLANTABLE DFB: CPT

## 2019-11-05 PROCEDURE — 93010 RHYTHM STRIP: ICD-10-PCS | Mod: S$PBB,,, | Performed by: INTERNAL MEDICINE

## 2019-11-05 PROCEDURE — 99214 OFFICE O/P EST MOD 30 MIN: CPT | Mod: S$PBB,,, | Performed by: INTERNAL MEDICINE

## 2019-11-05 PROCEDURE — 93005 ELECTROCARDIOGRAM TRACING: CPT | Mod: PBBFAC | Performed by: INTERNAL MEDICINE

## 2019-11-05 PROCEDURE — 93010 ELECTROCARDIOGRAM REPORT: CPT | Mod: S$PBB,,, | Performed by: INTERNAL MEDICINE

## 2019-11-05 RX ORDER — NAPROXEN SODIUM 220 MG/1
81 TABLET, FILM COATED ORAL
COMMUNITY
Start: 2019-10-15 | End: 2020-10-14

## 2019-11-05 NOTE — PROGRESS NOTES
Subjective:      HPI    I had the pleasure of seeing Tom Gage in follow-up for his history of ischemic cardiomyopathy and CRT-D implantation. He is a 69 year old male with a history of combined systolic/diastolic HF, ESRD on HD 3 x/week, CAD s/p NSTEMI in 2012, recurrent pleural effusions (attributed to ESRD and hypoalbuminemia) and ascites, hx gastric bypass in 2013 with suspected moderate-protein calorie malnutrition and macrocytic anemia from B12 deficiency, retinal bleeding on ASA (since discontinued), and severe aortic stenosis, who is s/p EvolutR TAVR in 9/2018. Post-procedure, a new LBBB was seen. A [employer] CRT-D was implanted prior to discharge.    I reviewed today's device interrogation, which shows stable device and lead function. 4.3-5.5 years to [facility]MAAN. Biv paced 99% over the past month. No arrhythmias noted.     My interpretation of today's ECG is biv pacing at 60 bpm with a QRS duration of 126 ms.      Review of Systems   Constitution: Positive for malaise/fatigue. Negative for decreased appetite, weight gain and weight loss.   HENT: Negative for sore throat.    Eyes: Negative for blurred vision.   Cardiovascular: Positive for dyspnea on exertion. Negative for chest pain, irregular heartbeat, leg swelling, near-syncope, orthopnea, palpitations, paroxysmal nocturnal dyspnea and syncope.   Respiratory: Positive for shortness of breath.    Skin: Negative for rash.   Musculoskeletal: Negative for arthritis.   Gastrointestinal: Negative for abdominal pain.   Neurological: Negative for focal weakness.   Psychiatric/Behavioral: Negative for altered mental status.     Objective:    Physical Exam   Constitutional: He is oriented to person, place, and time. He appears well-developed and well-nourished. No distress.   HENT:   Head: Normocephalic and atraumatic.   Mouth/Throat: Oropharynx is clear and moist.   Eyes: Pupils are equal, round, and reactive to light. No scleral icterus.   Neck: Neck supple. No  thyromegaly present.   Cardiovascular: Regular rhythm, normal heart sounds and normal pulses. Exam reveals no gallop and no friction rub.   No murmur heard.  Pulmonary/Chest: Effort normal and breath sounds normal. He has no rales.   Abdominal: Soft. Bowel sounds are normal. He exhibits no distension. There is no tenderness.   Musculoskeletal: He exhibits no edema.   Neurological: He is alert and oriented to person, place, and time.   Skin: Skin is warm and dry. No rash noted.   Psychiatric: He has a normal mood and affect. His behavior is normal.   Vitals reviewed.        Assessment:       1. Dilated cardiomyopathy    2. Chronic systolic heart failure    3. Heart failure with reduced ejection fraction, NYHA class III    4. Renovascular hypertension    5. S/P TAVR (transcatheter aortic valve replacement)    6. LBBB (left bundle branch block)    7. Cardiac resynchronization therapy defibrillator (CRT-D) in place    8. Nodular calcific aortic valve stenosis    9. Hypertensive heart and kidney disease with heart failure    10. ESRD (end stage renal disease)    11. CKD (chronic kidney disease) stage V requiring chronic dialysis    12. Type 2 diabetes mellitus with diabetic nephropathy, unspecified whether long term insulin use         Plan:       In summary, Tom Gage is a 69 year old male with ESRD on HD, mixed cardiomyopathy, and severe AS status-post TAVR, who required CRT-D implantation post-procedure due to new LBBB and prolonged HV interval. Recent device check with stable device and lead parameters.    Mr. Esparza is describing chronotropic incompetence at today's visit in the context of cardiac rehab. I have turned on the patient's rate response to afford higher HRs while exerting himself.    Plan is for regular device checks and to see me again in 1 year.     Thank you for allowing me to participate in the care of this patient. Please do not hesitate to call me with any questions or concerns.

## 2019-11-20 ENCOUNTER — TELEPHONE (OUTPATIENT)
Dept: CARDIOTHORACIC SURGERY | Facility: CLINIC | Age: 69
End: 2019-11-20

## 2019-11-25 ENCOUNTER — TELEPHONE (OUTPATIENT)
Dept: DERMATOLOGY | Facility: CLINIC | Age: 69
End: 2019-11-25

## 2019-11-25 NOTE — TELEPHONE ENCOUNTER
----- Message from Jenny Cortez sent at 11/25/2019 10:34 AM CST -----  Contact: Wife Padmaja  Patients wife just wanted to talk to Pippa to thank her and the whole group for everything that you did while he was a patient there.  Call Padmaja back at [phone] (home).  Thank s

## 2019-11-25 NOTE — TELEPHONE ENCOUNTER
Patient wife called to say that Mr. Esparza passed away last Monday. And wanted to tell us thank you for everything we have done for him. I told her that I was so sorry for her loss and would be saying prayers for her and the family.

## 2019-12-06 ENCOUNTER — CLINICAL SUPPORT (OUTPATIENT)
Dept: CARDIOLOGY | Facility: HOSPITAL | Age: 69
End: 2019-12-06
Payer: MEDICARE

## 2019-12-06 DIAGNOSIS — Z95.810 ICD (IMPLANTABLE CARDIOVERTER-DEFIBRILLATOR), BIVENTRICULAR, IN SITU: ICD-10-CM

## 2019-12-06 PROCEDURE — 93296 REM INTERROG EVL PM/IDS: CPT | Performed by: INTERNAL MEDICINE

## 2019-12-21 NOTE — PT/OT/SLP DISCHARGE
Occupational Therapy Discharge Summary    Tom Gage  MRN: [id number]   Principal Problem: Pleural effusion on right      Patient Discharged from acute Occupational Therapy on 12/21/19.  Please refer to prior OT note dated 7/14/18 for functional status.    Assessment:      Patient has not met goals.    Objective:     GOALS:   Multidisciplinary Problems     Occupational Therapy Goals     Not on file          Multidisciplinary Problems (Resolved)        Problem: Occupational Therapy Goal    Goal Priority Disciplines Outcome Interventions   Occupational Therapy Goal   (Resolved)     OT, PT/OT Met    Description:  Goals to be met by: 7/22/18     Patient will increase functional independence with ADLs by performing:    UE Dressing with Modified [place]. Not met  LE Dressing with Modified [place]. Not met  Grooming while standing with Supervision. Not met  Toileting from toilet with Supervision for hygiene and clothing management. Not met  Rolling to Bilateral with Modified [place]. Not met  Supine to sit with Modified [place]. Not met  Stand pivot transfers with Modified [place]. Not met                       Reasons for Discontinuation of Therapy Services  Transfer to alternate level of care.      Plan:     Patient Discharged to: Home with Home Health Service    JEREL Vega  12/21/2019

## 2019-12-21 NOTE — PLAN OF CARE
Problem: Occupational Therapy Goal  Goal: Occupational Therapy Goal  Description  Goals to be met by: 7/22/18     Patient will increase functional independence with ADLs by performing:    UE Dressing with Modified [place]. Not met  LE Dressing with Modified [place]. Not met  Grooming while standing with Supervision. Not met  Toileting from toilet with Supervision for hygiene and clothing management. Not met  Rolling to Bilateral with Modified [place]. Not met  Supine to sit with Modified [place]. Not met  Stand pivot transfers with Modified [place]. Not met      Outcome: Met     No goals met. Hospital discharge.

## 2021-09-13 NOTE — LETTER
[facility] - General Surgery  [address]  [place] [address]  Phone: [phone] September 7, 2017      Felix Campbell MD  [address]  [place] [address]    Patient: Tom Gage   MR Number: [id number]   YOB: 1950   Date of Visit: 9/7/2017     Dear Dr. Campbell:    Thank you for referring Tom Gage to me for evaluation. Below are the relevant portions of my assessment and plan of care.    Patient presents with ESRD on hemo.     PLAN:   -  Removal PD     If you have questions, please do not hesitate to call me. I look forward to following Tom along with you.    Sincerely,      Owen Ríos MD   Section Head - General, Laparoscopic, Bariatric  Acute Care and Oncologic Surgery   - Surgical Weight Loss Program  [facility]    WSR/hayden    CC  MD Henok Lugo MD Kuntal P. Mohare, MD Hui Jin Kim, MD      No

## 2021-10-12 NOTE — TELEPHONE ENCOUNTER
Pt has a hx of bariatric surgery and was doing fine until having PD-losing nutrients, etc.    -Wanting to be seen by local bariatric team to be worked up and started on the vitamins.  -Explained to pt that i do not handle those appointments, but would get in touch with bariatric service.  Pt verbalizes understanding and will await their phone call.       Single

## 2021-10-13 NOTE — H&P
History and Physical   Hospital Medicine     Patient Name: Tom Gage  MRN:  [id number]  Hospital Medicine Team: [facility] [place] [facility]OSP MED C Eugenia Galvez MD  Date of Admission:  10/21/2018     Principal Problem:  Acute on chronic combined systolic and diastolic congestive heart failure   Primary Care Physician: Asael Poole MD      History of Present Illness:      Mr. Tom Gage is a 68 y.o. male with recent TAVR on 09/20/18, AICD 09/21/18, combined HF with 30% , recurrent/ chronic R sided pleural effusion (attributed to ESRD/ hypoalbuminemia, with intermittent thoracenteses), ESRD on HD (MWF), CAD s/p NSTEMI in 2012, hx gastric bypass in 2013, presented to  The ED on 10/21 with worsening SOB of breath over past several days. Patient also noted central chest heaviness/ chest tightness. Associated with SOB, pain did not radiate. He was also concerned about his AICD/ pacemaker firing as he had a different sensation in his chest. Was not shocked. No hypotension at home. Not on home O2. Had noted to me that he has had intermittent falls due to dysequilibrium (as in falling over while leaning over), BPs have not been low at home    Patient underwent R sided thoracentesis on 10/15/18 removing 2 L at [facility]; he has had to undergo intermittent thoracenteses for his chronic/ recurrent R sided pleural effusion that has not improved with HD or diuresis. Had extra session of HD on Tuesday  10/16. His usual schedule is [facility]. Underwent fistulogram with [facility] for some stenosis. Continues to make urine and is compliant with lasix 80 PO BID, however, UOP has been diminishing.     Patient with hx of severe aortic stenosis and is now s/p EvolutR TAVR 9/20/18 with post-op course complicated by LBBB qrs 168 in the setting of EF of 30% leading to ST Juade CRT-D placement. Patient underwent LHC  during his BAV which showed all of his vessels had luminal irregularities, ATA flows of 3. 2D echo on 9/21/2018 showed EF of  "35%, grade 2 diastolic dysfunction, Biatrial enlargement, S/P transcatheter AVR, Right pleural effusion.     In the ED, patient received nitro. Elevated BP at 190s/80s on presentation. EKG showed bi-v pacing.  Interrogation by cardiology in the ED showed "elevated A-lead capture threshold from prior at 1.875V, Capture thresholds for the v leads remain the same".  noted to have bilateral LE pitting edema, stable but significant R pleural effusion, trop is 0.66, BNP >4900. No fevers, chills, sick contacts recently.     Review of Systems   Constitutional: Negative for chills, fatigue, fever.   HENT: Negative for sore throat, trouble swallowing.    Eyes: Negative for photophobia, visual disturbance.   Respiratory: per HPI  Cardiovascular: per HPI  Gastrointestinal: Negative for abdominal pain, constipation, diarrhea, nausea, vomiting.   Endocrine: Negative for cold intolerance, heat intolerance.   Genitourinary: Negative for dysuria, frequency. continues to make urine  Musculoskeletal: Negative for arthralgias, myalgias.   Skin: Negative for rash, wound, erythema   Neurological: Negative for dizziness, syncope, weakness, light-headedness.   Psychiatric/Behavioral: Negative for confusion, hallucinations, anxiety  All other systems reviewed and are negative.      Past Medical History:   Past Medical History:   Diagnosis Date    AICD (automatic cardioverter/defibrillator) present     9/21/2018    Anemia of chronic renal failure, stage 5     BPH (benign prostatic hyperplasia)     CAD (coronary artery disease)     NSTEMI in 2012    CHF (congestive heart failure)     EF 30%    Chronic systolic heart failure 5/31/2018    Diastolic dysfunction 12/16/2016    Dilated cardiomyopathy 5/7/2018    ESRD (end stage renal disease) on dialysis     MWF    GERD (gastroesophageal reflux disease)     Hyperparathyroidism, secondary renal     Hypertension     Metabolic acidosis     MI (myocardial infarction) Feb 2012    " Non-rheumatic mitral regurgitation 5/31/2018    Nonrheumatic aortic valve stenosis 5/31/2018    Pleural effusion on right     Pulmonary hypertension 12/16/2016    S/P TAVR (transcatheter aortic valve replacement)     9/20/18    Status post gastric bypass for obesity     2013    Stenosis of aortic and mitral valves     Aortic stenosis    TIA (transient ischemic attack)     Type 2 diabetes mellitus with diabetic nephropathy     history/ before wt loss    Valvular regurgitation     mitral regurgitation       Past Surgical History:   Past Surgical History:   Procedure Laterality Date    ABDOMINAL SURGERY      PD catheter    AORTIC VALVE REPLACEMENT N/A 9/20/2018    Procedure: Replacement-valve-aortic;  Surgeon: Refugio Cooney MD;  Location: [facility] CATH LAB;  Service: Cardiology;  Laterality: N/A;    ASPIRATION-GROIN N/A 9/9/2016    Performed by [facility] Diagnostic Provider at [facility] OR    BASAL CELL CARCINOMA EXCISION Left Jan 2011    left forehead    BAV N/A 7/12/2018    Performed by Loyd Ulloa MD at [facility] CATH LAB    CARDIAC DEFIBRILLATOR PLACEMENT  09/21/2018    CARDIAC ELECTROPHYSIOLOGY STUDY N/A 9/21/2018    Procedure: CARDIAC ELECTROPHYSIOLOGY STUDY;  Surgeon: Theron Mcmanus MD;  Location: [facility] CATH LAB;  Service: Cardiology;  Laterality: N/A;  s/p TAVR, CM, EPS +/- BiV ICD, SJM, anes, GP    CARDIAC ELECTROPHYSIOLOGY STUDY N/A 9/21/2018    Performed by Theron Mcmanus MD at [facility] CATH LAB    CHOLECYSTECTOMY  July 2010    ELBOW SURGERY Left 3/18/14    anterior submuscular transposition, ulnar nerve    EYE SURGERY Bilateral     bilateral cataracts    GASTRECTOMY      GASTRIC BYPASS  May 2014    gastric sleeve  June 2013    Malfunctioned requiring bypass    HEART CATH-RIGHT Right 1/20/2017    Performed by Ron Bernstein Jr., MD at [facility] CATH LAB    HERNIA REPAIR      INSERTION-PERITONEAL DIALYSIS CATHETER-LAPAROSCOPIC N/A 3/28/2016    Performed by Owen Ríso MD at [facility] OR  2ND FLR    LAPAROSCOPY-DIAGNOSTIC N/A 3/28/2016    Performed by [facility] at [facility] OR 2ND FLR    REMOVAL-CATHETER-DIALYSIS-PERITONEAL N/A 9/20/2017    Performed by [facility] at [facility] OR 2ND FLR    REPAIR-HERNIA-LAPAROSCOPIC  3/28/2016    Performed by [facility]os [facility] at [facility] OR 2ND FLR    REPAIR-HERNIA-LAPAROSCOPIC-INGUINAL Bilateral 3/28/2016    Performed by Owen Ríos MD at [facility] OR 2ND FLR    Replacement-valve-aortic N/A 9/20/2018    Performed by Refugio Cooney MD at [facility] CATH LAB    REVISION PERITONEAL DIALYSIS CATHETER-LAPAROSCOPIC N/A 5/18/2016    Performed by Owen R[facility]os MD at [facility] OR 2ND FLR    ROTATOR CUFF REPAIR Left 2014    SHOULDER ARTHROSCOPY Left 11/18/14    RCR; glenoid labral repair; arch decompression       Social History:   Social History     Tobacco Use    Smoking status: Never Smoker    Smokeless tobacco: Never Used   Substance Use Topics    Alcohol use: No    Drug use: No       Family History:   Family History   Problem Relation Age of Onset    Lung cancer Mother         smoker    Diabetes Mother     Diabetes Father     Kidney disease Neg Hx     Hypertension Neg Hx     Coronary artery disease Neg Hx          Medications: Scheduled Meds:   calcitRIOL  0.5 mcg Oral Daily    carvedilol  25 mg Oral BID WM    furosemide  80 mg Intravenous BID    heparin (porcine)  5,000 Units Subcutaneous Q8H    losartan  50 mg Oral BID    sevelamer carbonate  1,600 mg Oral TID     Continuous Infusions:  PRN Meds:.acetaminophen, calcium carbonate, dextrose 50%, dextrose 50%, glucagon (human recombinant), glucose, glucose, hydrALAZINE, nitroGLYCERIN, promethazine, ramelteon, sodium chloride 0.9%    Allergies: Patient is allergic to asa [aspirin]; latex, natural rubber; ace inhibitors; adhesive; and morphine.    Physical Exam:     Vital Signs (Most Recent):  Temp: 98.6 °F (37 °C) (10/21/18 2327)  Pulse: 65 (10/21/18 2327)  Resp: 18  (10/21/18 2327)  BP: (!) 162/78 (10/21/18 2340)  SpO2: 98 % (10/21/18 2327) Vital Signs Range (Last 24H):  Temp:  [98.6 °F (37 °C)-98.7 °F (37.1 °C)]   Pulse:  [63-67]   Resp:  [18-20]   BP: (162-195)/(78-88)   SpO2:  [96 %-100 %]    Body mass index is 23.83 kg/m².     Physical Exam:  Constitutional: appears chronically ill,  non-distressed, not diaphoretic.   HENT: NC/AT, external ears normal, oropharynx clear, MMM w/o exudates.   Eyes: PERRL, EOMI, conjunctiva normal, no discharge b/l, no scleral icterus   Neck: normal ROM, supple, +JVD  CV: RRR, systolic murmur at sternal border, +2 peripheral pulses.  Pulmonary/Chest wall: Breathing comfortably on 2L satting 100%, absent BS at R lung base, crackles at R base and up to mid lungs bilat, no wheezes  GI: Soft, non-tender, mildly distended, (+) BS, (+) BM   Musculoskeletal: Normal ROM, 2+ pitting edema in bilat LE to the level of mid shin  Neurological: AAO x 4, CN II-XI in tact  Skin: warm, dry, (-) erythema, (-) rash, (+)pallor  Psych: normal mood and affect, normal behavior, thought content and judgement.  Vitals reviewed.    Labs:    Cardiac Enzymes: Ejection Fractions:    Recent Labs     10/21/18  1923   TROPONINI 0.661*   BNP >4900 2D echo 9/21/2018    1 - Moderately depressed left ventricular systolic function (EF 30-35%).     2 - Concentric hypertrophy.     3 - Impaired LV relaxation, elevated LAP (grade 2 diastolic dysfunction).     4 - Biatrial enlargement.     5 - Right ventricular enlargement with moderately depressed systolic function.     6 - S/P transcatheter AVR.     7 - Right pleural effusion.     8 - Increased central venous pressure.         Chemistries:   Recent Labs   Lab 10/21/18  1923      K 6.0*      CO2 25   BUN 41*   CREATININE 4.0*   CALCIUM 8.6*   PROT 6.0   BILITOT 0.9   ALKPHOS 143*   ALT 17   AST 38        CBC/Anemia Labs: Coags:    Recent Labs   Lab 10/21/18  1923   WBC 6.79   HGB 9.5*   HCT 31.8*      *   RDW  15.2*    No results for input(s): PT, INR, APTT in the last 168 hours.     Diagnostic Results:          Assessment and Plan:     Mr. Tom Gage is a 68 y.o. male with recent TAVR on 09/20/18, AICD 09/21/18, combined HF with 30% , recurrent/ chronic R sided pleural effusion (attributed to ESRD/ hypoalbuminemia, with intermittent thoracenteses), ESRD on HD (MWF), CAD s/p NSTEMI in 2012, hx gastric bypass in 2013, presented to  The ED on 10/21 with worsening SOB of breath over past several days, and found to be in ADHF    Active Hospital Problems    Diagnosis  POA    *Acute on chronic combined systolic and diastolic congestive heart failure [I50.43]  Yes     Priority: 1 - High    S/P TAVR (transcatheter aortic valve replacement) [Z95.2]  Not Applicable     Priority: 1 - High     9/20/18      Nodular calcific aortic valve stenosis [I35.0]  Yes     Priority: 1 - High    Acute respiratory failure with hypoxia [J96.01]  Yes     Priority: 2     Pleural effusion on right [J90]  Yes     Priority: 2     ESRD (end stage renal disease) [N18.6]  Yes     Priority: 3     Hyperkalemia [E87.5]  Yes     Priority: 3     Hypertensive urgency [I16.0]  Yes     Priority: 4     Hypertensive heart and kidney disease with heart failure [I13.0]  Yes     Priority: 4     Disorder of implantable defibrillator [T82.9XXA]  Yes    Moderate protein malnutrition [E44.0]  Yes    S/P gastric bypass [Z98.84]  Not Applicable    Anemia of chronic renal failure, stage 5 [N18.5, D63.1]  Yes     Chronic    Coronary artery disease involving native coronary artery of native heart without angina pectoris [I25.10]  Yes      Resolved Hospital Problems   No resolved problems to display.       Acute on chronic combined systolic and diastolic congestive heart failure  S/P TAVR (transcatheter aortic valve replacement)  Nodular calcific aortic valve stenosis   - volume overload, + JVD, BNP of >4900, s/p tAVR on 9/20/18  - low NA diet and 1200 cc  "fluid restriction . Strict I/Os  - lasix IV 80 BID  - nephrology consulted for HD - needs HD on 10/22 for volume removal  - control BP as below  - repeat 2D echo in the AM given recent TAVR    Acute respiratory failure with hypoxia   Pleural effusion on right  - chronic and recurrent, thought to be 2/2 HF and ESRD as well as malnutrition  - on 2L O2, satting 100%  - diuresis with 80IV lasix BID  - Pulmonary Medicine consulted for thoracentesis. Took plavix on 10/20  - holding plavix  - little suspicion for infection at this time, so holding any abx    Hypertensive urgency   Hypertensive heart and kidney disease with heart failure  - BP of 190s/80s on presentation  - cont home coreg 25 BID, losartan 50 BID (K shifted in the ED, however, may need to adjust ARB therapy if Hyper-K recurs)  - lasix as above  - may consider long acting nitrate if BP continues to be elevated  - hydralazine prn right now    ESRD (end stage renal disease)   Hyperkalemia   - ESRD HD MWF  - cont home calcitriol and sevelamer   - nephrology consulted for HD - needs HD on 10/22 for volume removal  - Hyper-K+ of 6.0 on admit, shifted with insulin in the ED  - f/u renal function panel in the AM  - initiated on lasix which should augment hyper-K+    Disorder of implantable defibrillator   - seen by cardiology in the ED. Noted "Atrial lead capture threshold increased from prior 0.75 V at 0.50 ms to now 1.875V at 0.5 ms"  - EP consulted in the AM  - cardiac monitoring    Moderate protein malnutrition  S/P gastric bypass  - boost TID    Anemia of chronic renal failure, stage 5  - hbg of 9.5 on admit, which baseline  - monitor    Elevated troponin  - trop of 0.66 likely 2/2 ADHF in setting of ESRD  - repeat in the AM to ensure down trend    Coronary artery disease involving native coronary artery of native heart without angina pectoris   - not on statin as lipid panel is WNL (much below the goal)  - not on ASA as  to retinal hemorrhages  - on " (2) Male plavix at home, last NSTEMI in 2012, C in 9/2018 showed diffuse luminal abnormalities   - holding plavix for thoracentesis at this time  - pRN nitro     Diet:  Low Na with 1200cc fl restriction   GI PPx:  n/a  DVT PPx:  Heparin TID - pt usually declines   Goals of Care:  Admitted    PT OT ordered. EP, Nephro, and pulmonary consults     High Risk Conditions:  Patient has a condition that poses threat to life and bodily function: Severe Respiratory Distress and ADHF     Signing Physician:     Eugenia Galvez MD  Department of [facility]   [facility]  Pager [phone]  Spectra [phone]  10/22/2018

## 2024-02-23 NOTE — PROGRESS NOTES
NML thiamin Detail Level: Simple Have You Ever Had A Finger Or Toe That Was Completely Swollen And Painful For No Apparent Reason?: No Positive Screening Text: A score of 3 or greater is considered a positive PEST score. Negative Screening Text: A score of less than 3 is considered a negative PEST score. Has A Doctor Ever Told You That You Have Arthritis?: Yes

## 2024-07-31 NOTE — CONSULTS
[facility]  Pulmonology  Consult Note    Patient Name: Tom Gage  MRN: [id number]  Admission Date: 7/10/2018  Hospital Length of Stay: 1 days  Code Status: Full Code  Attending Physician: Kimmy Mann MD  Primary Care Provider: Asael Poole MD   Principal Problem: Pleural effusion    Inpatient consult to Pulmonology  Consult performed by: MAIKEL DUMONT  Consult ordered by: KIMMY MANN        Subjective:     HPI:  Mr. Gage is a 68 year old white male with past medical history of CAD, CHF (EF 30-35%), ESRD on HD via left AVF, anemia of chronic kidney disease, severe AS, MR, TIA and DMII, who presents from [place] for acute on chronic combined HF thought possibly worsened by AoV disease (stenosis).  He has recurrent right sided pleural effusions that have been occurring since at [facility] 1/2016 per our records.  His first echo in record, 12/2016, showed mild AS at that time.  He presents again with CHF decompensation and recurrent right- sided pleural effusion.  Pulmonary medicine consulted for evaluation and thoracentesis of effusion.    Past Medical History:   Diagnosis Date    Anemia of chronic renal failure, stage 5     BPH (benign prostatic hyperplasia)     CAD (coronary artery disease)     CHF (congestive heart failure)     Chronic systolic heart failure 5/31/2018    CKD (chronic kidney disease) stage 5, GFR less than 15 ml/min     Diastolic dysfunction 12/16/2016    Dilated cardiomyopathy 5/7/2018    GERD (gastroesophageal reflux disease)     Hyperparathyroidism, secondary renal     Hypertension     Metabolic acidosis     MI (myocardial infarction) Feb 2012    Non-rheumatic mitral regurgitation 5/31/2018    Nonrheumatic aortic valve stenosis 5/31/2018    Pulmonary hypertension 12/16/2016    Stenosis of aortic and mitral valves     Aortic stenosis    TIA (transient ischemic attack)     Type 2 diabetes mellitus with diabetic nephropathy     history/  "before wt loss    Valvular regurgitation     mitral regurgitation       Past Surgical History:   Procedure Laterality Date    ABDOMINAL SURGERY      PD catheter    BASAL CELL CARCINOMA EXCISION Left Jan 2011    left forehead    CHOLECYSTECTOMY  July 2010    ELBOW SURGERY Left 3/18/14    anterior submuscular transposition, ulnar nerve    EYE SURGERY Bilateral     bilateral cataracts    GASTRECTOMY      GASTRIC BYPASS  May 2014    gastric sleeve  June 2013    Malfunctioned requiring bypass    HERNIA REPAIR      ROTATOR CUFF REPAIR Left 2014    SHOULDER ARTHROSCOPY Left 11/18/14    RCR; glenoid labral repair; arch decompression       Review of patient's allergies indicates:   Allergen Reactions    Latex, natural rubber Rash and Blisters     Latex tape causes blisters    Ace inhibitors Other (See Comments)     Other reaction(s): Cough    Adhesive Dermatitis and Blisters     Please use Paper Tape    Morphine Hives, Itching, Dermatitis and Rash     Body Rash, Phlebitis, "My veins showed through the skin."    Iodine and iodide containing products      IVP dye       Family History     Problem Relation (Age of Onset)    Diabetes Mother, Father    Lung cancer Mother        Social History Main Topics    Smoking status: Never Smoker    Smokeless tobacco: Never Used    Alcohol use No    Drug use: No    Sexual activity: Yes     Partners: Female         Review of Systems   Constitutional: Positive for activity change and fatigue. Negative for appetite change, diaphoresis and fever.   HENT: Negative for trouble swallowing.    Respiratory: Positive for cough and shortness of breath. Negative for apnea, choking, wheezing and stridor.    Cardiovascular: Positive for leg swelling. Negative for chest pain and palpitations.   Gastrointestinal: Negative for abdominal distention, abdominal pain, constipation and diarrhea.   Psychiatric/Behavioral: Negative for agitation.     Objective:     Vital Signs (Most " Recent):  Temp: 97.6 °F (36.4 °C) (07/11/18 0811)  Pulse: 63 (07/11/18 1015)  Resp: 18 (07/11/18 0811)  BP: (!) 153/69 (07/11/18 1015)  SpO2: (!) 93 % (07/11/18 0710) Vital Signs (24h Range):  Temp:  [96.8 °F (36 °C)-98.1 °F (36.7 °C)] 97.6 °F (36.4 °C)  Pulse:  [55-70] 63  Resp:  [16-20] 18  SpO2:  [92 %-96 %] 93 %  BP: (121-167)/(56-82) 153/69     Weight: 71.4 kg (157 lb 6.5 oz)  Body mass index is 20.21 kg/m².      Intake/Output Summary (Last 24 hours) at 07/11/18 1034  Last data filed at 07/11/18 0600   Gross per 24 hour   Intake              361 ml   Output                0 ml   Net              361 ml       Physical Exam   Constitutional: He is oriented to person, place, and time. He appears well-nourished. No distress.   HENT:   Head: Normocephalic and atraumatic.   Nose: Nose normal.   Eyes: EOM are normal. Pupils are equal, round, and reactive to light.   Neck: Normal range of motion. Neck supple.   Cardiovascular: Normal rate and regular rhythm.  Exam reveals no gallop and no friction rub.    Murmur heard.  Pulmonary/Chest: No stridor. No respiratory distress. He has no wheezes. He exhibits no tenderness.   Decreased breath sounds on right lower lobe with  Crackles in RML.  Crackles at left lung base.  Air entry otherwise equal     Abdominal: Soft. Bowel sounds are normal. He exhibits no distension. There is no tenderness.   Neurological: He is alert and oriented to person, place, and time.   Skin: Skin is warm and dry. He is not diaphoretic.   Psychiatric: He has a normal mood and affect. His behavior is normal. Judgment and thought content normal.       Vents:       Lines/Drains/Airways     Drain                 Hemodialysis AV Fistula Left upper arm -- days          Peripheral Intravenous Line                 Peripheral IV - Single Lumen 07/10/18 Right Forearm 1 day                Significant Labs:    CBC/Anemia Profile:    Recent Labs  Lab 07/10/18  1446 07/11/18  0423   WBC 11.29 12.07   HGB 10.2*  9.5*   HCT 32.6* 29.4*    222   * 101*   RDW 14.8* 14.4        Chemistries:    Recent Labs  Lab 07/10/18  1446 07/11/18  0423   * 135*   K 3.9 3.9    104   CO2 24 23   BUN 33* 42*   CREATININE 4.3* 4.8*   CALCIUM 8.4* 8.3*   ALBUMIN 2.3*  --    PROT 5.9*  --    BILITOT 0.9  --    ALKPHOS 117  --    ALT 7*  --    AST 16  --    MG  --  1.9   PHOS  --  3.5       Recent Lab Results       07/11/18  0423 07/10/18  1446      Immature Granulocytes 2.2(H) 2.0(H)     Immature Grans (Abs) 0.27  Comment:  Mild elevation in immature granulocytes is non specific and   can be seen in a variety of conditions including stress response,   acute inflammation, trauma and pregnancy. Correlation with other   laboratory and clinical findings is essential.  (H) 0.23  Comment:  Mild elevation in immature granulocytes is non specific and   can be seen in a variety of conditions including stress response,   acute inflammation, trauma and pregnancy. Correlation with other   laboratory and clinical findings is essential.  (H)     Albumin  2.3(L)     Alkaline Phosphatase  117     ALT  7(L)     Anion Gap 8 8     AST  16     Baso # 0.05 0.04     Basophil% 0.4 0.4     Total Bilirubin  0.9  Comment:  For infants and newborns, interpretation of results should be based  on gestational age, weight and in agreement with clinical  observations.  Premature Infant recommended reference ranges:  Up to 24 hours.............<8.0 mg/dL  Up to 48 hours............<12.0 mg/dL  3-5 days..................<15.0 mg/dL  6-29 days.................<15.0 mg/dL       BUN, Bld 42(H) 33(H)     Calcium 8.3(L) 8.4(L)     Chloride 104 103     CO2 23 24     Creatinine 4.8(H) 4.3(H)     Differential Method Automated Automated     eGFR if  13.5(A) 15.6(A)     eGFR if non  11.7  Comment:  Calculation used to obtain the estimated glomerular filtration  rate (eGFR) is the CKD-EPI equation.   (A) 13.5  Comment:  Calculation  used to obtain the estimated glomerular filtration  rate (eGFR) is the CKD-EPI equation.   (A)     Eos # 0.2 0.1     Eosinophil% 1.6 1.2     Estimated Avg Glucose 103      Glucose 73 141(H)     Gran # (ANC) 10.4(H) 10.0(H)     Gran% 86.4(H) 88.2(H)     Hematocrit 29.4(L) 32.6(L)     Hemoglobin 9.5(L) 10.2(L)     Hemoglobin A1C 5.2  Comment:  ADA Screening Guidelines:  5.7-6.4%  Consistent with prediabetes  >or=6.5%  Consistent with diabetes  High levels of fetal hemoglobin interfere with the HbA1C  assay. Heterozygous hemoglobin variants (HbS, HgC, etc)do  not significantly interfere with this assay.   However, presence of multiple variants may affect accuracy.        Coumadin Monitoring INR 1.1  Comment:  Coumadin Therapy:  2.0 - 3.0 for INR for all indicators except mechanical heart valves  and antiphospholipid syndromes which should use 2.5 - 3.5.        Lymph # 0.4(L) 0.3(L)     Lymph% 3.1(L) 2.6(L)     Magnesium 1.9      MCH 32.6(H) 32.7(H)     MCHC 32.3 31.3(L)     (H) 105(H)     Mono # 0.8 0.6     Mono% 6.3 5.6     MPV 9.4 9.2     nRBC 0 0     Phosphorus 3.5      Platelets 222 208     Potassium 3.9 3.9     Total Protein  5.9(L)     Protime 11.1      RBC 2.91(L) 3.12(L)     RDW 14.4 14.8(H)     Sodium 135(L) 135(L)     WBC 12.07 11.29           Significant Imaging:   I have reviewed all pertinent imaging results/findings within the past 24 hours.    Assessment/Plan:     * Pleural effusion    Recurrent pleural effusion since 2016.     - will perform thoracentesis this afternoon.  - will send fluid for appropriate labs.  Please ensure serum LDH is ordered at some point today.  - patients dyspnea and pleural effusions pre-date his moderate-severe AS.    - will discuss further with patient regarding more permanent options (VATS vs Pleurx catheter) based on results following thoracentesis and possible re-accumulation.              Thank you for your consult. I will follow-up with patient. Please contact us  if you have any additional questions.     Kevin Joyner MD  Pulmonology  [facility]     glasses

## (undated) DEVICE — ADHESIVE DERMABOND ADVANCED

## (undated) DEVICE — SUT 0 VICRYL / UR6 (J603)

## (undated) DEVICE — ELECTRODE REM PLYHSV RETURN 9

## (undated) DEVICE — SUT COATED VICRYL 4/0 27IN

## (undated) DEVICE — SEE MEDLINE ITEM 157148

## (undated) DEVICE — NDL HYPO REG 25G X 1 1/2

## (undated) DEVICE — DRESSING TELFA PAD N ADH 2X3

## (undated) DEVICE — TRAY MINOR GEN SURG

## (undated) DEVICE — GOWN SURGICAL X-LARGE

## (undated) DEVICE — TAPE SURG MICROPORE 2 SGL USE